# Patient Record
Sex: MALE | Race: WHITE | NOT HISPANIC OR LATINO | URBAN - METROPOLITAN AREA
[De-identification: names, ages, dates, MRNs, and addresses within clinical notes are randomized per-mention and may not be internally consistent; named-entity substitution may affect disease eponyms.]

---

## 2018-03-15 ENCOUNTER — EMERGENCY (EMERGENCY)
Facility: HOSPITAL | Age: 38
LOS: 1 days | Discharge: HOME | End: 2018-03-15
Attending: EMERGENCY MEDICINE | Admitting: EMERGENCY MEDICINE
Payer: MEDICAID

## 2018-03-15 VITALS
HEART RATE: 74 BPM | OXYGEN SATURATION: 100 % | SYSTOLIC BLOOD PRESSURE: 140 MMHG | TEMPERATURE: 98 F | DIASTOLIC BLOOD PRESSURE: 85 MMHG | RESPIRATION RATE: 18 BRPM | WEIGHT: 240.08 LBS | HEIGHT: 74 IN

## 2018-03-15 VITALS
HEART RATE: 65 BPM | SYSTOLIC BLOOD PRESSURE: 121 MMHG | RESPIRATION RATE: 18 BRPM | OXYGEN SATURATION: 100 % | DIASTOLIC BLOOD PRESSURE: 78 MMHG | TEMPERATURE: 98 F

## 2018-03-15 DIAGNOSIS — R69 ILLNESS, UNSPECIFIED: ICD-10-CM

## 2018-03-15 DIAGNOSIS — F19.980 OTHER PSYCHOACTIVE SUBSTANCE USE, UNSPECIFIED WITH PSYCHOACTIVE SUBSTANCE-INDUCED ANXIETY DISORDER: ICD-10-CM

## 2018-03-15 LAB
ALBUMIN SERPL ELPH-MCNC: 4.1 G/DL — SIGNIFICANT CHANGE UP (ref 3.3–5)
ALP SERPL-CCNC: 60 U/L — SIGNIFICANT CHANGE UP (ref 40–120)
ALT FLD-CCNC: 20 U/L — SIGNIFICANT CHANGE UP (ref 12–78)
ANION GAP SERPL CALC-SCNC: 6 MMOL/L — SIGNIFICANT CHANGE UP (ref 5–17)
APAP SERPL-MCNC: <2 UG/ML — LOW (ref 10–30)
APPEARANCE UR: CLEAR — SIGNIFICANT CHANGE UP
AST SERPL-CCNC: 18 U/L — SIGNIFICANT CHANGE UP (ref 15–37)
BASOPHILS # BLD AUTO: 0.1 K/UL — SIGNIFICANT CHANGE UP (ref 0–0.2)
BASOPHILS NFR BLD AUTO: 1.3 % — SIGNIFICANT CHANGE UP (ref 0–2)
BILIRUB SERPL-MCNC: 0.4 MG/DL — SIGNIFICANT CHANGE UP (ref 0.2–1.2)
BILIRUB UR-MCNC: NEGATIVE — SIGNIFICANT CHANGE UP
BUN SERPL-MCNC: 8 MG/DL — SIGNIFICANT CHANGE UP (ref 7–23)
CALCIUM SERPL-MCNC: 8.8 MG/DL — SIGNIFICANT CHANGE UP (ref 8.5–10.1)
CHLORIDE SERPL-SCNC: 106 MMOL/L — SIGNIFICANT CHANGE UP (ref 96–108)
CO2 SERPL-SCNC: 26 MMOL/L — SIGNIFICANT CHANGE UP (ref 22–31)
COLOR SPEC: SIGNIFICANT CHANGE UP
CREAT SERPL-MCNC: 1 MG/DL — SIGNIFICANT CHANGE UP (ref 0.5–1.3)
DIFF PNL FLD: NEGATIVE — SIGNIFICANT CHANGE UP
EOSINOPHIL # BLD AUTO: 0.2 K/UL — SIGNIFICANT CHANGE UP (ref 0–0.5)
EOSINOPHIL NFR BLD AUTO: 2.1 % — SIGNIFICANT CHANGE UP (ref 0–6)
GLUCOSE SERPL-MCNC: 85 MG/DL — SIGNIFICANT CHANGE UP (ref 70–99)
GLUCOSE UR QL: NEGATIVE — SIGNIFICANT CHANGE UP
HCT VFR BLD CALC: 40.8 % — SIGNIFICANT CHANGE UP (ref 39–50)
HGB BLD-MCNC: 13.9 G/DL — SIGNIFICANT CHANGE UP (ref 13–17)
KETONES UR-MCNC: NEGATIVE — SIGNIFICANT CHANGE UP
LEUKOCYTE ESTERASE UR-ACNC: NEGATIVE — SIGNIFICANT CHANGE UP
LIDOCAIN IGE QN: 127 U/L — SIGNIFICANT CHANGE UP (ref 73–393)
LYMPHOCYTES # BLD AUTO: 2.1 K/UL — SIGNIFICANT CHANGE UP (ref 1–3.3)
LYMPHOCYTES # BLD AUTO: 28.4 % — SIGNIFICANT CHANGE UP (ref 13–44)
MCHC RBC-ENTMCNC: 29.9 PG — SIGNIFICANT CHANGE UP (ref 27–34)
MCHC RBC-ENTMCNC: 34.1 GM/DL — SIGNIFICANT CHANGE UP (ref 32–36)
MCV RBC AUTO: 87.9 FL — SIGNIFICANT CHANGE UP (ref 80–100)
MONOCYTES # BLD AUTO: 0.7 K/UL — SIGNIFICANT CHANGE UP (ref 0–0.9)
MONOCYTES NFR BLD AUTO: 9.7 % — HIGH (ref 1–9)
NEUTROPHILS # BLD AUTO: 4.2 K/UL — SIGNIFICANT CHANGE UP (ref 1.8–7.4)
NEUTROPHILS NFR BLD AUTO: 58.4 % — SIGNIFICANT CHANGE UP (ref 43–77)
NITRITE UR-MCNC: NEGATIVE — SIGNIFICANT CHANGE UP
PCP SPEC-MCNC: SIGNIFICANT CHANGE UP
PH UR: 7 — SIGNIFICANT CHANGE UP (ref 5–8)
PLATELET # BLD AUTO: 305 K/UL — SIGNIFICANT CHANGE UP (ref 150–400)
POTASSIUM SERPL-MCNC: 4.2 MMOL/L — SIGNIFICANT CHANGE UP (ref 3.5–5.3)
POTASSIUM SERPL-SCNC: 4.2 MMOL/L — SIGNIFICANT CHANGE UP (ref 3.5–5.3)
PROT SERPL-MCNC: 8.2 G/DL — SIGNIFICANT CHANGE UP (ref 6–8.3)
PROT UR-MCNC: NEGATIVE — SIGNIFICANT CHANGE UP
RBC # BLD: 4.64 M/UL — SIGNIFICANT CHANGE UP (ref 4.2–5.8)
RBC # FLD: 11.8 % — SIGNIFICANT CHANGE UP (ref 10.3–14.5)
SALICYLATES SERPL-MCNC: 1.8 MG/DL — LOW (ref 2.8–20)
SODIUM SERPL-SCNC: 138 MMOL/L — SIGNIFICANT CHANGE UP (ref 135–145)
SP GR SPEC: 1 — LOW (ref 1.01–1.02)
UROBILINOGEN FLD QL: NEGATIVE — SIGNIFICANT CHANGE UP
WBC # BLD: 7.2 K/UL — SIGNIFICANT CHANGE UP (ref 3.8–10.5)
WBC # FLD AUTO: 7.2 K/UL — SIGNIFICANT CHANGE UP (ref 3.8–10.5)

## 2018-03-15 PROCEDURE — 80053 COMPREHEN METABOLIC PANEL: CPT

## 2018-03-15 PROCEDURE — 90792 PSYCH DIAG EVAL W/MED SRVCS: CPT | Mod: GT

## 2018-03-15 PROCEDURE — 81003 URINALYSIS AUTO W/O SCOPE: CPT

## 2018-03-15 PROCEDURE — 85027 COMPLETE CBC AUTOMATED: CPT

## 2018-03-15 PROCEDURE — 83690 ASSAY OF LIPASE: CPT

## 2018-03-15 PROCEDURE — 87086 URINE CULTURE/COLONY COUNT: CPT

## 2018-03-15 PROCEDURE — 99284 EMERGENCY DEPT VISIT MOD MDM: CPT

## 2018-03-15 PROCEDURE — 93005 ELECTROCARDIOGRAM TRACING: CPT

## 2018-03-15 PROCEDURE — 80307 DRUG TEST PRSMV CHEM ANLYZR: CPT

## 2018-03-15 RX ORDER — SODIUM CHLORIDE 9 MG/ML
3 INJECTION INTRAMUSCULAR; INTRAVENOUS; SUBCUTANEOUS ONCE
Qty: 0 | Refills: 0 | Status: COMPLETED | OUTPATIENT
Start: 2018-03-15 | End: 2018-03-15

## 2018-03-15 RX ORDER — CLONAZEPAM 1 MG
2 TABLET ORAL ONCE
Qty: 0 | Refills: 0 | Status: DISCONTINUED | OUTPATIENT
Start: 2018-03-15 | End: 2018-03-15

## 2018-03-15 RX ADMIN — Medication 2 MILLIGRAM(S): at 13:30

## 2018-03-15 RX ADMIN — SODIUM CHLORIDE 3 MILLILITER(S): 9 INJECTION INTRAMUSCULAR; INTRAVENOUS; SUBCUTANEOUS at 13:24

## 2018-03-15 NOTE — ED ADULT NURSE NOTE - OBJECTIVE STATEMENT
Pt. A&Ox4, brought in via EMS from therapist office for snorting crushed Wellbutrin. Pt. anxious but cooperative at bedside. Denies any pain. States he has been snorting it for the past few days and has been more anxious since. Denies any SI/HI or hallucinations. Smokes weed almost daily and cigarettes.  Labs drawn and sent as ordered. VSS in triage. MD at bedside for eval. Will continue to monitor.

## 2018-03-15 NOTE — ED PROVIDER NOTE - OBJECTIVE STATEMENT
Pt is a 38 yo male who presents to the ED with a cc of depression.  PMHx of anxiety, depression, HTN, substance abuse.  Pt follows with [facility].  He reports that for the last 2 days he has been crushing and snorting his Welbutrin.  When questioned Pt is a 36 yo male who presents to the ED with a cc of depression.  PMHx of anxiety, depression, HTN, substance abuse (heroin, THC, prescription medications).  Pt follows with [facility].  He reports that for the last 2 days he has been crushing and snorting his Wellbutrin.  When questioned about what made him do this pt reports that he had been feeling increasing anxiety and depression over the last several weeks.  He was concerned that his medication regime might not be working.  He spoke with a friend regarding this and was told that his friend crushed and snorted his Wellbutrin and he thought that this made it work better.  Pt reports that he was only attempting this to help himself and that he was not trying to harm himself.  Since beginning to snort the medication he reports worsening anxiety, states that he is not sleeping, and he just does not feel like himself (reports that he snorted the prescription several times yesterday but only once today and did not take more then his normal prescription).  Today while at his counseling session he reports that he felt like he was going to die so he spoke with his therapist and told them what he was doing.  EMS was called and pt was taken to the ED.  Denies SI/HI.  Pt has no previous history of suicide attempts but does have one prior inpatient admission to [facility] at the age of 24 for depression.  He follows regularly with a psychiatrist but states that he just does not think the medication is helping him anymore.  Pt reports that he wants to feel better.  Denies fever, chills, N/V, CP, SOB, abd pain, ext numbness or weakness.

## 2018-03-15 NOTE — ED BEHAVIORAL HEALTH ASSESSMENT NOTE - REFERRAL / APPOINTMENT DETAILS
Return to [facility]; provided referrals for substance abuse/psychiatric treatment

## 2018-03-15 NOTE — ED PROVIDER NOTE - CARE PLAN
Principal Discharge DX:	Depression  Assessment and plan of treatment:	Return to the ED for any new or worsening symptoms  Take your medication as prescribed  Follow up with your PMD in 2-3 days for a recheck   Advance activity as tolerated  Secondary Diagnosis:	Anxiety  Secondary Diagnosis:	Substance abuse

## 2018-03-15 NOTE — ED PROVIDER NOTE - PROGRESS NOTE DETAILS
Pt medically cleared.  Seen by telepsych and cleared for discharge home with outpatient follow up.  There is concern about the therapy and medication regime pt is under and it is recommended that he seek a different psychiatrist at this time.  Pt is in agreement.  Referrals provided.  All questions answered, father at bedside, copy of results provided.  Pt discharged home with outpatient follow up

## 2018-03-15 NOTE — ED ADULT NURSE REASSESSMENT NOTE - NS ED NURSE REASSESS COMMENT FT1
Pt. spoke with MD on telepysch with ED tech at bedside. ER MD waiting for call to discuss plan. Denies any pain or other discomforts at this time. VSS. Will continue to monitor.

## 2018-03-15 NOTE — ED ADULT TRIAGE NOTE - CHIEF COMPLAINT QUOTE
Patient was at [facility] and told therapists he crushed his wellbutrin and snorted it. Hx of substance abuse.

## 2018-03-15 NOTE — ED BEHAVIORAL HEALTH ASSESSMENT NOTE - CASE SUMMARY
see above; 36 y/o M with psychiatric history significant for both substance and psychiatric pathology, presenting s/p medication reaction due to misuse, now resolved. Stable for outpatient level of care. No SI/HI/I/P. Family corroborates. Safe for d/c.

## 2018-03-15 NOTE — ED BEHAVIORAL HEALTH ASSESSMENT NOTE - RISK ASSESSMENT
Protective factors: Responsibility to family and others, Identifies reasons for living, Future oriented, Supportive social network or family, Fear of death or dying due to pain/suffering, High spirituality, Engaged in work or school, Positive therapeutic relationships, Ability to cope with stress  Protective factors: Substance abuse/dependence, Access to means (pills, firearms, etc.)  Assessment: Low risk

## 2018-03-15 NOTE — ED BEHAVIORAL HEALTH ASSESSMENT NOTE - OTHER PAST PSYCHIATRIC HISTORY (INCLUDE DETAILS REGARDING ONSET, COURSE OF ILLNESS, INPATIENT/OUTPATIENT TREATMENT)
hospitalized at [facility] in 2005, currently in treatment at [facility], psychiatrist is Dr. Thomas    Multiple rehab and detox programs

## 2018-03-15 NOTE — ED PROVIDER NOTE - CONSTITUTIONAL, MLM
normal... Well appearing, well nourished, awake, alert, oriented to person, place, time/situation Well appearing, well nourished, awake, alert, oriented to person, place, time/situation anxious at bedside but cooperative

## 2018-03-15 NOTE — ED BEHAVIORAL HEALTH ASSESSMENT NOTE - DESCRIPTION
LEISA Lives in [facility] in [place] alone, paid for by father who is a retired  calm and cooperative in ED    ICU Vital Signs Last 24 Hrs  T(C): 36.9 (15 Mar 2018 16:26), Max: 36.9 (15 Mar 2018 16:26)  T(F): 98.4 (15 Mar 2018 16:26), Max: 98.4 (15 Mar 2018 16:26)  HR: 65 (15 Mar 2018 16:26) (65 - 74)  BP: 121/78 (15 Mar 2018 16:26) (121/78 - 140/85)  BP(mean): --  ABP: --  ABP(mean): --  RR: 18 (15 Mar 2018 16:26) (18 - 18)  SpO2: 100% (15 Mar 2018 16:26) (100% - 100%)

## 2018-03-15 NOTE — ED BEHAVIORAL HEALTH ASSESSMENT NOTE - SUICIDE PROTECTIVE FACTORS
Responsibility to family and others/High spirituality/Engaged in work or school/Positive therapeutic relationships/Ability to cope with stress/Identifies reasons for living/Future oriented/Fear of death or dying due to pain/suffering/Supportive social network or family

## 2018-03-15 NOTE — ED BEHAVIORAL HEALTH ASSESSMENT NOTE - SUMMARY
This 36 y/o M is presenting to the [facility] ED with anxiety in the context of having taken his wellbutrin in a non-prescribed manner (snorting). The patient denies suicidal ideation, homicidal ideation, and AVH, has numerous protective factors (future orientation, motivated for treatment, hopefulness, supportive family, etc), and is not seeking admission. He is not an imminent or substantial danger to himself or others and does not meet criteria for inpatient psychiatric hospitalization. He would be best served by seeking treatment for his substance use issues and, considering the questionable nature of his current pharmacologic regimen, consider alternative mental health treatment more in line with his needs. This 38 y/o M is presenting to the [facility] ED with anxiety in the context of having taken his wellbutrin in a non-prescribed manner (snorting), which resulted in significant anxiety. This has now resolved and patient is motivated to seek ongoing outpatient care with his current providers, although he is considering a new psychiatrist/dual dx treatment center. The patient denies suicidal ideation, homicidal ideation, and AVH, has numerous protective factors (future orientation, motivated for treatment, hopefulness, supportive family, etc), and is not seeking admission. He is not an imminent or substantial danger to himself or others and does not meet criteria for inpatient psychiatric hospitalization. He would be best served by seeking treatment for his substance use issues and, considering the questionable nature of his current pharmacologic regimen, consider alternative mental health treatment more in line with his needs.

## 2018-03-16 LAB
CULTURE RESULTS: NO GROWTH — SIGNIFICANT CHANGE UP
SPECIMEN SOURCE: SIGNIFICANT CHANGE UP

## 2018-05-25 NOTE — ED BEHAVIORAL HEALTH ASSESSMENT NOTE - JUDGMENT (REGARDING EVERYDAY EVENTS)
Shayy Medellin, RN, BSN      Fair

## 2021-01-12 NOTE — ED BEHAVIORAL HEALTH ASSESSMENT NOTE - HPI (INCLUDE ILLNESS QUALITY, SEVERITY, DURATION, TIMING, CONTEXT, MODIFYING FACTORS, ASSOCIATED SIGNS AND SYMPTOMS)
I have reviewed and discussed the case with the advanced practice clinician (Kaylee Hobbs PA-C).  I have spent face to face time with the patient and agree with the findings except where otherwise documented.     Brief summary:    55 year old female with PMH of obesity, COPD on 2.5 L O2 at home, CHF, HTN, depression, DMT2, and schizophrenia presents for a medical screening exam. Patient lives with her son who is currently a patient in the emergency department and anticipated to be hospitalized.  Patient is anxious that her son is not home with her, and therefore brought to emergency department for evaluation.    Patient feels significantly anxious and exhibits an anxious mood and affect on exam.    Patient denies any further acute physical symptoms.  Physical exam otherwise unremarkable.        MDM   Patient was seen by ED care manager who checked into patient's support at home.  Patient is followed by [facility] and is in contact with  almost daily.    Patient has an outpatient  through [facility].  A message was left with this individual, and plan is to arrange for home physical therapy.    Patient discharged home from ED.    Impression:  Acute anxiety  Medical screening exam      ED Course     ED Course as of Jan 11 2102   Mon Jan 11, 2021   2444 Dr. Prado saw patient, agrees patient does not need further emergent evaluation in ED. Ginger spoke extensively with patient who is agreeable to go home with continued check ins from  and her home health aids all already in place. Advised patient to return for any changes. Patient agrees to plan.    [OS]      ED Course User Index  [OS] SUDHEER Saldaña MD  01/11/21 5085     THIS REPRESENTS AN INCOMPLETE NOTE CURRENTLY BEING WRITTEN    Patient is a 37 year old single male; domiciled alone in an apartment in [facility]; noncaregiver; unemployed; PPHx of substance abuse (heroin, MJ, prescription pills, etc) no history of complicated withdrawal; hospitalized at least once before at [facility] at age 24; no known suicide attempts; no known history of violence or arrests; PMHx of HLD; presenting with anxiety; in the setting of having been snorting his Wellbutrin for the past few days.    Patient was interviewed alone via telehealth device, patient engaged well without any difficulties. He presented as calm, cooperative, and reliable. He states that he has a history of substance abuse, has been clean from heroin for ~12 years (on suboxone 4mg), and uses MJ frequently (although prior to most recent use had been clean for ~a month). He reports that he sees Dr. Thomas, is prescribed Klonopin and Xanax XR for anxiety, and Wellbutrin and effexor for depression and anxiety. He has felt the medications were not working, but before going back to Dr. Thomas, a "friend" he had made through AA/NA turned him onto the idea of crushing and snorting his Wellbutrin "to make it work better." He states he started doing this for a few days with both his own prescription and those obtained surreptitiously through his friend, however despite a brief immediate relief, he was left feeling dizzy and with increased anxiety after. He states he did this at his therapist's offive    The patient denies depression or other significant mood symtpoms.  Specifically, the patient denies manic sympotms, past and present.  The patient denies auditory or visual hallucinations, and no delusions could be elicited on direct questioning.  The patient denies suicidal idation, homicidal ideation, intent, or plan. THIS REPRESENTS AN INCOMPLETE NOTE CURRENTLY BEING WRITTEN    Patient is a 37 year old single male; domiciled alone in an apartment in [place]; noncaregiver; unemployed; PPHx of substance abuse (heroin, MJ, prescription pills, etc) no history of complicated withdrawal; hospitalized at least once before at [facility] at age 24; no known suicide attempts; no known history of violence or arrests; PMHx of HLD; presenting with anxiety; in the setting of having been snorting his Wellbutrin for the past few days.    Patient was interviewed alone via telehealth device, patient engaged well without any difficulties. He presented as calm, cooperative, and reliable. He states that he has a history of substance abuse, has been clean from heroin for ~12 years (on suboxone 4mg), and uses MJ frequently (although prior to most recent use had been clean for ~a month). He reports that he sees Dr. Thomas, is prescribed Klonopin and Xanax XR for anxiety, and Wellbutrin and effexor for depression and anxiety. He has felt the medications were not working, but before going back to Dr. Thomas, a "friend" he had made through AA/NA turned him onto the idea of crushing and snorting his Wellbutrin "to make it work better." He states he started doing this for a few days with both his own prescription and those obtained surreptitiously through his friend, however despite a brief immediate relief, he was left feeling dizzy and with increased anxiety after. He states he did this at his therapist's office today and it made him feel "like I was going to die" and so his father brought him to the hospital. Here, he denies suicidal ideation, denies homicidal ideation, denies AVH. He states he is not sure his current treatment is working for him, as in addition to prescribing him questionable regimens of medication, the provider also utilizes holistic medicine techniques to choose medication (bi-directional O-ring test) of which the patient is skeptical. Otherwise, the patient is future oriented towards getting better and obtaining employment and a romantic relationship, hopeful that he can get better and obtain financial independence, and is motivated to find alternative treatment. The patient denies depression or other significant mood symptoms.  Specifically, the patient denies manic symptoms in the absence of drug use, past and present.  The patient denies auditory or visual hallucinations, and no delusions could be elicited on direct questioning.  The patient denies suicidal idation, homicidal ideation, intent, or plan. He states his plan is to find further treatment with our referrals, and to go home, take his medications as prescribed, and continue to attend his NA/AA meetings.    Collateral obtained by MANNY Skaggs:   "Patient is a 37 year old single  male domiciled in a private residence in [place], patient lives alone has no dependents, patient is a non-caregiver, patient is unemployed, patient has no current legals, no history of violence or aggression , no access to weapons, patient has history of anxiety, patient has history of multiple rehabilitation and detox programs, patient has history of Opioid, Cannabis and Sedative use disorders, patient attends NA/AA and attends [facility] in [place], there is no family history of substance or mental health, patient has no history of trauma or medical issues.    ED course: calm and cooperative, given 2 mg of Klonopin, no restraints utilized    Collateral: Jalen Tompkins  father    Collateral reports that he dropped patient at outpatient treatment today at which time patient was at baseline. Collateral states that patient called him as he was being transported by EMS “for snorting Wellbutrin in the bathroom with a friend before group” Collateral denies concern for patient safety and reports that patient is satisfied with outpatient and private psychiatrist. Collateral will to take patient home upon discharge." Patient is a 37 year old single male; domiciled alone in an apartment in [facility]; noncaregiver; unemployed; PPHx of substance abuse (heroin, MJ, prescription pills, etc) no history of complicated withdrawal; hospitalized at least once before at [facility] at age 24; no known suicide attempts; no known history of violence or arrests; PMHx of HLD; presenting with anxiety; in the setting of having been snorting his Wellbutrin for the past few days.    Patient was interviewed alone via telehealth device, patient engaged well without any difficulties. He presented as calm, cooperative, and reliable. He states that he has a history of substance abuse, has been clean from heroin for ~12 years (on suboxone 4mg), and uses MJ frequently (although prior to most recent use had been clean for ~a month). He reports that he sees Dr. Thomas, is prescribed Klonopin and Xanax XR for anxiety, and Wellbutrin and effexor for depression and anxiety. He has felt the medications were not working, but before going back to Dr. Thomas, a "friend" he had made through AA/NA turned him onto the idea of crushing and snorting his Wellbutrin "to make it work better." He states he started doing this for a few days with both his own prescription and those obtained surreptitiously through his friend, however despite a brief immediate relief, he was left feeling dizzy and with increased anxiety after. He states he did this at his therapist's office today and it made him feel "like I was going to die" and so his father brought him to the hospital. Here, he denies suicidal ideation, denies homicidal ideation, denies AVH. He states he is not sure his current treatment is working for him, as in addition to prescribing him questionable regimens of medication, the provider also utilizes holistic medicine techniques to choose medication (bi-directional O-ring test) of which the patient is skeptical. Otherwise, the patient is future oriented towards getting better and obtaining employment and a romantic relationship, hopeful that he can get better and obtain financial independence, and is motivated to find alternative treatment. The patient denies depression or other significant mood symptoms.  Specifically, the patient denies manic symptoms in the absence of drug use, past and present.  The patient denies auditory or visual hallucinations, and no delusions could be elicited on direct questioning.  The patient denies suicidal idation, homicidal ideation, intent, or plan. He states his plan is to find further treatment with our referrals, and to go home, take his medications as prescribed, and continue to attend his NA/AA meetings.    Collateral obtained by Mary Long LM:   "Patient is a 37 year old single  male domiciled in a private residence in [facility], patient lives alone has no dependents, patient is a non-caregiver, patient is unemployed, patient has no current legals, no history of violence or aggression , no access to weapons, patient has history of anxiety, patient has history of multiple rehabilitation and detox programs, patient has history of Opioid, Cannabis and Sedative use disorders, patient attends NA/AA and attends [facility] in [place], there is no family history of substance or mental health, patient has no history of trauma or medical issues.    ED course: calm and cooperative, given 2 mg of Klonopin, no restraints utilized    Collateral: Jalen Tompkins  father    Collateral reports that he dropped patient at outpatient treatment today at which time patient was at baseline. Collateral states that patient called him as he was being transported by EMS “for snorting Wellbutrin in the bathroom with a friend before group” Collateral denies concern for patient safety and reports that patient is satisfied with outpatient and private psychiatrist. Collateral will to take patient home upon discharge."

## 2021-06-03 ENCOUNTER — EMERGENCY (EMERGENCY)
Facility: HOSPITAL | Age: 41
LOS: 1 days | Discharge: HOME | End: 2021-06-03
Admitting: EMERGENCY MEDICINE
Payer: MEDICAID

## 2021-06-03 VITALS
DIASTOLIC BLOOD PRESSURE: 76 MMHG | RESPIRATION RATE: 16 BRPM | SYSTOLIC BLOOD PRESSURE: 130 MMHG | TEMPERATURE: 98 F | HEIGHT: 74 IN | OXYGEN SATURATION: 100 % | HEART RATE: 96 BPM

## 2021-06-03 PROBLEM — F32.9 MAJOR DEPRESSIVE DISORDER, SINGLE EPISODE, UNSPECIFIED: Chronic | Status: ACTIVE | Noted: 2018-03-15

## 2021-06-03 PROBLEM — F41.9 ANXIETY DISORDER, UNSPECIFIED: Chronic | Status: ACTIVE | Noted: 2018-03-15

## 2021-06-03 PROBLEM — I10 ESSENTIAL (PRIMARY) HYPERTENSION: Chronic | Status: ACTIVE | Noted: 2018-03-15

## 2021-06-03 PROBLEM — F19.10 OTHER PSYCHOACTIVE SUBSTANCE ABUSE, UNCOMPLICATED: Chronic | Status: ACTIVE | Noted: 2018-03-15

## 2021-06-03 PROCEDURE — 99284 EMERGENCY DEPT VISIT MOD MDM: CPT | Mod: 25

## 2021-06-03 PROCEDURE — 93010 ELECTROCARDIOGRAM REPORT: CPT

## 2021-06-03 NOTE — ED PROVIDER NOTE - PATIENT PORTAL LINK FT
You can access the FollowMyHealth Patient Portal offered by [facility] by registering at the following website: [email or web address]. By joining [facility]’s FollowMyHealth portal, you will also be able to view your health information using other applications (apps) compatible with our system.

## 2021-06-03 NOTE — ED PROVIDER NOTE - CLINICAL SUMMARY MEDICAL DECISION MAKING FREE TEXT BOX
42 y/o M with PMHx of former heroin substance abuse and has not used for 10 year now on medication,  HLD presents to the ED after abnormal EKG yesterday at PMD office. EKG in ED normal sinus rhythm and will refer pt to outpatient cardiology. 40 y/o M with HLD presents to the ED after "abnormal EKG" yesterday at PMD office ("they said I might have a blood clot"). EKG in ED normal sinus rhythm. No CP or SOb at present.  No known PE risk factors.  Vitals normal.  well appearing and entirely asymptomatic.  Has access to f/u.  Declining CDU for stress (occasional non exertional CP),  and will f/u with his outpt docs as scheduled. Return precautions discussed.

## 2021-06-03 NOTE — ED PROVIDER NOTE - OBJECTIVE STATEMENT
40 y/o M with PMHx of former heroin substance abuse and has not used for 10 year now on medication,  HLD presents to the ED after being referred by the PMHx for abnormal EKG. Pt denies complaints, Went to PMD yesterday for routine blood work and come to the ED for an abdominal EKG. Pt does not have the EKG from yesterday. Lab done yesterday were CMP, HbA1C and Vit D which were all WNL reviewed by myself. Reporting intermittent chest pain lasting about 15 seconds are resolving spontaneously over the last 2 months. Pt referred to cardiology by PMD. Denies chest pain, SOB, nausea, vomiting, abdominal pain. 40 y/o M with PMHx of former heroin substance abuse and has not used for 10 year now on medication,  HLD presents to the ED after being referred by the PMHx for abnormal EKG. Pt denies complaints, Went to PMD yesterday for routine blood work and come to the ED for an abdominal EKG. Pt does not have the EKG from yesterday. Lab done yesterday were CMP, HbA1C and Vit D which were all WNL reviewed by myself. Reporting intermittent chest pain lasting about 15 seconds are resolving spontaneously over the last 2 months, happened on 2 separate occassions. Pt referred to cardiology by PMD. Denies chest pain, SOB, nausea, vomiting, abdominal pain. 42 y/o M with PMHx of former heroin substance abuse and has not used for 10 year now on MAT,  HLD presents to the ED after being referred by the pmd for abnormal EKG. Pt denies complaints, Went to PMD yesterday for routine blood work and told to come to the ED for an abdominal EKG. Pt does not have the EKG from yesterday. Lab done yesterday were CMP, HbA1C and Vit D which were all WNL reviewed by myself (JENA Leal). Reporting intermittent chest pain lasting about 15 seconds are resolving spontaneously over the last 2 months, happened on 2 separate occasions. Non exertional.  States he feels well during usual exertion. Pt referred to cardiology by PMD. Denies chest pain, SOB, nausea, vomiting, abdominal pain.  Is here with father. no travel, sick contacts.

## 2021-06-03 NOTE — ED ADULT TRIAGE NOTE - CHIEF COMPLAINT QUOTE
Pt states he has a routine medical workup yesterday and was told his EKG was abnormal and he should go to the ED. States he decided to come today. Denies chest pain, denies PMH.

## 2021-06-03 NOTE — ED PROVIDER NOTE - ATTENDING CONTRIBUTION TO CARE
ED Attending (Dr Guerrero): I have personally performed a face to face bedside history and physical examination of this patient.  I have discussed the case with the PA and  I have personally authored the following components: HPI, ROS, Physical Exam and MDM in addition to reviewing and revising the rest of the Provider Note. 42 y/o M with HLD presents to the ED after "abnormal EKG" yesterday at PMD office ("they said I might have a blood clot"). EKG in ED normal sinus rhythm. No CP or SOb at present.  No known PE risk factors.  Vitals normal.  well appearing and entirely asymptomatic.  Has access to f/u.  Declining CDU for stress (occasional non exertional CP),  and will f/u with his outpt docs as scheduled. Return precautions discussed.

## 2021-06-03 NOTE — ED PROVIDER NOTE - PROGRESS NOTE DETAILS
Patient offered and declines to stay in CDU for stress test, low suspicion for ACS, pt stable to be discharged from ED.

## 2021-06-03 NOTE — ED PROVIDER NOTE - NSFOLLOWUPINSTRUCTIONS_ED_ALL_ED_FT
Advance activity as tolerated.  Continue all previously prescribed medications as directed unless otherwise instructed.  Follow up with your primary care physician in 48-72 hours- bring copies of your results.  Return to the ER for worsening or persistent symptoms, and/or ANY NEW OR CONCERNING SYMPTOMS. If you have issues obtaining follow up, please call: [phone] to obtain a doctor or specialist who takes your insurance in your area.  You may call [phone] to make an appointment with the internal medicine clinic.

## 2021-06-03 NOTE — ED PROVIDER NOTE - NSFOLLOWUPCLINICS_GEN_ALL_ED_FT
[facility]  Cardiology  [address] [place] [id number]  Phone: [phone]  Fax:

## 2021-09-10 NOTE — ED BEHAVIORAL HEALTH ASSESSMENT NOTE - SAFETY PLAN DETAILS
New Patient  New Issue    Referring MD: COOKIE Berger, DO    Work Comp: No    Occupation: Assembly    Coumadin/Blood Thinners: Yes    Diabetic: No    Dominant Hand: Left    MRSA Infection: No   Return to ER with worsening safety concerns; take medication as prescribed

## 2023-02-27 NOTE — H&P ADULT - HISTORY OF PRESENT ILLNESS
This is 42 year old male with PMH prior substance use, hypertension, anxiety who presents with left frontal IPH ICH 2. On 1/23 morning, he told his family that he was not feeling well and took 2 tabs of Vyvanse (usually takes as needed). Shortly after, he was at a group therapy meeting over the   phone when he complained to his father he was having a severe headache. He then became more lethargic, and developed nausea and vomited. He was brought to ED by his father. On arrival, noted to be lethargic, vomiting, with garbled speech. 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 Post CT, more lethargic with dilated right pupil and bilateral reactive but sluggish pupils. He was given mannitol and intubated for airway protection and was brought to neuro ICU. 1/25 HCT with enlarging R lateral ventricle and slightly worsening MLS concerning for trapped ventricle. 1/27 acute drop in SpO2, lavaged and suctioned out thick clot. Placed back on ACVC and FiO2 weaned from 70% to 40% overnight. Episode of emesis overnight. 1/28 exam worse in AM with T:100.3, given tylenol with improvement in temperature but still little to no movement on the L side (previously would localize arm to suction and spontaneously bend L leg). DHC and EVD placement. fter the procedure patient opened eyes for the first time and regained LUE/LLE movement. As he became febrile again this was less prominent. Ceftriaxone was switched to Zosyn. 1/29 HCT mild increase extraaxial fluid collection. 1/30 HCT unchanged. MTL discontinued. Zosyn and vancomycin switched to cefepime. 1/31 HCT shows worsening edema +/- extraaxial fluid collection, elevated ICPs restarted mannitol. 2/1 Mannitol 25 q8h restarted and was able to reduce ICP down to the low teens. Plans for EVD revision made by Dr Cueva for 2/2. 2/2 OR cancelled. MTL weaned. 2/8 EVD raised to 10. 2/9 EVD clamped, multiple loose BMs. 2/10 EVD removed 2/11-2/12 NGT displaced, no signs of aspiration 2/13 PSV x8 hours. Trach 2/14 PEG, agitated and hypotensive requiring bolus overnight. 2/15 very agitated and was started on Precedex. Started on oxycodone. Had further drainage from his fluid collection. 2/15: MRI brain done overnight and shows no underlying tumor. Mild leptomeningeal enhancement in left parietal region. 2/17: underwent wound revision with NSGY and plastics. 2/18: Fentanyl weaned off. Started seroquel with sleep hygiene. Tolerated 8 hrs of PSV and became apneic. 2/19: Tolerated 5 hrs PSV. Became apneic after. Seroquel increased to 50 as did not sleep the night prior on 25. Midline placed. TLC removed. Keppra discontinued due to signs of agitation. 2/20: More restless during the day. Increased oxycodone standing for possible pain and fentanyl w/d. Improved symptoms after. Tolerated 2 hrs CPAP in the day and 5 hrs in the afternoon. 2/21: DSA negative for vascular malformation. Tolerated 3-4 hrs of trach collar. 2/23 [facility]   42 year old male with PMH prior substance use, hypertension, anxiety who presents  to [facility] on 1/23 with complaints of severe headache, n/v, and lethgarywith left frontal IPH ICH 2.  On arrival, noted to be lethargic, vomiting, with garbled speech. CTH done showed large left frontal and basal ganglia IPH with mass effect and 8mm midline shift. Post CT, more lethargic with dilated right pupil and bilateral reactive but sluggish pupils. He was given mannitol and intubated for airway protection and was brought to neuro ICU. 1/25 CTH with enlarging R lateral ventricle and slightly worsening MLS concerning for trapped ventricle. 1/27 acute drop in SpO2, lavaged and suctioned out thick clot. Placed back on ACVC and FiO2 weaned from 70% to 40% overnight. Episode of emesis overnight. 1/28 exam worse in AM with T:100.3, given Tylenol with improvement in temperature but still little to no movement on the L side (previously would localize arm to suction and spontaneously bend L leg). DHC and EVD placement. after the procedure patient opened eyes for the first time and regained LUE/LLE movement. As he became febrile again this was less prominent. Ceftriaxone was switched to Zosyn. 1/29 CTH with mild increase extraaxial fluid collection. 1/30 CTH unchanged. MTL discontinued. Zosyn and vancomycin switched to cefepime. 1/31 CTH shows worsening edema +/- extraaxial fluid collection, elevated ICPs restarted mannitol. 2/1 Mannitol 25 q8h restarted and was able to reduce ICP down to the low teens. Plans for EVD revision made by Dr Cueva for 2/2. 2/2 OR cancelled. MTL weaned. 2/8 EVD raised to 10. 2/9 EVD clamped, multiple loose BMs. 2/10 EVD removed 2/11-2/12 NGT displaced, no signs of aspiration 2/13 PSV x8 hours. Trach 2/14 PEG, agitated and hypotensive requiring bolus overnight. 2/15 very agitated and was started on Precedex. Started on oxycodone. Had further drainage from his fluid collection. 2/15: MRI brain done overnight and shows no underlying tumor. Mild leptomeningeal enhancement in left parietal region. 2/17: underwent wound revision with NSGY and plastics. 2/18: Fentanyl weaned off. Started seroquel with sleep hygiene. Tolerated 8 hrs of PSV and became apneic. 2/19: Tolerated 5 hrs PSV. Became apneic after. Seroquel increased to 50 as did not sleep the night prior on 25. Midline placed. TLC removed. Keppra discontinued due to signs of agitation. 2/20: More restless during the day. Increased oxycodone standing for possible pain and fentanyl w/d. Improved symptoms after. Tolerated 2 hrs CPAP in the day and 5 hrs in the afternoon. 2/21: DSA negative for vascular malformation. Tolerated 3-4 hrs of trach collar. 2/23 TC

## 2023-02-27 NOTE — H&P ADULT - ASSESSMENT
y __ with a history of *** who presented to *** on *** with complaint of *** and found to have ***. Hospital course complicated by ***.       Admitted to [facility] acute inpatient rehab on ___ for ADL, gait, and functional impairments.     #   - Comprehensive Multidisciplinary Rehab Program: 3 hours a day, 5 days a week with PT/OT/SLP     P&O as needed    #    #    #    # Mood/Cognition:    # Sleep:  - Melatonin qHS    # Pain Management:  - Tylenol PRN    # GI/Bowel:  - Senna QHS, Miralax daily PRN  - GI ppx:    # /Bladder:   - Bladder scan x1 on admission, SC PRN  - Encourage timed voids every 4 hours while awake       # Skin/Pressure Injury:  - Skin assessment on admission: ***  - Monitor Incisions: ***  - Turn every 2 hours while in bed    # Diet:   - Diet Consistency/Modifications: ***  - Aspiration Precautions  - SLP consult for swallow function evaluation and treatment    # DVT ppx:  - ***  - SCDs  - Last Doppler on ***    # Restrictions/Precautions:  - Weight bearing status: ****  - ROM restrictions: ***  - Precautions:    ---------------  Outpatient Follow-up:      --------------    Goals: Safe discharge to home  Estimated Length of Stay: 10-14 days  Rehab Potential: Good  Medical Prognosis: Good  Estimated Disposition: Home with Home Care  ---------------    PRESCREEN COMPARISON:  I have reviewed the prescreen information and I have found no relevant changes between the preadmission screening and my post admission evaluation.    RATIONALE FOR INPATIENT ADMISSION: Patient demonstrates the following:  [X] Medically appropriate for rehabilitation admission  [X] Has attainable rehab goals with an appropriate initial discharge plan  [X]Has rehabilitation potential (expected to make a significant improvement within a reasonable period of time)  [X] Requires close medical management by a rehab physician, rehab nursing care, Hospitalist and comprehensive interdisciplinary team (including PT, OT and/or SLP, Prosthetics and Orthotics)

## 2023-02-28 ENCOUNTER — INPATIENT (INPATIENT)
Facility: HOSPITAL | Age: 43
LOS: 36 days | Discharge: ANOTHER HOSPITAL | DRG: 949 | End: 2023-04-06
Attending: STUDENT IN AN ORGANIZED HEALTH CARE EDUCATION/TRAINING PROGRAM | Admitting: STUDENT IN AN ORGANIZED HEALTH CARE EDUCATION/TRAINING PROGRAM
Payer: MEDICAID

## 2023-02-28 VITALS
DIASTOLIC BLOOD PRESSURE: 78 MMHG | WEIGHT: 193.79 LBS | TEMPERATURE: 98 F | HEIGHT: 75 IN | HEART RATE: 76 BPM | OXYGEN SATURATION: 97 % | RESPIRATION RATE: 16 BRPM | SYSTOLIC BLOOD PRESSURE: 120 MMHG

## 2023-02-28 DIAGNOSIS — I61.8 OTHER NONTRAUMATIC INTRACEREBRAL HEMORRHAGE: ICD-10-CM

## 2023-02-28 LAB
GLUCOSE BLDC GLUCOMTR-MCNC: 110 MG/DL — HIGH (ref 70–99)
GLUCOSE BLDC GLUCOMTR-MCNC: 118 MG/DL — HIGH (ref 70–99)
SARS-COV-2 RNA SPEC QL NAA+PROBE: SIGNIFICANT CHANGE UP

## 2023-02-28 PROCEDURE — 99223 1ST HOSP IP/OBS HIGH 75: CPT

## 2023-02-28 RX ORDER — DEXTROSE 50 % IN WATER 50 %
25 SYRINGE (ML) INTRAVENOUS ONCE
Refills: 0 | Status: DISCONTINUED | OUTPATIENT
Start: 2023-02-28 | End: 2023-03-03

## 2023-02-28 RX ORDER — SENNA PLUS 8.6 MG/1
5 TABLET ORAL AT BEDTIME
Refills: 0 | Status: DISCONTINUED | OUTPATIENT
Start: 2023-02-28 | End: 2023-04-06

## 2023-02-28 RX ORDER — SODIUM CHLORIDE 9 MG/ML
1000 INJECTION, SOLUTION INTRAVENOUS
Refills: 0 | Status: DISCONTINUED | OUTPATIENT
Start: 2023-02-28 | End: 2023-03-03

## 2023-02-28 RX ORDER — PETROLATUM,WHITE
1 JELLY (GRAM) TOPICAL
Refills: 0 | Status: ACTIVE | OUTPATIENT
Start: 2023-02-28 | End: 2024-01-27

## 2023-02-28 RX ORDER — ACETAMINOPHEN 500 MG
650 TABLET ORAL EVERY 6 HOURS
Refills: 0 | Status: DISCONTINUED | OUTPATIENT
Start: 2023-02-28 | End: 2023-02-28

## 2023-02-28 RX ORDER — INSULIN HUMAN 100 [IU]/ML
7 INJECTION, SOLUTION SUBCUTANEOUS EVERY 6 HOURS
Refills: 0 | Status: DISCONTINUED | OUTPATIENT
Start: 2023-02-28 | End: 2023-02-28

## 2023-02-28 RX ORDER — PANTOPRAZOLE SODIUM 20 MG/1
40 TABLET, DELAYED RELEASE ORAL DAILY
Refills: 0 | Status: DISCONTINUED | OUTPATIENT
Start: 2023-03-01 | End: 2023-04-06

## 2023-02-28 RX ORDER — ACETAMINOPHEN 500 MG
650 TABLET ORAL EVERY 6 HOURS
Refills: 0 | Status: DISCONTINUED | OUTPATIENT
Start: 2023-02-28 | End: 2023-04-06

## 2023-02-28 RX ORDER — OXYCODONE HYDROCHLORIDE 5 MG/1
5 TABLET ORAL EVERY 6 HOURS
Refills: 0 | Status: DISCONTINUED | OUTPATIENT
Start: 2023-02-28 | End: 2023-03-02

## 2023-02-28 RX ORDER — INSULIN LISPRO 100/ML
VIAL (ML) SUBCUTANEOUS
Refills: 0 | Status: DISCONTINUED | OUTPATIENT
Start: 2023-02-28 | End: 2023-03-03

## 2023-02-28 RX ORDER — POLYETHYLENE GLYCOL 3350 17 G/17G
17 POWDER, FOR SOLUTION ORAL DAILY
Refills: 0 | Status: DISCONTINUED | OUTPATIENT
Start: 2023-02-28 | End: 2023-04-06

## 2023-02-28 RX ORDER — GLUCAGON INJECTION, SOLUTION 0.5 MG/.1ML
1 INJECTION, SOLUTION SUBCUTANEOUS ONCE
Refills: 0 | Status: DISCONTINUED | OUTPATIENT
Start: 2023-02-28 | End: 2023-03-03

## 2023-02-28 RX ORDER — HEPARIN SODIUM 5000 [USP'U]/ML
5000 INJECTION INTRAVENOUS; SUBCUTANEOUS EVERY 8 HOURS
Refills: 0 | Status: DISCONTINUED | OUTPATIENT
Start: 2023-02-28 | End: 2023-03-01

## 2023-02-28 RX ORDER — DEXTROSE 50 % IN WATER 50 %
15 SYRINGE (ML) INTRAVENOUS ONCE
Refills: 0 | Status: DISCONTINUED | OUTPATIENT
Start: 2023-02-28 | End: 2023-03-03

## 2023-02-28 RX ORDER — QUETIAPINE FUMARATE 200 MG/1
50 TABLET, FILM COATED ORAL AT BEDTIME
Refills: 0 | Status: DISCONTINUED | OUTPATIENT
Start: 2023-02-28 | End: 2023-03-01

## 2023-02-28 RX ORDER — INSULIN GLARGINE 100 [IU]/ML
7 INJECTION, SOLUTION SUBCUTANEOUS AT BEDTIME
Refills: 0 | Status: DISCONTINUED | OUTPATIENT
Start: 2023-02-28 | End: 2023-03-03

## 2023-02-28 RX ORDER — DEXTROSE 50 % IN WATER 50 %
12.5 SYRINGE (ML) INTRAVENOUS ONCE
Refills: 0 | Status: DISCONTINUED | OUTPATIENT
Start: 2023-02-28 | End: 2023-03-03

## 2023-02-28 RX ORDER — LANOLIN ALCOHOL/MO/W.PET/CERES
6 CREAM (GRAM) TOPICAL AT BEDTIME
Refills: 0 | Status: DISCONTINUED | OUTPATIENT
Start: 2023-02-28 | End: 2023-04-06

## 2023-02-28 RX ORDER — GABAPENTIN 400 MG/1
600 CAPSULE ORAL AT BEDTIME
Refills: 0 | Status: DISCONTINUED | OUTPATIENT
Start: 2023-02-28 | End: 2023-03-01

## 2023-02-28 RX ORDER — TAMSULOSIN HYDROCHLORIDE 0.4 MG/1
0.4 CAPSULE ORAL AT BEDTIME
Refills: 0 | Status: DISCONTINUED | OUTPATIENT
Start: 2023-02-28 | End: 2023-02-28

## 2023-02-28 RX ORDER — NYSTATIN CREAM 100000 [USP'U]/G
1 CREAM TOPICAL
Refills: 0 | Status: DISCONTINUED | OUTPATIENT
Start: 2023-02-28 | End: 2023-04-06

## 2023-02-28 RX ORDER — INSULIN LISPRO 100/ML
7 VIAL (ML) SUBCUTANEOUS
Refills: 0 | Status: DISCONTINUED | OUTPATIENT
Start: 2023-02-28 | End: 2023-03-02

## 2023-02-28 RX ORDER — DOXAZOSIN MESYLATE 4 MG
2 TABLET ORAL AT BEDTIME
Refills: 0 | Status: DISCONTINUED | OUTPATIENT
Start: 2023-02-28 | End: 2023-03-02

## 2023-02-28 RX ORDER — INSULIN LISPRO 100/ML
VIAL (ML) SUBCUTANEOUS EVERY 6 HOURS
Refills: 0 | Status: DISCONTINUED | OUTPATIENT
Start: 2023-02-28 | End: 2023-02-28

## 2023-02-28 RX ORDER — INSULIN LISPRO 100/ML
7 VIAL (ML) SUBCUTANEOUS EVERY 6 HOURS
Refills: 0 | Status: DISCONTINUED | OUTPATIENT
Start: 2023-02-28 | End: 2023-02-28

## 2023-02-28 RX ADMIN — Medication 2 MILLIGRAM(S): at 22:17

## 2023-02-28 RX ADMIN — OXYCODONE HYDROCHLORIDE 5 MILLIGRAM(S): 5 TABLET ORAL at 18:21

## 2023-02-28 RX ADMIN — OXYCODONE HYDROCHLORIDE 5 MILLIGRAM(S): 5 TABLET ORAL at 17:41

## 2023-02-28 RX ADMIN — HEPARIN SODIUM 5000 UNIT(S): 5000 INJECTION INTRAVENOUS; SUBCUTANEOUS at 22:12

## 2023-02-28 RX ADMIN — Medication 6 MILLIGRAM(S): at 22:14

## 2023-02-28 RX ADMIN — GABAPENTIN 600 MILLIGRAM(S): 400 CAPSULE ORAL at 22:13

## 2023-02-28 RX ADMIN — Medication 1 APPLICATION(S): at 17:41

## 2023-02-28 RX ADMIN — Medication 7 UNIT(S): at 17:41

## 2023-02-28 RX ADMIN — QUETIAPINE FUMARATE 50 MILLIGRAM(S): 200 TABLET, FILM COATED ORAL at 22:12

## 2023-02-28 RX ADMIN — SENNA PLUS 5 MILLILITER(S): 8.6 TABLET ORAL at 22:12

## 2023-02-28 RX ADMIN — INSULIN GLARGINE 7 UNIT(S): 100 INJECTION, SOLUTION SUBCUTANEOUS at 22:16

## 2023-02-28 NOTE — PATIENT PROFILE ADULT - FUNCTIONAL ASSESSMENT - DAILY ACTIVITY 1.
Writer met with patient in order to discuss progress towards goal upon discharge. Pt has made fair progress, as patient has identified Playing video games and deescalating as positive coping skills. Writer provided support and encouraged patient to continue utilizing coping skills post discharge. Patient was receptive.     On the unit, patient was visible, interacting with peers. Patient was observed to be initially anxious, however upon discharge patient denies any anxiety. Patient reports intent to follow up with treatment and spend time with wife upon discharge. Pt denies SI and is able to contract for safety. Patient and writer completed safety planning form.     Patient has attended approximately 90% of psych rehab groups during current hospitalization. Patient was verbally engaged and able to tolerate session structure. Patient completed safety planning form.
4 = No assist / stand by assistance

## 2023-02-28 NOTE — H&P ADULT - NS_MD_PANP_GEN_ALL_CORE
-at goal today  -currently on HCTZ 12.5 mg QD, verapamil 120 mg BID  -Did not tolerate ACE, Norvasc, Chlorothiadone in the past, per last PCP notes  -continue lifestyle modification with low sodium diet and exercise   -discussed hypertension disease course and importance of treating high blood pressure  -patient understood and advised of risk of untreated blood pressure.  ER precautions were given   for symptoms of hypertensive urgency and emergency.   Attending and PA/NP shared services statement (NON-critical care):

## 2023-02-28 NOTE — H&P ADULT - NSHPPHYSICALEXAM_GEN_ALL_CORE
Constitutional - NAD, Comfortable  HEENT - NCAT, EOMI, left hemicraniectomy with sutures  Neck - Supple, No limited ROM. + trach Shiley 8 on TC  Chest - good chest expansion, good respiratory effort, CTAB   Cardio - warm and well perfused, RRR, no murmur  Abdomen -  Soft, NTND, +PEG  Extremities - No peripheral edema, No calf tenderness   Neurologic Exam:                    Cognitive -             Orientation: Awake, Alert, unable to assess orientation due to aphasia and cognitive deficits            Attention:  unable to assess due to aphasia and cognitive deficits; but follows commands inconsistently            Memory: unable to assess orientation due to aphasia and cognitive deficits            Thought: unable to assess due to aphasia and cognitive deficits     Speech - non-Fluent, impaired comprehension, +aphasia      Cranial Nerves - Limited due to comprehension impairment and aphasia; No facial asymmetry, Tongue midline, EOMI, Pupils dilated but reactive     Motor -                      LEFT    UE - at least 3/5 and moving spontaneously; exam limited by command following;  WNL                    RIGHT UE - ShAB 0/5, EF 0/5, EE 0/5, WE 0/5,  0/5                    LEFT    LE -  at least 3/5 and moving spontaneously; exam limited by command following                    RIGHT LE - HE 1/5, Hip adductor 1-2/5, KE 0/5, DF 0/5, PF 0/5        Sensory - decrease sensation to LT RUE     Reflexes - DTR 2+/ 4 , neg Grider's b/l, neg Babinski's b/l     Coordination - FTN / HTS unable to test due to cognitive and hemiparesis      OculoVestibular -  No nystagmus  Psychiatric - Mood stable, Affect WNL  Skin on admission: IAD to bilateral groin and sacrum; RUQ incision with sutures with palpable skull flap; Left hemicraniectomy with sutures MILLER; psoriasis BL shin Constitutional - NAD, Comfortable  HEENT - EOMI, left hemicraniectomy with sutures--No erythema no drainage  Neck -  + trach Shiley 8 on TC  Chest -  CTAB   Cardio - warm and well perfused, RRR, no murmur  Abdomen -  Soft, NTND, +PEG, Bone flap marsupialized in epigastric region.  Sutures with no significant erythema and no drainage.  Extremities - No peripheral edema, No calf tenderness   Neurologic Exam:                    Cognitive -             Orientation: Awake, Alert, unable to assess orientation due to aphasia and cognitive deficits            Attention:  Impaired; makes eye contact but follows commands inconsistently            Memory: Impaired            Thought: Impaired, Impulsive     Speech - non-verbal -(trached), impaired comprehension, +aphasia      Cranial Nerves - Limited due to comprehension impairment and aphasia; No facial asymmetry, Tongue midline, EOMI, Pupils dilated but reactive, +dysphagia     Motor -                      LEFT    UE - at least 3/5 and moving spontaneously; exam limited by command following;  WNL                    RIGHT UE - ShAB 0/5, EF 0/5, EE 0/5, WE 0/5,  0/5                    LEFT    LE -  at least 3/5 and moving spontaneously; exam limited by command following                    RIGHT LE - HE 1/5, Hip adductor 1/5, KE 0/5, DF 0/5, PF 0/5        Sensory - decrease sensation to LT-- RUE     Reflexes - DTR 2+/ 4 , neg Grider's b/l, neg Babinski's b/l     Coordination - FTN / HTS impaired on right;       OculoVestibular -  No nystagmus  Psychiatric - Mood stable, Affect WNL  Skin on admission: IAD to bilateral groin and sacrum; RUQ incision with sutures with palpable skull flap; Left hemicraniectomy with sutures MILLER; psoriasis BL shin

## 2023-02-28 NOTE — H&P ADULT - NSHPLABSRESULTS_GEN_ALL_CORE
2/26/23  Chemistry:    Glucose: 141    Sodium: 137    Potassium: 4.1    Blood Urea Nitrogen : 9    Creatinine: 0.58     Hematology:    WBC: 8.4    HgB: 10.7    Hct: 32.9    Platelets: 225     Cultures:     01/23/2023 COVID-19 PCR:  Not detected     Radiology:     MRI brain: Hematoma centered in left basal ganglia with left to right midline shift of approximately 5 mm at the level of third ventricle.  No definite evidence of underlying tumor. However, the presence of subacute blood products limits evaluation for enhancing mass. Subarachnoid hemorrhage with associated leptomeningeal enhancement at left vertex. Status post left frontoparietal craniotomy with overlying fluid collection in the scalp. Sphenoid sinus disease; total opacification of left mastoid cavity.     CTH 02/15: Ongoing expected evolution of hemorrhage in left cerebral hemisphere, status post decompressive left hemicraniectomy. Slightly decreased rightward midline shift of about 7 mm.   Stable postoperative fluid collection over the left hemicraniectomy site. Stable ventricular size.     CTH: Previously visualized right frontal approach ventriculostomy catheter has now been removed. Ventricles are stable in size.  Stable left basal ganglia parenchymal hemorrhage and surrounding edema compared to 2/10/2023 CT. Stable rightward midline shift.     CTH  Slight decrease in amount of edema associated with left basal ganglia hematoma. Other findings, including left to right midline shift of approximately 6 mm at the level of third ventricle, are similar to prior study.    CTH: Left basal ganglia hemorrhage with mild decrease in mass effect and improved visualization of the third ventricle and left frontal horn. Persistent left to right midline shift of approximately 6 mm mildly decreased from prior study. Interval increase in fluid collection in scalp overlying the craniectomy.     TTE  --There is no left atrial dilatation (LA volume index 17 ml/m2).   --There is mild left ventricular hypertrophy.   --The left ventricle has normal end-diastolic diameter.   --LV global wall motion is hyperkinetic.   --LV ejection fraction is increased (75 % ).   --Indeterminate left ventricular diastolic function.   --Ascending aorta is normal in size; its diameter is 3.5 cm (1.4 cm/m2).   --There is no aortic stenosis. There is no aortic regurgitation.   --There is trace mitral regurgitation.   --There is too little tricuspid regurgitation to estimate PA systolic pressure.   --There is no pericardial effusion.

## 2023-02-28 NOTE — H&P ADULT - TIME BILLING
Subjective   Patient ID: Cory is a 77 year old male.    Chief Complaint   Patient presents with   • Ear Pain     left      Patient   Comes in with cerumen impaction   Left  ear        Past Medical History:   Diagnosis Date   • Cataract    • Essential (primary) hypertension    • GERD (gastroesophageal reflux disease)    • Glaucoma (increased eye pressure)        MEDICATIONS:  Current Outpatient Medications   Medication Sig   • albuterol (VENTOLIN HFA) 108 (90 Base) MCG/ACT inhaler Inhale 2 puffs into the lungs every 4 hours as needed for Shortness of Breath or Wheezing.   • ibuprofen (MOTRIN) 600 MG tablet Take 1 tablet by mouth at bedtime as needed for Pain.   • fluticasone (FLONASE) 50 MCG/ACT nasal spray USE 2 SPRAYS IN EACH NOSTRIL EVERY DAY   • Capsaicin 0.025 % Gel Apply to affected area TID as needed for pain   • amLODIPine (NORVASC) 5 MG tablet    • metoPROLOL succinate (TOPROL-XL) 25 MG 24 hr tablet    • simvastatin (ZOCOR) 20 MG tablet      No current facility-administered medications for this visit.        ALLERGIES:  ALLERGIES:  No Known Allergies    PAST SURGICAL HISTORY:  Past Surgical History:   Procedure Laterality Date   • Hernia repair Bilateral     inguinal       FAMILY HISTORY:  Family History   Problem Relation Age of Onset   • Osteoarthritis Mother    • Cancer Brother    • Diabetes Neg Hx        SOCIAL HISTORY:  Social History     Tobacco Use   • Smoking status: Never Smoker   • Smokeless tobacco: Never Used   Substance Use Topics   • Alcohol use: No     Frequency: Never   • Drug use: No         Patient's medications, allergies, past medical, surgical, social and family histories were reviewed and updated as appropriate.  Patient's medications, allergies, past medical, surgical, and social history  were reviewed and updated as appropriate.    Review of Systems   All other systems reviewed and are negative.      Objective   Physical Exam   Constitutional: He is oriented to person, place, and  time. He appears well-developed and well-nourished. No distress.   HENT:   Head: Normocephalic and atraumatic.   Right Ear: External ear normal.   Nose: Nose normal.   Mouth/Throat: Oropharynx is clear and moist.   Cerumen  Let  Ear  canal   Eyes: Pupils are equal, round, and reactive to light. Conjunctivae and EOM are normal.   Neck: Normal range of motion.   Cardiovascular: Normal rate and regular rhythm.   Murmur heard.  Pulmonary/Chest: Effort normal and breath sounds normal. No respiratory distress.   Abdominal: Soft. Bowel sounds are normal. There is no tenderness.   Musculoskeletal: Normal range of motion.   Neurological: He is alert and oriented to person, place, and time.   Skin: Skin is warm. No rash noted.   Psychiatric: He has a normal mood and affect.   Nursing note and vitals reviewed.    Visit Vitals  /73 (BP Location: [facility], Patient Position: Sitting, Cuff Size: Large Adult)   Pulse 50   Temp 97.6 °F (36.4 °C) (Oral)   Resp 16   Ht 5' 7\" (1.702 m)   Wt 93.3 kg (205 lb 11 oz)   SpO2 99%   BMI 32.22 kg/m²       Assessment   Problem List Items Addressed This Visit        Otologic    Impacted cerumen - Primary          This is a 77 year old year-old male who presents with cerumen impaction left  ear.    Instructions provided as documented in the AVS.    Thank you for visiting [facility].  To establish care with an [facility] Provider, please call [phone] ([phone]).   Review and preparation to see patient, examination and assessment of patient, obtaining history and subjective report from pt's mother as pt. confused,  Review of [place] records.  Discussion with family.

## 2023-02-28 NOTE — H&P ADULT - NSHPREVIEWOFSYSTEMS_GEN_ALL_CORE
REVIEW OF SYSTEMS  unable to fully obtain due neurological and cognitive deficits  +Trach, +PEG, +[place]

## 2023-02-28 NOTE — PATIENT PROFILE ADULT - FLU SEASON?
Yes... Bi-Rhombic Flap Text: The defect edges were debeveled with a #15 scalpel blade.  Given the location of the defect and the proximity to free margins a bi-rhombic flap was deemed most appropriate.  Using a sterile surgical marker, an appropriate rhombic flap was drawn incorporating the defect. The area thus outlined was incised deep to adipose tissue with a #15 scalpel blade.  The skin margins were undermined to an appropriate distance in all directions utilizing iris scissors.

## 2023-02-28 NOTE — H&P ADULT - HISTORY OF PRESENT ILLNESS
This is 42 year old male with PMH of prior substance use, HTN, anxiety who presented to [facility] on 1/23. On 1/23 morning, he told his family that he was not feeling well and took 2 tabs of Vyvanse (usually takes as needed). Shortly after, he was at a group therapy meeting over the phone when he complained to his father he was having a severe headache. He then became more lethargic, and developed nausea and vomited. He was brought to ED by his father. On arrival, noted to be lethargic, vomiting, with garbled speech. 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 Post CT, more lethargic with dilated right pupil and bilateral reactive but sluggish pupils. He was given mannitol and intubated for airway protection and was taken to neuro ICU.    On 1/25, HCT with enlarging R lateral ventricle and slightly worsening MLS concerning for trapped ventricle. 1/27 acute drop in SpO2 on 1/27, lavaged and suctioned out thick clot. Placed back on ACVC and FiO2 weaned from 70% to 40% overnight. Overnight, he had episode of emesis, fever of 100.3,he was  given tylenol with improvement in temperature but still little to no movement on the L side (previously would localize arm to suction and spontaneously bend L leg). DHC and EVD were placed.    After the procedure, patient opened eyes for the first time and regained LUE/LLE movement. As he became febrile again this was less prominent. Ceftriaxone was switched to Zosyn. 1/29 HCT mild increase extraaxial fluid collection. on 1/30, repeat  HCT unchanged, his MTL was discontinued. Zosyn and vancomycin switched to cefepime. On 1/31,  HCT shoeds worsening edema +/- extraaxial fluid collection, elevated ICPs, and mannitol was restarted.      MTL was weaned,  EVD adjusted and eventually removed on 2/10.  Hospital course further s/f agitation requiring precedex, hypotension requiring bolus, dysphagia s/p PEG and trach on 2/14, insomnia s/p seroquel, pain s/p oxycodone and fentanyl. He underwent wound revision with NSGY and plastics on 2/17. Keppra discontinued due to signs of agitation. Tolerated trach collar.     Patient was evaluated by PM&R and therapy for functional deficits, gait/ADL impairments and acute rehabilitation was recommended. Patient was medically optimized for discharge to [facility] IRU on 2/28/23.     This is 42 year old male with PMH of prior substance use, HTN, anxiety who presented to [facility] on 1/23. On 1/23 morning, he told his family that he was not feeling well and took 2 tabs of Vyvanse (usually takes as needed). Shortly after, he was at a group therapy meeting over the phone when he complained to his father he was having a severe headache. He then became more lethargic, and developed nausea and vomited. He was brought to ED by his father. On arrival, noted to be lethargic, vomiting, with garbled speech. 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 Post CT, more lethargic with dilated right pupil and bilateral reactive but sluggish pupils. He was given mannitol and intubated for airway protection and was taken to neuro ICU.    On 1/25, HCT with enlarging R lateral ventricle and slightly worsening MLS concerning for trapped ventricle. 1/27 acute drop in SpO2 on 1/27, lavaged and suctioned out thick clot. Placed back on ACVC and FiO2 weaned from 70% to 40% overnight. On 1/28/23, he had episode of emesis, & fever of 100.3. He was  given tylenol with improvement in temperature but still little to no movement on the L side (previously would localize arm to suction and spontaneously bend L leg). Pt. Had left decompressive hemicraniectomy and right EVD  placed.    After the procedure, patient opened eyes for the first time and regained LUE/LLE movement. As he became febrile again this was less prominent. Ceftriaxone was switched to Zosyn. 1/29 HCT mild increase extraaxial fluid collection. on 1/30, repeat  HCT unchanged, his MTL was discontinued. Zosyn and vancomycin switched to cefepime. On 1/31,  HCT shoeds worsening edema +/- extraaxial fluid collection, elevated ICPs, and mannitol was restarted.      MTL was weaned,  EVD adjusted and eventually removed on 2/10.  Hospital course further c/b agitation requiring precedex, hypotension requiring bolus, dysphagia s/p PEG 2/14  and trach on 2/13, insomnia s/p seroquel, pain s/p oxycodone and fentanyl. He underwent wound revision with NSGY and plastics on 2/17. Keppra discontinued due to signs of agitation. Tolerated trach collar.     Patient was evaluated by PM&R and therapy for functional deficits, gait/ADL impairments and acute rehabilitation was recommended. Patient was medically optimized for discharge to [facility] IRU on 2/28/23.

## 2023-02-28 NOTE — H&P ADULT - NS ATTEND AMEND GEN_ALL_CORE FT
Pt. seen with resident and fellow on admission.  Agree with documentation above as per resident and NP with amendments made as appropriate. Patient medically stable. Appropriate for acute rehabilitation.      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 He is  s/p Left decompressive hemicraniectomy and right EVD placement 1/28/23. Hospital course further s/f respiratory failure s/p intubation and extubation,  agitation requiring precedex, sepsis s/p ABX,  hypotension requiring bolus, dysphagia s/p PEG 2/14 and trach on 2/13, insomnia s/p seroquel, pain s/p oxycodone and fentanyl. Patient now with Left Hemiparesis, dysphagia s/p PEG, Aphasia, Cognitive deficits, gait Instability, ADL impairments and Functional impairments.    --Records from [facility] reviewed.      --Cont. current meds.   --check CT head, and LE doppler for baseline assessment.     Obtained history from pt's mother at bedside  Patient's neurological deficits, rehab plan of care, general prognosis estimated length of stay and discharge options discussed with patient's mother at bedside.  All questions answered

## 2023-02-28 NOTE — H&P ADULT - ASSESSMENT
ASSESSMENT/PLAN  This is 42 year old male with PMH of prior substance use, HTN, anxiety who presented to [facility] on 1/23. On 1/23 morning, he told his family that he was not feeling well. 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 He is  s/p decompressive hemicraniotomy and EVD placement. Hospital course further s/f respiratory failure s/p intubation and extubation,  agitation requiring precedex, sepsis s/p ABX,  hypotension requiring bolus, dysphagia s/p PEG and trach on 2/14, insomnia s/p seroquel, pain s/p oxycodone and fentanyl, left hemiplegia. Patient now with gait Instability, ADL impairments and Functional impairments.    #IPH  - Large left frontal and basal ganglia IPH with mass effect and 8mm midline shift  - s/p intubation for airway protection and extubation  - s/p Decompressive hemicraniectomy and EVD placement   -  underwent wound revision with NSGY and plastics on 2/17  - c/b agitation  - Start Comprehensive Rehab Program: PT/OT/ST, 3hours daily and 5 days weekly  - PT: Focused on improving strength, endurance, coordination, balance, functional mobility, and transfers  - OT: Focused on improving strength, fine motor skills, coordination, posture and ADLs.    - ST: to diagnose and treat deficits in swallowing, cognition and communication.     #Acute Respiratory Failure  - intubation and extubation  - Trach  - Nebs PRN    #GI PPC  - Nexium 40mg daily    #DM II  - ISS and FS  - Admelog and Lantus  - A1c ..... on     #Pain management  - Tylenol PRN, Oxycodone PRN, neurontin 600mg nightly    #DVT ppx  - SCD, TEDs    #Bowel Regimen  - Senna, miralax PRN    #Bladder management  - c/w flomax 0.4mg daily  - BS on admission, and q 8 hours (SC if > 400)  - Monitor UO    #FEN   - Diet: Tube/Parenteral. Diabetasource at 80 ML Per hour     #Skin:  - Skin on admission: ***  - Pressure injury/Skin: Turn Q2hrs while in bed, OOB to Chair, PT/OT     #Dysphagia    - s/p PEG  - SLP: evaluation and treatment    #Mood/Cognition:  - Neuropsychology consult PRN  - c/w seroquel nightly    #Sleep:   - Maintain quiet hours and low stim environment.  - Melatonin 10mg  PRN to maximize participation in therapy during the day.     #Precaution  - Fall, Aspiration, seizure    #GOC  CODE STATUS: FULL CODE     Outpatient Follow-up (Specialty/Name of physician):        MEDICAL PROGNOSIS: GOOD            REHAB POTENTIAL: GOOD             ESTIMATED DISPOSITION: HOME WITH HOME CARE            ELOS: 10-14 Days   EXPECTED THERAPY:     P.T. 1hr/day       O.T. 1hr/day      S.L.P. 1hr/day     P&O Unnecessary     EXP FREQUENCY: 5 days per 7 day period     PRESCREEN COMPARISON:   I have reviewed the prescreen information and I have found no relevant changes between the preadmission screening and my post admission evaluation     RATIONALE FOR INPATIENT ADMISSION - Patient demonstrates the following: (check all that apply)  [X] Medically appropriate for rehabilitation admission  [X] Has attainable rehab goals with an appropriate initial discharge plan  [X] Has rehabilitation potential (expected to make a significant improvement within a reasonable period of time)   [X] Requires close medical management by a rehab physician, rehab nursing care, Hospitalist and comprehensive interdisciplinary team (including PT, OT, & or SLP, Prosthetics and Orthotics)   ASSESSMENT/PLAN  This is 42 year old male with PMH of prior substance use, HTN, anxiety who presented to [facility] on 1/23. On 1/23 morning, he told his family that he was not feeling well. 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 He is  s/p decompressive hemicraniectomy and EVD placement. Hospital course further s/f respiratory failure s/p intubation and extubation,  agitation requiring precedex, sepsis s/p ABX,  hypotension requiring bolus, dysphagia s/p PEG and trach on 2/14, insomnia s/p seroquel, pain s/p oxycodone and fentanyl, left hemiplegia. Patient now with gait Instability, ADL impairments and Functional impairments.    #IPH  - Large left frontal and basal ganglia IPH with mass effect and 8mm midline shift  - s/p intubation for airway protection and extubation  - s/p Decompressive hemicraniectomy and EVD placement 1/28  -  underwent wound revision with NSGY and plastics on 2/17  - c/b agitation  - Start Comprehensive Rehab Program: PT/OT/ST, 3hours daily and 5 days weekly  - PT: Focused on improving strength, endurance, coordination, balance, functional mobility, and transfers  - OT: Focused on improving strength, fine motor skills, coordination, posture and ADLs.    - ST: to diagnose and treat deficits in swallowing, cognition and communication.  -FU baseline CTH     #Acute Respiratory Failure  - intubation and extubation  - s/p Trach 2/13  - Nebs PRN  -Start PMV trials in Speech only  -Pulm consulted    #GI PPC  - Nexium 40mg daily    #DM II  - ISS and FS  - Admelog and Lantus  - A1c 6.0     #Pain management  - Tylenol PRN, Oxycodone PRN, neurontin 600mg nightly    #DVT ppx  - SCD, TEDs  -c/w heparin SQC   -FU dopplers     #Bowel Regimen  - Senna, miralax PRN    #Bladder management  - c/w flomax 0.4mg daily  -+ mireles  - Monitor UO    #FEN   - Diet: transitioned to bolus TF     #Skin:  - Skin on admission: IAD to bilateral groin and sacrum; RUQ incision with sutures with palpable skull flap; Left hemicraniectomy with sutures MILLER; psoriasis BL shin  - Pressure injury/Skin: Turn Q2hrs while in bed, OOB to Chair, PT/OT     #Dysphagia    - s/p PEG 2/14  - SLP: evaluation and treatment    #Mood/Cognition:  - Neuropsychology consult PRN  - c/w seroquel nightly    #Sleep:   - Maintain quiet hours and low stim environment.  - Melatonin 6mg QHS to maximize participation in therapy during the day.     #Precaution  - Fall, Aspiration, seizure    #GOC  CODE STATUS: FULL CODE       MEDICAL PROGNOSIS: GOOD            REHAB POTENTIAL: GOOD             ESTIMATED DISPOSITION: HOME WITH HOME CARE            ELOS: 10-14 Days   EXPECTED THERAPY:     P.T. 1hr/day       O.T. 1hr/day      S.L.P. 1hr/day     P&O Unnecessary     EXP FREQUENCY: 5 days per 7 day period     PRESCREEN COMPARISON:   I have reviewed the prescreen information and I have found no relevant changes between the preadmission screening and my post admission evaluation     RATIONALE FOR INPATIENT ADMISSION - Patient demonstrates the following: (check all that apply)  [X] Medically appropriate for rehabilitation admission  [X] Has attainable rehab goals with an appropriate initial discharge plan  [X] Has rehabilitation potential (expected to make a significant improvement within a reasonable period of time)   [X] Requires close medical management by a rehab physician, rehab nursing care, Hospitalist and comprehensive interdisciplinary team (including PT, OT, & or SLP, Prosthetics and Orthotics)   ASSESSMENT/PLAN  This is 42 year old male with PMH of prior substance use, HTN, anxiety who presented to [facility] on 1/23 with  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 He is  s/p Left decompressive hemicraniectomy and right EVD placement 1/28/23. Hospital course further s/f respiratory failure s/p intubation and extubation,  agitation requiring precedex, sepsis s/p ABX,  hypotension requiring bolus, dysphagia s/p PEG 2/14 and trach on 2/13, insomnia s/p seroquel, pain s/p oxycodone and fentanyl. Patient now with Left Hemiparesis, dysphagia s/p PEG, Aphasia, Cognitive deficits, gait Instability, ADL impairments and Functional impairments.    #IPH  - Large left frontal and basal ganglia IPH with mass effect and 8mm midline shift  - s/p intubation for airway protection and extubation  - s/p Decompressive hemicraniectomy and EVD placement 1/28  -  underwent wound revision with NSGY and plastics on 2/17  - c/b agitation  - Start Comprehensive Rehab Program: PT/OT/ST, 3hours daily and 5 days weekly  - PT: Focused on improving strength, endurance, coordination, balance, functional mobility, and transfers  - OT: Focused on improving strength, fine motor skills, coordination, posture and ADLs.    - ST: to diagnose and treat deficits in swallowing, cognition and communication.  -FU baseline CTH     #Acute Respiratory Failure  - intubation and extubation  - s/p Trach 2/13  - Nebs PRN  -Start PMV trials in Speech only  -Pulm consulted    #GI PPC  - Nexium 40mg daily    #DM II  - ISS and FS  - Admelog and Lantus  - A1c 6.0     #Pain management  - Tylenol PRN, Oxycodone PRN, neurontin 600mg nightly    #DVT ppx  -c/w heparin SQC   -FU dopplers --Pt. high risk for DVT due to hemiparesis     #Bowel Regimen  - Senna, miralax PRN    #Bladder management  - c/w flomax 0.4mg daily  -+ mireles  - Monitor UO    #FEN   - Diet: transitioned to bolus TF     #Skin:  - Skin on admission: IAD to bilateral groin and sacrum; RUQ incision with sutures with palpable skull flap; Left hemicraniectomy with sutures MILLER; psoriasis BL shin  - Pressure injury/Skin: Turn Q2hrs while in bed, OOB to Chair, PT/OT     #Dysphagia    - s/p PEG 2/14  - SLP: evaluation and treatment    #Mood/Cognition:  - Neuropsychology consult PRN  - c/w seroquel nightly    #Sleep:   - Maintain quiet hours and low stim environment.      #Precaution  - Fall, Aspiration, seizure    #GOC  CODE STATUS: FULL CODE       MEDICAL PROGNOSIS: GOOD            REHAB POTENTIAL: GOOD             ESTIMATED DISPOSITION: HOME WITH HOME CARE            ELOS: 28 Days   EXPECTED THERAPY:     P.T. 1hr/day       O.T. 1hr/day      S.L.P. 1hr/day     P&O Unnecessary     EXP FREQUENCY: 5 days per 7 day period     PRESCREEN COMPARISON:   I have reviewed the prescreen information and I have found no relevant changes between the preadmission screening and my post admission evaluation     RATIONALE FOR INPATIENT ADMISSION - Patient demonstrates the following: (check all that apply)  [X] Medically appropriate for rehabilitation admission  [X] Has attainable rehab goals with an appropriate initial discharge plan  [X] Has rehabilitation potential (expected to make a significant improvement within a reasonable period of time)   [X] Requires close medical management by a rehab physician, rehab nursing care, Hospitalist and comprehensive interdisciplinary team (including PT, OT, & or SLP, Prosthetics and Orthotics)   ASSESSMENT/PLAN  This is 42 year old male with PMH of prior substance use, HTN, anxiety who presented to [facility] on 1/23 with  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 He is  s/p Left decompressive hemicraniectomy and right EVD placement 1/28/23. Hospital course further s/f respiratory failure s/p intubation and extubation,  agitation requiring precedex, sepsis s/p ABX,  hypotension requiring bolus, dysphagia s/p PEG 2/14 and trach on 2/13, insomnia s/p seroquel, pain s/p oxycodone and fentanyl. Patient now with Right Hemiparesis, dysphagia s/p PEG, Aphasia, Cognitive deficits, gait Instability, ADL impairments and Functional impairments.    #IPH  - Large left frontal and basal ganglia IPH with mass effect and 8mm midline shift  - s/p intubation for airway protection and extubation  - s/p Decompressive hemicraniectomy and EVD placement 1/28  -  underwent wound revision with NSGY and plastics on 2/17  - c/b agitation  - Start Comprehensive Rehab Program: PT/OT/ST, 3hours daily and 5 days weekly  - PT: Focused on improving strength, endurance, coordination, balance, functional mobility, and transfers  - OT: Focused on improving strength, fine motor skills, coordination, posture and ADLs.    - ST: to diagnose and treat deficits in swallowing, cognition and communication.  -FU baseline CTH     #Acute Respiratory Failure  - intubation and extubation  - s/p Trach 2/13  - Nebs PRN  -Start PMV trials in Speech only  -Pulm consulted    #GI PPC  - Nexium 40mg daily    #DM II  - ISS and FS  - Admelog and Lantus  - A1c 6.0     #Pain management  - Tylenol PRN, Oxycodone PRN, neurontin 600mg nightly    #DVT ppx  -c/w heparin SQC   -FU dopplers --Pt. high risk for DVT due to hemiparesis     #Bowel Regimen  - Senna, miralax PRN    #Bladder management  - c/w flomax 0.4mg daily  -+ mireles  - Monitor UO    #FEN   - Diet: transitioned to bolus TF     #Skin:  - Skin on admission: IAD to bilateral groin and sacrum; RUQ incision with sutures with palpable skull flap; Left hemicraniectomy with sutures MILLER; psoriasis BL shin  - Pressure injury/Skin: Turn Q2hrs while in bed, OOB to Chair, PT/OT     #Dysphagia    - s/p PEG 2/14  - SLP: evaluation and treatment    #Mood/Cognition:  - Neuropsychology consult PRN  - c/w seroquel nightly    #Sleep:   - Maintain quiet hours and low stim environment.      #Precaution  - Fall, Aspiration, seizure    #GOC  CODE STATUS: FULL CODE       MEDICAL PROGNOSIS: GOOD            REHAB POTENTIAL: GOOD             ESTIMATED DISPOSITION: HOME WITH HOME CARE            ELOS: 28 Days   EXPECTED THERAPY:     P.T. 1hr/day       O.T. 1hr/day      S.L.P. 1hr/day     P&O Unnecessary     EXP FREQUENCY: 5 days per 7 day period     PRESCREEN COMPARISON:   I have reviewed the prescreen information and I have found no relevant changes between the preadmission screening and my post admission evaluation     RATIONALE FOR INPATIENT ADMISSION - Patient demonstrates the following: (check all that apply)  [X] Medically appropriate for rehabilitation admission  [X] Has attainable rehab goals with an appropriate initial discharge plan  [X] Has rehabilitation potential (expected to make a significant improvement within a reasonable period of time)   [X] Requires close medical management by a rehab physician, rehab nursing care, Hospitalist and comprehensive interdisciplinary team (including PT, OT, & or SLP, Prosthetics and Orthotics)

## 2023-02-28 NOTE — PATIENT PROFILE ADULT - FALL HARM RISK - HARM RISK INTERVENTIONS
Assistance with ambulation/Assistance OOB with selected safe patient handling equipment/Communicate Risk of Fall with Harm to all staff/Discuss with provider need for PT consult/Monitor gait and stability/Reinforce activity limits and safety measures with patient and family/Tailored Fall Risk Interventions/Visual Cue: Yellow wristband and red socks/Bed in lowest position, wheels locked, appropriate side rails in place/Call bell, personal items and telephone in reach/Instruct patient to call for assistance before getting out of bed or chair/Non-slip footwear when patient is out of bed/[place] to call system/Physically safe environment - no spills, clutter or unnecessary equipment/Purposeful Proactive Rounding/Room/bathroom lighting operational, light cord in reach

## 2023-02-28 NOTE — H&P ADULT - NSHPSOCIALHISTORY_GEN_ALL_CORE
Smoking -  EtOH -   Drugs -     Marital status: single     Patient lives his parents on the main floor of a private house, no steps to enter  PTA: Independent in ADLs and ambulation     CURRENT FUNCTIONAL STATUS  Date: 2/26  Supine to Sit: maximal assistance x2 persons. Required mod x2 persons to maintain sitting balance   Sit to Stand: maximal assistance x2 persons. Pt with right knee buckling, required tibial block. Pt may benefit from use of EZ stand machine. Pt able to assist with transfer x4   Static Sitting: Requires maximum assistance to maintain static position   Dynamic Sitting: Unable to move from midline voluntarily, requires maximum assistance to right oneself   Static Standing: Requires maximum assistance to maintain static position   Grooming: minimal assistance to clean face with washcloth seated in bed using left upper extremity   Upper Body Dressing: moderate assistance to don/doff hospital gown supine in bed,   Scooting/bridging: maximal assistance   Rolling: maximal assistance   Supine to Sit: maximal assistance   Sit to Supine: maximal assistance   Sit to Side-lying: maximal assistance   Side-lying to Sit: maximal assistance   X 2 person assist, verbal and tactile cues for proper technique and to use bed rail to facilitate rolling to sides to come to sitting. Pt also required assistance to manage bilateral Lower extremity's towards edge of bed   Sit to Stand: moderate assistance x 2 with hand held assist   Stand to Sit: moderate assistance x 2 Smoking -1/2 ppd x 15 y  EtOH - ?  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     CURRENT FUNCTIONAL STATUS  Date: 2/26  Supine to Sit: maximal assistance x2 persons. Required mod x2 persons to maintain sitting balance   Sit to Stand: maximal assistance x2 persons. Pt with right knee buckling, required tibial block. Pt may benefit from use of EZ stand machine. Pt able to assist with transfer x4   Static Sitting: Requires maximum assistance to maintain static position   Dynamic Sitting: Unable to move from midline voluntarily, requires maximum assistance to right oneself   Static Standing: Requires maximum assistance to maintain static position   Grooming: minimal assistance to clean face with washcloth seated in bed using left upper extremity   Upper Body Dressing: moderate assistance to don/doff hospital gown supine in bed,   Scooting/bridging: maximal assistance   Rolling: maximal assistance   Supine to Sit: maximal assistance   Sit to Supine: maximal assistance   Sit to Side-lying: maximal assistance   Side-lying to Sit: maximal assistance   X 2 person assist, verbal and tactile cues for proper technique and to use bed rail to facilitate rolling to sides to come to sitting. Pt also required assistance to manage bilateral Lower extremity's towards edge of bed   Sit to Stand: moderate assistance x 2 with hand held assist   Stand to Sit: moderate assistance x 2

## 2023-03-01 LAB
A1C WITH ESTIMATED AVERAGE GLUCOSE RESULT: 6.3 % — HIGH (ref 4–5.6)
ALBUMIN SERPL ELPH-MCNC: 2.9 G/DL — LOW (ref 3.3–5)
ALP SERPL-CCNC: 120 U/L — SIGNIFICANT CHANGE UP (ref 40–120)
ALT FLD-CCNC: 43 U/L — SIGNIFICANT CHANGE UP (ref 10–45)
ANION GAP SERPL CALC-SCNC: 10 MMOL/L — SIGNIFICANT CHANGE UP (ref 5–17)
AST SERPL-CCNC: 15 U/L — SIGNIFICANT CHANGE UP (ref 10–40)
BASOPHILS # BLD AUTO: 0.02 K/UL — SIGNIFICANT CHANGE UP (ref 0–0.2)
BASOPHILS NFR BLD AUTO: 0.2 % — SIGNIFICANT CHANGE UP (ref 0–2)
BILIRUB SERPL-MCNC: 0.4 MG/DL — SIGNIFICANT CHANGE UP (ref 0.2–1.2)
BUN SERPL-MCNC: 12 MG/DL — SIGNIFICANT CHANGE UP (ref 7–23)
CALCIUM SERPL-MCNC: 8.9 MG/DL — SIGNIFICANT CHANGE UP (ref 8.4–10.5)
CHLORIDE SERPL-SCNC: 100 MMOL/L — SIGNIFICANT CHANGE UP (ref 96–108)
CO2 SERPL-SCNC: 25 MMOL/L — SIGNIFICANT CHANGE UP (ref 22–31)
CREAT SERPL-MCNC: 0.58 MG/DL — SIGNIFICANT CHANGE UP (ref 0.5–1.3)
EGFR: 125 ML/MIN/1.73M2 — SIGNIFICANT CHANGE UP
EOSINOPHIL # BLD AUTO: 0.08 K/UL — SIGNIFICANT CHANGE UP (ref 0–0.5)
EOSINOPHIL NFR BLD AUTO: 0.8 % — SIGNIFICANT CHANGE UP (ref 0–6)
ESTIMATED AVERAGE GLUCOSE: 134 MG/DL — HIGH (ref 68–114)
GLUCOSE BLDC GLUCOMTR-MCNC: 129 MG/DL — HIGH (ref 70–99)
GLUCOSE BLDC GLUCOMTR-MCNC: 144 MG/DL — HIGH (ref 70–99)
GLUCOSE BLDC GLUCOMTR-MCNC: 170 MG/DL — HIGH (ref 70–99)
GLUCOSE BLDC GLUCOMTR-MCNC: 81 MG/DL — SIGNIFICANT CHANGE UP (ref 70–99)
GLUCOSE SERPL-MCNC: 112 MG/DL — HIGH (ref 70–99)
HCT VFR BLD CALC: 39.2 % — SIGNIFICANT CHANGE UP (ref 39–50)
HGB BLD-MCNC: 12.7 G/DL — LOW (ref 13–17)
IMM GRANULOCYTES NFR BLD AUTO: 0.5 % — SIGNIFICANT CHANGE UP (ref 0–0.9)
LYMPHOCYTES # BLD AUTO: 2.08 K/UL — SIGNIFICANT CHANGE UP (ref 1–3.3)
LYMPHOCYTES # BLD AUTO: 21.8 % — SIGNIFICANT CHANGE UP (ref 13–44)
MCHC RBC-ENTMCNC: 29.8 PG — SIGNIFICANT CHANGE UP (ref 27–34)
MCHC RBC-ENTMCNC: 32.4 GM/DL — SIGNIFICANT CHANGE UP (ref 32–36)
MCV RBC AUTO: 92 FL — SIGNIFICANT CHANGE UP (ref 80–100)
MONOCYTES # BLD AUTO: 0.97 K/UL — HIGH (ref 0–0.9)
MONOCYTES NFR BLD AUTO: 10.1 % — SIGNIFICANT CHANGE UP (ref 2–14)
NEUTROPHILS # BLD AUTO: 6.36 K/UL — SIGNIFICANT CHANGE UP (ref 1.8–7.4)
NEUTROPHILS NFR BLD AUTO: 66.6 % — SIGNIFICANT CHANGE UP (ref 43–77)
NRBC # BLD: 0 /100 WBCS — SIGNIFICANT CHANGE UP (ref 0–0)
PLATELET # BLD AUTO: 254 K/UL — SIGNIFICANT CHANGE UP (ref 150–400)
POTASSIUM SERPL-MCNC: 4 MMOL/L — SIGNIFICANT CHANGE UP (ref 3.5–5.3)
POTASSIUM SERPL-SCNC: 4 MMOL/L — SIGNIFICANT CHANGE UP (ref 3.5–5.3)
PROT SERPL-MCNC: 7.4 G/DL — SIGNIFICANT CHANGE UP (ref 6–8.3)
RBC # BLD: 4.26 M/UL — SIGNIFICANT CHANGE UP (ref 4.2–5.8)
RBC # FLD: 14.8 % — HIGH (ref 10.3–14.5)
SODIUM SERPL-SCNC: 135 MMOL/L — SIGNIFICANT CHANGE UP (ref 135–145)
WBC # BLD: 9.56 K/UL — SIGNIFICANT CHANGE UP (ref 3.8–10.5)
WBC # FLD AUTO: 9.56 K/UL — SIGNIFICANT CHANGE UP (ref 3.8–10.5)

## 2023-03-01 PROCEDURE — 99232 SBSQ HOSP IP/OBS MODERATE 35: CPT

## 2023-03-01 PROCEDURE — 99255 IP/OBS CONSLTJ NEW/EST HI 80: CPT

## 2023-03-01 PROCEDURE — 93970 EXTREMITY STUDY: CPT | Mod: 26

## 2023-03-01 PROCEDURE — 70450 CT HEAD/BRAIN W/O DYE: CPT | Mod: 26

## 2023-03-01 RX ORDER — QUETIAPINE FUMARATE 200 MG/1
25 TABLET, FILM COATED ORAL AT BEDTIME
Refills: 0 | Status: DISCONTINUED | OUTPATIENT
Start: 2023-03-01 | End: 2023-03-08

## 2023-03-01 RX ORDER — GABAPENTIN 400 MG/1
600 CAPSULE ORAL AT BEDTIME
Refills: 0 | Status: DISCONTINUED | OUTPATIENT
Start: 2023-03-01 | End: 2023-03-10

## 2023-03-01 RX ORDER — HEPARIN SODIUM 5000 [USP'U]/ML
5000 INJECTION INTRAVENOUS; SUBCUTANEOUS EVERY 8 HOURS
Refills: 0 | Status: COMPLETED | OUTPATIENT
Start: 2023-03-01 | End: 2023-03-01

## 2023-03-01 RX ORDER — ENOXAPARIN SODIUM 100 MG/ML
90 INJECTION SUBCUTANEOUS
Refills: 0 | Status: DISCONTINUED | OUTPATIENT
Start: 2023-03-02 | End: 2023-04-03

## 2023-03-01 RX ADMIN — OXYCODONE HYDROCHLORIDE 5 MILLIGRAM(S): 5 TABLET ORAL at 08:57

## 2023-03-01 RX ADMIN — Medication 2: at 21:49

## 2023-03-01 RX ADMIN — Medication 6 MILLIGRAM(S): at 21:42

## 2023-03-01 RX ADMIN — Medication 7 UNIT(S): at 08:33

## 2023-03-01 RX ADMIN — Medication 1 APPLICATION(S): at 05:00

## 2023-03-01 RX ADMIN — Medication 2 MILLIGRAM(S): at 21:44

## 2023-03-01 RX ADMIN — HEPARIN SODIUM 5000 UNIT(S): 5000 INJECTION INTRAVENOUS; SUBCUTANEOUS at 21:44

## 2023-03-01 RX ADMIN — POLYETHYLENE GLYCOL 3350 17 GRAM(S): 17 POWDER, FOR SOLUTION ORAL at 12:55

## 2023-03-01 RX ADMIN — NYSTATIN CREAM 1 APPLICATION(S): 100000 CREAM TOPICAL at 05:01

## 2023-03-01 RX ADMIN — OXYCODONE HYDROCHLORIDE 5 MILLIGRAM(S): 5 TABLET ORAL at 09:57

## 2023-03-01 RX ADMIN — QUETIAPINE FUMARATE 25 MILLIGRAM(S): 200 TABLET, FILM COATED ORAL at 20:19

## 2023-03-01 RX ADMIN — HEPARIN SODIUM 5000 UNIT(S): 5000 INJECTION INTRAVENOUS; SUBCUTANEOUS at 05:01

## 2023-03-01 RX ADMIN — Medication 7 UNIT(S): at 12:56

## 2023-03-01 RX ADMIN — Medication 1 APPLICATION(S): at 18:03

## 2023-03-01 RX ADMIN — HEPARIN SODIUM 5000 UNIT(S): 5000 INJECTION INTRAVENOUS; SUBCUTANEOUS at 15:43

## 2023-03-01 RX ADMIN — GABAPENTIN 600 MILLIGRAM(S): 400 CAPSULE ORAL at 21:42

## 2023-03-01 RX ADMIN — NYSTATIN CREAM 1 APPLICATION(S): 100000 CREAM TOPICAL at 21:43

## 2023-03-01 RX ADMIN — PANTOPRAZOLE SODIUM 40 MILLIGRAM(S): 20 TABLET, DELAYED RELEASE ORAL at 13:51

## 2023-03-01 RX ADMIN — INSULIN GLARGINE 7 UNIT(S): 100 INJECTION, SOLUTION SUBCUTANEOUS at 21:42

## 2023-03-01 NOTE — PROGRESS NOTE ADULT - SUBJECTIVE AND OBJECTIVE BOX
HPI:  This is 42 year old male with PMH of prior substance use, HTN, anxiety who presented to [facility] on 1/23. On 1/23 morning, he told his family that he was not feeling well and took 2 tabs of Vyvanse (usually takes as needed). Shortly after, he was at a group therapy meeting over the phone when he complained to his father he was having a severe headache. He then became more lethargic, and developed nausea and vomited. He was brought to ED by his father. On arrival, noted to be lethargic, vomiting, with garbled speech. 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 Post CT, more lethargic with dilated right pupil and bilateral reactive but sluggish pupils. He was given mannitol and intubated for airway protection and was taken to neuro ICU.    On 1/25, HCT with enlarging R lateral ventricle and slightly worsening MLS concerning for trapped ventricle. 1/27 acute drop in SpO2 on 1/27, lavaged and suctioned out thick clot. Placed back on ACVC and FiO2 weaned from 70% to 40% overnight. On 1/28/23, he had episode of emesis, & fever of 100.3. He was  given tylenol with improvement in temperature but still little to no movement on the L side (previously would localize arm to suction and spontaneously bend L leg). Pt. Had left decompressive hemicraniectomy and right EVD  placed.    After the procedure, patient opened eyes for the first time and regained LUE/LLE movement. As he became febrile again this was less prominent. Ceftriaxone was switched to Zosyn. 1/29 HCT mild increase extraaxial fluid collection. on 1/30, repeat  HCT unchanged, his MTL was discontinued. Zosyn and vancomycin switched to cefepime. On 1/31,  HCT shoeds worsening edema +/- extraaxial fluid collection, elevated ICPs, and mannitol was restarted.      MTL was weaned,  EVD adjusted and eventually removed on 2/10.  Hospital course further c/b agitation requiring precedex, hypotension requiring bolus, dysphagia s/p PEG 2/14  and trach on 2/13, insomnia s/p seroquel, pain s/p oxycodone and fentanyl. He underwent wound revision with NSGY and plastics on 2/17. Keppra discontinued due to signs of agitation. Tolerated trach collar.     Patient was evaluated by PM&R and therapy for functional deficits, gait/ADL impairments and acute rehabilitation was recommended. Patient was medically optimized for discharge to [facility] IRU on 2/28/23.      SUBJECTIVE/OBJECTIVE: Patient seen and examined. No acute overnight events. Reported to slept well by nursing. Patient is aphasic, inconsistent command following, automatic movements.    REVIEW OF SYSTEMS  Cognitive deficits-- aphasic  +Trach, +PEG, +Ovalles     VITALS  42y  Vital Signs Last 24 Hrs  T(C): 36.6 (01 Mar 2023 06:39), Max: 37.1 (28 Feb 2023 19:43)  T(F): 97.8 (01 Mar 2023 06:39), Max: 98.7 (28 Feb 2023 19:43)  HR: 76 (01 Mar 2023 06:39) (76 - 86)  BP: 118/76 (01 Mar 2023 06:39) (112/70 - 120/78)  BP(mean): --  RR: 16 (01 Mar 2023 06:39) (16 - 16)  SpO2: 98% (01 Mar 2023 06:39) (97% - 98%)    Parameters below as of 01 Mar 2023 06:39  Patient On (Oxygen Delivery Method): tracheostomy collar    O2 Concentration (%): 28  Daily Height in cm: 190.5 (28 Feb 2023 14:34)    Daily       PHYSICAL EXAM:   Constitutional - NAD, Comfortable  HEENT - EOMI, left hemicraniectomy with sutures--No erythema no drainage  Neck -  + trach Shiley 8 on TC  Chest -  CTAB   Cardio - warm and well perfused, RRR, no murmur  Abdomen -  Soft, NTND, +PEG, Bone flap marsupialized in epigastric region.  Sutures with no significant erythema and no drainage.  Extremities - No peripheral edema, No calf tenderness   Neurologic Exam:                    Cognitive -             Orientation: Awake, Alert, unable to assess orientation due to aphasia and cognitive deficits            Attention:  Impaired; makes eye contact but follows commands inconsistently            Memory: Impaired            Thought: Impaired, Impulsive     Speech - non-verbal -(trached), impaired comprehension, +aphasia      Cranial Nerves - Limited due to comprehension impairment and aphasia; No facial asymmetry, Tongue midline, EOMI, Pupils dilated but reactive, +dysphagia     Motor -                      LEFT    UE - at least 3/5 and moving spontaneously; exam limited by command following;  WNL                    RIGHT UE - ShAB 0/5, EF 0/5, EE 0/5, WE 0/5,  0/5-- MAS 1 finger flexors                     LEFT    LE -  at least 3/5 and moving spontaneously; exam limited by command following                    RIGHT LE - HE 1/5, Hip adductor 1/5, KE 0/5, DF 0/5, PF 0/5        Sensory - decrease sensation to LT-- RUE     Reflexes - DTR 2+/ 4 , neg Grider's b/l, neg Babinski's b/l     Coordination - FTN / HTS impaired on right;       OculoVestibular -  No nystagmus  Psychiatric - Mood stable, Affect WNL  Skin on admission: IAD to bilateral groin and sacrum; RUQ incision with sutures with palpable skull flap; Left hemicraniectomy with sutures MILLER; psoriasis BL shin    RECENT LABS:                        12.7   9.56  )-----------( 254      ( 01 Mar 2023 06:38 )             39.2     03-01    135  |  100  |  12  ----------------------------<  112<H>  4.0   |  25  |  0.58    Ca    8.9      01 Mar 2023 06:38    TPro  7.4  /  Alb  2.9<L>  /  TBili  0.4  /  DBili  x   /  AST  15  /  ALT  43  /  AlkPhos  120  03-01    LIVER FUNCTIONS - ( 01 Mar 2023 06:38 )  Alb: 2.9 g/dL / Pro: 7.4 g/dL / ALK PHOS: 120 U/L / ALT: 43 U/L / AST: 15 U/L / GGT: x                   CAPILLARY BLOOD GLUCOSE      POCT Blood Glucose.: 144 mg/dL (01 Mar 2023 08:30)  POCT Blood Glucose.: 110 mg/dL (28 Feb 2023 22:15)  POCT Blood Glucose.: 118 mg/dL (28 Feb 2023 17:34)        MEDICATIONS:  MEDICATIONS  (STANDING):  AQUAPHOR (petrolatum Ointment) 1 Application(s) Topical two times a day  dextrose 5%. 1000 milliLiter(s) (100 mL/Hr) IV Continuous <Continuous>  dextrose 5%. 1000 milliLiter(s) (50 mL/Hr) IV Continuous <Continuous>  dextrose 50% Injectable 25 Gram(s) IV Push once  dextrose 50% Injectable 12.5 Gram(s) IV Push once  dextrose 50% Injectable 25 Gram(s) IV Push once  doxazosin 2 milliGRAM(s) Oral at bedtime  gabapentin Solution 600 milliGRAM(s) Oral at bedtime  glucagon  Injectable 1 milliGRAM(s) IntraMuscular once  heparin   Injectable 5000 Unit(s) SubCutaneous every 8 hours  insulin glargine Injectable (LANTUS) 7 Unit(s) SubCutaneous at bedtime  insulin lispro (ADMELOG) corrective regimen sliding scale   SubCutaneous four times a day before meals  insulin lispro Injectable (ADMELOG) 7 Unit(s) SubCutaneous four times a day before meals  melatonin 6 milliGRAM(s) Oral at bedtime  nystatin Powder 1 Application(s) Topical two times a day  pantoprazole   Suspension 40 milliGRAM(s) Oral daily  polyethylene glycol 3350 17 Gram(s) Oral daily  QUEtiapine 50 milliGRAM(s) Oral at bedtime  senna Syrup 5 milliLiter(s) Oral at bedtime    MEDICATIONS  (PRN):  acetaminophen    Suspension .. 650 milliGRAM(s) Oral every 6 hours PRN Temp greater or equal to 38C (100.4F), Mild Pain (1 - 3)  dextrose Oral Gel 15 Gram(s) Oral once PRN Blood Glucose LESS THAN 70 milliGRAM(s)/deciliter  oxyCODONE    IR 5 milliGRAM(s) Oral every 6 hours PRN Severe Pain (7 - 10)     HPI:  This is 42 year old male with PMH of prior substance use, HTN, anxiety who presented to [facility] on 1/23. On 1/23 morning, he told his family that he was not feeling well and took 2 tabs of Vyvanse (usually takes as needed). Shortly after, he was at a group therapy meeting over the phone when he complained to his father he was having a severe headache. He then became more lethargic, and developed nausea and vomited. He was brought to ED by his father. On arrival, noted to be lethargic, vomiting, with garbled speech. 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 Post CT, more lethargic with dilated right pupil and bilateral reactive but sluggish pupils. He was given mannitol and intubated for airway protection and was taken to neuro ICU.    On 1/25, HCT with enlarging R lateral ventricle and slightly worsening MLS concerning for trapped ventricle. 1/27 acute drop in SpO2 on 1/27, lavaged and suctioned out thick clot. Placed back on ACVC and FiO2 weaned from 70% to 40% overnight. On 1/28/23, he had episode of emesis, & fever of 100.3. He was  given tylenol with improvement in temperature but still little to no movement on the L side (previously would localize arm to suction and spontaneously bend L leg). Pt. Had left decompressive hemicraniectomy and right EVD  placed.    After the procedure, patient opened eyes for the first time and regained LUE/LLE movement. As he became febrile again this was less prominent. Ceftriaxone was switched to Zosyn. 1/29 HCT mild increase extraaxial fluid collection. on 1/30, repeat  HCT unchanged, his MTL was discontinued. Zosyn and vancomycin switched to cefepime. On 1/31,  HCT shoeds worsening edema +/- extraaxial fluid collection, elevated ICPs, and mannitol was restarted.      MTL was weaned,  EVD adjusted and eventually removed on 2/10.  Hospital course further c/b agitation requiring precedex, hypotension requiring bolus, dysphagia s/p PEG 2/14  and trach on 2/13, insomnia s/p seroquel, pain s/p oxycodone and fentanyl. He underwent wound revision with NSGY and plastics on 2/17. Keppra discontinued due to signs of agitation. Tolerated trach collar.     Patient was evaluated by PM&R and therapy for functional deficits, gait/ADL impairments and acute rehabilitation was recommended. Patient was medically optimized for discharge to [facility] IRU on 2/28/23.      SUBJECTIVE/OBJECTIVE: Patient seen and examined. No acute overnight events. Reported to slept well by nursing. Patient is aphasic, inconsistent command following, automatic movements.    REVIEW OF SYSTEMS  Cognitive deficits-- aphasic  +Trach, +PEG, +Ovalles     VITALS  42y  Vital Signs Last 24 Hrs  T(C): 36.6 (01 Mar 2023 06:39), Max: 37.1 (28 Feb 2023 19:43)  T(F): 97.8 (01 Mar 2023 06:39), Max: 98.7 (28 Feb 2023 19:43)  HR: 76 (01 Mar 2023 06:39) (76 - 86)  BP: 118/76 (01 Mar 2023 06:39) (112/70 - 120/78)  RR: 16 (01 Mar 2023 06:39) (16 - 16)  SpO2: 98% (01 Mar 2023 06:39) (97% - 98%)    Parameters below as of 01 Mar 2023 06:39  Patient On (Oxygen Delivery Method): tracheostomy collar    O2 Concentration (%): 28  Daily Height in cm: 190.5 (28 Feb 2023 14:34)    Daily     PHYSICAL EXAM:   Constitutional - NAD, Comfortable  HEENT - EOMI, left hemicraniectomy with sutures--No erythema no drainage  Neck -  + trach Shiley 8 on TC  Chest -  CTAB   Cardio - warm and well perfused, RRR, no murmur  Abdomen -  Soft, NTND, +PEG, Bone flap marsupialized in epigastric region.  Sutures with no significant erythema and no drainage.  Extremities - No peripheral edema, No calf tenderness   Neurologic Exam:                    Cognitive -             Orientation: Awake, Alert, unable to assess orientation due to aphasia and cognitive deficits            Attention:  Impaired; makes eye contact but follows commands inconsistently            Memory: Impaired            Thought: Impaired, Impulsive     Speech - non-verbal -(trached), impaired comprehension, +aphasia      Cranial Nerves - Limited due to comprehension impairment and aphasia; No facial asymmetry, Tongue midline, EOMI, Pupils dilated but reactive, +dysphagia     Motor -                      LEFT    UE - at least 3/5 and moving spontaneously; exam limited by command following;  WNL                    RIGHT UE - ShAB 0/5, EF 0/5, EE 0/5, WE 0/5,  0/5-- MAS 1 finger flexors                     LEFT    LE -  at least 3/5 and moving spontaneously; exam limited by command following                    RIGHT LE - HE 1/5, Hip adductor 1/5, KE 0/5, DF 0/5, PF 0/5        Sensory - decrease sensation to LT-- RUE     Reflexes - DTR 2+/ 4 , neg Grider's b/l, neg Babinski's b/l     Coordination - FTN / HTS impaired on right;       OculoVestibular -  No nystagmus  Psychiatric - Mood stable, Affect WNL  Skin on admission: IAD to bilateral groin and sacrum; RUQ incision with sutures with palpable skull flap; Left hemicraniectomy with sutures MILLER; psoriasis BL shin    RECENT LABS:                        12.7   9.56  )-----------( 254      ( 01 Mar 2023 06:38 )             39.2     03-01    135  |  100  |  12  ----------------------------<  112<H>  4.0   |  25  |  0.58    Ca    8.9      01 Mar 2023 06:38    TPro  7.4  /  Alb  2.9<L>  /  TBili  0.4  /  DBili  x   /  AST  15  /  ALT  43  /  AlkPhos  120  03-01    LIVER FUNCTIONS - ( 01 Mar 2023 06:38 )  Alb: 2.9 g/dL / Pro: 7.4 g/dL / ALK PHOS: 120 U/L / ALT: 43 U/L / AST: 15 U/L / GGT: x             CAPILLARY BLOOD GLUCOSE      POCT Blood Glucose.: 144 mg/dL (01 Mar 2023 08:30)  POCT Blood Glucose.: 110 mg/dL (28 Feb 2023 22:15)  POCT Blood Glucose.: 118 mg/dL (28 Feb 2023 17:34)      MEDICATIONS:  MEDICATIONS  (STANDING):  AQUAPHOR (petrolatum Ointment) 1 Application(s) Topical two times a day  dextrose 5%. 1000 milliLiter(s) (100 mL/Hr) IV Continuous <Continuous>  dextrose 5%. 1000 milliLiter(s) (50 mL/Hr) IV Continuous <Continuous>  dextrose 50% Injectable 25 Gram(s) IV Push once  dextrose 50% Injectable 12.5 Gram(s) IV Push once  dextrose 50% Injectable 25 Gram(s) IV Push once  doxazosin 2 milliGRAM(s) Oral at bedtime  gabapentin Solution 600 milliGRAM(s) Oral at bedtime  glucagon  Injectable 1 milliGRAM(s) IntraMuscular once  heparin   Injectable 5000 Unit(s) SubCutaneous every 8 hours  insulin glargine Injectable (LANTUS) 7 Unit(s) SubCutaneous at bedtime  insulin lispro (ADMELOG) corrective regimen sliding scale   SubCutaneous four times a day before meals  insulin lispro Injectable (ADMELOG) 7 Unit(s) SubCutaneous four times a day before meals  melatonin 6 milliGRAM(s) Oral at bedtime  nystatin Powder 1 Application(s) Topical two times a day  pantoprazole   Suspension 40 milliGRAM(s) Oral daily  polyethylene glycol 3350 17 Gram(s) Oral daily  QUEtiapine 50 milliGRAM(s) Oral at bedtime  senna Syrup 5 milliLiter(s) Oral at bedtime    MEDICATIONS  (PRN):  acetaminophen    Suspension .. 650 milliGRAM(s) Oral every 6 hours PRN Temp greater or equal to 38C (100.4F), Mild Pain (1 - 3)  dextrose Oral Gel 15 Gram(s) Oral once PRN Blood Glucose LESS THAN 70 milliGRAM(s)/deciliter  oxyCODONE    IR 5 milliGRAM(s) Oral every 6 hours PRN Severe Pain (7 - 10)     HPI:  This is 42 year old male with PMH of prior substance use, HTN, anxiety who presented to [facility] on 1/23. On 1/23 morning, he told his family that he was not feeling well and took 2 tabs of Vyvanse (usually takes as needed). Shortly after, he was at a group therapy meeting over the phone when he complained to his father he was having a severe headache. He then became more lethargic, and developed nausea and vomited. He was brought to ED by his father. On arrival, noted to be lethargic, vomiting, with garbled speech. 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 Post CT, more lethargic with dilated right pupil and bilateral reactive but sluggish pupils. He was given mannitol and intubated for airway protection and was taken to neuro ICU.    On 1/25, HCT with enlarging R lateral ventricle and slightly worsening MLS concerning for trapped ventricle. 1/27 acute drop in SpO2 on 1/27, lavaged and suctioned out thick clot. Placed back on ACVC and FiO2 weaned from 70% to 40% overnight. On 1/28/23, he had episode of emesis, & fever of 100.3. He was  given tylenol with improvement in temperature but still little to no movement on the L side (previously would localize arm to suction and spontaneously bend L leg). Pt. Had left decompressive hemicraniectomy and right EVD  placed.    After the procedure, patient opened eyes for the first time and regained LUE/LLE movement. As he became febrile again this was less prominent. Ceftriaxone was switched to Zosyn. 1/29 HCT mild increase extraaxial fluid collection. on 1/30, repeat  HCT unchanged, his MTL was discontinued. Zosyn and vancomycin switched to cefepime. On 1/31,  HCT shoeds worsening edema +/- extraaxial fluid collection, elevated ICPs, and mannitol was restarted.      MTL was weaned,  EVD adjusted and eventually removed on 2/10.  Hospital course further c/b agitation requiring precedex, hypotension requiring bolus, dysphagia s/p PEG 2/14  and trach on 2/13, insomnia s/p seroquel, pain s/p oxycodone and fentanyl. He underwent wound revision with NSGY and plastics on 2/17. Keppra discontinued due to signs of agitation. Tolerated trach collar.     Patient was evaluated by PM&R and therapy for functional deficits, gait/ADL impairments and acute rehabilitation was recommended. Patient was medically optimized for discharge to [facility] IRU on 2/28/23.      SUBJECTIVE/OBJECTIVE: Patient seen and examined. No acute overnight events. Reported to have slept well by nursing. Patient is aphasic, inconsistent command following, automatic movements.  Pt. calm this AM.      REVIEW OF SYSTEMS  Cognitive deficits-- aphasic  +Trach, +PEG, +Ovalles     VITALS  42y  Vital Signs Last 24 Hrs  T(C): 36.6 (01 Mar 2023 06:39), Max: 37.1 (28 Feb 2023 19:43)  T(F): 97.8 (01 Mar 2023 06:39), Max: 98.7 (28 Feb 2023 19:43)  HR: 76 (01 Mar 2023 06:39) (76 - 86)  BP: 118/76 (01 Mar 2023 06:39) (112/70 - 120/78)  RR: 16 (01 Mar 2023 06:39) (16 - 16)  SpO2: 98% (01 Mar 2023 06:39) (97% - 98%)    Parameters below as of 01 Mar 2023 06:39  Patient On (Oxygen Delivery Method): tracheostomy collar    O2 Concentration (%): 28  Daily Height in cm: 190.5 (28 Feb 2023 14:34)    Daily     PHYSICAL EXAM:   Constitutional - NAD, Comfortable  HEENT - EOMI, left hemicraniectomy with sutures--No erythema no drainage  Neck -  + trach Shiley 8 on TC  Chest -  CTAB   Cardio - warm and well perfused, RRR, no murmur  Abdomen -  Soft, NTND, +PEG, Bone flap marsupialized in epigastric region.  Sutures with no significant erythema and no drainage.  Extremities - No peripheral edema, No calf tenderness   Neurologic Exam:                    Cognitive -             Orientation: Awake, Alert, unable to assess orientation due to aphasia and cognitive deficits            Attention:  Impaired; makes eye contact but follows commands inconsistently            Memory: Impaired            Thought: Impaired, Impulsive     Speech - non-verbal -(trached), impaired comprehension, +aphasia      Cranial Nerves - Limited due to comprehension impairment and aphasia; No facial asymmetry, Tongue midline, EOMI, Pupils dilated but reactive, +dysphagia     Motor -                      LEFT    UE - at least 3/5 and moving spontaneously; exam limited by command following;  WNL                    RIGHT UE - ShAB 0/5, EF 0/5, EE 0/5, WE 0/5,  0/5-- MAS 1 finger flexors                     LEFT    LE -  at least 3/5 and moving spontaneously; exam limited by command following                    RIGHT LE - HE 1/5, Hip adductor 1/5, KE 0/5, DF 0/5, PF 0/5        Sensory - decrease sensation to LT-- RUE     Reflexes - DTR 2+/ 4 , neg Grider's b/l, neg Babinski's b/l     Coordination - FTN / HTS impaired on right;       OculoVestibular -  No nystagmus  Psychiatric - Mood stable, Affect WNL  Skin on admission: IAD to bilateral groin and sacrum; RUQ incision with sutures with palpable skull flap; Left hemicraniectomy with sutures MILLER; psoriasis BL shin    RECENT LABS:                        12.7   9.56  )-----------( 254      ( 01 Mar 2023 06:38 )             39.2     03-01    135  |  100  |  12  ----------------------------<  112<H>  4.0   |  25  |  0.58    Ca    8.9      01 Mar 2023 06:38    TPro  7.4  /  Alb  2.9<L>  /  TBili  0.4  /  DBili  x   /  AST  15  /  ALT  43  /  AlkPhos  120  03-01    LIVER FUNCTIONS - ( 01 Mar 2023 06:38 )  Alb: 2.9 g/dL / Pro: 7.4 g/dL / ALK PHOS: 120 U/L / ALT: 43 U/L / AST: 15 U/L / GGT: x             CAPILLARY BLOOD GLUCOSE      POCT Blood Glucose.: 144 mg/dL (01 Mar 2023 08:30)  POCT Blood Glucose.: 110 mg/dL (28 Feb 2023 22:15)  POCT Blood Glucose.: 118 mg/dL (28 Feb 2023 17:34)    RADIOLOGY:  EXAM:  CT BRAIN   ORDERED BY: RHONDA TOPETE     PROCEDURE DATE:  03/01/2023          INTERPRETATION:  Clinical indication: Follow-up hemorrhage    Multiple axial sections were performed from base of vertex without contrast enhancement. Coronal and sagittal reconstructions were performed    No prior studies without comparison.    High right frontal malik hole is seen.    Postoperative changes compatible with a left temporal frontal parietalcraniectomy is again seen. Mild parenchyma extending through the craniectomy defect is again seen. There is abnormal high attenuation seen involving the left basal ganglia/corona radiata region. This finding is likely compatible with evolving areas acute parenchymal hemorrhage. Surrounding edema is seen. Mass effect on the left lateral ventricle is seen. Left-to-right shift (3.1 mm) is seen.    Evaluation of the osseous structures with the appropriate window aside from postop changes appear normal.    Complete opacification of the right sphenoid sinus is seen consisting of mucosal thickening.    Both mastoid and middle ear regions appear clear.    IMPRESSION: Postop changes described above    Evolving area parenchymal hemorrhage as described above.    MEDICATIONS:  MEDICATIONS  (STANDING):  AQUAPHOR (petrolatum Ointment) 1 Application(s) Topical two times a day  dextrose 5%. 1000 milliLiter(s) (100 mL/Hr) IV Continuous <Continuous>  dextrose 5%. 1000 milliLiter(s) (50 mL/Hr) IV Continuous <Continuous>  dextrose 50% Injectable 25 Gram(s) IV Push once  dextrose 50% Injectable 12.5 Gram(s) IV Push once  dextrose 50% Injectable 25 Gram(s) IV Push once  doxazosin 2 milliGRAM(s) Oral at bedtime  gabapentin Solution 600 milliGRAM(s) Oral at bedtime  glucagon  Injectable 1 milliGRAM(s) IntraMuscular once  heparin   Injectable 5000 Unit(s) SubCutaneous every 8 hours  insulin glargine Injectable (LANTUS) 7 Unit(s) SubCutaneous at bedtime  insulin lispro (ADMELOG) corrective regimen sliding scale   SubCutaneous four times a day before meals  insulin lispro Injectable (ADMELOG) 7 Unit(s) SubCutaneous four times a day before meals  melatonin 6 milliGRAM(s) Oral at bedtime  nystatin Powder 1 Application(s) Topical two times a day  pantoprazole   Suspension 40 milliGRAM(s) Oral daily  polyethylene glycol 3350 17 Gram(s) Oral daily  QUEtiapine 50 milliGRAM(s) Oral at bedtime  senna Syrup 5 milliLiter(s) Oral at bedtime    MEDICATIONS  (PRN):  acetaminophen    Suspension .. 650 milliGRAM(s) Oral every 6 hours PRN Temp greater or equal to 38C (100.4F), Mild Pain (1 - 3)  dextrose Oral Gel 15 Gram(s) Oral once PRN Blood Glucose LESS THAN 70 milliGRAM(s)/deciliter  oxyCODONE    IR 5 milliGRAM(s) Oral every 6 hours PRN Severe Pain (7 - 10)

## 2023-03-01 NOTE — PROGRESS NOTE ADULT - ATTENDING COMMENTS
Pt. seen with fellow.  Agree with documentation above as per fellow with amendments made as appropriate. Patient medically stable. Making progress towards rehab goals.     left ICH  Sleep/Restlessness--Pt. slept during the night,  No agitation   --Taper Seroquel to 25mg --will help cognition and aphasia  --cont. melatonin  --monitor    CT head Reviewed-- area of resolving ICH on left    LE dopplers-- reviewed with radiology--Patient with DVT in right common femoral, peroneal and gastrocnemius veins.  Also DVT and left peroneal and soleal veins.  Will contact neurosurgery for at [facility] for clearance for therapeutic Lovenox for treatment.  Vascular surgery consult.

## 2023-03-01 NOTE — DIETITIAN INITIAL EVALUATION ADULT - ADD RECOMMEND
1) Recommend increased tube feed to 1200 mL/d (300mL x4 x/d) will provide 2400 kcal, 100 grams of protein advance diet PRN.   2) Maintain aspiration precautions (elevated HOB>30-45 degrees during feeds and for at least 30 minutes before/after feeds), hold feeds for s/s TF intolerance (n/v/abdominal distention)  3) Monitor GI tolerance, skin integrity, labs, weight, and bowel movement regularity.   4) Follow SLP recommendations  5) Provide ongoing diet education as needed  6) RD remains available upon request and will follow-up per protocol

## 2023-03-01 NOTE — CONSULT NOTE ADULT - ASSESSMENT
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 He is  s/p Left decompressive hemicraniectomy and right EVD placement 1/28/23. Hospital course further s/f respiratory failure s/p intubation and extubation,  agitation requiring precedex, sepsis s/p ABX,  hypotension requiring bolus, dysphagia s/p PEG 2/14 and trach on 2/13, insomnia s/p seroquel, pain s/p oxycodone and fentanyl. Patient now with Left Hemiparesis, dysphagia s/p PEG, Aphasia, Cognitive deficits, gait Instability, ADL impairments and Functional impairments.    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 with mass effect and 8mm midline shift  -s/p intubation for airway protection and extubation  -s/p Decompressive hemicraniectomy and EVD placement 1/28  -Underwent wound revision with NSGY and plastics on 2/17  -Start comprehensive rehab program - PT/OT/SLP per rehab team  -Pain management, bowel regimen per rehab     #Acute Respiratory Failure  -s/p intubation/extubation, Trach 2/13  -Pulm consulted    #T2DM, HbA1c 6.0  -ISS and FS  -Admelog and Lantus    #Dysphagia s/p PEG 2/14  -Diet/Nutrition consult. Continue TF    #GERD  -PPI    #DVT ppx - HSQ This is 41 y/o M with PMH of prior substance use, HTN, anxiety who presented to 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 He is  s/p Left decompressive hemicraniectomy and right EVD placement 1/28/23. Hospital course further s/f respiratory failure s/p intubation and extubation,  agitation requiring precedex, sepsis s/p ABX,  hypotension requiring bolus, dysphagia s/p PEG 2/14 and trach on 2/13, insomnia s/p seroquel, pain s/p oxycodone and fentanyl. Patient now with Left Hemiparesis, dysphagia s/p PEG, Aphasia, Cognitive deficits, gait Instability, ADL impairments and Functional impairments.    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 with mass effect and 8mm midline shift  -s/p intubation for airway protection and extubation  -s/p Decompressive hemicraniectomy and EVD placement 1/28  -Underwent wound revision with NSGY and plastics on 2/17  -Start comprehensive rehab program - PT/OT/SLP per rehab team  -Pain management, bowel regimen per rehab     #Acute Respiratory Failure  -s/p intubation/extubation, Trach 2/13  -Pulm consulted    #T2DM, HbA1c 6.0  -ISS and FS  -Lantus 7un qhs, Admelog 7un 4x/day    #Dysphagia s/p PEG 2/14  -Diet/Nutrition consult. Continue TF    #GERD  -PPI    #DVT ppx - HSQ

## 2023-03-01 NOTE — DIETITIAN NUTRITION RISK NOTIFICATION - ADDITIONAL COMMENTS/DIETITIAN RECOMMENDATIONS
Recommend increase bolus tube feed via PEG to 1200 mL/d (300mL x4 x/d) will provide 2400 kcal, 100 grams of protein

## 2023-03-01 NOTE — DIETITIAN INITIAL EVALUATION ADULT - OTHER INFO
Reason for Admission: left IPH/ICH    History of Present Illness:   This is 42 year old male with PMH of prior substance use, HTN, anxiety who presented to [facility] on 1/23. On 1/23 morning, he told his family that he was not feeling well and took 2 tabs of Vyvanse (usually takes as needed). Shortly after, he was at a group therapy meeting over the phone when he complained to his father he was having a severe headache. He then became more lethargic, and developed nausea and vomited. He was brought to ED by his father. On arrival, noted to be lethargic, vomiting, with garbled speech. 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 Post CT, more lethargic with dilated right pupil and bilateral reactive but sluggish pupils. He was given mannitol and intubated for airway protection and was taken to neuro ICU.    On 1/25, HCT with enlarging R lateral ventricle and slightly worsening MLS concerning for trapped ventricle. 1/27 acute drop in SpO2 on 1/27, lavaged and suctioned out thick clot. Placed back on ACVC and FiO2 weaned from 70% to 40% overnight. On 1/28/23, he had episode of emesis, & fever of 100.3. He was  given tylenol with improvement in temperature but still little to no movement on the L side (previously would localize arm to suction and spontaneously bend L leg). Pt. Had left decompressive hemicraniectomy and right EVD  placed.    After the procedure, patient opened eyes for the first time and regained LUE/LLE movement. As he became febrile again this was less prominent. Ceftriaxone was switched to Zosyn. 1/29 HCT mild increase extraaxial fluid collection. on 1/30, repeat  HCT unchanged, his MTL was discontinued. Zosyn and vancomycin switched to cefepime. On 1/31,  HCT shoeds worsening edema +/- extraaxial fluid collection, elevated ICPs, and mannitol was restarted.      MTL was weaned,  EVD adjusted and eventually removed on 2/10.  Hospital course further c/b agitation requiring precedex, hypotension requiring bolus, dysphagia s/p PEG 2/14  and trach on 2/13, insomnia s/p seroquel, pain s/p oxycodone and fentanyl. He underwent wound revision with NSGY and plastics on 2/17. Keppra discontinued due to signs of agitation. Tolerated trach collar.     Patient was evaluated by PM&R and therapy for functional deficits, gait/ADL impairments and acute rehabilitation was recommended. Patient was medically optimized for discharge to [facility] IRU on 2/28/23.    At this time patient tolerating current tube feeding regimen TwoCal HN 960ml (240mL x 4/d) providing 1920kcal, 80 grams of protein. PEG placed on 2/14 per EMR. NKFA nor food intolerances reported. Denies N/V/C/D, unable to obtain last BM. W/ observed subcutaneous fat loss and depleted muscle mass. Noted Patient w/ A1C 6.3 (3/1)POCT 110-129 mg/dL in last 24 hours, continue to monitor.

## 2023-03-01 NOTE — DIETITIAN NUTRITION RISK NOTIFICATION - TREATMENT: THE FOLLOWING DIET HAS BEEN RECOMMENDED
Diet, NPO with Tube Feed:   Tube Feeding Modality: Gastrostomy  TwoCal HN  Total Volume for 24 Hours (mL): 3840  Bolus  Total Volume of Bolus (mL):  960  Total # of Feeds: 4  Tube Feed Frequency: Every 6 hours   Tube Feed Start Time: 20:00  Bolus Feed Rate (mL per Hour): 240   Bolus Feed Duration (in Hours): 0.5  Bolus Feed Instructions:  240cc Bolus QID at 8am, 12pm, 4pm, and 8pm  Free Water Flush  Bolus  Free Water Flush Instructions:  250cc water bolus QID at least 1 hour apart from tube feeds (02-28-23 @ 16:15) [Active]

## 2023-03-01 NOTE — CONSULT NOTE ADULT - SUBJECTIVE AND OBJECTIVE BOX
Patient is a 42y old  Male who presents with a chief complaint of left IPH/ICH (28 Feb 2023 13:10)    HPI:  This is 43 y/o M with PMH of prior substance use, HTN, anxiety who presented to [facility] on 1/23. On 1/23 morning, he told his family that he was not feeling well and took 2 tabs of Vyvanse (usually takes prn). Shortly after, he was at a group therapy meeting over the phone when he complained to his father he was having a severe headache. He then became more lethargic, and developed nausea and vomited. He was brought to ED by his father. On arrival, noted to be lethargic, vomiting, with garbled speech. CTH showed large left frontal and basal ganglia IPH with mass effect and 8mm midline shift. Post CT, more lethargic with dilated right pupil and bilateral reactive but sluggish pupils. He was given mannitol and intubated for airway protection and was taken to neuro ICU.    On 1/25, CTH with enlarging R lateral ventricle and slightly worsening MLS concerning for trapped ventricle. 1/27 acute drop in SpO2 on 1/27, lavaged and suctioned out thick clot. Placed back on ACVC and FiO2 weaned from 70% to 40% overnight. On 1/28/23, he had episode of emesis, & fever of 100.3F. He was given tylenol with improvement in temperature but still little to no movement on the L side (previously would localize arm to suction and spontaneously bend L leg). Pt. Had left decompressive hemicraniectomy and right EVD placed.    After the procedure, patient opened eyes for the first time and regained LUE/LLE movement. As he became febrile again this was less prominent. Ceftriaxone was switched to Zosyn. 1/29 HCT mild increase extraaxial fluid collection. On 1/30, repeat  CTH unchanged, his MTL was discontinued. Zosyn and vancomycin switched to cefepime. On 1/31, CTH showed worsening edema +/- extraaxial fluid collection, elevated ICPs, and mannitol was restarted.      MTL was weaned,  EVD adjusted and eventually removed on 2/10.  Hospital course further c/b agitation requiring precedex, hypotension requiring bolus, dysphagia s/p PEG 2/14  and trach on 2/13, insomnia s/p seroquel, pain s/p oxycodone and fentanyl. He underwent wound revision with NSGY and plastics on 2/17. Keppra discontinued due to signs of agitation. Tolerated trach collar.     Patient was evaluated by PM&R and therapy for functional deficits, gait/ADL impairments and acute rehabilitation was recommended. Patient was medically optimized for discharge to [facility] IRU on 2/28/23.     (28 Feb 2023 13:10)    PAST MEDICAL & SURGICAL HISTORY:  Substance abuse      Hypertension      Depression      Anxiety      No significant past surgical history        SOCIAL HISTORY:  FAMILY HISTORY:    ALLERGIES:  No Known Allergies    MEDICATIONS  (STANDING):  AQUAPHOR (petrolatum Ointment) 1 Application(s) Topical two times a day  dextrose 5%. 1000 milliLiter(s) (100 mL/Hr) IV Continuous <Continuous>  dextrose 5%. 1000 milliLiter(s) (50 mL/Hr) IV Continuous <Continuous>  dextrose 50% Injectable 25 Gram(s) IV Push once  dextrose 50% Injectable 12.5 Gram(s) IV Push once  dextrose 50% Injectable 25 Gram(s) IV Push once  doxazosin 2 milliGRAM(s) Oral at bedtime  gabapentin Solution 600 milliGRAM(s) Oral at bedtime  glucagon  Injectable 1 milliGRAM(s) IntraMuscular once  heparin   Injectable 5000 Unit(s) SubCutaneous every 8 hours  insulin glargine Injectable (LANTUS) 7 Unit(s) SubCutaneous at bedtime  insulin lispro (ADMELOG) corrective regimen sliding scale   SubCutaneous four times a day before meals  insulin lispro Injectable (ADMELOG) 7 Unit(s) SubCutaneous four times a day before meals  melatonin 6 milliGRAM(s) Oral at bedtime  nystatin Powder 1 Application(s) Topical two times a day  pantoprazole   Suspension 40 milliGRAM(s) Oral daily  polyethylene glycol 3350 17 Gram(s) Oral daily  QUEtiapine 50 milliGRAM(s) Oral at bedtime  senna Syrup 5 milliLiter(s) Oral at bedtime    MEDICATIONS  (PRN):  acetaminophen    Suspension .. 650 milliGRAM(s) Oral every 6 hours PRN Temp greater or equal to 38C (100.4F), Mild Pain (1 - 3)  dextrose Oral Gel 15 Gram(s) Oral once PRN Blood Glucose LESS THAN 70 milliGRAM(s)/deciliter  oxyCODONE    IR 5 milliGRAM(s) Oral every 6 hours PRN Severe Pain (7 - 10)    Review of Systems: Refer to HPI for pertinent positives and negatives. All other ROS reviewed and negative except as otherwise stated above.    Vital Signs Last 24 Hrs  T(F): 97.8 (01 Mar 2023 06:39), Max: 98.7 (28 Feb 2023 19:43)  HR: 76 (01 Mar 2023 06:39) (76 - 86)  BP: 118/76 (01 Mar 2023 06:39) (112/70 - 120/78)  RR: 16 (01 Mar 2023 06:39) (16 - 16)  SpO2: 98% (01 Mar 2023 06:39) (97% - 98%)  I&O's Summary    28 Feb 2023 07:01  -  01 Mar 2023 07:00  --------------------------------------------------------  IN: 490 mL / OUT: 0 mL / NET: 490 mL      PHYSICAL EXAM:  GENERAL: NAD, well-groomed, well-developed  HEAD:  Atraumatic, Normocephalic  EYES: EOMI, PERRL, conjunctiva and sclera clear  ENMT: Moist mucous membranes, Good dentition  NECK: Supple, No JVD  CHEST/LUNG: Clear to auscultation bilaterally, non-labored breathing, good air entry  HEART: RRR; S1/S2, No murmur  ABDOMEN: Soft, Nontender, Nondistended; Bowel sounds present  VASCULAR: Normal pulses, Normal capillary refill  EXTREMITIES: No cyanosis, No edema  LYMPH: No lymphadenopathy noted  SKIN: Warm, Intact  PSYCH: Normal mood and affect  NERVOUS SYSTEM:  A/O x3, Good concentration; CN 2-12 intact, No focal deficits, moves all extremities    LABS:                        12.7   9.56  )-----------( 254      ( 01 Mar 2023 06:38 )             39.2     03-01    135  |  100  |  12  ----------------------------<  112  4.0   |  25  |  0.58    Ca    8.9      01 Mar 2023 06:38    TPro  7.4  /  Alb  2.9  /  TBili  0.4  /  DBili  x   /  AST  15  /  ALT  43  /  AlkPhos  120  03-01                          POCT Blood Glucose.: 110 mg/dL (28 Feb 2023 22:15)  POCT Blood Glucose.: 118 mg/dL (28 Feb 2023 17:34)          COVID-19 PCR: NotDetec (02-28-23 @ 16:00)    RADIOLOGY & ADDITIONAL TESTS:    Care Discussed with Consultants/Other Providers: Patient is a 42y old  Male who presents with a chief complaint of left IPH/ICH (28 Feb 2023 13:10)    HPI:  This is 41 y/o M with PMH of prior substance use, HTN, anxiety who presented to [facility] on 1/23. On 1/23 morning, he told his family that he was not feeling well and took 2 tabs of Vyvanse (usually takes prn). Shortly after, he was at a group therapy meeting over the phone when he complained to his father he was having a severe headache. He then became more lethargic, and developed nausea and vomited. He was brought to ED by his father. On arrival, noted to be lethargic, vomiting, with garbled speech. CTH showed large left frontal and basal ganglia IPH with mass effect and 8mm midline shift. Post CT, more lethargic with dilated right pupil and bilateral reactive but sluggish pupils. He was given mannitol and intubated for airway protection and was taken to neuro ICU.    On 1/25, CTH with enlarging R lateral ventricle and slightly worsening MLS concerning for trapped ventricle. 1/27 acute drop in SpO2 on 1/27, lavaged and suctioned out thick clot. Placed back on ACVC and FiO2 weaned from 70% to 40% overnight. On 1/28/23, he had episode of emesis, & fever of 100.3F. He was given tylenol with improvement in temperature but still little to no movement on the L side (previously would localize arm to suction and spontaneously bend L leg). Pt. Had left decompressive hemicraniectomy and right EVD placed.    After the procedure, patient opened eyes for the first time and regained LUE/LLE movement. As he became febrile again this was less prominent. Ceftriaxone was switched to Zosyn. 1/29 HCT mild increase extraaxial fluid collection. On 1/30, repeat  CTH unchanged, his MTL was discontinued. Zosyn and vancomycin switched to cefepime. On 1/31, CTH showed worsening edema +/- extraaxial fluid collection, elevated ICPs, and mannitol was restarted.      MTL was weaned,  EVD adjusted and eventually removed on 2/10.  Hospital course further c/b agitation requiring precedex, hypotension requiring bolus, dysphagia s/p PEG 2/14  and trach on 2/13, insomnia s/p seroquel, pain s/p oxycodone and fentanyl. He underwent wound revision with NSGY and plastics on 2/17. Keppra discontinued due to signs of agitation. Tolerated trach collar.     Patient was evaluated by PM&R and therapy for functional deficits, gait/ADL impairments and acute rehabilitation was recommended. Patient was medically optimized for discharge to [facility] IRU on 2/28/23.  (28 Feb 2023 13:10)    Patient seen and examined at bedside, stable, NAD.     PAST MEDICAL & SURGICAL HISTORY:  Substance abuse  Hypertension  Depression  Anxiety  No significant past surgical history    SOCIAL HISTORY: tobacco 1/2 ppd x 15 years, h/o heroin use, completed detox, suboxone taper. PTA independent in ADLs, IADLs, and ambulation    FAMILY HISTORY: Denies pertinent family hx in first degree relatives    ALLERGIES:  No Known Allergies    MEDICATIONS  (STANDING):  AQUAPHOR (petrolatum Ointment) 1 Application(s) Topical two times a day  dextrose 5%. 1000 milliLiter(s) (100 mL/Hr) IV Continuous <Continuous>  dextrose 5%. 1000 milliLiter(s) (50 mL/Hr) IV Continuous <Continuous>  dextrose 50% Injectable 25 Gram(s) IV Push once  dextrose 50% Injectable 12.5 Gram(s) IV Push once  dextrose 50% Injectable 25 Gram(s) IV Push once  doxazosin 2 milliGRAM(s) Oral at bedtime  gabapentin Solution 600 milliGRAM(s) Oral at bedtime  glucagon  Injectable 1 milliGRAM(s) IntraMuscular once  heparin   Injectable 5000 Unit(s) SubCutaneous every 8 hours  insulin glargine Injectable (LANTUS) 7 Unit(s) SubCutaneous at bedtime  insulin lispro (ADMELOG) corrective regimen sliding scale   SubCutaneous four times a day before meals  insulin lispro Injectable (ADMELOG) 7 Unit(s) SubCutaneous four times a day before meals  melatonin 6 milliGRAM(s) Oral at bedtime  nystatin Powder 1 Application(s) Topical two times a day  pantoprazole   Suspension 40 milliGRAM(s) Oral daily  polyethylene glycol 3350 17 Gram(s) Oral daily  QUEtiapine 50 milliGRAM(s) Oral at bedtime  senna Syrup 5 milliLiter(s) Oral at bedtime    MEDICATIONS  (PRN):  acetaminophen    Suspension .. 650 milliGRAM(s) Oral every 6 hours PRN Temp greater or equal to 38C (100.4F), Mild Pain (1 - 3)  dextrose Oral Gel 15 Gram(s) Oral once PRN Blood Glucose LESS THAN 70 milliGRAM(s)/deciliter  oxyCODONE    IR 5 milliGRAM(s) Oral every 6 hours PRN Severe Pain (7 - 10)    Review of Systems: Refer to HPI for pertinent positives and negatives. All other ROS reviewed and negative except as otherwise stated above.    Vital Signs Last 24 Hrs  T(F): 97.8 (01 Mar 2023 06:39), Max: 98.7 (28 Feb 2023 19:43)  HR: 76 (01 Mar 2023 06:39) (76 - 86)  BP: 118/76 (01 Mar 2023 06:39) (112/70 - 120/78)  RR: 16 (01 Mar 2023 06:39) (16 - 16)  SpO2: 98% (01 Mar 2023 06:39) (97% - 98%)  I&O's Summary    28 Feb 2023 07:01  -  01 Mar 2023 07:00  --------------------------------------------------------  IN: 490 mL / OUT: 0 mL / NET: 490 mL      PHYSICAL EXAM:  GENERAL: NAD, well-groomed, well-developed  HEAD:  Atraumatic, Normocephalic  EYES: EOMI, PERRL, conjunctiva and sclera clear  ENMT: Moist mucous membranes, Good dentition  NECK: +trach  CHEST/LUNG: Clear to auscultation bilaterally, non-labored breathing, good air entry  HEART: RRR; S1/S2  ABDOMEN: Soft, Nontender, Nondistended; Bowel sounds present, +PEG  VASCULAR: Normal pulses, Normal capillary refill  EXTREMITIES: No cyanosis, No edema  LYMPH: No lymphadenopathy noted  SKIN: Warm, Intact  PSYCH: Normal mood and affect  NERVOUS SYSTEM:  A/O x3, Good concentration; No focal deficits, moves all extremities  : [place]    LABS:                        12.7   9.56  )-----------( 254      ( 01 Mar 2023 06:38 )             39.2     03-01    135  |  100  |  12  ----------------------------<  112  4.0   |  25  |  0.58    Ca    8.9      01 Mar 2023 06:38    TPro  7.4  /  Alb  2.9  /  TBili  0.4  /  DBili  x   /  AST  15  /  ALT  43  /  AlkPhos  120  03-01      POCT Blood Glucose.: 110 mg/dL (28 Feb 2023 22:15)  POCT Blood Glucose.: 118 mg/dL (28 Feb 2023 17:34)    COVID-19 PCR: NotDetec (02-28-23 @ 16:00)    RADIOLOGY & ADDITIONAL TESTS: reviewed    Labs, Radiology, Cardiology, and Other Results: 2/26/23  Chemistry:    Glucose: 141    Sodium: 137    Potassium: 4.1    Blood Urea Nitrogen : 9    Creatinine: 0.58     Hematology:    WBC: 8.4    HgB: 10.7    Hct: 32.9    Platelets: 225     Cultures:     01/23/2023 COVID-19 PCR:  Not detected     Radiology:     MRI brain: Hematoma centered in left basal ganglia with left to right midline shift of approximately 5 mm at the level of third ventricle.  No definite evidence of underlying tumor. However, the presence of subacute blood products limits evaluation for enhancing mass. Subarachnoid hemorrhage with associated leptomeningeal enhancement at left vertex. Status post left frontoparietal craniotomy with overlying fluid collection in the scalp. Sphenoid sinus disease; total opacification of left mastoid cavity.     CTH 02/15: Ongoing expected evolution of hemorrhage in left cerebral hemisphere, status post decompressive left hemicraniectomy. Slightly decreased rightward midline shift of about 7 mm.   Stable postoperative fluid collection over the left hemicraniectomy site. Stable ventricular size.     CTH: Previously visualized right frontal approach ventriculostomy catheter has now been removed. Ventricles are stable in size.  Stable left basal ganglia parenchymal hemorrhage and surrounding edema compared to 2/10/2023 CT. Stable rightward midline shift.     CTH  Slight decrease in amount of edema associated with left basal ganglia hematoma. Other findings, including left to right midline shift of approximately 6 mm at the level of third ventricle, are similar to prior study.    CTH: Left basal ganglia hemorrhage with mild decrease in mass effect and improved visualization of the third ventricle and left frontal horn. Persistent left to right midline shift of approximately 6 mm mildly decreased from prior study. Interval increase in fluid collection in scalp overlying the craniectomy.     TTE  --There is no left atrial dilatation (LA volume index 17 ml/m2).   --There is mild left ventricular hypertrophy.   --The left ventricle has normal end-diastolic diameter.   --LV global wall motion is hyperkinetic.   --LV ejection fraction is increased (75 % ).   --Indeterminate left ventricular diastolic function.   --Ascending aorta is normal in size; its diameter is 3.5 cm (1.4 cm/m2).   --There is no aortic stenosis. There is no aortic regurgitation.   --There is trace mitral regurgitation.   --There is too little tricuspid regurgitation to estimate PA systolic pressure.   --There is no pericardial effusion.      Care Discussed with Consultants/Other Providers: yes, rehab Patient is a 42y old  Male who presents with a chief complaint of left IPH/ICH (28 Feb 2023 13:10)    HPI:  This is 43 y/o M with PMH of prior substance use, HTN, anxiety who presented to [facility] on 1/23. On 1/23 morning, he told his family that he was not feeling well and took 2 tabs of Vyvanse (usually takes prn). Shortly after, he was at a group therapy meeting over the phone when he complained to his father he was having a severe headache. He then became more lethargic, and developed nausea and vomited. He was brought to ED by his father. On arrival, noted to be lethargic, vomiting, with garbled speech. CTH showed large left frontal and basal ganglia IPH with mass effect and 8mm midline shift. Post CT, more lethargic with dilated right pupil and bilateral reactive but sluggish pupils. He was given mannitol and intubated for airway protection and was taken to neuro ICU.    On 1/25, CTH with enlarging R lateral ventricle and slightly worsening MLS concerning for trapped ventricle. 1/27 acute drop in SpO2 on 1/27, lavaged and suctioned out thick clot. Placed back on ACVC and FiO2 weaned from 70% to 40% overnight. On 1/28/23, he had episode of emesis, & fever of 100.3F. He was given tylenol with improvement in temperature but still little to no movement on the L side (previously would localize arm to suction and spontaneously bend L leg). Pt. Had left decompressive hemicraniectomy and right EVD placed.    After the procedure, patient opened eyes for the first time and regained LUE/LLE movement. As he became febrile again this was less prominent. Ceftriaxone was switched to Zosyn. 1/29 HCT mild increase extraaxial fluid collection. On 1/30, repeat  CTH unchanged, his MTL was discontinued. Zosyn and vancomycin switched to cefepime. On 1/31, CTH showed worsening edema +/- extraaxial fluid collection, elevated ICPs, and mannitol was restarted.      MTL was weaned,  EVD adjusted and eventually removed on 2/10.  Hospital course further c/b agitation requiring precedex, hypotension requiring bolus, dysphagia s/p PEG 2/14  and trach on 2/13, insomnia s/p seroquel, pain s/p oxycodone and fentanyl. He underwent wound revision with NSGY and plastics on 2/17. Keppra discontinued due to signs of agitation. Tolerated trach collar.     Patient was evaluated by PM&R and therapy for functional deficits, gait/ADL impairments and acute rehabilitation was recommended. Patient was medically optimized for discharge to [facility] IRU on 2/28/23.  (28 Feb 2023 13:10)    Patient seen and examined at bedside, stable, NAD. +malaise, RUE pain    PAST MEDICAL & SURGICAL HISTORY:  Substance abuse  Hypertension  Depression  Anxiety  No significant past surgical history    SOCIAL HISTORY: tobacco 1/2 ppd x 15 years, h/o heroin use, completed detox, suboxone taper. PTA independent in ADLs, IADLs, and ambulation    FAMILY HISTORY: Denies pertinent family hx in first degree relatives    ALLERGIES:  No Known Allergies    MEDICATIONS  (STANDING):  AQUAPHOR (petrolatum Ointment) 1 Application(s) Topical two times a day  dextrose 5%. 1000 milliLiter(s) (100 mL/Hr) IV Continuous <Continuous>  dextrose 5%. 1000 milliLiter(s) (50 mL/Hr) IV Continuous <Continuous>  dextrose 50% Injectable 25 Gram(s) IV Push once  dextrose 50% Injectable 12.5 Gram(s) IV Push once  dextrose 50% Injectable 25 Gram(s) IV Push once  doxazosin 2 milliGRAM(s) Oral at bedtime  gabapentin Solution 600 milliGRAM(s) Oral at bedtime  glucagon  Injectable 1 milliGRAM(s) IntraMuscular once  heparin   Injectable 5000 Unit(s) SubCutaneous every 8 hours  insulin glargine Injectable (LANTUS) 7 Unit(s) SubCutaneous at bedtime  insulin lispro (ADMELOG) corrective regimen sliding scale   SubCutaneous four times a day before meals  insulin lispro Injectable (ADMELOG) 7 Unit(s) SubCutaneous four times a day before meals  melatonin 6 milliGRAM(s) Oral at bedtime  nystatin Powder 1 Application(s) Topical two times a day  pantoprazole   Suspension 40 milliGRAM(s) Oral daily  polyethylene glycol 3350 17 Gram(s) Oral daily  QUEtiapine 50 milliGRAM(s) Oral at bedtime  senna Syrup 5 milliLiter(s) Oral at bedtime    MEDICATIONS  (PRN):  acetaminophen    Suspension .. 650 milliGRAM(s) Oral every 6 hours PRN Temp greater or equal to 38C (100.4F), Mild Pain (1 - 3)  dextrose Oral Gel 15 Gram(s) Oral once PRN Blood Glucose LESS THAN 70 milliGRAM(s)/deciliter  oxyCODONE    IR 5 milliGRAM(s) Oral every 6 hours PRN Severe Pain (7 - 10)    Review of Systems: Refer to HPI for pertinent positives and negatives. All other ROS reviewed and negative except as otherwise stated above.    Vital Signs Last 24 Hrs  T(F): 97.8 (01 Mar 2023 06:39), Max: 98.7 (28 Feb 2023 19:43)  HR: 76 (01 Mar 2023 06:39) (76 - 86)  BP: 118/76 (01 Mar 2023 06:39) (112/70 - 120/78)  RR: 16 (01 Mar 2023 06:39) (16 - 16)  SpO2: 98% (01 Mar 2023 06:39) (97% - 98%)  I&O's Summary    28 Feb 2023 07:01  -  01 Mar 2023 07:00  --------------------------------------------------------  IN: 490 mL / OUT: 0 mL / NET: 490 mL      PHYSICAL EXAM:  GENERAL: NAD  HEAD: +left hemicraniectomy healing well  EYES: EOMI, PERRL, conjunctiva and sclera clear  ENMT: Moist mucous membranes, Good dentition  NECK: +trach  CHEST/LUNG: Clear to auscultation bilaterally, non-labored breathing, good air entry  HEART: RRR; S1/S2  ABDOMEN: Soft, Nontender, Nondistended; Bowel sounds present, +PEG  VASCULAR: Normal pulses, Normal capillary refill  EXTREMITIES: No cyanosis, No edema, +RUE contracture,  +RUE, RLE strength 0/5, LUE, LLE strength 2-3/5  SKIN: Warm, dry, +left hemicraniectomy sutures intact, healing well. +chronic dry plaques anterior tibia b/l  PSYCH: Normal mood and affect  NERVOUS SYSTEM: answers simple yes/no questions, fair concentration; No focal deficits, moves all extremities  : [place]    LABS:                        12.7   9.56  )-----------( 254      ( 01 Mar 2023 06:38 )             39.2     03-01    135  |  100  |  12  ----------------------------<  112  4.0   |  25  |  0.58    Ca    8.9      01 Mar 2023 06:38    TPro  7.4  /  Alb  2.9  /  TBili  0.4  /  DBili  x   /  AST  15  /  ALT  43  /  AlkPhos  120  03-01      POCT Blood Glucose.: 110 mg/dL (28 Feb 2023 22:15)  POCT Blood Glucose.: 118 mg/dL (28 Feb 2023 17:34)    COVID-19 PCR: NotDetec (02-28-23 @ 16:00)    RADIOLOGY & ADDITIONAL TESTS: reviewed    Labs, Radiology, Cardiology, and Other Results: 2/26/23  Chemistry:    Glucose: 141    Sodium: 137    Potassium: 4.1    Blood Urea Nitrogen : 9    Creatinine: 0.58     Hematology:    WBC: 8.4    HgB: 10.7    Hct: 32.9    Platelets: 225     Cultures:     01/23/2023 COVID-19 PCR:  Not detected     Radiology:     MRI brain: Hematoma centered in left basal ganglia with left to right midline shift of approximately 5 mm at the level of third ventricle.  No definite evidence of underlying tumor. However, the presence of subacute blood products limits evaluation for enhancing mass. Subarachnoid hemorrhage with associated leptomeningeal enhancement at left vertex. Status post left frontoparietal craniotomy with overlying fluid collection in the scalp. Sphenoid sinus disease; total opacification of left mastoid cavity.     CTH 02/15: Ongoing expected evolution of hemorrhage in left cerebral hemisphere, status post decompressive left hemicraniectomy. Slightly decreased rightward midline shift of about 7 mm.   Stable postoperative fluid collection over the left hemicraniectomy site. Stable ventricular size.     CTH: Previously visualized right frontal approach ventriculostomy catheter has now been removed. Ventricles are stable in size.  Stable left basal ganglia parenchymal hemorrhage and surrounding edema compared to 2/10/2023 CT. Stable rightward midline shift.     CTH  Slight decrease in amount of edema associated with left basal ganglia hematoma. Other findings, including left to right midline shift of approximately 6 mm at the level of third ventricle, are similar to prior study.    CTH: Left basal ganglia hemorrhage with mild decrease in mass effect and improved visualization of the third ventricle and left frontal horn. Persistent left to right midline shift of approximately 6 mm mildly decreased from prior study. Interval increase in fluid collection in scalp overlying the craniectomy.     TTE  --There is no left atrial dilatation (LA volume index 17 ml/m2).   --There is mild left ventricular hypertrophy.   --The left ventricle has normal end-diastolic diameter.   --LV global wall motion is hyperkinetic.   --LV ejection fraction is increased (75 % ).   --Indeterminate left ventricular diastolic function.   --Ascending aorta is normal in size; its diameter is 3.5 cm (1.4 cm/m2).   --There is no aortic stenosis. There is no aortic regurgitation.   --There is trace mitral regurgitation.   --There is too little tricuspid regurgitation to estimate PA systolic pressure.   --There is no pericardial effusion.      Care Discussed with Consultants/Other Providers: yes, rehab

## 2023-03-01 NOTE — DIETITIAN INITIAL EVALUATION ADULT - PERTINENT LABORATORY DATA
03-01    135  |  100  |  12  ----------------------------<  112<H>  4.0   |  25  |  0.58    Ca    8.9      01 Mar 2023 06:38    TPro  7.4  /  Alb  2.9<L>  /  TBili  0.4  /  DBili  x   /  AST  15  /  ALT  43  /  AlkPhos  120  03-01  POCT Blood Glucose.: 129 mg/dL (03-01-23 @ 12:52)  A1C with Estimated Average Glucose Result: 6.3 % (03-01-23 @ 06:38)

## 2023-03-01 NOTE — PROGRESS NOTE ADULT - ASSESSMENT
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 He is  s/p Left decompressive hemicraniectomy and right EVD placement 1/28/23. Hospital course further s/f respiratory failure s/p intubation and extubation,  agitation requiring precedex, sepsis s/p ABX,  hypotension requiring bolus, dysphagia s/p PEG 2/14 and trach on 2/13, insomnia s/p seroquel, pain s/p oxycodone and fentanyl. Patient now with Left Hemiparesis, dysphagia s/p PEG, Aphasia, Cognitive deficits, gait Instability, ADL impairments and Functional impairments.    #IPH  - Large left frontal and basal ganglia IPH with mass effect and 8mm midline shift  - s/p intubation for airway protection and extubation  - s/p Decompressive hemicraniectomy and EVD placement 1/28  -  underwent wound revision with NSGY and plastics on 2/17  - c/b agitation  - Start Comprehensive Rehab Program: PT/OT/ST, 3hours daily and 5 days weekly  - PT: Focused on improving strength, endurance, coordination, balance, functional mobility, and transfers  - OT: Focused on improving strength, fine motor skills, coordination, posture and ADLs.    - ST: to diagnose and treat deficits in swallowing, cognition and communication.  - Helmet when OOB  - Baseline CT head 3/1- High right frontal malik hole is seen. Postoperative changes compatible with a left temporal frontal parietal craniectomy is again seen. Mild parenchyma extending through the craniectomy defect is again seen. There is abnormal high attenuation seen involving the left basal ganglia/corona radiata region. This finding is likely compatible with evolving areas acute parenchymal hemorrhage. Surrounding edema is seen. Mass effect on the left lateral ventricle is seen. Left-to-right shift (3.1 mm) is seen. Evaluation of the osseous structures with the appropriate window aside from postop changes appear normal. Complete opacification of the right sphenoid sinus is seen consisting of mucosal thickening. Both mastoid and middle ear regions appear clear. IMPRESSION: Postop changes described above. Evolving area parenchymal hemorrhage as described above.    #Acute Respiratory Failure  - Intubation and extubation  - s/p Trach 2/13  - Nebs PRN  -Start PMV trials in Speech only  -Pulm consulted, pending recs    #GI PPX  - Nexium 40mg daily    #DM II  - ISS and FS  - Admelog and Lantus  - A1c 6.0     #Pain management  - Tylenol PRN, Oxycodone PRN, Neurontin 600mg nightly    #DVT ppx  -c/w heparin SQC   -FU dopplers --Pt. high risk for DVT due to hemiparesis     #Bowel Regimen  - Senna, miralax PRN    #Bladder management  - c/w flomax 0.4mg daily  -+ mireles  - Monitor UO    #FEN   - Diet: transitioned to bolus TF     #Skin:  - Skin on admission: IAD to bilateral groin and sacrum; RUQ incision with sutures with palpable skull flap; Left hemicraniectomy with sutures MILLER; psoriasis BL shin  - Pressure injury/Skin: Turn Q2hrs while in bed, OOB to Chair, PT/OT     #Dysphagia    - s/p PEG 2/14  - SLP: evaluation and treatment    #Mood/Cognition:  - Neuropsychology consult PRN  - c/w seroquel nightly    #Sleep:   - Maintain quiet hours and low stim environment.    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 He is  s/p Left decompressive hemicraniectomy and right EVD placement 1/28/23. Hospital course further s/f respiratory failure s/p intubation and extubation,  agitation requiring precedex, sepsis s/p ABX,  hypotension requiring bolus, dysphagia s/p PEG 2/14 and trach on 2/13, insomnia s/p seroquel, pain s/p oxycodone and fentanyl. Patient now with Left Hemiparesis, dysphagia s/p PEG, Aphasia, Cognitive deficits, gait Instability, ADL impairments and Functional impairments.    #IPH  - Large left frontal and basal ganglia IPH with mass effect and 8mm midline shift  - s/p intubation for airway protection and extubation  - s/p Decompressive hemicraniectomy and EVD placement 1/28  -  underwent wound revision with NSGY and plastics on 2/17  - c/b agitation  - Start Comprehensive Rehab Program: PT/OT/ST, 3hours daily and 5 days weekly  - PT: Focused on improving strength, endurance, coordination, balance, functional mobility, and transfers  - OT: Focused on improving strength, fine motor skills, coordination, posture and ADLs.    - ST: to diagnose and treat deficits in swallowing, cognition and communication.  - Helmet when OOB  - Baseline CT head 3/1- High right frontal malik hole is seen. Postoperative changes compatible with a left temporal frontal parietal craniectomy is again seen. Mild parenchyma extending through the craniectomy defect is again seen. There is abnormal high attenuation seen involving the left basal ganglia/corona radiata region. This finding is likely compatible with evolving areas acute parenchymal hemorrhage. Surrounding edema is seen. Mass effect on the left lateral ventricle is seen. Left-to-right shift (3.1 mm) is seen. Evaluation of the osseous structures with the appropriate window aside from postop changes appear normal. Complete opacification of the right sphenoid sinus is seen consisting of mucosal thickening. Both mastoid and middle ear regions appear clear. IMPRESSION: Postop changes described above. Evolving area parenchymal hemorrhage as described above.    Sleep/Restlessness  --Taper Seroquel to 25mg --will help cognition and aphasia  --cont. melatonin  --monitor    #Acute Respiratory Failure  - Intubation and extubation  - s/p Trach 2/13  - Nebs PRN  -Start PMV trials in Speech only  -Pulm consulted, pending recs    #GI PPX  - Nexium 40mg daily    #DM II  - ISS and FS  - Admelog and Lantus  - A1c 6.0     #Pain management  - Tylenol PRN, Oxycodone PRN, Neurontin 600mg nightly    #DVT ppx  -c/w heparin SQC   -FU dopplers --Pt. high risk for DVT due to hemiparesis     #Bowel Regimen  - Senna, miralax PRN    #Bladder management  - c/w flomax 0.4mg daily  -+ mireles  - Monitor UO    #FEN   - Diet: transitioned to bolus TF     #Skin:  - Skin on admission: IAD to bilateral groin and sacrum; RUQ incision with sutures with palpable skull flap; Left hemicraniectomy with sutures MILLER; psoriasis BL shin  - Pressure injury/Skin: Turn Q2hrs while in bed, OOB to Chair, PT/OT     #Dysphagia    - s/p PEG 2/14  - SLP: evaluation and treatment        #Precaution  - Fall, Aspiration, seizure

## 2023-03-01 NOTE — DIETITIAN INITIAL EVALUATION ADULT - ORAL INTAKE PTA/DIET HISTORY
Unable to obtain diet Hx from patient, no family at bedside. Will obtain subjective wt/diet history from pt/family PRN. Per EMR PEG placed 2/14

## 2023-03-01 NOTE — DIETITIAN INITIAL EVALUATION ADULT - NS FNS DIET ORDER
Diet, NPO with Tube Feed:   Tube Feeding Modality: Gastrostomy  TwoCal HN  Total Volume for 24 Hours (mL): 3840  Bolus  Total Volume of Bolus (mL):  960  Total # of Feeds: 4  Tube Feed Frequency: Every 6 hours   Tube Feed Start Time: 20:00  Bolus Feed Rate (mL per Hour): 240   Bolus Feed Duration (in Hours): 0.5  Bolus Feed Instructions:  240cc Bolus QID at 8am, 12pm, 4pm, and 8pm  Free Water Flush  Bolus  Free Water Flush Instructions:  250cc water bolus QID at least 1 hour apart from tube feeds (02-28-23 @ 16:15)

## 2023-03-01 NOTE — DIETITIAN INITIAL EVALUATION ADULT - NSFNSGIIOFT_GEN_A_CORE
02-28-23 @ 07:01 - 03-01-23 @ 07:00  --------------------------------------------------------  OUT:  Total OUT: 0 mL    Total NET: 240 mL      03-01-23 @ 07:01 - 03-01-23 @ 13:19  --------------------------------------------------------  OUT:  Total OUT: 0 mL    Total NET: 240 mL

## 2023-03-02 LAB
ALBUMIN SERPL ELPH-MCNC: 3 G/DL — LOW (ref 3.3–5)
ALP SERPL-CCNC: 122 U/L — HIGH (ref 40–120)
ALT FLD-CCNC: 35 U/L — SIGNIFICANT CHANGE UP (ref 10–45)
ANION GAP SERPL CALC-SCNC: 11 MMOL/L — SIGNIFICANT CHANGE UP (ref 5–17)
AST SERPL-CCNC: 14 U/L — SIGNIFICANT CHANGE UP (ref 10–40)
BILIRUB SERPL-MCNC: 0.4 MG/DL — SIGNIFICANT CHANGE UP (ref 0.2–1.2)
BUN SERPL-MCNC: 9 MG/DL — SIGNIFICANT CHANGE UP (ref 7–23)
CALCIUM SERPL-MCNC: 9.1 MG/DL — SIGNIFICANT CHANGE UP (ref 8.4–10.5)
CHLORIDE SERPL-SCNC: 100 MMOL/L — SIGNIFICANT CHANGE UP (ref 96–108)
CO2 SERPL-SCNC: 24 MMOL/L — SIGNIFICANT CHANGE UP (ref 22–31)
CORTIS AM PEAK SERPL-MCNC: 29.4 UG/DL — HIGH (ref 6–18.4)
CREAT SERPL-MCNC: 0.63 MG/DL — SIGNIFICANT CHANGE UP (ref 0.5–1.3)
EGFR: 122 ML/MIN/1.73M2 — SIGNIFICANT CHANGE UP
FSH SERPL-MCNC: 2 IU/L — SIGNIFICANT CHANGE UP (ref 1.5–12.4)
GLUCOSE BLDC GLUCOMTR-MCNC: 104 MG/DL — HIGH (ref 70–99)
GLUCOSE BLDC GLUCOMTR-MCNC: 110 MG/DL — HIGH (ref 70–99)
GLUCOSE BLDC GLUCOMTR-MCNC: 128 MG/DL — HIGH (ref 70–99)
GLUCOSE BLDC GLUCOMTR-MCNC: 181 MG/DL — HIGH (ref 70–99)
GLUCOSE SERPL-MCNC: 146 MG/DL — HIGH (ref 70–99)
HCT VFR BLD CALC: 39.8 % — SIGNIFICANT CHANGE UP (ref 39–50)
HGB BLD-MCNC: 12.9 G/DL — LOW (ref 13–17)
INSULIN-LIKE GROWTH FACTOR 1 INTERPRETATION: SIGNIFICANT CHANGE UP
INSULIN-LIKE GROWTH FACTOR 1: 175 NG/ML — SIGNIFICANT CHANGE UP (ref 80–235)
LH SERPL-ACNC: 2.5 IU/L — SIGNIFICANT CHANGE UP
MCHC RBC-ENTMCNC: 29.5 PG — SIGNIFICANT CHANGE UP (ref 27–34)
MCHC RBC-ENTMCNC: 32.4 GM/DL — SIGNIFICANT CHANGE UP (ref 32–36)
MCV RBC AUTO: 90.9 FL — SIGNIFICANT CHANGE UP (ref 80–100)
NRBC # BLD: 0 /100 WBCS — SIGNIFICANT CHANGE UP (ref 0–0)
PLATELET # BLD AUTO: 248 K/UL — SIGNIFICANT CHANGE UP (ref 150–400)
POTASSIUM SERPL-MCNC: 4.1 MMOL/L — SIGNIFICANT CHANGE UP (ref 3.5–5.3)
POTASSIUM SERPL-SCNC: 4.1 MMOL/L — SIGNIFICANT CHANGE UP (ref 3.5–5.3)
PROT SERPL-MCNC: 7.4 G/DL — SIGNIFICANT CHANGE UP (ref 6–8.3)
RBC # BLD: 4.38 M/UL — SIGNIFICANT CHANGE UP (ref 4.2–5.8)
RBC # FLD: 14.6 % — HIGH (ref 10.3–14.5)
SODIUM SERPL-SCNC: 135 MMOL/L — SIGNIFICANT CHANGE UP (ref 135–145)
T4 FREE SERPL-MCNC: 1.4 NG/DL — SIGNIFICANT CHANGE UP (ref 0.9–1.8)
TESTOST FREE+TOTAL PANEL SERPL-MCNC: 252 NG/DL — LOW (ref 300–836)
TSH SERPL-MCNC: 2.6 UIU/ML — SIGNIFICANT CHANGE UP (ref 0.36–3.74)
WBC # BLD: 9.13 K/UL — SIGNIFICANT CHANGE UP (ref 3.8–10.5)
WBC # FLD AUTO: 9.13 K/UL — SIGNIFICANT CHANGE UP (ref 3.8–10.5)

## 2023-03-02 PROCEDURE — 99232 SBSQ HOSP IP/OBS MODERATE 35: CPT

## 2023-03-02 PROCEDURE — 99233 SBSQ HOSP IP/OBS HIGH 50: CPT

## 2023-03-02 RX ORDER — QUETIAPINE FUMARATE 200 MG/1
12.5 TABLET, FILM COATED ORAL ONCE
Refills: 0 | Status: COMPLETED | OUTPATIENT
Start: 2023-03-02 | End: 2023-03-02

## 2023-03-02 RX ORDER — PETROLATUM,WHITE
1 JELLY (GRAM) TOPICAL
Refills: 0 | Status: DISCONTINUED | OUTPATIENT
Start: 2023-03-02 | End: 2023-04-06

## 2023-03-02 RX ORDER — TRAZODONE HCL 50 MG
25 TABLET ORAL AT BEDTIME
Refills: 0 | Status: DISCONTINUED | OUTPATIENT
Start: 2023-03-02 | End: 2023-03-03

## 2023-03-02 RX ORDER — BACITRACIN ZINC 500 UNIT/G
1 OINTMENT IN PACKET (EA) TOPICAL DAILY
Refills: 0 | Status: DISCONTINUED | OUTPATIENT
Start: 2023-03-02 | End: 2023-03-02

## 2023-03-02 RX ORDER — BACITRACIN ZINC 500 UNIT/G
1 OINTMENT IN PACKET (EA) TOPICAL DAILY
Refills: 0 | Status: DISCONTINUED | OUTPATIENT
Start: 2023-03-02 | End: 2023-04-06

## 2023-03-02 RX ADMIN — INSULIN GLARGINE 7 UNIT(S): 100 INJECTION, SOLUTION SUBCUTANEOUS at 22:32

## 2023-03-02 RX ADMIN — NYSTATIN CREAM 1 APPLICATION(S): 100000 CREAM TOPICAL at 19:16

## 2023-03-02 RX ADMIN — ENOXAPARIN SODIUM 90 MILLIGRAM(S): 100 INJECTION SUBCUTANEOUS at 21:29

## 2023-03-02 RX ADMIN — QUETIAPINE FUMARATE 12.5 MILLIGRAM(S): 200 TABLET, FILM COATED ORAL at 13:20

## 2023-03-02 RX ADMIN — Medication 1 APPLICATION(S): at 23:05

## 2023-03-02 RX ADMIN — SENNA PLUS 5 MILLILITER(S): 8.6 TABLET ORAL at 22:32

## 2023-03-02 RX ADMIN — GABAPENTIN 600 MILLIGRAM(S): 400 CAPSULE ORAL at 22:33

## 2023-03-02 RX ADMIN — QUETIAPINE FUMARATE 25 MILLIGRAM(S): 200 TABLET, FILM COATED ORAL at 22:33

## 2023-03-02 RX ADMIN — PANTOPRAZOLE SODIUM 40 MILLIGRAM(S): 20 TABLET, DELAYED RELEASE ORAL at 13:20

## 2023-03-02 RX ADMIN — Medication 1 APPLICATION(S): at 05:42

## 2023-03-02 RX ADMIN — Medication 2: at 22:29

## 2023-03-02 RX ADMIN — ENOXAPARIN SODIUM 90 MILLIGRAM(S): 100 INJECTION SUBCUTANEOUS at 08:33

## 2023-03-02 RX ADMIN — NYSTATIN CREAM 1 APPLICATION(S): 100000 CREAM TOPICAL at 05:42

## 2023-03-02 RX ADMIN — Medication 25 MILLIGRAM(S): at 22:32

## 2023-03-02 RX ADMIN — Medication 6 MILLIGRAM(S): at 22:33

## 2023-03-02 RX ADMIN — POLYETHYLENE GLYCOL 3350 17 GRAM(S): 17 POWDER, FOR SOLUTION ORAL at 13:21

## 2023-03-02 NOTE — PROGRESS NOTE ADULT - ASSESSMENT
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 He is  s/p Left decompressive hemicraniectomy and right EVD placement 1/28/23. Hospital course further s/f respiratory failure s/p intubation and extubation,  agitation requiring precedex, sepsis s/p ABX,  hypotension requiring bolus, dysphagia s/p PEG 2/14 and trach on 2/13, insomnia s/p seroquel, pain s/p oxycodone and fentanyl. Patient now with Left Hemiparesis, dysphagia s/p PEG, Aphasia, Cognitive deficits, gait Instability, ADL impairments and Functional impairments.    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 with mass effect and 8mm midline shift  -s/p intubation for airway protection and extubation  -s/p Decompressive hemicraniectomy and EVD placement 1/28  -Underwent wound revision with NSGY and plastics on 2/17  -Comprehensive rehab program - PT/OT/SLP per rehab team  -Pain management, bowel regimen per rehab     #Acute Respiratory Failure  -s/p intubation/extubation, Trach 2/13  -Pulm consulted    #T2DM, HbA1c 6.0  -ISS and FS  -Lantus 7un qhs, Admelog 7un 4x/day    #Dysphagia s/p PEG 2/14  -Diet/Nutrition consult. Continue TF    #GERD  -PPI    #DVT ppx - HSQ

## 2023-03-02 NOTE — PROGRESS NOTE ADULT - SUBJECTIVE AND OBJECTIVE BOX
Patient is a 42y old  Male who presents with a chief complaint of left IPH/ICH (02 Mar 2023 12:31)      Patient seen and examined at bedside.     ALLERGIES:  No Known Allergies    MEDICATIONS  (STANDING):  AQUAPHOR (petrolatum Ointment) 1 Application(s) Topical two times a day  dextrose 5%. 1000 milliLiter(s) (100 mL/Hr) IV Continuous <Continuous>  dextrose 5%. 1000 milliLiter(s) (50 mL/Hr) IV Continuous <Continuous>  dextrose 50% Injectable 25 Gram(s) IV Push once  dextrose 50% Injectable 12.5 Gram(s) IV Push once  dextrose 50% Injectable 25 Gram(s) IV Push once  doxazosin 2 milliGRAM(s) Oral at bedtime  enoxaparin Injectable 90 milliGRAM(s) SubCutaneous <User Schedule>  gabapentin 600 milliGRAM(s) Oral at bedtime  glucagon  Injectable 1 milliGRAM(s) IntraMuscular once  insulin glargine Injectable (LANTUS) 7 Unit(s) SubCutaneous at bedtime  insulin lispro (ADMELOG) corrective regimen sliding scale   SubCutaneous four times a day before meals  melatonin 6 milliGRAM(s) Oral at bedtime  nystatin Powder 1 Application(s) Topical two times a day  pantoprazole   Suspension 40 milliGRAM(s) Oral daily  polyethylene glycol 3350 17 Gram(s) Oral daily  QUEtiapine 25 milliGRAM(s) Oral at bedtime  senna Syrup 5 milliLiter(s) Oral at bedtime  traZODone 25 milliGRAM(s) Oral at bedtime    MEDICATIONS  (PRN):  acetaminophen    Suspension .. 650 milliGRAM(s) Oral every 6 hours PRN Temp greater or equal to 38C (100.4F), Mild Pain (1 - 3)  dextrose Oral Gel 15 Gram(s) Oral once PRN Blood Glucose LESS THAN 70 milliGRAM(s)/deciliter    Vital Signs Last 24 Hrs  T(F): 97.5 (02 Mar 2023 09:27), Max: 98.4 (01 Mar 2023 19:55)  HR: 86 (02 Mar 2023 09:27) (86 - 98)  BP: 99/66 (02 Mar 2023 09:27) (99/66 - 112/78)  RR: 16 (02 Mar 2023 09:27) (16 - 16)  SpO2: 96% (01 Mar 2023 19:55) (96% - 96%)  I&O's Summary    01 Mar 2023 07:01  -  02 Mar 2023 07:00  --------------------------------------------------------  IN: 1090 mL / OUT: 0 mL / NET: 1090 mL    02 Mar 2023 07:01  -  02 Mar 2023 13:26  --------------------------------------------------------  IN: 600 mL / OUT: 0 mL / NET: 600 mL      BMI (kg/m2): 24.2 (02-28-23 @ 14:34)    PHYSICAL EXAM:  General: NAD, +cranial wound healing well  ENT: MMM, no scleral icterus  Neck: Supple, No JVD. +trach  Lungs: Clear to auscultation bilaterally, no wheezes, rales, rhonchi  Cardio: RRR, S1/S2  Abdomen: Soft, Nontender, Nondistended; Bowel sounds present. +PEG  Extremities: No calf tenderness, No pitting edema    LABS:                        12.9   9.13  )-----------( 248      ( 02 Mar 2023 05:47 )             39.8       03-02    135  |  100  |  9   ----------------------------<  146  4.1   |  24  |  0.63    Ca    9.1      02 Mar 2023 05:47    TPro  7.4  /  Alb  3.0  /  TBili  0.4  /  DBili  x   /  AST  14  /  ALT  35  /  AlkPhos  122  03-02                  TSH 2.605   TSH with FT4 reflex --  Total T3 --              POCT Blood Glucose.: 104 mg/dL (02 Mar 2023 13:09)  POCT Blood Glucose.: 128 mg/dL (02 Mar 2023 08:30)  POCT Blood Glucose.: 170 mg/dL (01 Mar 2023 21:36)  POCT Blood Glucose.: 81 mg/dL (01 Mar 2023 17:21)          COVID-19 PCR: NotDetec (02-28-23 @ 16:00)      RADIOLOGY & ADDITIONAL TESTS: reviewed    Care Discussed with Consultants/Other Providers: yes, rehab

## 2023-03-02 NOTE — PROGRESS NOTE ADULT - SUBJECTIVE AND OBJECTIVE BOX
HPI:  This is 42 year old male with PMH of prior substance use, HTN, anxiety who presented to [facility] on 1/23. On 1/23 morning, he told his family that he was not feeling well and took 2 tabs of Vyvanse (usually takes as needed). Shortly after, he was at a group therapy meeting over the phone when he complained to his father he was having a severe headache. He then became more lethargic, and developed nausea and vomited. He was brought to ED by his father. On arrival, noted to be lethargic, vomiting, with garbled speech. 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 Post CT, more lethargic with dilated right pupil and bilateral reactive but sluggish pupils. He was given mannitol and intubated for airway protection and was taken to neuro ICU.    On 1/25, HCT with enlarging R lateral ventricle and slightly worsening MLS concerning for trapped ventricle. 1/27 acute drop in SpO2 on 1/27, lavaged and suctioned out thick clot. Placed back on ACVC and FiO2 weaned from 70% to 40% overnight. On 1/28/23, he had episode of emesis, & fever of 100.3. He was  given tylenol with improvement in temperature but still little to no movement on the L side (previously would localize arm to suction and spontaneously bend L leg). Pt. Had left decompressive hemicraniectomy and right EVD  placed.    After the procedure, patient opened eyes for the first time and regained LUE/LLE movement. As he became febrile again this was less prominent. Ceftriaxone was switched to Zosyn. 1/29 HCT mild increase extraaxial fluid collection. on 1/30, repeat  HCT unchanged, his MTL was discontinued. Zosyn and vancomycin switched to cefepime. On 1/31,  HCT shoeds worsening edema +/- extraaxial fluid collection, elevated ICPs, and mannitol was restarted.      MTL was weaned,  EVD adjusted and eventually removed on 2/10.  Hospital course further c/b agitation requiring precedex, hypotension requiring bolus, dysphagia s/p PEG 2/14  and trach on 2/13, insomnia s/p seroquel, pain s/p oxycodone and fentanyl. He underwent wound revision with NSGY and plastics on 2/17. Keppra discontinued due to signs of agitation. Tolerated trach collar.     Patient was evaluated by PM&R and therapy for functional deficits, gait/ADL impairments and acute rehabilitation was recommended. Patient was medically optimized for discharge to [facility] IRU on 2/28/23.        Subjective:  Pt. slept Overnight as per nursing.  Nursing reports patient was more restless during morning care today.  Patient was calmer during PT seen during PT session.  Patient had orthostatic BP while sitting at the edge of the bed.  BP was not unable to be measured when patient was sitting due to symptoms.  Patient was placed supine and blood pressure reading was 115/72.    Patient limited by aphasia.  He appeared calm, did not seem to be uncomfortable or restless at the time when I saw him.      VITALS  Vital Signs Last 24 Hrs  T(C): 36.4 (02 Mar 2023 09:27), Max: 36.9 (01 Mar 2023 19:55)  T(F): 97.5 (02 Mar 2023 09:27), Max: 98.4 (01 Mar 2023 19:55)  HR: 86 (02 Mar 2023 09:27) (86 - 98)  BP: 99/66 (02 Mar 2023 09:27) (99/66 - 112/78)  BP(mean): --  RR: 16 (02 Mar 2023 09:27) (16 - 16)  SpO2: 96% (01 Mar 2023 19:55) (96% - 96%)    Parameters below as of 02 Mar 2023 09:27  Patient On (Oxygen Delivery Method): tracheostomy collar    O2 Concentration (%): 28    REVIEW OF SYMPTOMS  Neurological deficits      PHYSICAL EXAM  Constitutional - NAD, Comfortable  HEENT - EOMI, left hemicraniectomy with sutures--No erythema no drainage  Neck -  + trach Shiley 8 on TC  Chest -  CTAB   Cardio - warm and well perfused, RRR, no murmur  Abdomen -  Soft, NTND, +PEG, Bone flap marsupialized in epigastric region.  Sutures with no significant erythema and no drainage.  Extremities - No peripheral edema, No calf tenderness   Neurologic Exam:                    Cognitive -             Orientation: Awake, Alert, unable to assess orientation due to aphasia and cognitive deficits            Attention:  Impaired; makes eye contact but follows commands inconsistently            Memory: Impaired            Thought: Impaired, Impulsive     Speech - non-verbal -(trached), impaired comprehension, +aphasia      Cranial Nerves - Limited due to comprehension impairment and aphasia; No facial asymmetry, Tongue midline, EOMI, Pupils dilated but reactive, +dysphagia     Motor -                      LEFT    UE - at least 3/5 and moving spontaneously; exam limited by command following;  WNL                    RIGHT UE - ShAB 0/5, EF 0/5, EE 0/5, WE 0/5,  0/5-- MAS 1 finger flexors                     LEFT    LE -  at least 3/5 and moving spontaneously; exam limited by command following                    RIGHT LE - HE 1/5, Hip adductor 1/5, KE 0/5, DF 0/5, PF 0/5        Sensory - decrease sensation to LT-- RUE     Reflexes - DTR 2+/ 4 , neg Grider's b/l, neg Babinski's b/l     Coordination - FTN / HTS impaired on right;    Psychiatric - Mood stable, Affect WNL  Skin on admission: IAD to bilateral groin and sacrum; RUQ incision with sutures with palpable skull flap; Left hemicraniectomy with sutures MILLER; psoriasis BL shin      RECENT LABS                        12.9   9.13  )-----------( 248      ( 02 Mar 2023 05:47 )             39.8     03-02    135  |  100  |  9   ----------------------------<  146<H>  4.1   |  24  |  0.63    Ca    9.1      02 Mar 2023 05:47    TPro  7.4  /  Alb  3.0<L>  /  TBili  0.4  /  DBili  x   /  AST  14  /  ALT  35  /  AlkPhos  122<H>  03-02            RADIOLOGY/OTHER RESULTS   EXAM:  CT BRAIN   ORDERED BY: RHONDA TOPETE     PROCEDURE DATE:  03/01/2023          INTERPRETATION:  Clinical indication: Follow-up hemorrhage    Multiple axial sections were performed from base of vertex without contrast enhancement. Coronal and sagittal reconstructions were performed    No prior studies without comparison.    High right frontal malik hole is seen.    Postoperative changes compatible with a left temporal frontal parietal craniectomy is again seen. Mild parenchyma extending through the craniectomy defect is again seen. There is abnormal high attenuation seen involving the left basal ganglia/corona radiata region. This finding is likely compatible with evolving areas acute parenchymal hemorrhage. Surrounding edema is seen. Mass effect on the left lateral ventricle is seen. Left-to-right shift (3.1 mm) is seen.    Evaluation of the osseous structures with the appropriate window aside from postop changes appear normal.    Complete opacification of the right sphenoid sinus is seen consisting of mucosal thickening.    Both mastoid and middle ear regions appear clear.    IMPRESSION: Postop changes described above    Evolving area parenchymal hemorrhage as described above.    EXAM:  US DPLX LWR EXT VEINS COMPL BI   ORDERED BY:   RHONDA TOPETE     PROCEDURE DATE:  03/01/2023          INTERPRETATION:  CLINICAL INFORMATION: Leg swelling    COMPARISON: None available.    TECHNIQUE: Duplex sonography of the BILATERAL LOWER extremity veins with color and spectral Doppler, with and without compression. Portable technique is utilized.    FINDINGS:    RIGHT:  Normal compressibility of the RIGHT femoral and popliteal veins. History tibial vein is patent. Doppler examination shows normal spontaneous and phasic flow.Partially occlusive DVT is seen in the common femoral vein and at its junction with the greater saphenous vein. Gastrocnemius and peroneal veins demonstrate occlusive thrombus.    LEFT:  Normal compressibility of the LEFT common femoral, femoral and popliteal veins.  Doppler examination shows normal spontaneous and phasic flow. The   posterior tibial vein is patent.  Thrombus is seen in the peroneal and soleal veins.    IMPRESSION:  Partially occlusive thrombus in the right common femoral vein.  Bilateral calf vein thrombosis as described above.  Results were discussed with Dr. Eulalia cevallos at 5:10 PM on 3/1/2023.      MEDICATIONS  (STANDING):  AQUAPHOR (petrolatum Ointment) 1 Application(s) Topical two times a day  dextrose 5%. 1000 milliLiter(s) (100 mL/Hr) IV Continuous <Continuous>  dextrose 5%. 1000 milliLiter(s) (50 mL/Hr) IV Continuous <Continuous>  dextrose 50% Injectable 25 Gram(s) IV Push once  dextrose 50% Injectable 12.5 Gram(s) IV Push once  dextrose 50% Injectable 25 Gram(s) IV Push once  doxazosin 2 milliGRAM(s) Oral at bedtime  enoxaparin Injectable 90 milliGRAM(s) SubCutaneous <User Schedule>  gabapentin 600 milliGRAM(s) Oral at bedtime  glucagon  Injectable 1 milliGRAM(s) IntraMuscular once  insulin glargine Injectable (LANTUS) 7 Unit(s) SubCutaneous at bedtime  insulin lispro (ADMELOG) corrective regimen sliding scale   SubCutaneous four times a day before meals  melatonin 6 milliGRAM(s) Oral at bedtime  nystatin Powder 1 Application(s) Topical two times a day  pantoprazole   Suspension 40 milliGRAM(s) Oral daily  polyethylene glycol 3350 17 Gram(s) Oral daily  QUEtiapine 25 milliGRAM(s) Oral at bedtime  senna Syrup 5 milliLiter(s) Oral at bedtime    MEDICATIONS  (PRN):  acetaminophen    Suspension .. 650 milliGRAM(s) Oral every 6 hours PRN Temp greater or equal to 38C (100.4F), Mild Pain (1 - 3)  dextrose Oral Gel 15 Gram(s) Oral once PRN Blood Glucose LESS THAN 70 milliGRAM(s)/deciliter

## 2023-03-02 NOTE — PROGRESS NOTE ADULT - ASSESSMENT
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 He is  s/p Left decompressive hemicraniectomy and right EVD placement 1/28/23. Hospital course further c/b respiratory failure s/p intubation and extubation,  agitation requiring precedex, sepsis s/p ABX,  hypotension requiring bolus, dysphagia s/p PEG 2/14 and trach on 2/13, insomnia s/p seroquel, pain s/p oxycodone and fentanyl. Patient now with Left Hemiparesis, dysphagia s/p PEG, Aphasia, Cognitive deficits, gait Instability, ADL impairments and Functional impairments.    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    - ST: to diagnose and treat deficits in swallowing, cognition and communication.  - Helmet when OOB  - Baseline CT head 3/1- High right frontal malik hole is seen. Postoperative changes compatible with a left temporal frontal parietal craniectomy is again seen. Mild parenchyma extending through the craniectomy defect is again seen. There is abnormal high attenuation seen involving the left basal ganglia/corona radiata region. This finding is likely compatible with evolving areas acute parenchymal hemorrhage. Surrounding edema is seen. Mass effect on the left lateral ventricle is seen. Left-to-right shift (3.1 mm) is seen. Evaluation of the osseous structures with the appropriate window aside from postop changes appear normal. Complete opacification of the right sphenoid sinus is seen consisting of mucosal thickening. Both mastoid and middle ear regions appear clear. IMPRESSION: Postop changes described above. Evolving area parenchymal hemorrhage as described above.  --Spoke with OT–will benefit from right resting hand splint    Sleep/Restlessness  --Tapered Seroquel to 25mg 3/2--goal to eventually transition off-- will help cognition and aphasia  --Add trial of Trazodone 25mg qhs as will help with daytime restlessness.    --cont. melatonin  --monitor  --Discussed with nursing that patient needs supervision for safety monitoring as pt.  is impulsive, restless and high fall risk as well as is reaching for trach  --Cont. left UE mitten    Bilateral lower extremity DVTs–  – Lower extremity Doppler from 3/1–right femoral vein DVT and bilateral lower extremity calf DVTs  – [facility]--Dr. Graham, spoke with patient's neurosurgery PA–who was in contact with Dr. Cueva patient's neurosurgeon–gave clearance for patient to start therapeutic Lovenox.  – Vascular consult pending,–discussed with Dr. Nolan recommends therapeutic Lovenox for 3 months.    Orthostatic hypotension–  – Discussed with hospitalist–patient on doxazosin 2 mg  Check postvoid residual–if normal will stop doxazosin and monitor.      #Acute Respiratory Failure  - Intubation and extubation  - s/p Trach 2/13  - Nebs PRN  -Starting PMV trials in Speech only  -Pulm consulted, pending recs    #GI PPX  - Nexium 40mg daily    #DM II  - ISS and FS  - Admelog and Lantus  - A1c 6.0     #Pain management  - Tylenol PRN, d/c Oxycodone PRN,   --cont. Neurontin 600mg nightly      #Bowel Regimen  - Senna, miralax PRN    #Bladder management  -on doxazosin 2mg   -check PVRs-- SC PRN if > 400cc  -- d/c doxazosin if PVR normal    #Dysphagia    - s/p PEG 2/14  - SLP: evaluation and treatment  - Diet: tolerating bolus TF     #Skin:  - Skin on admission: IAD to bilateral groin and sacrum; RUQ incision with sutures with palpable skull flap; Left hemicraniectomy with sutures MILLER; psoriasis BL shin  - Pressure injury/Skin: Turn Q2hrs while in bed, OOB to Chair, PT/OT       #Precaution  - Fall, Aspiration, seizure    – IDT 3/2:  Social work:  patient on discharge can live with his mother who has a first-floor set up.  Nursing–incontinent of bowel and bladder, total assist with toileting  Speech: N.p.o./PEG, severe language deficits–poor command following, poor orientation, decreased initiation, delayed responses.  Tolerating finger occlusion of trach–starting PMV trials  Aphasia, severe cognitive deficits  OT's: Total assist with ADLs and transfers, restless  PT: Sitting–max assist,–goal min assist with bed mobility,  mod assist with transfers, ambulation 10 feet max assist with hemiwalker, wheelchair mobility 50 feet min assist.  ELOS: 3/28 ZOHAIB  Goals:  1.  Improved secretion management and tolerate PMV  2.  transfers with 1 person assist  3.  Communicate basic wants and needs with yes no answers    **Called pt's mother, [place], to provide update.  No answer-- left Voicemail.

## 2023-03-03 LAB
GLUCOSE BLDC GLUCOMTR-MCNC: 119 MG/DL — HIGH (ref 70–99)
GLUCOSE BLDC GLUCOMTR-MCNC: 128 MG/DL — HIGH (ref 70–99)

## 2023-03-03 PROCEDURE — 93010 ELECTROCARDIOGRAM REPORT: CPT

## 2023-03-03 PROCEDURE — 71045 X-RAY EXAM CHEST 1 VIEW: CPT | Mod: 26

## 2023-03-03 PROCEDURE — 99233 SBSQ HOSP IP/OBS HIGH 50: CPT

## 2023-03-03 RX ORDER — TRAZODONE HCL 50 MG
50 TABLET ORAL AT BEDTIME
Refills: 0 | Status: DISCONTINUED | OUTPATIENT
Start: 2023-03-03 | End: 2023-03-06

## 2023-03-03 RX ORDER — OLANZAPINE 15 MG/1
2.5 TABLET, FILM COATED ORAL ONCE
Refills: 0 | Status: COMPLETED | OUTPATIENT
Start: 2023-03-03 | End: 2023-03-03

## 2023-03-03 RX ADMIN — Medication 1 APPLICATION(S): at 12:41

## 2023-03-03 RX ADMIN — OLANZAPINE 2.5 MILLIGRAM(S): 15 TABLET, FILM COATED ORAL at 11:19

## 2023-03-03 RX ADMIN — SENNA PLUS 5 MILLILITER(S): 8.6 TABLET ORAL at 21:46

## 2023-03-03 RX ADMIN — Medication 1 APPLICATION(S): at 05:08

## 2023-03-03 RX ADMIN — ENOXAPARIN SODIUM 90 MILLIGRAM(S): 100 INJECTION SUBCUTANEOUS at 21:47

## 2023-03-03 RX ADMIN — Medication 1 APPLICATION(S): at 18:12

## 2023-03-03 RX ADMIN — POLYETHYLENE GLYCOL 3350 17 GRAM(S): 17 POWDER, FOR SOLUTION ORAL at 12:58

## 2023-03-03 RX ADMIN — NYSTATIN CREAM 1 APPLICATION(S): 100000 CREAM TOPICAL at 05:08

## 2023-03-03 RX ADMIN — GABAPENTIN 600 MILLIGRAM(S): 400 CAPSULE ORAL at 21:47

## 2023-03-03 RX ADMIN — ENOXAPARIN SODIUM 90 MILLIGRAM(S): 100 INJECTION SUBCUTANEOUS at 08:39

## 2023-03-03 RX ADMIN — PANTOPRAZOLE SODIUM 40 MILLIGRAM(S): 20 TABLET, DELAYED RELEASE ORAL at 12:58

## 2023-03-03 RX ADMIN — NYSTATIN CREAM 1 APPLICATION(S): 100000 CREAM TOPICAL at 18:11

## 2023-03-03 RX ADMIN — Medication 6 MILLIGRAM(S): at 21:48

## 2023-03-03 RX ADMIN — Medication 50 MILLIGRAM(S): at 21:48

## 2023-03-03 RX ADMIN — OLANZAPINE 2.5 MILLIGRAM(S): 15 TABLET, FILM COATED ORAL at 18:12

## 2023-03-03 RX ADMIN — QUETIAPINE FUMARATE 25 MILLIGRAM(S): 200 TABLET, FILM COATED ORAL at 21:48

## 2023-03-03 NOTE — CONSULT NOTE ADULT - ASSESSMENT
Assessment  Chronic Respiratory failure s/p trach  S/P ICH  h/o polysubstance abuse  underlying HTN, Depression    Plan  Continue PMV traials, will consider downsizing trach if noted air trapping  trach care  Speech f/u  PT as tolerated  TC o2 to maintain sat taper as tolerated  d/w Speech

## 2023-03-03 NOTE — PROGRESS NOTE ADULT - SUBJECTIVE AND OBJECTIVE BOX
Patient is a 42y old  Male who presents with a chief complaint of left IPH/ICH (03 Mar 2023 10:17)      HPI:  This is 42 year old male with PMH of prior substance use, HTN, anxiety who presented to [facility] on 1/23. On 1/23 morning, he told his family that he was not feeling well and took 2 tabs of Vyvanse (usually takes as needed). Shortly after, he was at a group therapy meeting over the phone when he complained to his father he was having a severe headache. He then became more lethargic, and developed nausea and vomited. He was brought to ED by his father. On arrival, noted to be lethargic, vomiting, with garbled speech. 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 Post CT, more lethargic with dilated right pupil and bilateral reactive but sluggish pupils. He was given mannitol and intubated for airway protection and was taken to neuro ICU.    On 1/25, HCT with enlarging R lateral ventricle and slightly worsening MLS concerning for trapped ventricle. 1/27 acute drop in SpO2 on 1/27, lavaged and suctioned out thick clot. Placed back on ACVC and FiO2 weaned from 70% to 40% overnight. On 1/28/23, he had episode of emesis, & fever of 100.3. He was  given tylenol with improvement in temperature but still little to no movement on the L side (previously would localize arm to suction and spontaneously bend L leg). Pt. Had left decompressive hemicraniectomy and right EVD  placed.    After the procedure, patient opened eyes for the first time and regained LUE/LLE movement. As he became febrile again this was less prominent. Ceftriaxone was switched to Zosyn. 1/29 HCT mild increase extraaxial fluid collection. on 1/30, repeat  HCT unchanged, his MTL was discontinued. Zosyn and vancomycin switched to cefepime. On 1/31,  HCT shoeds worsening edema +/- extraaxial fluid collection, elevated ICPs, and mannitol was restarted.      MTL was weaned,  EVD adjusted and eventually removed on 2/10.  Hospital course further c/b agitation requiring precedex, hypotension requiring bolus, dysphagia s/p PEG 2/14  and trach on 2/13, insomnia s/p seroquel, pain s/p oxycodone and fentanyl. He underwent wound revision with NSGY and plastics on 2/17. Keppra discontinued due to signs of agitation. Tolerated trach collar.     Patient was evaluated by PM&R and therapy for functional deficits, gait/ADL impairments and acute rehabilitation was recommended. Patient was medically optimized for discharge to [facility] IRU on 2/28/23.     (28 Feb 2023 13:10)      SUBJECTIVE HISTORY:  Patient seen during therapy.  Minimally following commands.  Per nursing slept well.  Around 10:30 am patient became agitated and combative and IM 2.5mg Zyprexa was given.  Family at bedside provided update on progress and medications.    Review Of Systems:  Aphasia  right hemiparesis   Neurological deficits      PHYSICAL EXAM  Constitutional - NAD, Comfortable  HEENT - EOMI, left hemicraniectomy with sutures--No erythema no drainage  Neck -  + trach Shiley 8 on TC  Chest -  CTAB   Cardio - warm and well perfused, RRR, no murmur  Abdomen -  Soft, NTND, +PEG, Bone flap marsupialized in epigastric region.  Sutures with no significant erythema and no drainage.  Extremities - No peripheral edema, No calf tenderness   Neurologic Exam:                    Cognitive -             Orientation: Awake, Alert, unable to assess orientation due to aphasia and cognitive deficits            Attention:  Impaired; makes eye contact but follows commands inconsistently            Memory: Impaired            Thought: Impaired, Impulsive     Speech - non-verbal -(trached), impaired comprehension, +aphasia      Cranial Nerves - Limited due to comprehension impairment and aphasia; No facial asymmetry, Tongue midline, EOMI, Pupils dilated but reactive, +dysphagia     Motor -                      LEFT    UE - at least 3/5 and moving spontaneously; exam limited by command following;  WNL                    RIGHT UE - ShAB 0/5, EF 0/5, EE 0/5, WE 0/5,  0/5-- MAS 1 finger flexors                     LEFT    LE -  at least 3/5 and moving spontaneously; exam limited by command following                    RIGHT LE - HE 1/5, Hip adductor 1/5, KE 0/5, DF 0/5, PF 0/5        Sensory - decrease sensation to LT-- RUE     Reflexes - DTR 2+/ 4 , neg Grider's b/l, neg Babinski's b/l     Coordination - FTN / HTS impaired on right;    Psychiatric - Mood stable, Affect WNL  Skin on admission: IAD to bilateral groin and sacrum; RUQ incision with sutures with palpable skull flap; Left hemicraniectomy with sutures MILLER; psoriasis BL shin      VITALS  42y  Vital Signs Last 24 Hrs  T(C): 36.7 (03 Mar 2023 08:53), Max: 36.9 (02 Mar 2023 20:54)  T(F): 98 (03 Mar 2023 08:53), Max: 98.4 (02 Mar 2023 20:54)  HR: 78 (03 Mar 2023 08:53) (78 - 91)  BP: 108/72 (03 Mar 2023 08:53) (108/72 - 111/64)  BP(mean): --  RR: 16 (03 Mar 2023 08:53) (15 - 16)  SpO2: 98% (03 Mar 2023 08:53) (94% - 98%)    Parameters below as of 03 Mar 2023 08:53  Patient On (Oxygen Delivery Method): tracheostomy collar    O2 Concentration (%): 28  Daily     Daily         RECENT LABS:                          12.9   9.13  )-----------( 248      ( 02 Mar 2023 05:47 )             39.8     03-02    135  |  100  |  9   ----------------------------<  146<H>  4.1   |  24  |  0.63    Ca    9.1      02 Mar 2023 05:47    TPro  7.4  /  Alb  3.0<L>  /  TBili  0.4  /  DBili  x   /  AST  14  /  ALT  35  /  AlkPhos  122<H>  03-02    LIVER FUNCTIONS - ( 02 Mar 2023 05:47 )  Alb: 3.0 g/dL / Pro: 7.4 g/dL / ALK PHOS: 122 U/L / ALT: 35 U/L / AST: 14 U/L / GGT: x                   CAPILLARY BLOOD GLUCOSE      POCT Blood Glucose.: 128 mg/dL (03 Mar 2023 12:40)  POCT Blood Glucose.: 119 mg/dL (03 Mar 2023 08:37)  POCT Blood Glucose.: 181 mg/dL (02 Mar 2023 22:25)  POCT Blood Glucose.: 110 mg/dL (02 Mar 2023 16:53)      MEDICATIONS  (STANDING):  AQUAPHOR (petrolatum Ointment) 1 Application(s) Topical two times a day  bacitracin   Ointment 1 Application(s) Topical daily  enoxaparin Injectable 90 milliGRAM(s) SubCutaneous <User Schedule>  gabapentin 600 milliGRAM(s) Oral at bedtime  melatonin 6 milliGRAM(s) Oral at bedtime  nystatin Powder 1 Application(s) Topical two times a day  pantoprazole   Suspension 40 milliGRAM(s) Oral daily  polyethylene glycol 3350 17 Gram(s) Oral daily  QUEtiapine 25 milliGRAM(s) Oral at bedtime  senna Syrup 5 milliLiter(s) Oral at bedtime  traZODone 50 milliGRAM(s) Oral at bedtime    MEDICATIONS  (PRN):  acetaminophen    Suspension .. 650 milliGRAM(s) Oral every 6 hours PRN Temp greater or equal to 38C (100.4F), Mild Pain (1 - 3)           Patient is a 42y old  Male who presents with a chief complaint of left IPH/ICH (03 Mar 2023 10:17)      HPI:  This is 42 year old male with PMH of prior substance use, HTN, anxiety who presented to [facility] on 1/23. On 1/23 morning, he told his family that he was not feeling well and took 2 tabs of Vyvanse (usually takes as needed). Shortly after, he was at a group therapy meeting over the phone when he complained to his father he was having a severe headache. He then became more lethargic, and developed nausea and vomited. He was brought to ED by his father. On arrival, noted to be lethargic, vomiting, with garbled speech. 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 Post CT, more lethargic with dilated right pupil and bilateral reactive but sluggish pupils. He was given mannitol and intubated for airway protection and was taken to neuro ICU.    On 1/25, HCT with enlarging R lateral ventricle and slightly worsening MLS concerning for trapped ventricle. 1/27 acute drop in SpO2 on 1/27, lavaged and suctioned out thick clot. Placed back on ACVC and FiO2 weaned from 70% to 40% overnight. On 1/28/23, he had episode of emesis, & fever of 100.3. He was  given tylenol with improvement in temperature but still little to no movement on the L side (previously would localize arm to suction and spontaneously bend L leg). Pt. Had left decompressive hemicraniectomy and right EVD  placed.    After the procedure, patient opened eyes for the first time and regained LUE/LLE movement. As he became febrile again this was less prominent. Ceftriaxone was switched to Zosyn. 1/29 HCT mild increase extraaxial fluid collection. on 1/30, repeat  HCT unchanged, his MTL was discontinued. Zosyn and vancomycin switched to cefepime. On 1/31,  HCT shoeds worsening edema +/- extraaxial fluid collection, elevated ICPs, and mannitol was restarted.      MTL was weaned,  EVD adjusted and eventually removed on 2/10.  Hospital course further c/b agitation requiring precedex, hypotension requiring bolus, dysphagia s/p PEG 2/14  and trach on 2/13, insomnia s/p seroquel, pain s/p oxycodone and fentanyl. He underwent wound revision with NSGY and plastics on 2/17. Keppra discontinued due to signs of agitation. Tolerated trach collar.     Patient was evaluated by PM&R and therapy for functional deficits, gait/ADL impairments and acute rehabilitation was recommended. Patient was medically optimized for discharge to [facility] IRU on 2/28/23.        SUBJECTIVE HISTORY:  Patient seen during therapy.  Minimally following commands.  Per nursing slept well.  Around 10:30 am patient became agitated and combative and IM 2.5mg Zyprexa was given.  Family at bedside provided update on progress and medications.    Review Of Systems:  Aphasia  right hemiparesis   Neurological deficits      PHYSICAL EXAM  Constitutional - NAD, Comfortable  HEENT - EOMI, left hemicraniectomy with sutures--No erythema no drainage  Neck -  + trach Shiley 8 on TC  Chest -  CTAB   Cardio - warm and well perfused, RRR, no murmur  Abdomen -  Soft, NTND, +PEG, Bone flap marsupialized in epigastric region.  Sutures with no significant erythema and no drainage.  Extremities - No peripheral edema, No calf tenderness   Neurologic Exam:                    Cognitive -             Orientation: Awake, Alert, unable to assess orientation due to aphasia and cognitive deficits            Attention:  Impaired; makes eye contact but follows commands inconsistently            Memory: Impaired            Thought: Impaired, Impulsive     Speech - non-verbal -(trached), impaired comprehension, +aphasia      Cranial Nerves - Limited due to comprehension impairment and aphasia; No facial asymmetry, Tongue midline, EOMI, Pupils dilated but reactive, +dysphagia     Motor -                      LEFT    UE - at least 3/5 and moving spontaneously; exam limited by command following;  WNL                    RIGHT UE - ShAB 0/5, EF 0/5, EE 0/5, WE 0/5,  0/5-- MAS 1 finger flexors                     LEFT    LE -  at least 3/5 and moving spontaneously; exam limited by command following                    RIGHT LE - HE 1/5, Hip adductor 1/5, KE 0/5, DF 0/5, PF 0/5        Sensory - decrease sensation to LT-- RUE     Reflexes - DTR 2+/ 4 , neg Grider's b/l, neg Babinski's b/l     Coordination - FTN / HTS impaired on right;    Psychiatric - Mood stable, Affect WNL  Skin on admission: IAD to bilateral groin and sacrum; RUQ incision with sutures with palpable skull flap; Left hemicraniectomy with sutures MILLER; psoriasis BL shin      VITALS  42y  Vital Signs Last 24 Hrs  T(C): 36.7 (03 Mar 2023 08:53), Max: 36.9 (02 Mar 2023 20:54)  T(F): 98 (03 Mar 2023 08:53), Max: 98.4 (02 Mar 2023 20:54)  HR: 78 (03 Mar 2023 08:53) (78 - 91)  BP: 108/72 (03 Mar 2023 08:53) (108/72 - 111/64)  BP(mean): --  RR: 16 (03 Mar 2023 08:53) (15 - 16)  SpO2: 98% (03 Mar 2023 08:53) (94% - 98%)    Parameters below as of 03 Mar 2023 08:53  Patient On (Oxygen Delivery Method): tracheostomy collar    O2 Concentration (%): 28  Daily     Daily         RECENT LABS:                          12.9   9.13  )-----------( 248      ( 02 Mar 2023 05:47 )             39.8     03-02    135  |  100  |  9   ----------------------------<  146<H>  4.1   |  24  |  0.63    Ca    9.1      02 Mar 2023 05:47    TPro  7.4  /  Alb  3.0<L>  /  TBili  0.4  /  DBili  x   /  AST  14  /  ALT  35  /  AlkPhos  122<H>  03-02    LIVER FUNCTIONS - ( 02 Mar 2023 05:47 )  Alb: 3.0 g/dL / Pro: 7.4 g/dL / ALK PHOS: 122 U/L / ALT: 35 U/L / AST: 14 U/L / GGT: x                   CAPILLARY BLOOD GLUCOSE      POCT Blood Glucose.: 128 mg/dL (03 Mar 2023 12:40)  POCT Blood Glucose.: 119 mg/dL (03 Mar 2023 08:37)  POCT Blood Glucose.: 181 mg/dL (02 Mar 2023 22:25)  POCT Blood Glucose.: 110 mg/dL (02 Mar 2023 16:53)      MEDICATIONS  (STANDING):  AQUAPHOR (petrolatum Ointment) 1 Application(s) Topical two times a day  bacitracin   Ointment 1 Application(s) Topical daily  enoxaparin Injectable 90 milliGRAM(s) SubCutaneous <User Schedule>  gabapentin 600 milliGRAM(s) Oral at bedtime  melatonin 6 milliGRAM(s) Oral at bedtime  nystatin Powder 1 Application(s) Topical two times a day  pantoprazole   Suspension 40 milliGRAM(s) Oral daily  polyethylene glycol 3350 17 Gram(s) Oral daily  QUEtiapine 25 milliGRAM(s) Oral at bedtime  senna Syrup 5 milliLiter(s) Oral at bedtime  traZODone 50 milliGRAM(s) Oral at bedtime    MEDICATIONS  (PRN):  acetaminophen    Suspension .. 650 milliGRAM(s) Oral every 6 hours PRN Temp greater or equal to 38C (100.4F), Mild Pain (1 - 3)

## 2023-03-03 NOTE — PROGRESS NOTE ADULT - ATTENDING COMMENTS
Pt. seen with resident.  Agree with documentation above as per resident with amendments made as appropriate. Patient medically stable. Making progress towards rehab goals.       right IPH--  -increased Trazodone to 50mg qhs as will help with daytime restlessness--May increase further on weekend if needed.    Cont. 2:1 for safety  --If PVR is normal can use Zyprexa 2.5mg IM PRN for agitation.  Or can use 12.5mg seroquel PRN    --Pt. with long standing psych history-- Spoke with pt's parents to bring in list of recent meds to help with behavior management.      -- Stopped doxazosin yesterday for orthostatic BP-- no orthostatic BP today-- monitor PVRs.     Cont. therapeutic lovenox for DVTs-- Vascular consult pending-- d/w Parents.     ** Spoke with pt's  Parents__, to provide update on pt's status,  medical update, medications, progress in therapy, rehab plan of care, ELOS and discharge plan for ZOHAIB.  Also discussed pt's psych history and management.  All questions answered.

## 2023-03-03 NOTE — PROGRESS NOTE ADULT - ASSESSMENT
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 He is  s/p Left decompressive hemicraniectomy and right EVD placement 1/28/23. Hospital course further c/b respiratory failure s/p intubation and extubation,  agitation requiring precedex, sepsis s/p ABX,  hypotension requiring bolus, dysphagia s/p PEG 2/14 and trach on 2/13, insomnia s/p seroquel, pain s/p oxycodone and fentanyl. Patient now with Right Hemiparesis, dysphagia s/p PEG, Aphasia, Cognitive deficits, gait Instability, ADL impairments and Functional impairments.    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    - ST: to diagnose and treat deficits in swallowing, cognition and communication.  - Helmet when OOB  - Baseline CT head 3/1- High right frontal malik hole is seen. Postoperative changes compatible with a left temporal frontal parietal craniectomy is again seen. Mild parenchyma extending through the craniectomy defect is again seen. There is abnormal high attenuation seen involving the left basal ganglia/corona radiata region. This finding is likely compatible with evolving areas acute parenchymal hemorrhage. Surrounding edema is seen. Mass effect on the left lateral ventricle is seen. Left-to-right shift (3.1 mm) is seen. Evaluation of the osseous structures with the appropriate window aside from postop changes appear normal. Complete opacification of the right sphenoid sinus is seen consisting of mucosal thickening. Both mastoid and middle ear regions appear clear. IMPRESSION: Postop changes described above. Evolving area parenchymal hemorrhage as described above.  --Spoke with OT–will benefit from right resting hand splint    Sleep/Restlessness  --Tapered Seroquel to 25mg 3/2--goal to eventually transition off-- will help cognition and aphasia  --increased Trazodone to 50mg qhs as will help with daytime restlessness.    --cont. melatonin  --monitor  --Discussed with nursing that patient needs supervision for safety monitoring as pt.  is impulsive, restless and high fall risk as well as is reaching for trach  --Cont. left UE mitten  -3/3 required Zyprexa 2.5mg IM x1    Bilateral lower extremity DVTs–  – Lower extremity Doppler from 3/1–right femoral vein DVT and bilateral lower extremity calf DVTs  – [facility]--Dr. Graham, spoke with patient's neurosurgery PA–who was in contact with Dr. Cueva patient's neurosurgeon–gave clearance for patient to start therapeutic Lovenox.  – Vascular consult pending,–discussed with Dr. Nolan recommends therapeutic Lovenox for 3 months.    Orthostatic hypotension–  – Discussed with hospitalist–patient on doxazosin 2 mg  -stopped doxazosin-- monitor PVR    #Acute Respiratory Failure  - Intubation and extubation  - s/p Trach 2/13  - Nebs PRN  -Starting PMV trials in Speech only  -Pulm recs appreciatred,continue PMV trials, will consider downsizing trach if noted air trapping    #GI PPX  - Nexium 40mg daily    #DM II  - A1c 6.0  -dw hospitalist and dc'd lantus and lispro     #Pain management  - Tylenol PRN, d/c Oxycodone PRN,   --cont. Neurontin 600mg nightly      #Bowel Regimen  - Senna, miralax PRN    #Bladder management  -check PVRs-- SC PRN if > 400cc  -- d/c doxazosin    #Dysphagia    - s/p PEG 2/14  - SLP: evaluation and treatment  - Diet: tolerating bolus TF     #Skin:  - Skin on admission: IAD to bilateral groin and sacrum; RUQ incision with sutures with palpable skull flap; Left hemicraniectomy with sutures MILLER; psoriasis BL shin  - Pressure injury/Skin: Turn Q2hrs while in bed, OOB to Chair, PT/OT       #Precaution  - Fall, Aspiration, seizure    – IDT 3/2:  Social work:  patient on discharge can live with his mother who has a first-floor set up.  Nursing–incontinent of bowel and bladder, total assist with toileting  Speech: N.p.o./PEG, severe language deficits–poor command following, poor orientation, decreased initiation, delayed responses.  Tolerating finger occlusion of trach–starting PMV trials  Aphasia, severe cognitive deficits  OT's: Total assist with ADLs and transfers, restless  PT: Sitting–max assist,–goal min assist with bed mobility,  mod assist with transfers, ambulation 10 feet max assist with hemiwalker, wheelchair mobility 50 feet min assist.  ELOS: 3/28 ZOHAIB  Goals:  1.  Improved secretion management and tolerate PMV  2.  transfers with 1 person assist  3.  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 He is  s/p Left decompressive hemicraniectomy and right EVD placement 1/28/23. Hospital course further c/b respiratory failure s/p intubation and extubation,  agitation requiring precedex, sepsis s/p ABX,  hypotension requiring bolus, dysphagia s/p PEG 2/14 and trach on 2/13, insomnia s/p seroquel, pain s/p oxycodone and fentanyl. Patient now with Right Hemiparesis, dysphagia s/p PEG, Aphasia, Cognitive deficits, gait Instability, ADL impairments and Functional impairments.    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    - ST: to diagnose and treat deficits in swallowing, cognition and communication.  - Helmet when OOB  - Baseline CT head 3/1- High right frontal malik hole is seen. Postoperative changes compatible with a left temporal frontal parietal craniectomy is again seen. Mild parenchyma extending through the craniectomy defect is again seen. There is abnormal high attenuation seen involving the left basal ganglia/corona radiata region. This finding is likely compatible with evolving areas acute parenchymal hemorrhage. Surrounding edema is seen. Mass effect on the left lateral ventricle is seen. Left-to-right shift (3.1 mm) is seen. Evaluation of the osseous structures with the appropriate window aside from postop changes appear normal. Complete opacification of the right sphenoid sinus is seen consisting of mucosal thickening. Both mastoid and middle ear regions appear clear. IMPRESSION: Postop changes described above. Evolving area parenchymal hemorrhage as described above.  --Spoke with OT–will benefit from right resting hand splint    Sleep/Restlessness  --Tapered Seroquel to 25mg 3/2--goal to eventually transition off-- will help cognition and aphasia  --increased Trazodone to 50mg qhs as will help with daytime restlessness--May increase further on weekend if needed.    --cont. melatonin  --monitor  --Discussed with nursing that patient needs supervision for safety monitoring as pt.  is impulsive, restless and high fall risk as well as is reaching for trach  --Cont. left UE mitten  -3/3 required Zyprexa 2.5mg IM x1    Bilateral lower extremity DVTs–  – Lower extremity Doppler from 3/1–right femoral vein DVT and bilateral lower extremity calf DVTs  –  spoke with patient's neurosurgery PA–who was in contact with Dr. Cueva patient's neurosurgeon–gave clearance for patient to start therapeutic Lovenox.  – Vascular consult pending,–discussed with Dr. Nolan recommends therapeutic Lovenox for 3 months.    Orthostatic hypotension–  – Discussed with hospitalist–patient on doxazosin 2 mg  -stopped doxazosin-- monitor PVR    #Acute Respiratory Failure  - Intubation and extubation  - s/p Trach 2/13  - Nebs PRN  -Starting PMV trials in Speech only  -Pulm recs appreciatred,continue PMV trials, will consider downsizing trach if noted air trapping    #GI PPX  - Nexium 40mg daily    #DM II  - A1c 6.0  -dw hospitalist and dc'd lantus and lispro     #Pain management  - Tylenol PRN,   --cont. Neurontin 600mg nightly      #Bowel Regimen  - Senna, miralax PRN    #Bladder management  -check PVRs-- SC PRN if > 400cc  -- off doxazosin 3/2    #Dysphagia    - s/p PEG 2/14  - SLP: evaluation and treatment  - Diet: tolerating bolus TF     #Skin:  - Skin on admission: IAD to bilateral groin and sacrum; RUQ incision with sutures with palpable skull flap; Left hemicraniectomy with sutures MILLER; psoriasis BL shin  - Pressure injury/Skin: Turn Q2hrs while in bed, OOB to Chair, PT/OT       #Precaution  - Fall, Aspiration, seizure    – IDT 3/2:  Social work:  patient on discharge can live with his mother who has a first-floor set up.  Nursing–incontinent of bowel and bladder, total assist with toileting  Speech: N.p.o./PEG, severe language deficits–poor command following, poor orientation, decreased initiation, delayed responses.  Tolerating finger occlusion of trach–starting PMV trials  Aphasia, severe cognitive deficits  OT's: Total assist with ADLs and transfers, restless  PT: Sitting–max assist,–goal min assist with bed mobility,  mod assist with transfers, ambulation 10 feet max assist with hemiwalker, wheelchair mobility 50 feet min assist.  ELOS: 3/28 ZOHAIB  Goals:  1.  Improved secretion management and tolerate PMV  2.  transfers with 1 person assist  3.  Communicate basic wants and needs with yes no answers    **Patient's parents updated at bedside

## 2023-03-03 NOTE — CONSULT NOTE ADULT - SUBJECTIVE AND OBJECTIVE BOX
PULMONARY CONSULT  Location of Patient :  [facility] ( [facility])  Attending requesting Consult:Eulalia Cevallos  Chief Complaint :     Reason For consult :      Initial HPI on admission:  HPI:  This is 42 year old male with PMH of prior substance use, HTN, anxiety who presented to [facility] on 1/23. On 1/23 morning, he told his family that he was not feeling well and took 2 tabs of Vyvanse (usually takes as needed). Shortly after, he was at a group therapy meeting over the phone when he complained to his father he was having a severe headache. He then became more lethargic, and developed nausea and vomited. He was brought to ED by his father. On arrival, noted to be lethargic, vomiting, with garbled speech. 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 Post CT, more lethargic with dilated right pupil and bilateral reactive but sluggish pupils. He was given mannitol and intubated for airway protection and was taken to neuro ICU.    On 1/25, HCT with enlarging R lateral ventricle and slightly worsening MLS concerning for trapped ventricle. 1/27 acute drop in SpO2 on 1/27, lavaged and suctioned out thick clot. Placed back on ACVC and FiO2 weaned from 70% to 40% overnight. On 1/28/23, he had episode of emesis, & fever of 100.3. He was  given tylenol with improvement in temperature but still little to no movement on the L side (previously would localize arm to suction and spontaneously bend L leg). Pt. Had left decompressive hemicraniectomy and right EVD  placed.    After the procedure, patient opened eyes for the first time and regained LUE/LLE movement. As he became febrile again this was less prominent. Ceftriaxone was switched to Zosyn. 1/29 HCT mild increase extraaxial fluid collection. on 1/30, repeat  HCT unchanged, his MTL was discontinued. Zosyn and vancomycin switched to cefepime. On 1/31,  HCT shoeds worsening edema +/- extraaxial fluid collection, elevated ICPs, and mannitol was restarted.      MTL was weaned,  EVD adjusted and eventually removed on 2/10.  Hospital course further c/b agitation requiring precedex, hypotension requiring bolus, dysphagia s/p PEG 2/14  and trach on 2/13, insomnia s/p seroquel, pain s/p oxycodone and fentanyl. He underwent wound revision with NSGY and plastics on 2/17. Keppra discontinued due to signs of agitation. Tolerated trach collar.     Patient was evaluated by PM&R and therapy for functional deficits, gait/ADL impairments and acute rehabilitation was recommended. Patient was medically optimized for discharge to [facility] IRU on 2/28/23.     (28 Feb 2023 13:10)      BRIEF HOSPITAL COURSE: ***    PAST MEDICAL & SURGICAL HISTORY:  Substance abuse      Hypertension      Depression      Anxiety      No significant past surgical history        Allergies    No Known Allergies    Intolerances      FAMILY HISTORY:  No pertinent family history in first degree relatives      Social history: Social History:  Smoking -1/2 ppd x 15 y  EtOH - ?  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     CURRENT FUNCTIONAL STATUS  Date: 2/26  Supine to Sit: maximal assistance x2 persons. Required mod x2 persons to maintain sitting balance   Sit to Stand: maximal assistance x2 persons. Pt with right knee buckling, required tibial block. Pt may benefit from use of EZ stand machine. Pt able to assist with transfer x4   Static Sitting: Requires maximum assistance to maintain static position   Dynamic Sitting: Unable to move from midline voluntarily, requires maximum assistance to right oneself   Static Standing: Requires maximum assistance to maintain static position   Grooming: minimal assistance to clean face with washcloth seated in bed using left upper extremity   Upper Body Dressing: moderate assistance to don/doff hospital gown supine in bed,   Scooting/bridging: maximal assistance   Rolling: maximal assistance   Supine to Sit: maximal assistance   Sit to Supine: maximal assistance   Sit to Side-lying: maximal assistance   Side-lying to Sit: maximal assistance   X 2 person assist, verbal and tactile cues for proper technique and to use bed rail to facilitate rolling to sides to come to sitting. Pt also required assistance to manage bilateral Lower extremity's towards edge of bed   Sit to Stand: moderate assistance x 2 with hand held assist   Stand to Sit: moderate assistance x 2 (28 Feb 2023 13:10)       Smoking:     Drinking:     Drug use:    Review of Systems: as stated above    CONSTITUTIONAL: No fever, No chills, No fatigue  EYES: No eye pain, No visual disturbances, No discharge  ENMT:  No difficulty hearing, No tinnitus, No vertigo; No sinus or throat pain  NECK: No pain, No stiffness  RESPIRATORY: No Cough, No SOB, No Secretions  CARDIOVASCULAR: No chest pain, No palpitations, No dizziness, or No leg swelling  GASTROINTESTINAL: No abdominal or epigastric pain. No nausea, No vomiting, No hematemesis; No diarrhea, No constipation. No melena, No hematochezia.  GENITOURINARY: No dysuria, No frequency, No hematuria, No incontinence  NEUROLOGICAL: No headaches, No memory loss, No loss of strength, No numbness, No tremors  SKIN: No itching, No burning, No rashes, No lesions   MUSCULOSKELETAL: No joint pain or swelling; No muscle, back, No extremity pain  PSYCHIATRIC: No depression, No anxiety, No mood swings, No difficulty sleeping      Medications:  MEDICATIONS  (STANDING):  AQUAPHOR (petrolatum Ointment) 1 Application(s) Topical two times a day  bacitracin   Ointment 1 Application(s) Topical daily  dextrose 5%. 1000 milliLiter(s) (100 mL/Hr) IV Continuous <Continuous>  dextrose 5%. 1000 milliLiter(s) (50 mL/Hr) IV Continuous <Continuous>  dextrose 50% Injectable 25 Gram(s) IV Push once  dextrose 50% Injectable 12.5 Gram(s) IV Push once  dextrose 50% Injectable 25 Gram(s) IV Push once  enoxaparin Injectable 90 milliGRAM(s) SubCutaneous <User Schedule>  gabapentin 600 milliGRAM(s) Oral at bedtime  glucagon  Injectable 1 milliGRAM(s) IntraMuscular once  insulin glargine Injectable (LANTUS) 7 Unit(s) SubCutaneous at bedtime  insulin lispro (ADMELOG) corrective regimen sliding scale   SubCutaneous four times a day before meals  melatonin 6 milliGRAM(s) Oral at bedtime  nystatin Powder 1 Application(s) Topical two times a day  pantoprazole   Suspension 40 milliGRAM(s) Oral daily  polyethylene glycol 3350 17 Gram(s) Oral daily  QUEtiapine 25 milliGRAM(s) Oral at bedtime  senna Syrup 5 milliLiter(s) Oral at bedtime  traZODone 25 milliGRAM(s) Oral at bedtime    MEDICATIONS  (PRN):  acetaminophen    Suspension .. 650 milliGRAM(s) Oral every 6 hours PRN Temp greater or equal to 38C (100.4F), Mild Pain (1 - 3)  dextrose Oral Gel 15 Gram(s) Oral once PRN Blood Glucose LESS THAN 70 milliGRAM(s)/deciliter      Antibiotics History      Heme Medications   enoxaparin Injectable 90 milliGRAM(s) SubCutaneous <User Schedule>, 03-02-23 @ 00:00      GI Medications  pantoprazole   Suspension 40 milliGRAM(s) Oral daily, 03-01-23 @ 00:00, Routine  polyethylene glycol 3350 17 Gram(s) Oral daily, 02-28-23 @ 14:19, Routine  senna Syrup 5 milliLiter(s) Oral at bedtime, 02-28-23 @ 14:19, Routine        Home Medications:  Last Order Reconciliation Date: 02-28-23 @ 13:11 (Admission Reconciliation)  atenolol:  (03-16-18 @ 10:30)  Effexor:  (03-16-18 @ 10:30)  KlonoPIN 2 mg oral tablet: 1 tab(s) orally 3 times a day (03-16-18 @ 10:30)  simvastatin:  (03-16-18 @ 10:30)  Suboxone 2 mg-0.5 mg sublingual tablet: 2 tab(s) sublingual once a day (03-16-18 @ 10:30)  Wellbutrin: 150 milligram(s) orally once a day (03-16-18 @ 10:30)  Xanax 2 mg oral tablet: 1 tab(s) orally 3 times a day (03-16-18 @ 10:30)      LABS:                        12.9   9.13  )-----------( 248      ( 02 Mar 2023 05:47 )             39.8     03-02    135  |  100  |  9   ----------------------------<  146<H>  4.1   |  24  |  0.63    Ca    9.1      02 Mar 2023 05:47    TPro  7.4  /  Alb  3.0<L>  /  TBili  0.4  /  DBili  x   /  AST  14  /  ALT  35  /  AlkPhos  122<H>  03-02       RADIOLOGY  CXR:      CT:  < from: CT Head No Cont (03.01.23 @ 09:43) >  IMPRESSION: Postop changes described above    Evolving area parenchymal hemorrhage as described above.    < end of copied text >    ECHO:    US  < from: US Duplex Venous Lower Ext Complete, Bilateral (03.01.23 @ 16:55) >  IMPRESSION:  Partially occlusive thrombus in the right common femoral vein.  Bilateral calf vein thrombosis as described above.  Results were discussed with Dr. Eulalia cevallos at 5:10 PM on 3/1/2023.      < end of copied text >      VITALS:  T(C): 36.7 (03-03-23 @ 08:53), Max: 36.9 (03-02-23 @ 20:54)  T(F): 98 (03-03-23 @ 08:53), Max: 98.4 (03-02-23 @ 20:54)  HR: 78 (03-03-23 @ 08:53) (78 - 91)  BP: 108/72 (03-03-23 @ 08:53) (108/72 - 111/64)  BP(mean): --  ABP: --  ABP(mean): --  RR: 16 (03-03-23 @ 08:53) (15 - 16)  SpO2: 98% (03-03-23 @ 08:53) (94% - 98%)  CVP(mm Hg): --  CVP(cm H2O): --    Ins and Outs     03-02-23 @ 07:01  -  03-03-23 @ 07:00  --------------------------------------------------------  IN: 2100 mL / OUT: 0 mL / NET: 2100 mL        Height (cm): 190.5 (02-28-23 @ 14:34)  Weight (kg): 87.9 (02-28-23 @ 14:34)  BMI (kg/m2): 24.2 (02-28-23 @ 14:34)        I&O's Detail    02 Mar 2023 07:01  -  03 Mar 2023 07:00  --------------------------------------------------------  IN:    Free Water: 900 mL    TwoCal HN: 1200 mL  Total IN: 2100 mL    OUT:  Total OUT: 0 mL    Total NET: 2100 mL          Physical Examination:  GENERAL:               Alert, Oriented, No acute distress.    HEENT:                    Pupils equal, reactive to light.  Symmetric. No JVD, Moist MM  PULM:                     Bilateral air entry, Clear to auscultation bilaterally, no significant sputum production, No Rales, No Rhonchi, No Wheezing  CVS:                         S1, S2,  No Murmur  ABD:                        Soft, nondistended, nontender, normoactive bowel sounds,   EXT:                         No edema, nontender, No Cyanosis or Clubbing   Vascular:                Warm Extremities, Normal Capillary refill, Normal Distal Pulses  SKIN:                       Warm and well perfused, no rashes noted.   NEURO:                  Alert, oriented, interactive, nonfocal, follows commands  PSYC:                      Calm, + Insight.     PULMONARY CONSULT  Location of Patient :  [facility] [address] ( [facility])  Attending requesting Consult:Eulalia Cevallos  Chief Complaint :     Reason For consult :      Initial HPI on admission:  HPI:  This is 42 year old male with PMH of prior substance use, HTN, anxiety who presented to [facility] on 1/23. On 1/23 morning, he told his family that he was not feeling well and took 2 tabs of Vyvanse (usually takes as needed). Shortly after, he was at a group therapy meeting over the phone when he complained to his father he was having a severe headache. He then became more lethargic, and developed nausea and vomited. He was brought to ED by his father. On arrival, noted to be lethargic, vomiting, with garbled speech. 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 Post CT, more lethargic with dilated right pupil and bilateral reactive but sluggish pupils. He was given mannitol and intubated for airway protection and was taken to neuro ICU.    On 1/25, HCT with enlarging R lateral ventricle and slightly worsening MLS concerning for trapped ventricle. 1/27 acute drop in SpO2 on 1/27, lavaged and suctioned out thick clot. Placed back on ACVC and FiO2 weaned from 70% to 40% overnight. On 1/28/23, he had episode of emesis, & fever of 100.3. He was  given tylenol with improvement in temperature but still little to no movement on the L side (previously would localize arm to suction and spontaneously bend L leg). Pt. Had left decompressive hemicraniectomy and right EVD  placed.    After the procedure, patient opened eyes for the first time and regained LUE/LLE movement. As he became febrile again this was less prominent. Ceftriaxone was switched to Zosyn. 1/29 HCT mild increase extraaxial fluid collection. on 1/30, repeat  HCT unchanged, his MTL was discontinued. Zosyn and vancomycin switched to cefepime. On 1/31,  HCT shoeds worsening edema +/- extraaxial fluid collection, elevated ICPs, and mannitol was restarted.      MTL was weaned,  EVD adjusted and eventually removed on 2/10.  Hospital course further c/b agitation requiring precedex, hypotension requiring bolus, dysphagia s/p PEG 2/14  and trach on 2/13, insomnia s/p seroquel, pain s/p oxycodone and fentanyl. He underwent wound revision with NSGY and plastics on 2/17. Keppra discontinued due to signs of agitation. Tolerated trach collar.     Patient was evaluated by PM&R and therapy for functional deficits, gait/ADL impairments and acute rehabilitation was recommended. Patient was medically optimized for discharge to [facility] IRU on 2/28/23.     (28 Feb 2023 13:10)      BRIEF HOSPITAL COURSE:   Patient seen and examined   unable to provide history or ROS  seen with leta tolerated short period of time with PMV valve      PAST MEDICAL & SURGICAL HISTORY:  Substance abuse  Hypertension  Depression  Anxiety  No significant past surgical history        Allergies  No Known Allergies    Intolerances      FAMILY HISTORY:  No pertinent family history in first degree relatives      Social history: Social History:  Smoking -1/2 ppd x 15 y  EtOH - ?  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     CURRENT FUNCTIONAL STATUS  Date: 2/26  Supine to Sit: maximal assistance x2 persons. Required mod x2 persons to maintain sitting balance   Sit to Stand: maximal assistance x2 persons. Pt with right knee buckling, required tibial block. Pt may benefit from use of EZ stand machine. Pt able to assist with transfer x4   Static Sitting: Requires maximum assistance to maintain static position   Dynamic Sitting: Unable to move from midline voluntarily, requires maximum assistance to right oneself   Static Standing: Requires maximum assistance to maintain static position   Grooming: minimal assistance to clean face with washcloth seated in bed using left upper extremity   Upper Body Dressing: moderate assistance to don/doff hospital gown supine in bed,   Scooting/bridging: maximal assistance   Rolling: maximal assistance   Supine to Sit: maximal assistance   Sit to Supine: maximal assistance   Sit to Side-lying: maximal assistance   Side-lying to Sit: maximal assistance   X 2 person assist, verbal and tactile cues for proper technique and to use bed rail to facilitate rolling to sides to come to sitting. Pt also required assistance to manage bilateral Lower extremity's towards edge of bed   Sit to Stand: moderate assistance x 2 with hand held assist   Stand to Sit: moderate assistance x 2 (28 Feb 2023 13:10)    Due to current medical status patient unable to provide medical, social, family history.  History obtained from available medical record.       Medications:  MEDICATIONS  (STANDING):  AQUAPHOR (petrolatum Ointment) 1 Application(s) Topical two times a day  bacitracin   Ointment 1 Application(s) Topical daily  dextrose 5%. 1000 milliLiter(s) (100 mL/Hr) IV Continuous <Continuous>  dextrose 5%. 1000 milliLiter(s) (50 mL/Hr) IV Continuous <Continuous>  dextrose 50% Injectable 25 Gram(s) IV Push once  dextrose 50% Injectable 12.5 Gram(s) IV Push once  dextrose 50% Injectable 25 Gram(s) IV Push once  enoxaparin Injectable 90 milliGRAM(s) SubCutaneous <User Schedule>  gabapentin 600 milliGRAM(s) Oral at bedtime  glucagon  Injectable 1 milliGRAM(s) IntraMuscular once  insulin glargine Injectable (LANTUS) 7 Unit(s) SubCutaneous at bedtime  insulin lispro (ADMELOG) corrective regimen sliding scale   SubCutaneous four times a day before meals  melatonin 6 milliGRAM(s) Oral at bedtime  nystatin Powder 1 Application(s) Topical two times a day  pantoprazole   Suspension 40 milliGRAM(s) Oral daily  polyethylene glycol 3350 17 Gram(s) Oral daily  QUEtiapine 25 milliGRAM(s) Oral at bedtime  senna Syrup 5 milliLiter(s) Oral at bedtime  traZODone 25 milliGRAM(s) Oral at bedtime    MEDICATIONS  (PRN):  acetaminophen    Suspension .. 650 milliGRAM(s) Oral every 6 hours PRN Temp greater or equal to 38C (100.4F), Mild Pain (1 - 3)  dextrose Oral Gel 15 Gram(s) Oral once PRN Blood Glucose LESS THAN 70 milliGRAM(s)/deciliter      Antibiotics History      Heme Medications   enoxaparin Injectable 90 milliGRAM(s) SubCutaneous <User Schedule>, 03-02-23 @ 00:00      GI Medications  pantoprazole   Suspension 40 milliGRAM(s) Oral daily, 03-01-23 @ 00:00, Routine  polyethylene glycol 3350 17 Gram(s) Oral daily, 02-28-23 @ 14:19, Routine  senna Syrup 5 milliLiter(s) Oral at bedtime, 02-28-23 @ 14:19, Routine        Home Medications:  Last Order Reconciliation Date: 02-28-23 @ 13:11 (Admission Reconciliation)  atenolol:  (03-16-18 @ 10:30)  Effexor:  (03-16-18 @ 10:30)  KlonoPIN 2 mg oral tablet: 1 tab(s) orally 3 times a day (03-16-18 @ 10:30)  simvastatin:  (03-16-18 @ 10:30)  Suboxone 2 mg-0.5 mg sublingual tablet: 2 tab(s) sublingual once a day (03-16-18 @ 10:30)  Wellbutrin: 150 milligram(s) orally once a day (03-16-18 @ 10:30)  Xanax 2 mg oral tablet: 1 tab(s) orally 3 times a day (03-16-18 @ 10:30)      LABS:                        12.9   9.13  )-----------( 248      ( 02 Mar 2023 05:47 )             39.8     03-02    135  |  100  |  9   ----------------------------<  146<H>  4.1   |  24  |  0.63    Ca    9.1      02 Mar 2023 05:47    TPro  7.4  /  Alb  3.0<L>  /  TBili  0.4  /  DBili  x   /  AST  14  /  ALT  35  /  AlkPhos  122<H>  03-02       RADIOLOGY  CXR:      CT:  < from: CT Head No Cont (03.01.23 @ 09:43) >  IMPRESSION: Postop changes described above    Evolving area parenchymal hemorrhage as described above.    < end of copied text >    ECHO:    US  < from: US Duplex Venous Lower Ext Complete, Bilateral (03.01.23 @ 16:55) >  IMPRESSION:  Partially occlusive thrombus in the right common femoral vein.  Bilateral calf vein thrombosis as described above.  Results were discussed with Dr. Eulalia cevallos at 5:10 PM on 3/1/2023.      < end of copied text >      VITALS:  T(C): 36.7 (03-03-23 @ 08:53), Max: 36.9 (03-02-23 @ 20:54)  T(F): 98 (03-03-23 @ 08:53), Max: 98.4 (03-02-23 @ 20:54)  HR: 78 (03-03-23 @ 08:53) (78 - 91)  BP: 108/72 (03-03-23 @ 08:53) (108/72 - 111/64)  BP(mean): --  ABP: --  ABP(mean): --  RR: 16 (03-03-23 @ 08:53) (15 - 16)  SpO2: 98% (03-03-23 @ 08:53) (94% - 98%)  CVP(mm Hg): --  CVP(cm H2O): --    Ins and Outs     03-02-23 @ 07:01  -  03-03-23 @ 07:00  --------------------------------------------------------  IN: 2100 mL / OUT: 0 mL / NET: 2100 mL        Height (cm): 190.5 (02-28-23 @ 14:34)  Weight (kg): 87.9 (02-28-23 @ 14:34)  BMI (kg/m2): 24.2 (02-28-23 @ 14:34)        I&O's Detail    02 Mar 2023 07:01  -  03 Mar 2023 07:00  --------------------------------------------------------  IN:    Free Water: 900 mL    TwoCal HN: 1200 mL  Total IN: 2100 mL    OUT:  Total OUT: 0 mL    Total NET: 2100 mL          Physical Examination:  GENERAL:               Alert,  No acute distress.    HEENT:                      No JVD, Moist MM, s/p craniotomy, Trach in place with no secretions   PULM:                     Bilateral air entry, course to auscultation bilaterally, no significant sputum production, No Rales, No Rhonchi, No Wheezing  CVS:                         S1, S2,  No Murmur  ABD:                        Soft, nondistended, nontender, normoactive bowel sounds,   EXT:                         No edema, nontender, No Cyanosis or Clubbing   NEURO:                  Alert,  Not interactive, does not follow commands  PSYC:                      Calm, not Insight.

## 2023-03-04 PROCEDURE — 99232 SBSQ HOSP IP/OBS MODERATE 35: CPT

## 2023-03-04 RX ADMIN — SENNA PLUS 5 MILLILITER(S): 8.6 TABLET ORAL at 21:42

## 2023-03-04 RX ADMIN — QUETIAPINE FUMARATE 25 MILLIGRAM(S): 200 TABLET, FILM COATED ORAL at 21:42

## 2023-03-04 RX ADMIN — ENOXAPARIN SODIUM 90 MILLIGRAM(S): 100 INJECTION SUBCUTANEOUS at 21:43

## 2023-03-04 RX ADMIN — Medication 1 APPLICATION(S): at 12:25

## 2023-03-04 RX ADMIN — Medication 1 APPLICATION(S): at 05:23

## 2023-03-04 RX ADMIN — Medication 6 MILLIGRAM(S): at 21:43

## 2023-03-04 RX ADMIN — NYSTATIN CREAM 1 APPLICATION(S): 100000 CREAM TOPICAL at 05:22

## 2023-03-04 RX ADMIN — NYSTATIN CREAM 1 APPLICATION(S): 100000 CREAM TOPICAL at 17:17

## 2023-03-04 RX ADMIN — Medication 50 MILLIGRAM(S): at 21:42

## 2023-03-04 RX ADMIN — ENOXAPARIN SODIUM 90 MILLIGRAM(S): 100 INJECTION SUBCUTANEOUS at 09:23

## 2023-03-04 RX ADMIN — Medication 1 APPLICATION(S): at 17:24

## 2023-03-04 RX ADMIN — GABAPENTIN 600 MILLIGRAM(S): 400 CAPSULE ORAL at 21:43

## 2023-03-04 RX ADMIN — POLYETHYLENE GLYCOL 3350 17 GRAM(S): 17 POWDER, FOR SOLUTION ORAL at 12:24

## 2023-03-04 RX ADMIN — PANTOPRAZOLE SODIUM 40 MILLIGRAM(S): 20 TABLET, DELAYED RELEASE ORAL at 12:25

## 2023-03-04 NOTE — PROGRESS NOTE ADULT - SUBJECTIVE AND OBJECTIVE BOX
Follow-up Pulmonary Progress Note  Chief Complaint : Other nontraumatic intracerebral hemorrhage        patient seen and examined  d/w Speech noted air trapping today   Downsized to size 6 shiley uncuffed, tolerated procedure   mild secretions  noted airtrapping post procedure with manual occlusion of trach  patient otherwise comfortable and cant provide history or ROS      Allergies :No Known Allergies      PAST MEDICAL & SURGICAL HISTORY:  Substance abuse    Hypertension    Depression    Anxiety    No significant past surgical history        Medications:  MEDICATIONS  (STANDING):  AQUAPHOR (petrolatum Ointment) 1 Application(s) Topical two times a day  bacitracin   Ointment 1 Application(s) Topical daily  enoxaparin Injectable 90 milliGRAM(s) SubCutaneous <User Schedule>  gabapentin 600 milliGRAM(s) Oral at bedtime  melatonin 6 milliGRAM(s) Oral at bedtime  nystatin Powder 1 Application(s) Topical two times a day  pantoprazole   Suspension 40 milliGRAM(s) Oral daily  polyethylene glycol 3350 17 Gram(s) Oral daily  QUEtiapine 25 milliGRAM(s) Oral at bedtime  senna Syrup 5 milliLiter(s) Oral at bedtime  traZODone 50 milliGRAM(s) Oral at bedtime    MEDICATIONS  (PRN):  acetaminophen    Suspension .. 650 milliGRAM(s) Oral every 6 hours PRN Temp greater or equal to 38C (100.4F), Mild Pain (1 - 3)      Antibiotics History      Heme Medications   enoxaparin Injectable 90 milliGRAM(s) SubCutaneous <User Schedule>, 03-02-23 @ 00:00      GI Medications  pantoprazole   Suspension 40 milliGRAM(s) Oral daily, 03-01-23 @ 00:00, Routine  polyethylene glycol 3350 17 Gram(s) Oral daily, 02-28-23 @ 14:19, Routine  senna Syrup 5 milliLiter(s) Oral at bedtime, 02-28-23 @ 14:19, Routine             VITALS:  T(C): 36.7 (03-04-23 @ 08:58), Max: 36.9 (03-03-23 @ 20:19)  T(F): 98 (03-04-23 @ 08:58), Max: 98.5 (03-03-23 @ 20:19)  HR: 78 (03-04-23 @ 10:02) (78 - 80)  BP: 110/69 (03-04-23 @ 08:58) (110/69 - 114/76)  BP(mean): --  ABP: --  ABP(mean): --  RR: 15 (03-04-23 @ 08:58) (15 - 16)  SpO2: 98% (03-04-23 @ 10:02) (98% - 99%)  CVP(mm Hg): --  CVP(cm H2O): --    Ins and Outs     03-03-23 @ 07:01  -  03-04-23 @ 07:00  --------------------------------------------------------  IN: 600 mL / OUT: 0 mL / NET: 600 mL    03-04-23 @ 07:01  -  03-04-23 @ 13:50  --------------------------------------------------------  IN: 300 mL / OUT: 0 mL / NET: 300 mL                I&O's Detail    03 Mar 2023 07:01  -  04 Mar 2023 07:00  --------------------------------------------------------  IN:    Free Water: 300 mL    TwoCal HN: 300 mL  Total IN: 600 mL    OUT:  Total OUT: 0 mL    Total NET: 600 mL      04 Mar 2023 07:01  -  04 Mar 2023 13:50  --------------------------------------------------------  IN:    TwoCal HN: 300 mL  Total IN: 300 mL    OUT:  Total OUT: 0 mL    Total NET: 300 mL

## 2023-03-04 NOTE — PROGRESS NOTE ADULT - SUBJECTIVE AND OBJECTIVE BOX
Patient is a 42y old  Male who presents with a chief complaint of left IPH/ICH (03 Mar 2023 16:21)      HPI:  This is 42 year old male with PMH of prior substance use, HTN, anxiety who presented to [facility] on 1/23. On 1/23 morning, he told his family that he was not feeling well and took 2 tabs of Vyvanse (usually takes as needed). Shortly after, he was at a group therapy meeting over the phone when he complained to his father he was having a severe headache. He then became more lethargic, and developed nausea and vomited. He was brought to ED by his father. On arrival, noted to be lethargic, vomiting, with garbled speech. 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 Post CT, more lethargic with dilated right pupil and bilateral reactive but sluggish pupils. He was given mannitol and intubated for airway protection and was taken to neuro ICU.    On 1/25, HCT with enlarging R lateral ventricle and slightly worsening MLS concerning for trapped ventricle. 1/27 acute drop in SpO2 on 1/27, lavaged and suctioned out thick clot. Placed back on ACVC and FiO2 weaned from 70% to 40% overnight. On 1/28/23, he had episode of emesis, & fever of 100.3. He was  given tylenol with improvement in temperature but still little to no movement on the L side (previously would localize arm to suction and spontaneously bend L leg). Pt. Had left decompressive hemicraniectomy and right EVD  placed.    After the procedure, patient opened eyes for the first time and regained LUE/LLE movement. As he became febrile again this was less prominent. Ceftriaxone was switched to Zosyn. 1/29 HCT mild increase extraaxial fluid collection. on 1/30, repeat  HCT unchanged, his MTL was discontinued. Zosyn and vancomycin switched to cefepime. On 1/31,  HCT shoeds worsening edema +/- extraaxial fluid collection, elevated ICPs, and mannitol was restarted.      MTL was weaned,  EVD adjusted and eventually removed on 2/10.  Hospital course further c/b agitation requiring precedex, hypotension requiring bolus, dysphagia s/p PEG 2/14  and trach on 2/13, insomnia s/p seroquel, pain s/p oxycodone and fentanyl. He underwent wound revision with NSGY and plastics on 2/17. Keppra discontinued due to signs of agitation. Tolerated trach collar.     Patient was evaluated by PM&R and therapy for functional deficits, gait/ADL impairments and acute rehabilitation was recommended. Patient was medically optimized for discharge to [facility] IRU on 2/28/23.     (28 Feb 2023 13:10)      PAST MEDICAL & SURGICAL HISTORY:  Substance abuse      Hypertension      Depression      Anxiety      No significant past surgical history          MEDICATIONS  (STANDING):  AQUAPHOR (petrolatum Ointment) 1 Application(s) Topical two times a day  bacitracin   Ointment 1 Application(s) Topical daily  enoxaparin Injectable 90 milliGRAM(s) SubCutaneous <User Schedule>  gabapentin 600 milliGRAM(s) Oral at bedtime  melatonin 6 milliGRAM(s) Oral at bedtime  nystatin Powder 1 Application(s) Topical two times a day  pantoprazole   Suspension 40 milliGRAM(s) Oral daily  polyethylene glycol 3350 17 Gram(s) Oral daily  QUEtiapine 25 milliGRAM(s) Oral at bedtime  senna Syrup 5 milliLiter(s) Oral at bedtime  traZODone 50 milliGRAM(s) Oral at bedtime    MEDICATIONS  (PRN):  acetaminophen    Suspension .. 650 milliGRAM(s) Oral every 6 hours PRN Temp greater or equal to 38C (100.4F), Mild Pain (1 - 3)      Allergies    No Known Allergies    Intolerances          VITALS  42y  Vital Signs Last 24 Hrs  T(C): 36.7 (04 Mar 2023 08:58), Max: 36.9 (03 Mar 2023 20:19)  T(F): 98 (04 Mar 2023 08:58), Max: 98.5 (03 Mar 2023 20:19)  HR: 78 (04 Mar 2023 10:02) (78 - 80)  BP: 110/69 (04 Mar 2023 08:58) (110/69 - 114/76)  BP(mean): --  RR: 15 (04 Mar 2023 08:58) (15 - 16)  SpO2: 98% (04 Mar 2023 10:02) (98% - 99%)    Parameters below as of 04 Mar 2023 10:02  Patient On (Oxygen Delivery Method): tracheostomy collar      Daily     Daily         RECENT LABS:                      CAPILLARY BLOOD GLUCOSE      POCT Blood Glucose.: 128 mg/dL (03 Mar 2023 12:40)

## 2023-03-04 NOTE — PROGRESS NOTE ADULT - ASSESSMENT
Physical Examination:  GENERAL:               Alert,  No acute distress.    HEENT:                      No JVD, Moist MM, s/p craniotomy, Trach in place with no secretions   PULM:                     Bilateral air entry, course to auscultation bilaterally, no significant sputum production, No Rales, No Rhonchi, No Wheezing  CVS:                         S1, S2,  No Murmur  ABD:                        Soft, nondistended, nontender, normoactive bowel sounds,   EXT:                         No edema, nontender, No Cyanosis or Clubbing   NEURO:                  Alert,  Not interactive, does not follow commands  PSYC:                      Calm, not Insight.      Assessment  Chronic Respiratory failure s/p trach  S/P ICH  h/o polysubstance abuse  underlying HTN, Depression    Plan  Trach downsized  if air trapping continues consider ENT eval  PMV trials with speech as tolerated    trach care  d/w Speech   PT as tolerated  TC o2 to maintain sat taper as tolerated

## 2023-03-04 NOTE — PROGRESS NOTE ADULT - ASSESSMENT
42 year old male with PMH of prior substance use, HTN, anxiety who presented to [facility] on 1/23/23 with  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s/p Left decompressive hemicraniectomy and right EVD placement 1/28/23. Hospital course further c/b respiratory failure, sepsis s/p ABX,  hypotension. s/p PEG 2/14 and trach on 2/13,    #  left frontal and basal ganglia IPH with mass effect and 8mm midline shift  - s/p Decompressive hemicraniectomy and EVD placement 1/28  - underwent wound revision with NSGY and plastics on 2/17  - CT head 3/1- High right frontal malik hole is seen. Postoperative changes compatible with a left temporal frontal parietal craniectomy is again seen. Mild parenchyma extending through the craniectomy defect is again seen. There is abnormal high attenuation seen involving the left basal ganglia/corona radiata region. This finding is likely compatible with evolving areas acute parenchymal hemorrhage. Surrounding edema is seen. Mass effect on the left lateral ventricle is seen. Left-to-right shift (3.1 mm)  - Cont Comprehensive Rehab Program: PT/OT/ST, 3hours daily and 5 days weekly  - Helmet when OOB   - right resting hand splint    # Sleep/Restlessness  --Tapered Seroquel to 25mg 3/2  -  Trazodone to 50mg qhs    - melatonin  - Cont. left UE mitten for line pullint 3/4  - maintain on 3:1 supervision    # Orthostatic hypotension  –  doxazosin dc  - 110/69 - 114/76) 3/4    # Acute Respiratory Failure  - Intubation and extubation  - s/p Trach 2/13  - Nebs PRN  - Starting PMV trials in Speech only  - will consider downsizing trach if noted air trapping    # DM II  - A1c 6.0  -dc'd lantus and lispro     #GI PPX  - Nexium 40mg daily    # Pain management  - Tylenol PRN,   - Neurontin 600mg nightly    # Bowel Regimen  - Senna, miralax PRN    # Bladder management  - check PVRs-- SC PRN if > 400cc  -- off doxazosin 3/2    #Dysphagia    - s/p PEG 2/14  - SLP: evaluation and treatment  - Diet: tolerating bolus TF     #Skin:  - Skin on admission: IAD to bilateral groin and sacrum; RUQ incision with sutures with palpable skull flap; Left hemicraniectomy with sutures MILLER; psoriasis BL shin  - Pressure injury/Skin: Turn Q2hrs while in bed, OOB to Chair, PT/OT     # Bilateral lower extremity DVTs–  – Lower extremity Doppler from 3/1–right femoral vein DVT and bilateral lower extremity calf DVTs  –  spoke with patient's neurosurgery PA–who was in contact with Dr. Cueva patient's neurosurgeon–gave clearance for patient to start therapeutic Lovenox.  – Vascular consult" Dr. Nolan recommends therapeutic Lovenox for 3 months.     42 year old male with PMH of prior substance use, HTN, anxiety who presented to [facility] on 1/23/23 with  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s/p Left decompressive hemicraniectomy and right EVD placement 1/28/23. Hospital course further c/b respiratory failure, sepsis s/p ABX,  hypotension. s/p PEG 2/14 and trach on 2/13,    #  left frontal and basal ganglia IPH with mass effect and 8mm midline shift  - s/p Decompressive hemicraniectomy and EVD placement 1/28  - underwent wound revision with NSGY and plastics on 2/17  - CT head 3/1- High right frontal malik hole is seen. Postoperative changes compatible with a left temporal frontal parietal craniectomy is again seen. Mild parenchyma extending through the craniectomy defect is again seen. There is abnormal high attenuation seen involving the left basal ganglia/corona radiata region. This finding is likely compatible with evolving areas acute parenchymal hemorrhage. Surrounding edema is seen. Mass effect on the left lateral ventricle is seen. Left-to-right shift (3.1 mm)  - Cont Comprehensive Rehab Program: PT/OT/ST, 3hours daily and 5 days weekly  - Helmet when OOB   - right resting hand splint    # Sleep/Restlessness  --Tapered Seroquel to 25mg 3/2  -  Trazodone to 50mg qhs    - melatonin  - Cont. left UE mitten for line pullint 3/4  - maintain on 3:1 supervision    # Orthostatic hypotension  –  doxazosin dc  - 110/69 - 114/76) 3/4    # Acute Respiratory Failure  - Intubation and extubation  - s/p Trach 2/13  - Nebs PRN  - Pulmonary appreciated. Plan to downsize #6 TRACH today due to air trapping with PMV trials 3/4    # DM II  - A1c 6.0  -dc'd lantus and lispro     #GI PPX  - Nexium 40mg daily    # Pain management  - Tylenol PRN,   - Neurontin 600mg nightly    # Bowel Regimen  - Senna, miralax PRN    # Bladder management  - check PVRs-- SC PRN if > 400cc  -- off doxazosin 3/2    #Dysphagia    - s/p PEG 2/14  - SLP: evaluation and treatment  - Diet: tolerating bolus TF     #Skin:  - Skin on admission: IAD to bilateral groin and sacrum; RUQ incision with sutures with palpable skull flap; Left hemicraniectomy with sutures MILLER; psoriasis BL shin  - Pressure injury/Skin: Turn Q2hrs while in bed, OOB to Chair, PT/OT     # Bilateral lower extremity DVTs–  – Lower extremity Doppler from 3/1–right femoral vein DVT and bilateral lower extremity calf DVTs  –  spoke with patient's neurosurgery PA–who was in contact with Dr. Cueva patient's neurosurgeon–gave clearance for patient to start therapeutic Lovenox.  – Vascular consult" Dr. Nolan recommends therapeutic Lovenox for 3 months.

## 2023-03-05 PROCEDURE — 99233 SBSQ HOSP IP/OBS HIGH 50: CPT

## 2023-03-05 PROCEDURE — 70450 CT HEAD/BRAIN W/O DYE: CPT | Mod: 26

## 2023-03-05 PROCEDURE — 99232 SBSQ HOSP IP/OBS MODERATE 35: CPT

## 2023-03-05 RX ADMIN — NYSTATIN CREAM 1 APPLICATION(S): 100000 CREAM TOPICAL at 17:52

## 2023-03-05 RX ADMIN — Medication 1 APPLICATION(S): at 17:52

## 2023-03-05 RX ADMIN — Medication 1 APPLICATION(S): at 05:38

## 2023-03-05 RX ADMIN — QUETIAPINE FUMARATE 25 MILLIGRAM(S): 200 TABLET, FILM COATED ORAL at 21:48

## 2023-03-05 RX ADMIN — SENNA PLUS 5 MILLILITER(S): 8.6 TABLET ORAL at 21:47

## 2023-03-05 RX ADMIN — NYSTATIN CREAM 1 APPLICATION(S): 100000 CREAM TOPICAL at 05:37

## 2023-03-05 RX ADMIN — POLYETHYLENE GLYCOL 3350 17 GRAM(S): 17 POWDER, FOR SOLUTION ORAL at 11:44

## 2023-03-05 RX ADMIN — Medication 1 APPLICATION(S): at 11:45

## 2023-03-05 RX ADMIN — ENOXAPARIN SODIUM 90 MILLIGRAM(S): 100 INJECTION SUBCUTANEOUS at 12:59

## 2023-03-05 RX ADMIN — GABAPENTIN 600 MILLIGRAM(S): 400 CAPSULE ORAL at 21:48

## 2023-03-05 RX ADMIN — Medication 6 MILLIGRAM(S): at 21:48

## 2023-03-05 RX ADMIN — ENOXAPARIN SODIUM 90 MILLIGRAM(S): 100 INJECTION SUBCUTANEOUS at 21:47

## 2023-03-05 RX ADMIN — Medication 50 MILLIGRAM(S): at 21:48

## 2023-03-05 RX ADMIN — PANTOPRAZOLE SODIUM 40 MILLIGRAM(S): 20 TABLET, DELAYED RELEASE ORAL at 11:44

## 2023-03-05 NOTE — PROGRESS NOTE ADULT - SUBJECTIVE AND OBJECTIVE BOX
Follow-up Pulmonary Progress Note  Chief Complaint : Other nontraumatic intracerebral hemorrhage    Was asked to evaluate patient as he self decannulated on my way to hospital   when came to evaluate patient ~ 8 am . noted stoma closed unable to place any trach  he is breathing comfortably  no gross air trapping noted  sat 100% on 2 L n/c  no stridor  does not follow commands or attempts made to phonate.          Allergies :No Known Allergies      PAST MEDICAL & SURGICAL HISTORY:  Substance abuse    Hypertension    Depression    Anxiety    No significant past surgical history        Medications:  MEDICATIONS  (STANDING):  AQUAPHOR (petrolatum Ointment) 1 Application(s) Topical two times a day  bacitracin   Ointment 1 Application(s) Topical daily  enoxaparin Injectable 90 milliGRAM(s) SubCutaneous <User Schedule>  gabapentin 600 milliGRAM(s) Oral at bedtime  melatonin 6 milliGRAM(s) Oral at bedtime  nystatin Powder 1 Application(s) Topical two times a day  pantoprazole   Suspension 40 milliGRAM(s) Oral daily  polyethylene glycol 3350 17 Gram(s) Oral daily  QUEtiapine 25 milliGRAM(s) Oral at bedtime  senna Syrup 5 milliLiter(s) Oral at bedtime  traZODone 50 milliGRAM(s) Oral at bedtime    MEDICATIONS  (PRN):  acetaminophen    Suspension .. 650 milliGRAM(s) Oral every 6 hours PRN Temp greater or equal to 38C (100.4F), Mild Pain (1 - 3)      Antibiotics History      Heme Medications   enoxaparin Injectable 90 milliGRAM(s) SubCutaneous <User Schedule>, 03-02-23 @ 00:00      GI Medications  pantoprazole   Suspension 40 milliGRAM(s) Oral daily, 03-01-23 @ 00:00, Routine  polyethylene glycol 3350 17 Gram(s) Oral daily, 02-28-23 @ 14:19, Routine  senna Syrup 5 milliLiter(s) Oral at bedtime, 02-28-23 @ 14:19, Routine       VITALS:  T(C): 36.7 (03-05-23 @ 10:37), Max: 36.7 (03-04-23 @ 19:54)  T(F): 98 (03-05-23 @ 10:37), Max: 98.1 (03-04-23 @ 19:54)  HR: 85 (03-05-23 @ 10:37) (76 - 85)  BP: 113/72 (03-05-23 @ 10:37) (112/71 - 127/75)  BP(mean): --  ABP: --  ABP(mean): --  RR: 15 (03-05-23 @ 10:37) (15 - 16)  SpO2: 98% (03-05-23 @ 10:37) (96% - 99%)  CVP(mm Hg): --  CVP(cm H2O): --    Ins and Outs     03-04-23 @ 07:01  -  03-05-23 @ 07:00  --------------------------------------------------------  IN: 900 mL / OUT: 0 mL / NET: 900 mL                I&O's Detail    04 Mar 2023 07:01  -  05 Mar 2023 07:00  --------------------------------------------------------  IN:    Free Water: 300 mL    TwoCal HN: 600 mL  Total IN: 900 mL    OUT:  Total OUT: 0 mL    Total NET: 900 mL

## 2023-03-05 NOTE — PROGRESS NOTE ADULT - SUBJECTIVE AND OBJECTIVE BOX
Patient is a 42y old  Male who presents with a chief complaint of left IPH/ICH (05 Mar 2023 11:33)      HPI:  This is 42 year old male with PMH of prior substance use, HTN, anxiety who presented to [facility] on 1/23. On 1/23 morning, he told his family that he was not feeling well and took 2 tabs of Vyvanse (usually takes as needed). Shortly after, he was at a group therapy meeting over the phone when he complained to his father he was having a severe headache. He then became more lethargic, and developed nausea and vomited. He was brought to ED by his father. On arrival, noted to be lethargic, vomiting, with garbled speech. 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 Post CT, more lethargic with dilated right pupil and bilateral reactive but sluggish pupils. He was given mannitol and intubated for airway protection and was taken to neuro ICU.    On 1/25, HCT with enlarging R lateral ventricle and slightly worsening MLS concerning for trapped ventricle. 1/27 acute drop in SpO2 on 1/27, lavaged and suctioned out thick clot. Placed back on ACVC and FiO2 weaned from 70% to 40% overnight. On 1/28/23, he had episode of emesis, & fever of 100.3. He was  given tylenol with improvement in temperature but still little to no movement on the L side (previously would localize arm to suction and spontaneously bend L leg). Pt. Had left decompressive hemicraniectomy and right EVD  placed.    After the procedure, patient opened eyes for the first time and regained LUE/LLE movement. As he became febrile again this was less prominent. Ceftriaxone was switched to Zosyn. 1/29 HCT mild increase extraaxial fluid collection. on 1/30, repeat  HCT unchanged, his MTL was discontinued. Zosyn and vancomycin switched to cefepime. On 1/31,  HCT shoeds worsening edema +/- extraaxial fluid collection, elevated ICPs, and mannitol was restarted.      MTL was weaned,  EVD adjusted and eventually removed on 2/10.  Hospital course further c/b agitation requiring precedex, hypotension requiring bolus, dysphagia s/p PEG 2/14  and trach on 2/13, insomnia s/p seroquel, pain s/p oxycodone and fentanyl. He underwent wound revision with NSGY and plastics on 2/17. Keppra discontinued due to signs of agitation. Tolerated trach collar.     Patient was evaluated by PM&R and therapy for functional deficits, gait/ADL impairments and acute rehabilitation was recommended. Patient was medically optimized for discharge to [facility] IRU on 2/28/23.     (28 Feb 2023 13:10)      PAST MEDICAL & SURGICAL HISTORY:  Substance abuse      Hypertension      Depression      Anxiety      No significant past surgical history          MEDICATIONS  (STANDING):  AQUAPHOR (petrolatum Ointment) 1 Application(s) Topical two times a day  bacitracin   Ointment 1 Application(s) Topical daily  enoxaparin Injectable 90 milliGRAM(s) SubCutaneous <User Schedule>  gabapentin 600 milliGRAM(s) Oral at bedtime  melatonin 6 milliGRAM(s) Oral at bedtime  nystatin Powder 1 Application(s) Topical two times a day  pantoprazole   Suspension 40 milliGRAM(s) Oral daily  polyethylene glycol 3350 17 Gram(s) Oral daily  QUEtiapine 25 milliGRAM(s) Oral at bedtime  senna Syrup 5 milliLiter(s) Oral at bedtime  traZODone 50 milliGRAM(s) Oral at bedtime    MEDICATIONS  (PRN):  acetaminophen    Suspension .. 650 milliGRAM(s) Oral every 6 hours PRN Temp greater or equal to 38C (100.4F), Mild Pain (1 - 3)      Allergies    No Known Allergies    Intolerances          VITALS  42y  Vital Signs Last 24 Hrs  T(C): 36.7 (05 Mar 2023 10:37), Max: 36.7 (04 Mar 2023 19:54)  T(F): 98 (05 Mar 2023 10:37), Max: 98.1 (04 Mar 2023 19:54)  HR: 85 (05 Mar 2023 10:37) (76 - 85)  BP: 113/72 (05 Mar 2023 10:37) (112/71 - 127/75)  BP(mean): --  RR: 15 (05 Mar 2023 10:37) (15 - 16)  SpO2: 98% (05 Mar 2023 10:37) (96% - 99%)    Parameters below as of 05 Mar 2023 10:37  Patient On (Oxygen Delivery Method): nasal cannula      Daily     Daily         RECENT LABS:            Review of Systems:   · Additional ROS	Patient eyes open, seen with RN and respiratory thearpy this morning at 7 AM. Patient was found with TRACH tube displaced. no repiratory distress, denied SOB. Eyes sustained opening, nodding. good color. Vitals stable, including O2 sat 99% RA, HR and BP ctonrolled        Physical Exam:   · Constitutional Comments	eyes open spontaneously. stoma already closed, No active bleeding noted, but gauze from nurse shows pinking sanguinous drainage. no significant secretions . Puplmonary called, see below A/P    Later seen with ICU team as well  	  · ENMT Comments	+stoma, very small opening, not large enough to cause nont raumatic reinsertion TRACH  · Respiratory Comments	fair effort no wheezing or rhonchi  · Cardiovascular	regular rate and rhythm; S1 S2 present; no gallops; no rub; no murmur  · Gastrointestinal	soft; nontender; nondistended; normal active bowel sounds  · Motor	bilateral calves soft no TTP  no erythema or warmth  left UE and LE at least 3-4/5  no AROM RUE or RLE

## 2023-03-05 NOTE — PROGRESS NOTE ADULT - ASSESSMENT
Physical Examination:  GENERAL:               Alert,  No acute distress.    HEENT:                      No JVD, Moist MM, s/p craniotomy, Trach in place with no secretions   PULM:                     Bilateral air entry, course to auscultation bilaterally, no significant sputum production, No Rales, No Rhonchi, No Wheezing  CVS:                         S1, S2,  No Murmur  ABD:                        Soft, nondistended, nontender, normoactive bowel sounds,   EXT:                         No edema, nontender, No Cyanosis or Clubbing   NEURO:                  Alert,  Not interactive, does not follow commands  PSYC:                      Calm, not Insight.      Assessment  Chronic Respiratory failure s/p trach s/p self decannulation 3/5/23  S/P ICH  h/o polysubstance abuse  underlying HTN, Depression    Plan  monitor with out trach  monito ro2 today  No gross air trapping with out trach today  PMV trials with speech as tolerated    trach care  d/w Speech   PT as tolerated  TC o2 to maintain sat taper as tolerated  d/w floor team.  Physical Examination:  GENERAL:               Alert,  No acute distress.    HEENT:                      No JVD, Moist MM, s/p craniotomy, stoma pin sized hole  PULM:                     Bilateral air entry, course to auscultation bilaterally, no significant sputum production, No Rales, No Rhonchi, No Wheezing  CVS:                         S1, S2,  No Murmur  ABD:                        Soft, nondistended, nontender, normoactive bowel sounds,   EXT:                         No edema, nontender, No Cyanosis or Clubbing   NEURO:                  Alert,  Not interactive, does not follow commands  PSYC:                      Calm, not Insight.      Assessment  Chronic Respiratory failure s/p trach s/p self decannulation 3/5/23  S/P ICH  h/o polysubstance abuse  underlying HTN, Depression    Plan  monitor with out trach  monitor o2  sat today  No gross air trapping with out trach today    d/w Speech   PT as tolerated  d/w floor team.

## 2023-03-05 NOTE — PROVIDER CONTACT NOTE (OTHER) - BACKGROUND
Patient  with dx of large left frontal and basal ganglia IPH, s/p Left decompressive hemicraniectomy and right EVD . patient was downsized on 3/4

## 2023-03-05 NOTE — PROGRESS NOTE ADULT - SUBJECTIVE AND OBJECTIVE BOX
Hospitalist: Kaylee Healy DO    CHIEF COMPLAINT: Patient is a 42y old  male who presents with a chief complaint of left IPH/ICH (05 Mar 2023 12:21)      SUBJECTIVE / OVERNIGHT EVENTS: Patient seen and examined. No acute events overnight. Pain well controlled and patient without any complaints.    MEDICATIONS  (STANDING):  AQUAPHOR (petrolatum Ointment) 1 Application(s) Topical two times a day  bacitracin   Ointment 1 Application(s) Topical daily  enoxaparin Injectable 90 milliGRAM(s) SubCutaneous <User Schedule>  gabapentin 600 milliGRAM(s) Oral at bedtime  melatonin 6 milliGRAM(s) Oral at bedtime  nystatin Powder 1 Application(s) Topical two times a day  pantoprazole   Suspension 40 milliGRAM(s) Oral daily  polyethylene glycol 3350 17 Gram(s) Oral daily  QUEtiapine 25 milliGRAM(s) Oral at bedtime  senna Syrup 5 milliLiter(s) Oral at bedtime  traZODone 50 milliGRAM(s) Oral at bedtime    MEDICATIONS  (PRN):  acetaminophen    Suspension .. 650 milliGRAM(s) Oral every 6 hours PRN Temp greater or equal to 38C (100.4F), Mild Pain (1 - 3)      VITALS:  T(F): 98 (03-05-23 @ 10:37), Max: 98.1 (03-04-23 @ 19:54)  HR: 85 (03-05-23 @ 10:37) (76 - 85)  BP: 113/72 (03-05-23 @ 10:37) (112/71 - 127/75)  RR: 15 (03-05-23 @ 10:37) (15 - 16)  SpO2: 98% (03-05-23 @ 10:37)      REVIEW OF SYSTEMS:  For ROV please refer back to H&P     PHYSICAL EXAM:  General: NAD, +cranial wound healing well  ENT: MMM, no scleral icterus  Neck: Supple, No JVD. +trach  Lungs: Clear to auscultation bilaterally, no wheezes, rales, rhonchi  Cardio: RRR, S1/S2  Abdomen: Soft, Nontender, Nondistended; Bowel sounds present. +PEG  Extremities: No calf tenderness, No pitting edema      RADIOLOGY & ADDITIONAL TESTS:    Imaging Personally Reviewed:    [X] Consultant(s) Notes Reviewed:  [X] Care Discussed with Consultants/Other Providers:

## 2023-03-05 NOTE — PROGRESS NOTE ADULT - ASSESSMENT
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 Hospital course further c/b respiratory failure, sepsis s/p ABX,  hypotension. s/p PEG 2/14 and trach on 2/13,    #  left frontal and basal ganglia IPH with mass effect and 8mm midline shift  - s/p Decompressive hemicraniectomy and EVD placement 1/28  - underwent wound revision with NSGY and plastics on 2/17  - CT head 3/1- High right frontal malik hole is seen. Postoperative changes compatible with a left temporal frontal parietal craniectomy is again seen. Mild parenchyma extending through the craniectomy defect is again seen. There is abnormal high attenuation seen involving the left basal ganglia/corona radiata region. This finding is likely compatible with evolving areas acute parenchymal hemorrhage. Surrounding edema is seen. Mass effect on the left lateral ventricle is seen. Left-to-right shift (3.1 mm)  - Cont Comprehensive Rehab Program: PT/OT/ST, 3hours daily and 5 days weekly  - Helmet when OOB   - right resting hand splint    # dislodged TRACH 3/5  - evaluated with respiratory therapists and ICU team, RN this morning  - spoke with pulmonary. Patient was recently downsized to #6 due to concern of air trapping, and would prefer to inset #4 Shiley if possible. However, stoma had closed that re-insertion was difficult; had some sanguinous oozing stoma, but stopped  - lovenox in AM HELD to make sure no rebleed. If stable, will resume, discussed in huddle  - Patient was alert, comfortable. Able to vocalize somewhat with finger occlusion over stoma, and no respiratory distress. O2 sat % on RA,   - O2 via NC also ordered in case of fatigue. no significant air trapping noted this morning  - placed on continous pulse ox, with pulmonary to follow  - xeroform and tegaderm dressing applied  - discussed with team, in huddle and BRYNN    # Sleep/Restlessness  --Tapered Seroquel to 25mg 3/2  -  Trazodone to 50mg qhs    - melatonin  - maintain on 2:1 supervision with continuous O2 monitoring  3/5    # Orthostatic hypotension  –  doxazosin dc  -  (112/71 - 127/75) 3/5    # DM II  - A1c 6.0  - dc'd lantus and lispro     #GI PPX  - Nexium 40mg daily    # Pain management  - Tylenol PRN,   - Neurontin 600mg nightly    # Bowel Regimen  - Senna, miralax PRN    #Dysphagia    - s/p PEG 2/14  - SLP: evaluation and treatment  - Diet: tolerating bolus TF     #Skin:  - Skin on admission: IAD to bilateral groin and sacrum; RUQ incision with sutures with palpable skull flap; Left hemicraniectomy with sutures MILLER; psoriasis BL shin  - Pressure injury/Skin: Turn Q2hrs while in bed, OOB to Chair, PT/OT     # Bilateral lower extremity DVTs–  – Lower extremity Doppler from 3/1–right femoral vein DVT and bilateral lower extremity calf DVTs  –  spoke with patient's neurosurgery PA–who was in contact with Dr. Cueva patient's neurosurgeon–gave clearance for patient to start therapeutic Lovenox.  – Vascular consult" Dr. Nolan recommends therapeutic Lovenox for 3 months.    time spent: 60 min 42 year old male with PMH of prior substance use, HTN, anxiety who presented to [facility] on [facility]/23/23 with  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s/p Left decompressive hemicraniectomy and right EVD placement 1/28/23. Hospital course further c/b respiratory failure, sepsis s/p ABX,  hypotension. s/p PEG 2/14 and trach on 2/13,    #  left frontal and basal ganglia IPH with mass effect and 8mm midline shift  - s/p Decompressive hemicraniectomy and EVD placement 1/28  - underwent wound revision with NSGY and plastics on 2/17  - CT head 3/1- High right frontal malik hole is seen. Postoperative changes compatible with a left temporal frontal parietal craniectomy is again seen. Mild parenchyma extending through the craniectomy defect is again seen. There is abnormal high attenuation seen involving the left basal ganglia/corona radiata region. This finding is likely compatible with evolving areas acute parenchymal hemorrhage. Surrounding edema is seen. Mass effect on the left lateral ventricle is seen. Left-to-right shift (3.1 mm)  - Cont Comprehensive Rehab Program: PT/OT/ST, 3hours daily and 5 days weekly  - Helmet when OOB   - right resting hand splint    # dislodged TRACH 3/5  - evaluated with respiratory therapists and ICU team, RN this morning  - spoke with pulmonary. Patient was recently downsized to #6 due to concern of air trapping, and would prefer to inset #4 Shiley if possible. However, stoma had closed that re-insertion was difficult; had some sanguinous oozing stoma, but stopped  - lovenox in AM HELD to make sure no rebleed. If stable, will resume, discussed in huddle  - Patient was alert, comfortable. Able to vocalize somewhat with finger occlusion over stoma, and no respiratory distress. O2 sat % on RA,   - O2 via NC also ordered in case of fatigue. no significant air trapping noted this morning  - placed on continous pulse ox, with pulmonary to follow  - xeroform and tegaderm dressing applied  - discussed with team, in huddle and BRYNN    # Sleep/Restlessness  --Tapered Seroquel to 25mg 3/2  -  Trazodone to 50mg qhs    - melatonin  - maintain on 2:1 supervision with continuous O2 monitoring  3/5    # Orthostatic hypotension  –  doxazosin dc  -  (112/71 - 127/75) 3/5    # DM II  - A1c 6.0  - dc'd lantus and lispro     #GI PPX  - Nexium 40mg daily    # Pain management  - Tylenol PRN,   - Neurontin 600mg nightly    # Bowel Regimen  - Senna, miralax PRN    #Dysphagia    - s/p PEG 2/14  - SLP: evaluation and treatment  - Diet: tolerating bolus TF     #Skin:  - Skin on admission: IAD to bilateral groin and sacrum; RUQ incision with sutures with palpable skull flap; Left hemicraniectomy with sutures MILLER; psoriasis BL shin  - Pressure injury/Skin: Turn Q2hrs while in bed, OOB to Chair, PT/OT     # Bilateral lower extremity DVTs–  – Lower extremity Doppler from 3/1–right femoral vein DVT and bilateral lower extremity calf DVTs  –  spoke with patient's neurosurgery PA–who was in contact with Dr. Cueva patient's neurosurgeon–gave clearance for patient to start therapeutic Lovenox.  – Vascular consult" Dr. Nolan recommends therapeutic Lovenox for 3 months.  - lovenox HELD this AM post decannulation and oozing. Will restart later today if stable    time spent: 60 min

## 2023-03-05 NOTE — PROVIDER CONTACT NOTE (OTHER) - ASSESSMENT
Patient assessed. O2 2L placed, O2 sat approx at 98%-99%. Patient appeared in no distress. Resp called and MD was at bedside at present time. Patient assessed. O2 2L placed, O2 sat approx at 98%-99%. Patient appeared in no distress. Resp called and MD was at bedside at present time. B/P 127/75, 97.9, 15.

## 2023-03-05 NOTE — PROGRESS NOTE ADULT - ASSESSMENT
41 y/o M with PMH of prior substance use, HTN, anxiety who presented to [facility] on 1/23 with  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 He is  s/p Left decompressive hemicraniectomy and right EVD placement 1/28/23. Hospital course further s/f respiratory failure s/p intubation and extubation,  agitation requiring precedex, sepsis s/p ABX,  hypotension requiring bolus, dysphagia s/p PEG 2/14 and trach on 2/13, insomnia s/p seroquel, pain s/p oxycodone and fentanyl. Patient now with Left Hemiparesis, dysphagia s/p PEG, Aphasia, Cognitive deficits, gait Instability, ADL impairments and Functional impairments.    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 with mass effect and 8mm midline shift  -s/p intubation for airway protection and extubation  -s/p Decompressive hemicraniectomy and EVD placement 1/28  -Underwent wound revision with NSGY and plastics on 2/17  -Comprehensive rehab program - PT/OT/SLP per rehab team  -Pain management, bowel regimen per rehab     #Acute Respiratory Failure  -s/p intubation/extubation, Trach 2/13  -Pulm consulted    #T2DM, HbA1c 6.0  -ISS and FS  -Lantus 7un qhs, Admelog 7un 4x/day    #Dysphagia s/p PEG 2/14  -Diet/Nutrition consult. Continue TF    #GERD  -PPI    #DVT ppx - HSQ

## 2023-03-06 LAB
ALBUMIN SERPL ELPH-MCNC: 3 G/DL — LOW (ref 3.3–5)
ALP SERPL-CCNC: 109 U/L — SIGNIFICANT CHANGE UP (ref 40–120)
ALT FLD-CCNC: 17 U/L — SIGNIFICANT CHANGE UP (ref 10–45)
ANION GAP SERPL CALC-SCNC: 11 MMOL/L — SIGNIFICANT CHANGE UP (ref 5–17)
AST SERPL-CCNC: 14 U/L — SIGNIFICANT CHANGE UP (ref 10–40)
BILIRUB SERPL-MCNC: 0.3 MG/DL — SIGNIFICANT CHANGE UP (ref 0.2–1.2)
BUN SERPL-MCNC: 10 MG/DL — SIGNIFICANT CHANGE UP (ref 7–23)
CALCIUM SERPL-MCNC: 9.5 MG/DL — SIGNIFICANT CHANGE UP (ref 8.4–10.5)
CHLORIDE SERPL-SCNC: 102 MMOL/L — SIGNIFICANT CHANGE UP (ref 96–108)
CO2 SERPL-SCNC: 25 MMOL/L — SIGNIFICANT CHANGE UP (ref 22–31)
CREAT SERPL-MCNC: 0.59 MG/DL — SIGNIFICANT CHANGE UP (ref 0.5–1.3)
EGFR: 124 ML/MIN/1.73M2 — SIGNIFICANT CHANGE UP
GLUCOSE SERPL-MCNC: 143 MG/DL — HIGH (ref 70–99)
HCT VFR BLD CALC: 41.2 % — SIGNIFICANT CHANGE UP (ref 39–50)
HGB BLD-MCNC: 13.4 G/DL — SIGNIFICANT CHANGE UP (ref 13–17)
MCHC RBC-ENTMCNC: 29.9 PG — SIGNIFICANT CHANGE UP (ref 27–34)
MCHC RBC-ENTMCNC: 32.5 GM/DL — SIGNIFICANT CHANGE UP (ref 32–36)
MCV RBC AUTO: 92 FL — SIGNIFICANT CHANGE UP (ref 80–100)
NRBC # BLD: 0 /100 WBCS — SIGNIFICANT CHANGE UP (ref 0–0)
PLATELET # BLD AUTO: 240 K/UL — SIGNIFICANT CHANGE UP (ref 150–400)
POTASSIUM SERPL-MCNC: 4.5 MMOL/L — SIGNIFICANT CHANGE UP (ref 3.5–5.3)
POTASSIUM SERPL-SCNC: 4.5 MMOL/L — SIGNIFICANT CHANGE UP (ref 3.5–5.3)
PROT SERPL-MCNC: 7.3 G/DL — SIGNIFICANT CHANGE UP (ref 6–8.3)
RBC # BLD: 4.48 M/UL — SIGNIFICANT CHANGE UP (ref 4.2–5.8)
RBC # FLD: 14.5 % — SIGNIFICANT CHANGE UP (ref 10.3–14.5)
SODIUM SERPL-SCNC: 138 MMOL/L — SIGNIFICANT CHANGE UP (ref 135–145)
WBC # BLD: 7.92 K/UL — SIGNIFICANT CHANGE UP (ref 3.8–10.5)
WBC # FLD AUTO: 7.92 K/UL — SIGNIFICANT CHANGE UP (ref 3.8–10.5)

## 2023-03-06 PROCEDURE — 90832 PSYTX W PT 30 MINUTES: CPT

## 2023-03-06 PROCEDURE — 99232 SBSQ HOSP IP/OBS MODERATE 35: CPT

## 2023-03-06 RX ORDER — TRAZODONE HCL 50 MG
75 TABLET ORAL AT BEDTIME
Refills: 0 | Status: DISCONTINUED | OUTPATIENT
Start: 2023-03-06 | End: 2023-03-08

## 2023-03-06 RX ADMIN — PANTOPRAZOLE SODIUM 40 MILLIGRAM(S): 20 TABLET, DELAYED RELEASE ORAL at 13:04

## 2023-03-06 RX ADMIN — GABAPENTIN 600 MILLIGRAM(S): 400 CAPSULE ORAL at 21:41

## 2023-03-06 RX ADMIN — Medication 1 APPLICATION(S): at 05:33

## 2023-03-06 RX ADMIN — Medication 1 APPLICATION(S): at 13:04

## 2023-03-06 RX ADMIN — NYSTATIN CREAM 1 APPLICATION(S): 100000 CREAM TOPICAL at 17:50

## 2023-03-06 RX ADMIN — Medication 6 MILLIGRAM(S): at 21:41

## 2023-03-06 RX ADMIN — Medication 75 MILLIGRAM(S): at 21:41

## 2023-03-06 RX ADMIN — POLYETHYLENE GLYCOL 3350 17 GRAM(S): 17 POWDER, FOR SOLUTION ORAL at 13:04

## 2023-03-06 RX ADMIN — ENOXAPARIN SODIUM 90 MILLIGRAM(S): 100 INJECTION SUBCUTANEOUS at 21:40

## 2023-03-06 RX ADMIN — NYSTATIN CREAM 1 APPLICATION(S): 100000 CREAM TOPICAL at 05:33

## 2023-03-06 RX ADMIN — Medication 1 APPLICATION(S): at 17:51

## 2023-03-06 RX ADMIN — QUETIAPINE FUMARATE 25 MILLIGRAM(S): 200 TABLET, FILM COATED ORAL at 21:41

## 2023-03-06 RX ADMIN — ENOXAPARIN SODIUM 90 MILLIGRAM(S): 100 INJECTION SUBCUTANEOUS at 11:00

## 2023-03-06 NOTE — PROGRESS NOTE ADULT - SUBJECTIVE AND OBJECTIVE BOX
Follow-up Pulmonary Progress Note  Chief Complaint : Other nontraumatic intracerebral hemorrhage    patient seen and examined  confused  stoma still open with wheezing of air  has pressure dressing there  patient able to phonate but unable to provide history or ROS        Allergies :No Known Allergies      PAST MEDICAL & SURGICAL HISTORY:  Substance abuse    Hypertension    Depression    Anxiety    No significant past surgical history        Medications:  MEDICATIONS  (STANDING):  AQUAPHOR (petrolatum Ointment) 1 Application(s) Topical two times a day  bacitracin   Ointment 1 Application(s) Topical daily  enoxaparin Injectable 90 milliGRAM(s) SubCutaneous <User Schedule>  gabapentin 600 milliGRAM(s) Oral at bedtime  melatonin 6 milliGRAM(s) Oral at bedtime  nystatin Powder 1 Application(s) Topical two times a day  pantoprazole   Suspension 40 milliGRAM(s) Oral daily  polyethylene glycol 3350 17 Gram(s) Oral daily  QUEtiapine 25 milliGRAM(s) Oral at bedtime  senna Syrup 5 milliLiter(s) Oral at bedtime  traZODone 50 milliGRAM(s) Oral at bedtime    MEDICATIONS  (PRN):  acetaminophen    Suspension .. 650 milliGRAM(s) Oral every 6 hours PRN Temp greater or equal to 38C (100.4F), Mild Pain (1 - 3)      Antibiotics History      Heme Medications   enoxaparin Injectable 90 milliGRAM(s) SubCutaneous <User Schedule>, 03-02-23 @ 00:00      GI Medications  pantoprazole   Suspension 40 milliGRAM(s) Oral daily, 03-01-23 @ 00:00, Routine  polyethylene glycol 3350 17 Gram(s) Oral daily, 02-28-23 @ 14:19, Routine  senna Syrup 5 milliLiter(s) Oral at bedtime, 02-28-23 @ 14:19, Routine        LABS:                        13.4   7.92  )-----------( 240      ( 06 Mar 2023 06:21 )             41.2     03-06    138  |  102  |  10  ----------------------------<  143<H>  4.5   |  25  |  0.59    Ca    9.5      06 Mar 2023 06:21    TPro  7.3  /  Alb  3.0<L>  /  TBili  0.3  /  DBili  x   /  AST  14  /  ALT  17  /  AlkPhos  109  03-06           VITALS:  T(C): 36.7 (03-06-23 @ 07:57), Max: 36.7 (03-06-23 @ 07:57)  T(F): 98.1 (03-06-23 @ 07:57), Max: 98.1 (03-06-23 @ 07:57)  HR: 86 (03-06-23 @ 07:57) (86 - 93)  BP: 115/80 (03-06-23 @ 07:57) (115/80 - 120/75)  BP(mean): --  ABP: --  ABP(mean): --  RR: 16 (03-06-23 @ 07:57) (16 - 16)  SpO2: 97% (03-06-23 @ 07:57) (97% - 97%)  CVP(mm Hg): --  CVP(cm H2O): --    Ins and Outs     03-05-23 @ 07:01  -  03-06-23 @ 07:00  --------------------------------------------------------  IN: 0 mL / OUT: 900 mL / NET: -900 mL                I&O's Detail    05 Mar 2023 07:01  -  06 Mar 2023 07:00  --------------------------------------------------------  IN:  Total IN: 0 mL    OUT:    Voided (mL): 900 mL  Total OUT: 900 mL    Total NET: -900 mL

## 2023-03-06 NOTE — CHART NOTE - NSCHARTNOTEFT_GEN_A_CORE
Nutrition Follow Up Note  Hospital Course   (Per Electronic Medical Record)    Source:  Patient [X]  Nursing Staff [X]   Medical Record [X]      Diet: Diet, NPO with Tube Feed:   Tube Feeding Modality: Gastrostomy  TwoCal HN  Total Volume for 24 Hours (mL): 1200  Bolus  Total Volume of Bolus (mL):  300  Total # of Feeds: 4  Tube Feed Frequency: Every 6 hours   Tube Feed Start Time: 20:00  Bolus Feed Rate (mL per Hour): 300   Bolus Feed Duration (in Hours): 1  Bolus Feed Instructions:  Please provide tube feed at 8am, 12pm, 4pm, and 8pm  Free Water Flush  Free Water Flush Instructions:  Please provide 300ml water at least 1 hour apart from feeds (03-01-23 @ 14:53) [Active]    At this time patient tolerating current tube feeding regimen, 1200 mL/d (300mL x4 x/d) will providing 2400 kcal, 100 grams of protein. Denies hunger, N/V/C/D. Last BM 3/2 Per nursing flowsheets. Noted DM II A1c 6.0 - dc'd lantus and lispro.      Current Weight: 193.7lb on 2/28    Pertinent Medications: MEDICATIONS  (STANDING):  AQUAPHOR (petrolatum Ointment) 1 Application(s) Topical two times a day  bacitracin   Ointment 1 Application(s) Topical daily  enoxaparin Injectable 90 milliGRAM(s) SubCutaneous <User Schedule>  gabapentin 600 milliGRAM(s) Oral at bedtime  melatonin 6 milliGRAM(s) Oral at bedtime  nystatin Powder 1 Application(s) Topical two times a day  pantoprazole   Suspension 40 milliGRAM(s) Oral daily  polyethylene glycol 3350 17 Gram(s) Oral daily  QUEtiapine 25 milliGRAM(s) Oral at bedtime  senna Syrup 5 milliLiter(s) Oral at bedtime  traZODone 50 milliGRAM(s) Oral at bedtime    MEDICATIONS  (PRN):  acetaminophen    Suspension .. 650 milliGRAM(s) Oral every 6 hours PRN Temp greater or equal to 38C (100.4F), Mild Pain (1 - 3)    Pertinent Labs:  03-06 Na138 mmol/L Glu 143 mg/dL<H> K+ 4.5 mmol/L Cr  0.59 mg/dL BUN 10 mg/dL 03-06 Alb 3.0 g/dL<L>    Skin: Surgical Incision, Site left head, Right upper abdomen    Edema: No edema noted per nursing flowsheet    Last Bowel Movement: on 2/    Estimated Needs:   [X] No Change Since Previous Assessment    Previous Nutrition Diagnosis:   Moderate, Acute  related to decreased ability to meet estimated energy requirements  as evidenced by subcutaneous fat loss and signs of muscle depletion    Nutrition Diagnosis is [X] Ongoing - addressed w/ tube feed at goal rate    New Nutrition Diagnosis: [X] Not Applicable    Interventions:   1) Recommend continue tube feed to 1200 mL/d (300mL x4 x/d) provides 2400 kcal, 100 grams of protein advance diet PRN.   2) Maintain aspiration precautions (elevated HOB>30-45 degrees during feeds and for at least 30 minutes before/after feeds), hold feeds for s/s TF intolerance (n/v/abdominal distention)  3) Monitor GI tolerance, skin integrity, labs, weight, and bowel movement regularity.   4) Follow SLP recommendations  5) Provide ongoing diet education as needed  6) RD remains available upon request and will follow-up per protocol    Monitoring & Evaluation:   [X] Weights   [X] PO Intake   [X] Skin Integrity   [X] Follow Up (Per Protocol)  [X] Tolerance to Diet Prescription   [X] Other: Labs & POCT    Registered Dietitian/Nutritionist Remains Available.  Zahra Rain RD, MS, CDN    Phone# [phone]

## 2023-03-06 NOTE — PROGRESS NOTE ADULT - ASSESSMENT
Physical Examination:  GENERAL:               Alert,  No acute distress.    HEENT:                      No JVD, Moist MM, s/p craniotomy , stoma with pinsized hole   PULM:                     Bilateral air entry, course to auscultation bilaterally, no significant sputum production, No Rales, No Rhonchi, No Wheezing  CVS:                         S1, S2,  No Murmur  ABD:                        Soft, nondistended, nontender, normoactive bowel sounds,   EXT:                         No edema, nontender, No Cyanosis or Clubbing   NEURO:                  Alert,  Not interactive, does not follow commands  PSYC:                      Calm, not Insight.      Assessment  Chronic Respiratory failure s/p trach s/p self decannulation 3/5/23  S/P ICH  h/o polysubstance abuse  underlying HTN, Depression    Plan  monitor with out trach  pressure dressing on stoma  not hypoxic on RA  No gross air trapping with out trach today  PT as tolerated    Rest of care as per primary team  D/w Dr. Talavera

## 2023-03-06 NOTE — PROGRESS NOTE ADULT - SUBJECTIVE AND OBJECTIVE BOX
Patient is a 42y old  Male who presents with a chief complaint of left IPH/ICH (03 Mar 2023 10:17)    HPI:  This is 42 year old male with PMH of prior substance use, HTN, anxiety who presented to [facility] on 1/23. On 1/23 morning, he told his family that he was not feeling well and took 2 tabs of Vyvanse (usually takes as needed). Shortly after, he was at a group therapy meeting over the phone when he complained to his father he was having a severe headache. He then became more lethargic, and developed nausea and vomited. He was brought to ED by his father. On arrival, noted to be lethargic, vomiting, with garbled speech. 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 Post CT, more lethargic with dilated right pupil and bilateral reactive but sluggish pupils. He was given mannitol and intubated for airway protection and was taken to neuro ICU.    On 1/25, HCT with enlarging R lateral ventricle and slightly worsening MLS concerning for trapped ventricle. 1/27 acute drop in SpO2 on 1/27, lavaged and suctioned out thick clot. Placed back on ACVC and FiO2 weaned from 70% to 40% overnight. On 1/28/23, he had episode of emesis, & fever of 100.3. He was  given tylenol with improvement in temperature but still little to no movement on the L side (previously would localize arm to suction and spontaneously bend L leg). Pt. Had left decompressive hemicraniectomy and right EVD  placed.    After the procedure, patient opened eyes for the first time and regained LUE/LLE movement. As he became febrile again this was less prominent. Ceftriaxone was switched to Zosyn. 1/29 HCT mild increase extraaxial fluid collection. on 1/30, repeat  HCT unchanged, his MTL was discontinued. Zosyn and vancomycin switched to cefepime. On 1/31,  HCT shoeds worsening edema +/- extraaxial fluid collection, elevated ICPs, and mannitol was restarted.      MTL was weaned,  EVD adjusted and eventually removed on 2/10.  Hospital course further c/b agitation requiring precedex, hypotension requiring bolus, dysphagia s/p PEG 2/14  and trach on 2/13, insomnia s/p seroquel, pain s/p oxycodone and fentanyl. He underwent wound revision with NSGY and plastics on 2/17. Keppra discontinued due to signs of agitation. Tolerated trach collar.     Patient was evaluated by PM&R and therapy for functional deficits, gait/ADL impairments and acute rehabilitation was recommended. Patient was medically optimized for discharge to [facility] IRU on 2/28/23.      SUBJECTIVE HISTORY:  Patient seen and examined in bed during AM rounds. L mitten in place. NAD. Attempts speech with prompting.     Review Of Systems:  Aphasia  right hemiparesis   Neurological deficits      PHYSICAL EXAM  Constitutional - NAD, Comfortable  HEENT - EOMI, left hemicraniectomy with sutures--No erythema no drainage  Neck -  +trach Shiley 8 on TC  Chest -  CTAB   Cardio - warm and well perfused, RRR, no murmur  Abdomen -  Soft, NTND, +PEG, Bone flap marsupialized in epigastric region.  Sutures removed, healing well, with no significant erythema and no drainage.  Extremities - No peripheral edema, No calf tenderness   Neurologic Exam:                    Cognitive -             Orientation: Awake, Alert, unable to assess orientation due to aphasia and cognitive deficits            Attention:  Impaired; makes eye contact but follows commands inconsistently            Memory: Impaired            Thought: Impaired, Impulsive     Speech - non-verbal -s/p trach, impaired comprehension, +aphasia, poor parroting      Cranial Nerves - Limited due to comprehension impairment and aphasia; No facial asymmetry, Tongue midline, EOMI, Pupils dilated but reactive, +dysphagia     Motor -                      LEFT    UE - at least 3/5 and moving spontaneously; exam limited by command following;  WNL                    RIGHT UE - ShAB 0/5, EF 0/5, EE 0/5, WE 0/5,  0/5-- MAS 1 finger flexors                     LEFT    LE -  at least 3/5 and moving spontaneously; exam limited by command following                    RIGHT LE - HE 1/5, Hip adductor 1/5, KE 0/5, DF 0/5, PF 0/5        Sensory - decrease sensation to LT-- RUE     Reflexes - DTR 2+/ 4 , neg Grider's b/l, neg Babinski's b/l     Coordination - FTN / HTS impaired on right;    Psychiatric - Mood stable, Affect WNL  Skin on admission: IAD to bilateral groin and sacrum; RUQ incision with sutures with palpable skull flap; Left hemicraniectomy with sutures MILLER; psoriasis BL shin      RECENT LABS    Vital Signs Last 24 Hrs  T(C): 36.7 (06 Mar 2023 07:57), Max: 36.7 (06 Mar 2023 07:57)  T(F): 98.1 (06 Mar 2023 07:57), Max: 98.1 (06 Mar 2023 07:57)  HR: 86 (06 Mar 2023 07:57) (86 - 93)  BP: 115/80 (06 Mar 2023 07:57) (115/80 - 120/75)  RR: 16 (06 Mar 2023 07:57) (16 - 16)  SpO2: 97% (06 Mar 2023 07:57) (97% - 97%)    Parameters below as of 06 Mar 2023 07:57  Patient On (Oxygen Delivery Method): room air                            13.4   7.92  )-----------( 240      ( 06 Mar 2023 06:21 )             41.2     03-06    138  |  102  |  10  ----------------------------<  143<H>  4.5   |  25  |  0.59    Ca    9.5      06 Mar 2023 06:21    TPro  7.3  /  Alb  3.0<L>  /  TBili  0.3  /  DBili  x   /  AST  14  /  ALT  17  /  AlkPhos  109  03-06      CAPILLARY BLOOD GLUCOSE      MEDICATIONS  (STANDING):  AQUAPHOR (petrolatum Ointment) 1 Application(s) Topical two times a day  bacitracin   Ointment 1 Application(s) Topical daily  enoxaparin Injectable 90 milliGRAM(s) SubCutaneous <User Schedule>  gabapentin 600 milliGRAM(s) Oral at bedtime  melatonin 6 milliGRAM(s) Oral at bedtime  nystatin Powder 1 Application(s) Topical two times a day  pantoprazole   Suspension 40 milliGRAM(s) Oral daily  polyethylene glycol 3350 17 Gram(s) Oral daily  QUEtiapine 25 milliGRAM(s) Oral at bedtime  senna Syrup 5 milliLiter(s) Oral at bedtime  traZODone 50 milliGRAM(s) Oral at bedtime    MEDICATIONS  (PRN):  acetaminophen    Suspension .. 650 milliGRAM(s) Oral every 6 hours PRN Temp greater or equal to 38C (100.4F), Mild Pain (1 - 3)         Patient is a 42y old  Male who presents with a chief complaint of left IPH/ICH (03 Mar 2023 10:17)    HPI:  This is 42 year old male with PMH of prior substance use, HTN, anxiety who presented to [facility] on 1/23. On 1/23 morning, he told his family that he was not feeling well and took 2 tabs of Vyvanse (usually takes as needed). Shortly after, he was at a group therapy meeting over the phone when he complained to his father he was having a severe headache. He then became more lethargic, and developed nausea and vomited. He was brought to ED by his father. On arrival, noted to be lethargic, vomiting, with garbled speech. 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 Post CT, more lethargic with dilated right pupil and bilateral reactive but sluggish pupils. He was given mannitol and intubated for airway protection and was taken to neuro ICU.    On 1/25, HCT with enlarging R lateral ventricle and slightly worsening MLS concerning for trapped ventricle. 1/27 acute drop in SpO2 on 1/27, lavaged and suctioned out thick clot. Placed back on ACVC and FiO2 weaned from 70% to 40% overnight. On 1/28/23, he had episode of emesis, & fever of 100.3. He was  given tylenol with improvement in temperature but still little to no movement on the L side (previously would localize arm to suction and spontaneously bend L leg). Pt. Had left decompressive hemicraniectomy and right EVD  placed.    After the procedure, patient opened eyes for the first time and regained LUE/LLE movement. As he became febrile again this was less prominent. Ceftriaxone was switched to Zosyn. 1/29 HCT mild increase extraaxial fluid collection. on 1/30, repeat  HCT unchanged, his MTL was discontinued. Zosyn and vancomycin switched to cefepime. On 1/31,  HCT shoeds worsening edema +/- extraaxial fluid collection, elevated ICPs, and mannitol was restarted.      MTL was weaned,  EVD adjusted and eventually removed on 2/10.  Hospital course further c/b agitation requiring precedex, hypotension requiring bolus, dysphagia s/p PEG 2/14  and trach on 2/13, insomnia s/p seroquel, pain s/p oxycodone and fentanyl. He underwent wound revision with NSGY and plastics on 2/17. Keppra discontinued due to signs of agitation. Tolerated trach collar.     Patient was evaluated by PM&R and therapy for functional deficits, gait/ADL impairments and acute rehabilitation was recommended. Patient was medically optimized for discharge to [facility] IRU on 2/28/23.      SUBJECTIVE HISTORY:  Patient seen and examined in bed during AM rounds. L mitten in place. NAD. Attempts speech with prompting.     Review Of Systems:  Aphasia  right hemiparesis   Neurological deficits      PHYSICAL EXAM  Constitutional - NAD, Comfortable  HEENT - EOMI, left hemicraniectomy with sutures--No erythema no drainage  Neck -  +trach Shiley 8 on TC  Chest -  CTAB   Cardio - warm and well perfused, RRR, no murmur  Abdomen -  Soft, NTND, +PEG, Bone flap marsupialized in epigastric region.  Sutures removed, healing well, with no significant erythema and no drainage.  Extremities - No peripheral edema, No calf tenderness   Neurologic Exam:                    Cognitive -             Orientation: Awake, Alert, unable to assess orientation due to aphasia and cognitive deficits            Attention:  Impaired; makes eye contact but follows commands inconsistently            Memory: Impaired            Thought: Impaired, Impulsive     Speech - non-verbal -s/p trach, impaired comprehension, +aphasia, poor mimicking     Cranial Nerves - Limited due to comprehension impairment and aphasia; No facial asymmetry, Tongue midline, EOMI, Pupils dilated but reactive, +dysphagia     Motor -                      LEFT    UE - at least 3/5 and moving spontaneously; exam limited by command following;  WNL                    RIGHT UE - ShAB 0/5, EF 0/5, EE 0/5, WE 0/5,  0/5-- MAS 1 finger flexors                     LEFT    LE -  at least 3/5 and moving spontaneously; exam limited by command following                    RIGHT LE - HE 1/5, Hip adductor 1/5, KE 0/5, DF 0/5, PF 0/5        Sensory - decrease sensation to LT-- RUE     Reflexes - DTR 2+/ 4 , neg Grider's b/l, neg Babinski's b/l     Coordination - FTN / HTS impaired on right;    Psychiatric - Mood stable, Affect WNL  Skin on admission: IAD to bilateral groin and sacrum; RUQ incision with sutures with palpable skull flap; Left hemicraniectomy with sutures MILLER; psoriasis BL shin      RECENT LABS    Vital Signs Last 24 Hrs  T(C): 36.7 (06 Mar 2023 07:57), Max: 36.7 (06 Mar 2023 07:57)  T(F): 98.1 (06 Mar 2023 07:57), Max: 98.1 (06 Mar 2023 07:57)  HR: 86 (06 Mar 2023 07:57) (86 - 93)  BP: 115/80 (06 Mar 2023 07:57) (115/80 - 120/75)  RR: 16 (06 Mar 2023 07:57) (16 - 16)  SpO2: 97% (06 Mar 2023 07:57) (97% - 97%)    Parameters below as of 06 Mar 2023 07:57  Patient On (Oxygen Delivery Method): room air                            13.4   7.92  )-----------( 240      ( 06 Mar 2023 06:21 )             41.2     03-06    138  |  102  |  10  ----------------------------<  143<H>  4.5   |  25  |  0.59    Ca    9.5      06 Mar 2023 06:21    TPro  7.3  /  Alb  3.0<L>  /  TBili  0.3  /  DBili  x   /  AST  14  /  ALT  17  /  AlkPhos  109  03-06      CAPILLARY BLOOD GLUCOSE      MEDICATIONS  (STANDING):  AQUAPHOR (petrolatum Ointment) 1 Application(s) Topical two times a day  bacitracin   Ointment 1 Application(s) Topical daily  enoxaparin Injectable 90 milliGRAM(s) SubCutaneous <User Schedule>  gabapentin 600 milliGRAM(s) Oral at bedtime  melatonin 6 milliGRAM(s) Oral at bedtime  nystatin Powder 1 Application(s) Topical two times a day  pantoprazole   Suspension 40 milliGRAM(s) Oral daily  polyethylene glycol 3350 17 Gram(s) Oral daily  QUEtiapine 25 milliGRAM(s) Oral at bedtime  senna Syrup 5 milliLiter(s) Oral at bedtime  traZODone 50 milliGRAM(s) Oral at bedtime    MEDICATIONS  (PRN):  acetaminophen    Suspension .. 650 milliGRAM(s) Oral every 6 hours PRN Temp greater or equal to 38C (100.4F), Mild Pain (1 - 3)

## 2023-03-06 NOTE — PROGRESS NOTE ADULT - SUBJECTIVE AND OBJECTIVE BOX
Pt was seen for 25 min for supportive tx. Pt c/o poor sleep, fatigue. Reviewed chart, approached Pt an initial consult, introduced the role of neuropsychology in the tx team and explained the nature/purpose of the consult. Pt agreed to participate. Pt is a 41 y/o male brought to ED by his father, on arrival, noted to be lethargic, vomiting, with garbled speech, and HCT done showed large left frontal and basal ganglia IPH with mass effect and 8mm midline shift. Pt has dysarthric speech, with unintelligible mumbling. Session focused on establishing rapport, attempting to gather some biographical information, and assessing Pt's current emotional functioning. Pt had significant difficulty with initiation and fluency, but tended to respond questions in a yes/no format, but with unclear accuracy. He reported a few depressive symptoms, including helplessness/hopelessness, poor sleep, low energy and fatigue, but was unable to verbalize their triggers. Pt also provided very basic personal information, but requires much prompting and questions stated in a way that he was able to answer them. Also dedicated a portion of the session to educate Pt about acute rehab and to instill hope and positive outcome expectations. Support and encouragement were provided.

## 2023-03-06 NOTE — PROGRESS NOTE ADULT - ATTENDING COMMENTS
Pt. seen with resident.  Agree with documentation above as per resident with amendments made as appropriate. Patient medically stable. Making progress towards rehab goals.     Left IPH  --increase Trazodone to 75mg qhs as will help with sleep & daytime restlessness--Pt. reported Sleep issues as per Neuropsychology note     --MBS 3/8--d/w speech therapy    d---decannulated on weekend-- d/w pulmonology-- O2 sat is good.      --will f/u with parents regarding home psych meds.

## 2023-03-06 NOTE — PROGRESS NOTE ADULT - ASSESSMENT
Pt alert, fair attention, impaired expressive language, hypophonic/dysarthric speech, thought processes apparently goal-directed, no abnormal thought contents, depressed affect, euthymic mood, denied AH/VH, denied SI/HI/I/P, calm behavior. Plan: Continue the assessment process.

## 2023-03-06 NOTE — PROGRESS NOTE ADULT - ASSESSMENT
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 He is  s/p Left decompressive hemicraniectomy and right EVD placement 1/28/23. Hospital course further c/b respiratory failure s/p intubation and extubation,  agitation requiring precedex, sepsis s/p ABX,  hypotension requiring bolus, dysphagia s/p PEG 2/14 and trach on 2/13, insomnia s/p seroquel, pain s/p oxycodone and fentanyl. Patient now with Right Hemiparesis, dysphagia s/p PEG, Aphasia, Cognitive deficits, gait Instability, ADL impairments and Functional impairments.    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    - ST: to diagnose and treat deficits in swallowing, cognition and communication.  - Helmet when OOB  - Baseline CT head 3/1- High right frontal malik hole is seen. Postoperative changes compatible with a left temporal frontal parietal craniectomy is again seen. Mild parenchyma extending through the craniectomy defect is again seen. There is abnormal high attenuation seen involving the left basal ganglia/corona radiata region. This finding is likely compatible with evolving areas acute parenchymal hemorrhage. Surrounding edema is seen. Mass effect on the left lateral ventricle is seen. Left-to-right shift (3.1 mm) is seen. Evaluation of the osseous structures with the appropriate window aside from postop changes appear normal. Complete opacification of the right sphenoid sinus is seen consisting of mucosal thickening. Both mastoid and middle ear regions appear clear. IMPRESSION: Postop changes described above. Evolving area parenchymal hemorrhage as described above.  --Spoke with OT–will benefit from right resting hand splint    Sleep/Restlessness  --Tapered Seroquel to 25mg 3/2--goal to eventually transition off-- will help cognition and aphasia  --increased Trazodone to 50mg qhs as will help with daytime restlessness--May increase further on weekend if needed.    --cont. melatonin  --monitor  --Discussed with nursing that patient needs supervision for safety monitoring as pt.  is impulsive, restless and high fall risk as well as is reaching for trach  --Cont. left UE mitten  -3/3 required Zyprexa 2.5mg IM x1    Bilateral lower extremity DVTs–  – Lower extremity Doppler from 3/1–right femoral vein DVT and bilateral lower extremity calf DVTs  –  spoke with patient's neurosurgery PA–who was in contact with Dr. Cueva patient's neurosurgeon–gave clearance for patient to start therapeutic Lovenox.  – Vascular consult pending,–discussed with Dr. Nolan recommends therapeutic Lovenox for 3 months.    Orthostatic hypotension–  – Discussed with hospitalist–patient on doxazosin 2 mg  -  stopped doxazosin-- monitor PVR    #Acute Respiratory Failure  - Intubation and extubation  - s/p Trach 2/13  - Nebs PRN  -Starting PMV trials in Speech only  -Pulm recs appreciatred,continue PMV trials, will consider downsizing trach if noted air trapping    #GI PPX  - Nexium 40mg daily    #DM II  - A1c 6.0  -dw hospitalist and dc'd lantus and lispro     #Pain management  - Tylenol PRN,   --cont. Neurontin 600mg nightly      #Bowel Regimen  - Senna, miralax PRN    #Bladder management  -check PVRs-- SC PRN if > 400cc  -- off doxazosin 3/2    #Dysphagia    - s/p PEG 2/14  - SLP: evaluation and treatment  - Diet: tolerating bolus TF     #Skin:  - Skin on admission: IAD to bilateral groin and sacrum; RUQ incision with sutures with palpable skull flap; Left hemicraniectomy with sutures MILLER; psoriasis BL shin  - Pressure injury/Skin: Turn Q2hrs while in bed, OOB to Chair, PT/OT       #Precaution  - Fall, Aspiration, seizure    – IDT 3/2:  Social work:  patient on discharge can live with his mother who has a first-floor set up.  Nursing–incontinent of bowel and bladder, total assist with toileting  Speech: N.p.o./PEG, severe language deficits–poor command following, poor orientation, decreased initiation, delayed responses.  Tolerating finger occlusion of trach–starting PMV trials  Aphasia, severe cognitive deficits  OT's: Total assist with ADLs and transfers, restless  PT: Sitting–max assist,–goal min assist with bed mobility,  mod assist with transfers, ambulation 10 feet max assist with hemiwalker, wheelchair mobility 50 feet min assist.  ELOS: 3/28 ZOHAIB  Goals:  1.  Improved secretion management and tolerate PMV  2.  transfers with 1 person assist  3.  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 He is  s/p Left decompressive hemicraniectomy and right EVD placement 1/28/23. Hospital course further c/b respiratory failure s/p intubation and extubation,  agitation requiring precedex, sepsis s/p ABX,  hypotension requiring bolus, dysphagia s/p PEG 2/14 and trach on 2/13, insomnia s/p seroquel, pain s/p oxycodone and fentanyl. Patient now with Right Hemiparesis, dysphagia s/p PEG, Aphasia, Cognitive deficits, gait Instability, ADL impairments and Functional impairments.    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    - ST: to diagnose and treat deficits in swallowing, cognition and communication.  - Helmet when OOB  - Baseline CT head 3/1- High right frontal malik hole is seen. Postoperative changes compatible with a left temporal frontal parietal craniectomy is again seen. Mild parenchyma extending through the craniectomy defect is again seen. There is abnormal high attenuation seen involving the left basal ganglia/corona radiata region. This finding is likely compatible with evolving areas acute parenchymal hemorrhage. Surrounding edema is seen. Mass effect on the left lateral ventricle is seen. Left-to-right shift (3.1 mm) is seen. Evaluation of the osseous structures with the appropriate window aside from postop changes appear normal. Complete opacification of the right sphenoid sinus is seen consisting of mucosal thickening. Both mastoid and middle ear regions appear clear. IMPRESSION: Postop changes described above. Evolving area parenchymal hemorrhage as described above.  --Spoke with OT–will benefit from right resting hand splint    Sleep/Restlessness  --Tapered Seroquel to 25mg 3/2--goal to eventually transition off-- will help cognition and aphasia  --increased Trazodone to 50mg qhs as will help with daytime restlessness--May increase further on weekend if needed.    --cont. melatonin  --monitor  --Discussed with nursing that patient needs supervision for safety monitoring as pt.  is impulsive, restless and high fall risk as well as is reaching for trach  --Cont. left UE mitten  -3/3 required Zyprexa 2.5mg IM x1    Bilateral lower extremity DVTs–  – Lower extremity Doppler from 3/1–right femoral vein DVT and bilateral lower extremity calf DVTs  – spoke with patient's neurosurgery PA–who was in contact with Dr. Cueva patient's neurosurgeon–gave clearance for patient to start therapeutic Lovenox.  – Vascular consult pending?   – discussed with Dr. Nolan recommends therapeutic Lovenox for 3 months.  -- c/w therapeutic lovenox     Orthostatic hypotension–  – Discussed with hospitalist–patient on doxazosin 2 mg  -  stopped doxazosin-- monitor PVR    #Acute Respiratory Failure  - Intubation and extubation  - s/p Trach 2/13  - Nebs PRN  - patient decannulated over weekend, Pulm monitoring, tolerating well    #GI PPX  - Nexium 40mg daily    #DM II  - A1c 6.0  -dw hospitalist and dc'd lantus and lispro     #Pain management  - Tylenol PRN,   --cont. Neurontin 600mg nightly      #Bowel Regimen  - Senna, miralax PRN    #Bladder management  -check PVRs-- SC PRN if > 400cc  -- off doxazosin 3/2    #Dysphagia    - s/p PEG 2/14  - SLP: evaluation and treatment  - Diet: tolerating bolus TF     #Skin:  - Skin on admission: IAD to bilateral groin and sacrum; RUQ incision with sutures with palpable skull flap; Left hemicraniectomy with sutures MILLER; psoriasis BL shin  - Pressure injury/Skin: Turn Q2hrs while in bed, OOB to Chair, PT/OT       #Precaution  - Fall, Aspiration, seizure    – IDT 3/2:  Social work:  patient on discharge can live with his mother who has a first-floor set up.  Nursing–incontinent of bowel and bladder, total assist with toileting  Speech: N.p.o./PEG, severe language deficits–poor command following, poor orientation, decreased initiation, delayed responses.  Tolerating finger occlusion of trach–starting PMV trials  Aphasia, severe cognitive deficits  OT's: Total assist with ADLs and transfers, restless  PT: Sitting–max assist,–goal min assist with bed mobility,  mod assist with transfers, ambulation 10 feet max assist with hemiwalker, wheelchair mobility 50 feet min assist.  ELOS: 3/28 ZOHAIB  Goals:  1.  Improved secretion management and tolerate PMV  2.  transfers with 1 person assist  3.  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 He is  s/p Left decompressive hemicraniectomy and right EVD placement 1/28/23. Hospital course further c/b respiratory failure s/p intubation and extubation,  agitation requiring precedex, sepsis s/p ABX,  hypotension requiring bolus, dysphagia s/p PEG 2/14 and trach on 2/13, insomnia s/p seroquel, pain s/p oxycodone and fentanyl. Patient now with Right Hemiparesis, dysphagia s/p PEG, Aphasia, Cognitive deficits, gait Instability, ADL impairments and Functional impairments.    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    - ST: to diagnose and treat deficits in swallowing, cognition and communication.  - Helmet when OOB  - Baseline CT head 3/1- High right frontal malik hole is seen. Postoperative changes compatible with a left temporal frontal parietal craniectomy is again seen. Mild parenchyma extending through the craniectomy defect is again seen. There is abnormal high attenuation seen involving the left basal ganglia/corona radiata region. This finding is likely compatible with evolving areas acute parenchymal hemorrhage. Surrounding edema is seen. Mass effect on the left lateral ventricle is seen. Left-to-right shift (3.1 mm) is seen. Evaluation of the osseous structures with the appropriate window aside from postop changes appear normal. Complete opacification of the right sphenoid sinus is seen consisting of mucosal thickening. Both mastoid and middle ear regions appear clear. IMPRESSION: Postop changes described above. Evolving area parenchymal hemorrhage as described above.  - OT–right resting hand splint    Sleep/Restlessness  --Tapered Seroquel to 25mg 3/2--goal to eventually transition off-- will help cognition and aphasia  --increase Trazodone to 75mg qhs as will help with sleep & daytime restlessness--Pt. reported Sleep issues as per Neuropsychology note  --cont. melatonin  --monitor  --Discussed with nursing that patient needs supervision for safety monitoring as pt.  is impulsive, restless and high fall risk as well as is reaching for trach  --Cont. left UE mitten  -3/3 required Zyprexa 2.5mg IM x1    Bilateral lower extremity DVTs–  – Lower extremity Doppler from 3/1–right femoral vein DVT and bilateral lower extremity calf DVTs  – spoke with patient's neurosurgery PA–who was in contact with Dr. Cueva patient's neurosurgeon–gave clearance for patient to start therapeutic Lovenox.  --Vascular Surgery consult--  – discussed with Dr. Nolan recommends therapeutic Lovenox for 3 months.  -- c/w therapeutic lovenox     Orthostatic hypotension–  – Discussed with hospitalist–patient on doxazosin 2 mg  -  stopped doxazosin-- monitor PVR    #Acute Respiratory Failure  - Intubation and extubation  - s/p Trach 2/13  - Nebs PRN  - patient decannulated over weekend, Pulm monitoring, tolerating well    #GI PPX  - Nexium 40mg daily    #DM II  - A1c 6.0  -dw hospitalist and dc'd lantus and lispro     #Pain management  - Tylenol PRN,   --cont. Neurontin 600mg nightly      #Bowel Regimen  - Senna, miralax PRN    #Bladder management  -check PVRs-- SC PRN if > 400cc  -- off doxazosin 3/2    #Dysphagia    - s/p PEG 2/14  --MBS ordered--d/w Speech therapy  - SLP: evaluation and treatment  - Diet: tolerating bolus TF     #Skin:  - Skin on admission: IAD to bilateral groin and sacrum; RUQ incision with sutures with palpable skull flap; Left hemicraniectomy with sutures MILLER; psoriasis BL shin  - Pressure injury/Skin: Turn Q2hrs while in bed, OOB to Chair, PT/OT       #Precaution  - Fall, Aspiration, seizure    – IDT 3/2:  Social work:  patient on discharge can live with his mother who has a first-floor set up.  Nursing–incontinent of bowel and bladder, total assist with toileting  Speech: N.p.o./PEG, severe language deficits–poor command following, poor orientation, decreased initiation, delayed responses.  Tolerating finger occlusion of trach–starting PMV trials  Aphasia, severe cognitive deficits  OT's: Total assist with ADLs and transfers, restless  PT: Sitting–max assist,–goal min assist with bed mobility,  mod assist with transfers, ambulation 10 feet max assist with hemiwalker, wheelchair mobility 50 feet min assist.  ELOS: 3/28 ZOHAIB  Goals:  1.  Improved secretion management and tolerate PMV  2.  transfers with 1 person assist  3.  Communicate basic wants and needs with yes no answers

## 2023-03-07 LAB — TESTOST FREE SERPL-MCNC: 10.2 PG/ML — SIGNIFICANT CHANGE UP (ref 5.7–30.7)

## 2023-03-07 PROCEDURE — 99232 SBSQ HOSP IP/OBS MODERATE 35: CPT

## 2023-03-07 RX ADMIN — Medication 6 MILLIGRAM(S): at 21:30

## 2023-03-07 RX ADMIN — POLYETHYLENE GLYCOL 3350 17 GRAM(S): 17 POWDER, FOR SOLUTION ORAL at 12:01

## 2023-03-07 RX ADMIN — GABAPENTIN 600 MILLIGRAM(S): 400 CAPSULE ORAL at 21:30

## 2023-03-07 RX ADMIN — NYSTATIN CREAM 1 APPLICATION(S): 100000 CREAM TOPICAL at 05:07

## 2023-03-07 RX ADMIN — Medication 1 APPLICATION(S): at 17:52

## 2023-03-07 RX ADMIN — ENOXAPARIN SODIUM 90 MILLIGRAM(S): 100 INJECTION SUBCUTANEOUS at 21:30

## 2023-03-07 RX ADMIN — SENNA PLUS 5 MILLILITER(S): 8.6 TABLET ORAL at 21:30

## 2023-03-07 RX ADMIN — ENOXAPARIN SODIUM 90 MILLIGRAM(S): 100 INJECTION SUBCUTANEOUS at 08:37

## 2023-03-07 RX ADMIN — Medication 1 APPLICATION(S): at 05:08

## 2023-03-07 RX ADMIN — NYSTATIN CREAM 1 APPLICATION(S): 100000 CREAM TOPICAL at 17:51

## 2023-03-07 RX ADMIN — Medication 75 MILLIGRAM(S): at 21:30

## 2023-03-07 RX ADMIN — QUETIAPINE FUMARATE 25 MILLIGRAM(S): 200 TABLET, FILM COATED ORAL at 21:30

## 2023-03-07 RX ADMIN — Medication 1 APPLICATION(S): at 12:01

## 2023-03-07 RX ADMIN — PANTOPRAZOLE SODIUM 40 MILLIGRAM(S): 20 TABLET, DELAYED RELEASE ORAL at 12:00

## 2023-03-07 NOTE — PROGRESS NOTE ADULT - ASSESSMENT
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 He is  s/p Left decompressive hemicraniectomy and right EVD placement 1/28/23. Hospital course further c/b respiratory failure s/p intubation and extubation,  agitation requiring precedex, sepsis s/p ABX,  hypotension requiring bolus, dysphagia s/p PEG 2/14 and trach on 2/13, insomnia s/p seroquel, pain s/p oxycodone and fentanyl. Patient now with Right Hemiparesis, dysphagia s/p PEG, Aphasia, Cognitive deficits, gait Instability, ADL impairments and Functional impairments.    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    - ST: to diagnose and treat deficits in swallowing, cognition and communication.  - Helmet when OOB  - Baseline CT head 3/1- High right frontal malik hole is seen. Postoperative changes compatible with a left temporal frontal parietal craniectomy is again seen. Mild parenchyma extending through the craniectomy defect is again seen. There is abnormal high attenuation seen involving the left basal ganglia/corona radiata region. This finding is likely compatible with evolving areas acute parenchymal hemorrhage. Surrounding edema is seen. Mass effect on the left lateral ventricle is seen. Left-to-right shift (3.1 mm) is seen. Evaluation of the osseous structures with the appropriate window aside from postop changes appear normal. Complete opacification of the right sphenoid sinus is seen consisting of mucosal thickening. Both mastoid and middle ear regions appear clear. IMPRESSION: Postop changes described above. Evolving area parenchymal hemorrhage as described above.  - OT–right resting hand splint    Sleep/Restlessness  --Tapered Seroquel to 25mg 3/2--goal to eventually transition off-- will help cognition and aphasia  --C/w Trazodone to 75mg qhs (increased on 3/6)- helping with sleep and restlessness   --cont. melatonin  --monitor  --Attending discussed with nursing that patient needs supervision for safety monitoring as pt.  is impulsive, restless and high fall risk as well as is reaching for trach  --Cont. left UE mitten  -3/3 required Zyprexa 2.5mg IM x1    Bilateral lower extremity DVTs–  – Lower extremity Doppler from 3/1–right femoral vein DVT and bilateral lower extremity calf DVTs  – spoke with patient's neurosurgery PA–who was in contact with Dr. Cueva patient's neurosurgeon–gave clearance for patient to start therapeutic Lovenox.  --Vascular Surgery consult  – discussed with Dr. Nolan recommends therapeutic Lovenox for 3 months.  -- c/w therapeutic lovenox     Orthostatic hypotension–  -Stopped doxazosin-- monitor PVR-- reported by nursing to be 241, unclear if true post void-- nursing will attempt repeat today    #Acute Respiratory Failure  - Intubation and extubation  - s/p Trach 2/13  - Nebs PRN  - Decannulated over weekend, Pulm monitoring, tolerating well    #GI PPX  - Nexium 40mg daily    #DM II  - A1c 6.0  -dw hospitalist and dc'd lantus and lispro     #Pain management  - Tylenol PRN,   --cont. Neurontin 600mg nightly    #Bowel Regimen  - Senna, miralax PRN    #Bladder management  -check PVRs-- SC PRN if > 400cc  -- off doxazosin 3/2    #Dysphagia    - s/p PEG 2/14  --MBS ordered--d/w Speech therapy  - SLP: evaluation and treatment  - Diet: tolerating bolus TF     #Skin:  - Skin on admission: IAD to bilateral groin and sacrum; RUQ incision with sutures with palpable skull flap; Left hemicraniectomy with sutures MILLER; psoriasis BL shin  - Pressure injury/Skin: Turn Q2hrs while in bed, OOB to Chair, PT/OT     #Precaution  - Fall, Aspiration, seizure    – IDT 3/2:  Social work:  patient on discharge can live with his mother who has a first-floor set up.  Nursing–incontinent of bowel and bladder, total assist with toileting  Speech: N.p.o./PEG, severe language deficits–poor command following, poor orientation, decreased initiation, delayed responses.  Tolerating finger occlusion of trach–starting PMV trials  Aphasia, severe cognitive deficits  OT's: Total assist with ADLs and transfers, restless  PT: Sitting–max assist,–goal min assist with bed mobility,  mod assist with transfers, ambulation 10 feet max assist with hemiwalker, wheelchair mobility 50 feet min assist.  ELOS: 3/28 ZOHAIB  Goals:  1.  Improved secretion management and tolerate PMV  2.  transfers with 1 person assist  3.  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 He is  s/p Left decompressive hemicraniectomy and right EVD placement 1/28/23. Hospital course further c/b respiratory failure s/p intubation and extubation,  agitation requiring precedex, sepsis s/p ABX,  hypotension requiring bolus, dysphagia s/p PEG 2/14 and trach on 2/13, insomnia s/p seroquel, pain s/p oxycodone and fentanyl. Patient now with Right Hemiparesis, dysphagia s/p PEG, Aphasia, Cognitive deficits, gait Instability, ADL impairments and Functional impairments.    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    - ST: to diagnose and treat deficits in swallowing, cognition and communication.  - Helmet when OOB  - Baseline CT head 3/1- High right frontal malik hole is seen. Postoperative changes compatible with a left temporal frontal parietal craniectomy is again seen. Mild parenchyma extending through the craniectomy defect is again seen. There is abnormal high attenuation seen involving the left basal ganglia/corona radiata region. This finding is likely compatible with evolving areas acute parenchymal hemorrhage. Surrounding edema is seen. Mass effect on the left lateral ventricle is seen. Left-to-right shift (3.1 mm) is seen. Evaluation of the osseous structures with the appropriate window aside from postop changes appear normal. Complete opacification of the right sphenoid sinus is seen consisting of mucosal thickening. Both mastoid and middle ear regions appear clear. IMPRESSION: Postop changes described above. Evolving area parenchymal hemorrhage as described above.  - OT–right resting hand splint    Sleep/Restlessness  --Tapered Seroquel to 25mg 3/2--goal to eventually transition off-- will help cognition and aphasia  --C/w Trazodone to 75mg qhs (increased on 3/6)- helping with sleep and restlessness   --cont. melatonin  --monitor  --Attending discussed with nursing that patient needs supervision for safety monitoring as pt.  is impulsive, restless and high fall risk as well as is reaching for trach  --Cont. left UE mitten  -3/3 required Zyprexa 2.5mg IM x1    Bilateral lower extremity DVTs–  – Lower extremity Doppler from 3/1–right femoral vein DVT and bilateral lower extremity calf DVTs  – spoke with patient's neurosurgery PA–who was in contact with Dr. Cueva patient's neurosurgeon–gave clearance for patient to start therapeutic Lovenox.  --Vascular Surgery consult  – discussed with Dr. Nolan recommends therapeutic Lovenox for 3 months.  -- c/w therapeutic lovenox     Orthostatic hypotension–  -Stopped doxazosin-- monitor PVR-- reported by nursing to be 241, unclear if true post void-- nursing will attempt repeat today    #Acute Respiratory Failure  - Intubation and extubation  - s/p Trach 2/13  - Nebs PRN  - Decannulated over weekend, Pulm monitoring, tolerating well    #GI PPX  - Nexium 40mg daily    #DM II  - A1c 6.0  -dw hospitalist and dc'd lantus and lispro     #Pain management  - Tylenol PRN,   --cont. Neurontin 600mg nightly    #Bowel Regimen  - Senna, miralax PRN    #Bladder management  -check PVRs-- SC PRN if > 400cc  -- off doxazosin 3/2    #Dysphagia    - s/p PEG 2/14  --MBS ordered- to be done tomorrow AM  - SLP: evaluation and treatment  - Diet: tolerating bolus TF     #Skin:  - Skin on admission: IAD to bilateral groin and sacrum; RUQ incision with sutures with palpable skull flap; Left hemicraniectomy with sutures MILLER; psoriasis BL shin  - Pressure injury/Skin: Turn Q2hrs while in bed, OOB to Chair, PT/OT     #Precaution  - Fall, Aspiration, seizure    – IDT 3/2:  Social work:  patient on discharge can live with his mother who has a first-floor set up.  Nursing–incontinent of bowel and bladder, total assist with toileting  Speech: N.p.o./PEG, severe language deficits–poor command following, poor orientation, decreased initiation, delayed responses.  Tolerating finger occlusion of trach–starting PMV trials  Aphasia, severe cognitive deficits  OT's: Total assist with ADLs and transfers, restless  PT: Sitting–max assist,–goal min assist with bed mobility,  mod assist with transfers, ambulation 10 feet max assist with hemiwalker, wheelchair mobility 50 feet min assist.  ELOS: 3/28 ZOHAIB  Goals:  1.  Improved secretion management and tolerate PMV  2.  transfers with 1 person assist  3.  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 He is  s/p Left decompressive hemicraniectomy and right EVD placement 1/28/23. Hospital course further c/b respiratory failure s/p intubation and extubation,  agitation requiring precedex, sepsis s/p ABX,  hypotension requiring bolus, dysphagia s/p PEG 2/14 and trach on 2/13, insomnia s/p seroquel, pain s/p oxycodone and fentanyl. Patient now with Right Hemiparesis, dysphagia s/p PEG, Aphasia, Cognitive deficits, gait Instability, ADL impairments and Functional impairments.    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    - ST: to diagnose and treat deficits in swallowing, cognition and communication.  - Helmet when OOB  - Baseline CT head 3/1- High right frontal malik hole is seen. Postoperative changes compatible with a left temporal frontal parietal craniectomy is again seen. Mild parenchyma extending through the craniectomy defect is again seen. There is abnormal high attenuation seen involving the left basal ganglia/corona radiata region. This finding is likely compatible with evolving areas acute parenchymal hemorrhage. Surrounding edema is seen. Mass effect on the left lateral ventricle is seen. Left-to-right shift (3.1 mm) is seen. Evaluation of the osseous structures with the appropriate window aside from postop changes appear normal. Complete opacification of the right sphenoid sinus is seen consisting of mucosal thickening. Both mastoid and middle ear regions appear clear. IMPRESSION: Postop changes described above. Evolving area parenchymal hemorrhage as described above.  - OT–right resting hand splint    Sleep/Restlessness  --Tapered Seroquel to 25mg 3/2--goal to eventually transition off-- will help cognition and aphasia  --C/w Trazodone to 75mg qhs (increased on 3/6)- helping with sleep and restlessness   --cont. melatonin  --monitor  --Attending discussed with nursing that patient needs supervision for safety monitoring as pt.  is impulsive, restless and high fall risk as well as is reaching for trach  --Cont. left UE mitten  -3/3 required Zyprexa 2.5mg IM x1    Bilateral lower extremity DVTs–  – Lower extremity Doppler from 3/1–right femoral vein DVT and bilateral lower extremity calf DVTs  – spoke with patient's neurosurgery PA–who was in contact with Dr. Cueva patient's neurosurgeon–gave clearance for patient to start therapeutic Lovenox.  --Vascular Surgery consult -- reaached out to Dr. Hodges 3/7 - nothing further to do if patient is on therapeutic lovenox.   – discussed with Dr. Nolan recommends therapeutic Lovenox for 3 months.  -- c/w therapeutic lovenox     Orthostatic hypotension–  -Stopped doxazosin-- monitor PVR-- reported by nursing to be 241, unclear if true post void-- nursing will attempt repeat today    #Acute Respiratory Failure  - Intubation and extubation  - s/p Trach 2/13  - Nebs PRN  - Decannulated over weekend, Pulm monitoring, tolerating well    #GI PPX  - Nexium 40mg daily    #DM II  - A1c 6.0  -dw hospitalist and dc'd lantus and lispro     #Pain management  - Tylenol PRN,   --cont. Neurontin 600mg nightly    #Bowel Regimen  - Senna, miralax PRN    #Bladder management  -check PVRs-- SC PRN if > 400cc  -- off doxazosin 3/2    #Dysphagia    - s/p PEG 2/14  --MBS ordered- to be done tomorrow AM  - SLP: evaluation and treatment  - Diet: tolerating bolus TF     #Skin:  - Skin on admission: IAD to bilateral groin and sacrum; RUQ incision with sutures with palpable skull flap; Left hemicraniectomy with sutures MILLER; psoriasis BL shin  - Pressure injury/Skin: Turn Q2hrs while in bed, OOB to Chair, PT/OT     #Precaution  - Fall, Aspiration, seizure    – IDT 3/2:  Social work:  patient on discharge can live with his mother who has a first-floor set up.  Nursing–incontinent of bowel and bladder, total assist with toileting  Speech: N.p.o./PEG, severe language deficits–poor command following, poor orientation, decreased initiation, delayed responses.  Tolerating finger occlusion of trach–starting PMV trials  Aphasia, severe cognitive deficits  OT's: Total assist with ADLs and transfers, restless  PT: Sitting–max assist,–goal min assist with bed mobility,  mod assist with transfers, ambulation 10 feet max assist with hemiwalker, wheelchair mobility 50 feet min assist.  ELOS: 3/28 ZOHAIB  Goals:  1.  Improved secretion management and tolerate PMV  2.  transfers with 1 person assist  3.  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 He is  s/p Left decompressive hemicraniectomy and right EVD placement 1/28/23. Hospital course further c/b respiratory failure s/p intubation and extubation,  agitation requiring precedex, sepsis s/p ABX,  hypotension requiring bolus, dysphagia s/p PEG 2/14 and trach on 2/13, insomnia s/p seroquel, pain s/p oxycodone and fentanyl. Patient now with Right Hemiparesis, dysphagia s/p PEG, Aphasia, Cognitive deficits, gait Instability, ADL impairments and Functional impairments.    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    - ST: to diagnose and treat deficits in swallowing, cognition and communication.  - Helmet when OOB  - Baseline CT head 3/1- High right frontal malik hole is seen. Postoperative changes compatible with a left temporal frontal parietal craniectomy is again seen. Mild parenchyma extending through the craniectomy defect is again seen. There is abnormal high attenuation seen involving the left basal ganglia/corona radiata region. This finding is likely compatible with evolving areas acute parenchymal hemorrhage. Surrounding edema is seen. Mass effect on the left lateral ventricle is seen. Left-to-right shift (3.1 mm) is seen. Evaluation of the osseous structures with the appropriate window aside from postop changes appear normal. Complete opacification of the right sphenoid sinus is seen consisting of mucosal thickening. Both mastoid and middle ear regions appear clear. IMPRESSION: Postop changes described above. Evolving area parenchymal hemorrhage as described above.  - OT–right resting hand splint    Sleep/Restlessness  --Tapered Seroquel to 25mg 3/2--goal to eventually transition off-- will help cognition and aphasia  --C/w Trazodone to 75mg qhs (increased on 3/6)- helping with sleep and restlessness   --cont. melatonin  --monitor  --Attending discussed with nursing that patient needs supervision for safety monitoring as pt.  is impulsive, restless and high fall risk as well as is reaching for trach  --Cont. left UE mitten  -3/3 required Zyprexa 2.5mg IM x1    Bilateral lower extremity DVTs–  – Lower extremity Doppler from 3/1–right femoral vein DVT and bilateral lower extremity calf DVTs  – spoke with patient's neurosurgery PA–who was in contact with Dr. Cueva patient's neurosurgeon–gave clearance for patient to start therapeutic Lovenox.  --Vascular Surgery consult -- reaached out to Dr. Hodges 3/7 - nothing further to do if patient is on therapeutic lovenox.   -- c/w therapeutic lovenox for 3 months.    Orthostatic hypotension–  -Stopped doxazosin-- monitor PVR-- reported by nursing to be 241, unclear if true post void--   -- PVR at 10am was 0  --BP improved    #Acute Respiratory Failure  - Intubation and extubation  - s/p Trach 2/13  - Nebs PRN  - Decannulated over weekend, Pulm monitoring, tolerating well    #GI PPX  - Nexium 40mg daily    #DM II  - A1c 6.0  -dw hospitalist and dc'd lantus and lispro     #Pain management  - Tylenol PRN,   --cont. Neurontin 600mg nightly    #Bowel Regimen  - Senna, miralax PRN    #Bladder management  -check PVRs-- SC PRN if > 400cc  -- off doxazosin 3/2    #Dysphagia    - s/p PEG 2/14  --MBS ordered- to be done tomorrow AM  - SLP: evaluation and treatment  - Diet: tolerating bolus TF     #Skin:  - Skin on admission: IAD to bilateral groin and sacrum; RUQ incision with sutures with palpable skull flap; Left hemicraniectomy with sutures MILLER; psoriasis BL shin  - Pressure injury/Skin: Turn Q2hrs while in bed, OOB to Chair, PT/OT     #Precaution  - Fall, Aspiration, seizure    – IDT 3/2:  Social work:  patient on discharge can live with his mother who has a first-floor set up.  Nursing–incontinent of bowel and bladder, total assist with toileting  Speech: N.p.o./PEG, severe language deficits–poor command following, poor orientation, decreased initiation, delayed responses.  Tolerating finger occlusion of trach–starting PMV trials  Aphasia, severe cognitive deficits  OT's: Total assist with ADLs and transfers, restless  PT: Sitting–max assist,–goal min assist with bed mobility,  mod assist with transfers, ambulation 10 feet max assist with hemiwalker, wheelchair mobility 50 feet min assist.  ELOS: 3/28 ZOHAIB  Goals:  1.  Improved secretion management and tolerate PMV  2.  transfers with 1 person assist  3.  Communicate basic wants and needs with yes no answers

## 2023-03-07 NOTE — PROGRESS NOTE ADULT - ATTENDING COMMENTS
Pt. seen with resident & fellow.  Agree with documentation above as per fellow with amendments made as appropriate. Patient medically stable. Making progress towards rehab goals.     Left IPH  -C/w Trazodone to 75mg qhs (increased on 3/6)- helping with sleep and restlessness     Voiding–true PVR at 10 AM = 0, patient voiding well off of doxazosin.  No orthostatic hypotension issues currently

## 2023-03-07 NOTE — PROGRESS NOTE ADULT - SUBJECTIVE AND OBJECTIVE BOX
SUBJECTIVE/OBJECTIVE: Patient seen and examined while sitting in the wheelchair No acute overnight events, sleeping well per nursing. Continues to be aphasic with automated responses- waving.    REVIEW OF SYSTEMS  Aphasia  Right hemiparesis   Cognitive deficits     VITALS  42y  Vital Signs Last 24 Hrs  T(C): 36.6 (07 Mar 2023 08:07), Max: 36.7 (06 Mar 2023 20:31)  T(F): 97.8 (07 Mar 2023 08:07), Max: 98 (06 Mar 2023 20:31)  HR: 95 (07 Mar 2023 08:53) (92 - 98)  BP: 104/66 (07 Mar 2023 08:07) (104/66 - 110/68)  BP(mean): --  RR: 18 (07 Mar 2023 08:53) (15 - 18)  SpO2: 98% (07 Mar 2023 08:53) (96% - 98%)    Parameters below as of 07 Mar 2023 08:53  Patient On (Oxygen Delivery Method): room air    PHYSICAL EXAM:   Constitutional - NAD, Comfortable  HEENT - EOMI, left hemicraniectomy, sutures removed 3/7,  +Helmet in place   Neck - trach removal, stoma covered   Chest -  CTAB   Cardio - warm and well perfused, RRR, no murmur  Abdomen -  Soft, NTND, +PEG, Bone flap marsupialized in epigastric region.  Sutures removed, healing well, with no significant erythema and no drainage.  Extremities - No peripheral edema, No calf tenderness   Neurologic Exam:                    Cognitive -             Orientation: Awake, Alert, unable to assess orientation due to aphasia and cognitive deficits            Attention:  Impaired; makes eye contact but follows commands inconsistently            Memory: Impaired            Thought: Impaired, Impulsive     Speech - non-verbal -s/p trach, impaired comprehension, +aphasia, poor mimicking     Cranial Nerves - Limited due to comprehension impairment and aphasia; No facial asymmetry, Tongue midline, EOMI, Pupils dilated but reactive, +dysphagia     Motor -                      LEFT    UE - at least 3/5 and moving spontaneously; exam limited by command following;  WNL                    RIGHT UE - ShAB 0/5, EF 0/5, EE 0/5, WE 0/5,  0/5-- MAS 1 finger flexors                     LEFT    LE -  at least 3/5 and moving spontaneously; exam limited by command following                    RIGHT LE - HE 1/5, Hip adductor 1/5, KE 0/5, DF 0/5, PF 0/5        Sensory - decrease sensation to LT-- RUE     Reflexes - DTR 2+/ 4 , neg Grider's b/l, neg Babinski's b/l     Coordination - FTN / HTS impaired on right  Psychiatric - Mood stable, Affect WNL  Skin on admission: IAD to bilateral groin and sacrum; RUQ incision with sutures with palpable skull flap; Left hemicraniectomy with sutures MILLER; psoriasis BL shin    RECENT LABS:                        13.4   7.92  )-----------( 240      ( 06 Mar 2023 06:21 )             41.2     03-06    138  |  102  |  10  ----------------------------<  143<H>  4.5   |  25  |  0.59    Ca    9.5      06 Mar 2023 06:21    TPro  7.3  /  Alb  3.0<L>  /  TBili  0.3  /  DBili  x   /  AST  14  /  ALT  17  /  AlkPhos  109  03-06    LIVER FUNCTIONS - ( 06 Mar 2023 06:21 )  Alb: 3.0 g/dL / Pro: 7.3 g/dL / ALK PHOS: 109 U/L / ALT: 17 U/L / AST: 14 U/L / GGT: x               MEDICATIONS:  MEDICATIONS  (STANDING):  AQUAPHOR (petrolatum Ointment) 1 Application(s) Topical two times a day  bacitracin   Ointment 1 Application(s) Topical daily  enoxaparin Injectable 90 milliGRAM(s) SubCutaneous <User Schedule>  gabapentin 600 milliGRAM(s) Oral at bedtime  melatonin 6 milliGRAM(s) Oral at bedtime  nystatin Powder 1 Application(s) Topical two times a day  pantoprazole   Suspension 40 milliGRAM(s) Oral daily  polyethylene glycol 3350 17 Gram(s) Oral daily  QUEtiapine 25 milliGRAM(s) Oral at bedtime  senna Syrup 5 milliLiter(s) Oral at bedtime  traZODone 75 milliGRAM(s) Oral at bedtime    MEDICATIONS  (PRN):  acetaminophen    Suspension .. 650 milliGRAM(s) Oral every 6 hours PRN Temp greater or equal to 38C (100.4F), Mild Pain (1 - 3)     SUBJECTIVE/OBJECTIVE: Patient seen and examined while sitting in the wheelchair. No acute overnight events, sleeping well per nursing. Continues to be aphasic with automated responses- waving.    REVIEW OF SYSTEMS  Aphasia  Right hemiparesis   Cognitive deficits     VITALS  42y  Vital Signs Last 24 Hrs  T(C): 36.6 (07 Mar 2023 08:07), Max: 36.7 (06 Mar 2023 20:31)  T(F): 97.8 (07 Mar 2023 08:07), Max: 98 (06 Mar 2023 20:31)  HR: 95 (07 Mar 2023 08:53) (92 - 98)  BP: 104/66 (07 Mar 2023 08:07) (104/66 - 110/68)  BP(mean): --  RR: 18 (07 Mar 2023 08:53) (15 - 18)  SpO2: 98% (07 Mar 2023 08:53) (96% - 98%)    Parameters below as of 07 Mar 2023 08:53  Patient On (Oxygen Delivery Method): room air    PHYSICAL EXAM:   Constitutional - NAD, Comfortable  HEENT - EOMI, left hemicraniectomy, sutures removed 3/7,  +Helmet in place   Neck - trach removal, stoma covered   Chest -  CTAB   Cardio - warm and well perfused, RRR, no murmur  Abdomen -  Soft, NTND, +PEG, Bone flap marsupialized in epigastric region.  Sutures removed, healing well, with no significant erythema and no drainage.  Extremities - No peripheral edema, No calf tenderness   Neurologic Exam:                    Cognitive -             Orientation: Awake, Alert, unable to assess orientation due to aphasia and cognitive deficits            Communication- able to sing "hello"            Attention:  Impaired; makes eye contact but follows commands inconsistently            Memory: Impaired            Thought: Impaired, Impulsive     Speech - non-verbal -s/p trach, impaired comprehension, +aphasia, poor mimicking     Cranial Nerves - Limited due to comprehension impairment and aphasia; No facial asymmetry, Tongue midline, EOMI, Pupils dilated but reactive, +dysphagia     Motor -                      LEFT    UE - at least 3/5 and moving spontaneously; exam limited by command following;  WNL                    RIGHT UE - ShAB 0/5, EF 0/5, EE 0/5, WE 0/5,  0/5-- MAS 1 finger flexors                     LEFT    LE -  at least 3/5 and moving spontaneously; exam limited by command following                    RIGHT LE - HE 1/5, Hip adductor 1/5, KE 0/5, DF 0/5, PF 0/5        Sensory - decrease sensation to LT-- RUE     Reflexes - DTR 2+/ 4 , neg Grider's b/l, neg Babinski's b/l     Coordination - FTN / HTS impaired on right  Psychiatric - Mood stable, Affect WNL  Skin on admission: IAD to bilateral groin and sacrum; RUQ incision with sutures with palpable skull flap; Left hemicraniectomy with sutures MILLER; psoriasis BL shin    RECENT LABS:                        13.4   7.92  )-----------( 240      ( 06 Mar 2023 06:21 )             41.2     03-06    138  |  102  |  10  ----------------------------<  143<H>  4.5   |  25  |  0.59    Ca    9.5      06 Mar 2023 06:21    TPro  7.3  /  Alb  3.0<L>  /  TBili  0.3  /  DBili  x   /  AST  14  /  ALT  17  /  AlkPhos  109  03-06    LIVER FUNCTIONS - ( 06 Mar 2023 06:21 )  Alb: 3.0 g/dL / Pro: 7.3 g/dL / ALK PHOS: 109 U/L / ALT: 17 U/L / AST: 14 U/L / GGT: x               MEDICATIONS:  MEDICATIONS  (STANDING):  AQUAPHOR (petrolatum Ointment) 1 Application(s) Topical two times a day  bacitracin   Ointment 1 Application(s) Topical daily  enoxaparin Injectable 90 milliGRAM(s) SubCutaneous <User Schedule>  gabapentin 600 milliGRAM(s) Oral at bedtime  melatonin 6 milliGRAM(s) Oral at bedtime  nystatin Powder 1 Application(s) Topical two times a day  pantoprazole   Suspension 40 milliGRAM(s) Oral daily  polyethylene glycol 3350 17 Gram(s) Oral daily  QUEtiapine 25 milliGRAM(s) Oral at bedtime  senna Syrup 5 milliLiter(s) Oral at bedtime  traZODone 75 milliGRAM(s) Oral at bedtime    MEDICATIONS  (PRN):  acetaminophen    Suspension .. 650 milliGRAM(s) Oral every 6 hours PRN Temp greater or equal to 38C (100.4F), Mild Pain (1 - 3)     SUBJECTIVE/OBJECTIVE: Patient seen and examined while sitting in the wheelchair. No acute overnight events, sleeping well per nursing. Continues to be aphasic with automated responses- waving. Seen in speech therapy this morning.  Patient less restless today.  Patient reports yes when asked if slept.     REVIEW OF SYSTEMS  Aphasia  Right hemiparesis   Cognitive deficits     VITALS  42y  Vital Signs Last 24 Hrs  T(C): 36.6 (07 Mar 2023 08:07), Max: 36.7 (06 Mar 2023 20:31)  T(F): 97.8 (07 Mar 2023 08:07), Max: 98 (06 Mar 2023 20:31)  HR: 95 (07 Mar 2023 08:53) (92 - 98)  BP: 104/66 (07 Mar 2023 08:07) (104/66 - 110/68)  BP(mean): --  RR: 18 (07 Mar 2023 08:53) (15 - 18)  SpO2: 98% (07 Mar 2023 08:53) (96% - 98%)    Parameters below as of 07 Mar 2023 08:53  Patient On (Oxygen Delivery Method): room air    PHYSICAL EXAM:   Constitutional - NAD, Comfortable  HEENT - EOMI, left hemicraniectomy, sutures removed 3/7,  +Helmet in place   Neck - trach removal, stoma covered   Chest -  CTAB   Cardio - warm and well perfused, RRR, no murmur  Abdomen -  Soft, NTND, +PEG, Bone flap marsupialized in epigastric region.  Sutures removed, healing well, with no significant erythema and no drainage.  Extremities - No peripheral edema, No calf tenderness   Neurologic Exam:                    Cognitive -             Orientation: Awake, Alert, unable to assess orientation due to aphasia and cognitive deficits            Communication- able to sing "hello" and hum a tune.             Attention:  Impaired; makes eye contact but follows commands inconsistently            Memory: Impaired            Thought: Impaired, Impulsive     Speech - non-verbal -s/p trach, impaired comprehension, +aphasia, poor mimicking     Cranial Nerves - Limited due to comprehension impairment and aphasia; No facial asymmetry, Tongue midline, EOMI, Pupils dilated but reactive, +dysphagia     Motor -                      LEFT    UE - at least 3/5 and moving spontaneously; exam limited by command following;  WNL                    RIGHT UE - ShAB 0/5, EF 0/5, EE 0/5, WE 0/5,  0/5-- MAS 1 finger flexors                     LEFT    LE -  at least 3/5 and moving spontaneously; exam limited by command following                    RIGHT LE - HE 1/5, Hip adductor 1/5, KE 0/5, DF 0/5, PF 0/5        Sensory - decrease sensation to LT-- RUE     Reflexes - DTR 2+/ 4 , neg Grider's b/l, neg Babinski's b/l     Coordination - FTN / HTS impaired on right  Psychiatric - Mood stable, Affect WNL  Skin on admission: IAD to bilateral groin and sacrum; RUQ incision with sutures with palpable skull flap; Left hemicraniectomy with sutures MILLER; psoriasis BL shin    RECENT LABS:                        13.4   7.92  )-----------( 240      ( 06 Mar 2023 06:21 )             41.2     03-06    138  |  102  |  10  ----------------------------<  143<H>  4.5   |  25  |  0.59    Ca    9.5      06 Mar 2023 06:21    TPro  7.3  /  Alb  3.0<L>  /  TBili  0.3  /  DBili  x   /  AST  14  /  ALT  17  /  AlkPhos  109  03-06    LIVER FUNCTIONS - ( 06 Mar 2023 06:21 )  Alb: 3.0 g/dL / Pro: 7.3 g/dL / ALK PHOS: 109 U/L / ALT: 17 U/L / AST: 14 U/L / GGT: x               MEDICATIONS:  MEDICATIONS  (STANDING):  AQUAPHOR (petrolatum Ointment) 1 Application(s) Topical two times a day  bacitracin   Ointment 1 Application(s) Topical daily  enoxaparin Injectable 90 milliGRAM(s) SubCutaneous <User Schedule>  gabapentin 600 milliGRAM(s) Oral at bedtime  melatonin 6 milliGRAM(s) Oral at bedtime  nystatin Powder 1 Application(s) Topical two times a day  pantoprazole   Suspension 40 milliGRAM(s) Oral daily  polyethylene glycol 3350 17 Gram(s) Oral daily  QUEtiapine 25 milliGRAM(s) Oral at bedtime  senna Syrup 5 milliLiter(s) Oral at bedtime  traZODone 75 milliGRAM(s) Oral at bedtime    MEDICATIONS  (PRN):  acetaminophen    Suspension .. 650 milliGRAM(s) Oral every 6 hours PRN Temp greater or equal to 38C (100.4F), Mild Pain (1 - 3)

## 2023-03-08 PROCEDURE — 99232 SBSQ HOSP IP/OBS MODERATE 35: CPT

## 2023-03-08 PROCEDURE — 74230 X-RAY XM SWLNG FUNCJ C+: CPT | Mod: 26

## 2023-03-08 PROCEDURE — 99233 SBSQ HOSP IP/OBS HIGH 50: CPT

## 2023-03-08 RX ORDER — MIDODRINE HYDROCHLORIDE 2.5 MG/1
5 TABLET ORAL
Refills: 0 | Status: DISCONTINUED | OUTPATIENT
Start: 2023-03-08 | End: 2023-03-09

## 2023-03-08 RX ORDER — TRAZODONE HCL 50 MG
100 TABLET ORAL AT BEDTIME
Refills: 0 | Status: DISCONTINUED | OUTPATIENT
Start: 2023-03-08 | End: 2023-03-08

## 2023-03-08 RX ORDER — TRAZODONE HCL 50 MG
100 TABLET ORAL AT BEDTIME
Refills: 0 | Status: DISCONTINUED | OUTPATIENT
Start: 2023-03-08 | End: 2023-04-06

## 2023-03-08 RX ADMIN — ENOXAPARIN SODIUM 90 MILLIGRAM(S): 100 INJECTION SUBCUTANEOUS at 21:49

## 2023-03-08 RX ADMIN — Medication 1 APPLICATION(S): at 17:35

## 2023-03-08 RX ADMIN — Medication 1 APPLICATION(S): at 11:41

## 2023-03-08 RX ADMIN — POLYETHYLENE GLYCOL 3350 17 GRAM(S): 17 POWDER, FOR SOLUTION ORAL at 11:41

## 2023-03-08 RX ADMIN — ENOXAPARIN SODIUM 90 MILLIGRAM(S): 100 INJECTION SUBCUTANEOUS at 08:04

## 2023-03-08 RX ADMIN — Medication 6 MILLIGRAM(S): at 21:14

## 2023-03-08 RX ADMIN — SENNA PLUS 5 MILLILITER(S): 8.6 TABLET ORAL at 21:14

## 2023-03-08 RX ADMIN — GABAPENTIN 600 MILLIGRAM(S): 400 CAPSULE ORAL at 21:14

## 2023-03-08 RX ADMIN — Medication 100 MILLIGRAM(S): at 21:14

## 2023-03-08 RX ADMIN — PANTOPRAZOLE SODIUM 40 MILLIGRAM(S): 20 TABLET, DELAYED RELEASE ORAL at 11:41

## 2023-03-08 RX ADMIN — NYSTATIN CREAM 1 APPLICATION(S): 100000 CREAM TOPICAL at 17:40

## 2023-03-08 RX ADMIN — Medication 1 APPLICATION(S): at 05:27

## 2023-03-08 RX ADMIN — NYSTATIN CREAM 1 APPLICATION(S): 100000 CREAM TOPICAL at 05:24

## 2023-03-08 NOTE — PROGRESS NOTE ADULT - ASSESSMENT
Physical Examination:  GENERAL:               Alert,  No acute distress.    HEENT:                      No JVD, Moist MM, s/p craniotomy , stoma with pinsized hole   PULM:                     Bilateral air entry, course to auscultation bilaterally, no significant sputum production, No Rales, No Rhonchi, No Wheezing  CVS:                         S1, S2,  No Murmur  ABD:                        Soft, nondistended, nontender, normoactive bowel sounds,   EXT:                         No edema, nontender, No Cyanosis or Clubbing   NEURO:                  Alert,  Not interactive, does not follow commands  PSYC:                      Calm, not Insight.      Assessment  Chronic Respiratory failure s/p trach s/p self decannulation 3/5/23  S/P ICH  h/o polysubstance abuse  underlying HTN, Depression    Plan  Continue pressure dressing on stoma  monitor on RA    No gross air trapping with out trach today  PT as tolerated    Rest of care as per primary team

## 2023-03-08 NOTE — PROGRESS NOTE ADULT - ASSESSMENT
ASSESSMENT/PLAN  This is 42 year old male with PMH of prior substance use, HTN, anxiety who presented to [facility] on 1/23 with  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 He is  s/p Left decompressive hemicraniectomy and right EVD placement 1/28/23. Hospital course further c/b respiratory failure s/p intubation and extubation,  agitation requiring precedex, sepsis s/p ABX,  hypotension requiring bolus, dysphagia s/p PEG 2/14 and trach on 2/13, insomnia s/p seroquel, pain s/p oxycodone and fentanyl. Patient now with Right Hemiparesis, dysphagia s/p PEG, Aphasia, Cognitive deficits, gait Instability, ADL impairments and Functional impairments.    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    - ST: to diagnose and treat deficits in swallowing, cognition and communication.  - Helmet when OOB  - Baseline CT head 3/1- High right frontal malik hole is seen. Postoperative changes compatible with a left temporal frontal parietal craniectomy is again seen. Mild parenchyma extending through the craniectomy defect is again seen. There is abnormal high attenuation seen involving the left basal ganglia/corona radiata region. This finding is likely compatible with evolving areas acute parenchymal hemorrhage. Surrounding edema is seen. Mass effect on the left lateral ventricle is seen. Left-to-right shift (3.1 mm) is seen. Evaluation of the osseous structures with the appropriate window aside from postop changes appear normal. Complete opacification of the right sphenoid sinus is seen consisting of mucosal thickening. Both mastoid and middle ear regions appear clear. IMPRESSION: Postop changes described above. Evolving area parenchymal hemorrhage as described above.  - OT–right resting hand splint  ****-Mother to bring in medication list later this afternoon    Sleep/Restlessness- not sleeping at night   --Discontinued Seroquel mood stable, not agitated   --Increased Trazodone to 100mg qhs-- helping with sleep and restlessness   --cont. melatonin  --monitor  --Attending discussed with nursing that patient needs supervision for safety monitoring as pt.  is impulsive, restless and high fall risk as well as is reaching for trach  --Cont. left UE mitten  -3/3 required Zyprexa 2.5mg IM x1    Bilateral lower extremity DVTs–  – Lower extremity Doppler from 3/1–right femoral vein DVT and bilateral lower extremity calf DVTs  – spoke with patient's neurosurgery PA–who was in contact with Dr. Cueva patient's neurosurgeon–gave clearance for patient to start therapeutic Lovenox.  --Vascular Surgery consult -- reaached out to Dr. Hodges 3/7 - nothing further to do if patient is on therapeutic lovenox.   -- c/w therapeutic lovenox for 3 months.    Orthostatic hypotension–  -Stopped doxazosin-- monitor PVRs, low PVR's per nursing  -Episode of orthostasis in therapy-- started midodrine 5 mg @8am     #Acute Respiratory Failure  - Intubation and extubation  - s/p Trach 2/13  - Nebs PRN  - Decannulated over weekend, Pulm monitoring, tolerating well    #GI PPX  - Nexium 40mg daily    #DM II  - A1c 6.0  -dw hospitalist and dc'd lantus and lispro     #Pain management  - Tylenol PRN,   --cont. Neurontin 600mg nightly    #Bowel Regimen  - Senna, miralax PRN    #Bladder management  -check PVRs-- SC PRN if > 400cc  -- off doxazosin 3/2    #Dysphagia    - s/p PEG 2/14  - Passed MBS this morning-- diet upgraded  - SLP: evaluation and treatment  - Diet: tolerating bolus TF     #Skin:  - Skin on admission: IAD to bilateral groin and sacrum; RUQ incision with sutures with palpable skull flap; Left hemicraniectomy with sutures MILLER; psoriasis BL shin  - Pressure injury/Skin: Turn Q2hrs while in bed, OOB to Chair, PT/OT     #Precaution  - Fall, Aspiration, seizure    – IDT 3/2:  Social work:  patient on discharge can live with his mother who has a first-floor set up.  Nursing–incontinent of bowel and bladder, total assist with toileting  Speech: N.p.o./PEG, severe language deficits–poor command following, poor orientation, decreased initiation, delayed responses.  Tolerating finger occlusion of trach–starting PMV trials  Aphasia, severe cognitive deficits  OT's: Total assist with ADLs and transfers, restless  PT: Sitting–max assist,–goal min assist with bed mobility,  mod assist with transfers, ambulation 10 feet max assist with hemiwalker, wheelchair mobility 50 feet min assist.  ELOS: 3/28 ZOHAIB  Goals:  1.  Improved secretion management and tolerate PMV  2.  transfers with 1 person assist  3.  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 He is  s/p Left decompressive hemicraniectomy and right EVD placement 1/28/23. Hospital course further c/b respiratory failure s/p intubation and extubation,  agitation requiring precedex, sepsis s/p ABX,  hypotension requiring bolus, dysphagia s/p PEG 2/14 and trach on 2/13, insomnia s/p seroquel, pain s/p oxycodone and fentanyl. Patient now with Right Hemiparesis, dysphagia s/p PEG, Aphasia, Cognitive deficits, gait Instability, ADL impairments and Functional impairments.    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    - ST: to diagnose and treat deficits in swallowing, cognition and communication.  - Helmet when OOB  - Baseline CT head 3/1- High right frontal malik hole is seen. Postoperative changes compatible with a left temporal frontal parietal craniectomy is again seen. Mild parenchyma extending through the craniectomy defect is again seen. There is abnormal high attenuation seen involving the left basal ganglia/corona radiata region. This finding is likely compatible with evolving areas acute parenchymal hemorrhage. Surrounding edema is seen. Mass effect on the left lateral ventricle is seen. Left-to-right shift (3.1 mm) is seen. Evaluation of the osseous structures with the appropriate window aside from postop changes appear normal. Complete opacification of the right sphenoid sinus is seen consisting of mucosal thickening. Both mastoid and middle ear regions appear clear. IMPRESSION: Postop changes described above. Evolving area parenchymal hemorrhage as described above.  - OT–right resting hand splint  ****-Mother to bring in medication list later this afternoon    Sleep/Restlessness- not sleeping at night   --Discontinued Seroquel --due to Orthostatic hypotension and cognitive SE risk.   --Increased Trazodone to 100mg qhs-- helping with sleep and restlessness.  Pt's mother reports he was on this at home.  unsure of dose  --cont. melatonin  --monitor  --Attending discussed with nursing that patient needs supervision for safety monitoring as pt.  is impulsive, restless and high fall risk as well as is reaching for trach  --Cont. left UE mitten      Bilateral lower extremity DVTs–  – Lower extremity Doppler from 3/1–right femoral vein DVT and bilateral lower extremity calf DVTs  – spoke with patient's neurosurgery PA–who was in contact with Dr. Cueva patient's neurosurgeon–gave clearance for patient to start therapeutic Lovenox.  --Vascular Surgery consult -- reaached out to Dr. Hodges 3/7 - nothing further to do if patient is on therapeutic lovenox.   -- c/w therapeutic lovenox for 3 months.    Orthostatic hypotension–  -Stopped doxazosin 3/2-- monitor PVRs, low PVR's per nursing  -Episode of orthostasis in therapy-- started midodrine 5 mg @8am --d/w hospitalist, Dr. Parrish  --stop Seroquel as can contribute to OH    #Acute Respiratory Failure  - Intubation and extubation  - s/p Trach 2/13  - Nebs PRN  - Decannulated over weekend, Pulm monitoring, tolerating well    #GI PPX  - Nexium 40mg daily    #DM II  - A1c 6.0  -dw hospitalist and dc'd lantus and lispro     #Pain management  - Tylenol PRN,   --cont. Neurontin 600mg nightly    #Bowel Regimen  - Senna, miralax PRN    #Bladder management  -voiding well with low PVRs  -- off doxazosin 3/2    #Dysphagia    - s/p PEG 2/14  - Passed MBS this morning-- diet upgraded to Puree with thin liquids-- 1:1 assist with meals  - SLP: evaluation and treatment  - Diet: tolerating bolus TF     #Skin:  - Skin on admission: IAD to bilateral groin and sacrum; RUQ incision with sutures with palpable skull flap; Left hemicraniectomy with sutures MILLER; psoriasis BL shin  - Pressure injury/Skin: Turn Q2hrs while in bed, OOB to Chair, PT/OT     #Precaution  - Fall, Aspiration, seizure    – IDT 3/2:  Social work:  patient on discharge can live with his mother who has a first-floor set up.  Nursing–incontinent of bowel and bladder, total assist with toileting  Speech: N.p.o./PEG, severe language deficits–poor command following, poor orientation, decreased initiation, delayed responses.  Tolerating finger occlusion of trach–starting PMV trials  Aphasia, severe cognitive deficits  OT's: Total assist with ADLs and transfers, restless  PT: Sitting–max assist,–goal min assist with bed mobility,  mod assist with transfers, ambulation 10 feet max assist with hemiwalker, wheelchair mobility 50 feet min assist.  ELOS: 3/28 ZOHAIB  Goals:  1.  Improved secretion management and tolerate PMV  2.  transfers with 1 person assist  3.  Communicate basic wants and needs with yes no answers

## 2023-03-08 NOTE — PROGRESS NOTE ADULT - ATTENDING COMMENTS
Pt. seen with fellow.  Agree with documentation above as per fellow with amendments made as appropriate. Patient medically stable. Making progress towards rehab goals.     Left ICH  Episode of orthostasis in therapy-- started midodrine 5 mg @8am --d/w hospitalist, Dr. Parrish  --stop Seroquel as can contribute to OH    Increase Trazodone to 100mg-- will help with sleep and restlessness-- pt. was on this at home     Mother to bring in home med list later today. Pt. seen with fellow.  Agree with documentation above as per fellow with amendments made as appropriate. Patient medically stable. Making progress towards rehab goals.     Left ICH  Episode of orthostasis in therapy-- started midodrine 5 mg @8am --d/w hospitalist, Dr. Parrish  --stop Seroquel as can contribute to OH    Increase Trazodone to 100mg-- will help with sleep and restlessness-- pt. was on this at home     Mother to bring in home med list later today.    MBS today-- diet upgraded to Puree with thin liquid.

## 2023-03-08 NOTE — PROGRESS NOTE ADULT - SUBJECTIVE AND OBJECTIVE BOX
42y old  male who presents with a chief complaint of left IPH/ICH     Patient seen and examined. No acute events overnight.  on 1:1 observation  noted hypotensive to 70's in PT on standing    Vital Signs Last 24 Hrs  T(C): 36.6 (08 Mar 2023 08:48), Max: 36.8 (07 Mar 2023 19:37)  T(F): 97.9 (08 Mar 2023 08:48), Max: 98.2 (07 Mar 2023 19:37)  HR: 93 (08 Mar 2023 08:48) (91 - 93)  BP: 122/83 (08 Mar 2023 08:48) (109/79 - 122/83)  BP(mean): --  RR: 16 (08 Mar 2023 08:48) (16 - 17)  SpO2: 96% (08 Mar 2023 08:48) (96% - 98%)    Parameters below as of 08 Mar 2023 08:48  Patient On (Oxygen Delivery Method): room air      GENERAL- NAD  EAR/NOSE/MOUTH/THROAT - no pharyngeal exudates, no oral leisions,  MMM  EYES- LEW, conjunctiva and Sclera clear  NECK- supple, trach  RESPIRATORY-  clear to auscultation bilaterally, non laboured breathing  CARDIOVASCULAR - SIS2, RRR  GI - soft NT BS present, peg+  EXTREMITIES- no pedal edema  NEUROLOGY- aphasia, right sided weakness > left       Complete Blood Count (03.06.23 @ 06:21)    Nucleated RBC: 0 /100 WBCs    WBC Count: 7.92 K/uL    RBC Count: 4.48 M/uL    Hemoglobin: 13.4 g/dL    Hematocrit: 41.2 %    Mean Cell Volume: 92.0 fl    Mean Cell Hemoglobin: 29.9 pg    Mean Cell Hemoglobin Conc: 32.5 gm/dL    Red Cell Distrib Width: 14.5 %    Platelet Count - Automated: 240 K/uL      < from: CT Head No Cont (03.05.23 @ 09:44) >  IMPRESSION: Slightly improved subacute/chronic left frontal lobe   hematoma, as above. No new hemorrhage.    < end of copied text >    < from: Xray Chest 1 View- PORTABLE-Routine (Xray Chest 1 View- PORTABLE-Routine .) (03.03.23 @ 14:49) >    No acute pulmonary disease.     Tracheostomy projects over the air column. Correlate clinically.    < end of copied text >    < from: US Duplex Venous Lower Ext Complete, Bilateral (03.01.23 @ 16:55) >    IMPRESSION:  Partially occlusive thrombus in the right common femoral vein.  Bilateral calf vein thrombosis as described above.  Results were discussed with Dr. Eulalia cevallos at 5:10 PM on 3/1/2023.    < end of copied text >    MEDICATIONS  (STANDING):  AQUAPHOR (petrolatum Ointment) 1 Application(s) Topical two times a day  bacitracin   Ointment 1 Application(s) Topical daily  enoxaparin Injectable 90 milliGRAM(s) SubCutaneous <User Schedule>  gabapentin 600 milliGRAM(s) Oral at bedtime  melatonin 6 milliGRAM(s) Oral at bedtime  midodrine 5 milliGRAM(s) Oral <User Schedule>  nystatin Powder 1 Application(s) Topical two times a day  pantoprazole   Suspension 40 milliGRAM(s) Oral daily  polyethylene glycol 3350 17 Gram(s) Oral daily  senna Syrup 5 milliLiter(s) Oral at bedtime  traZODone 100 milliGRAM(s) Oral at bedtime    MEDICATIONS  (PRN):  acetaminophen    Suspension .. 650 milliGRAM(s) Oral every 6 hours PRN Temp greater or equal to 38C (100.4F), Mild Pain (1 - 3)

## 2023-03-08 NOTE — PROGRESS NOTE ADULT - ASSESSMENT
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 He is  s/p Left decompressive hemicraniectomy and right EVD placement 1/28/23. Hospital course further s/f respiratory failure s/p intubation and extubation,  agitation requiring precedex, sepsis s/p ABX,  hypotension requiring bolus, dysphagia s/p PEG 2/14 and trach on 2/13, insomnia s/p seroquel, pain s/p oxycodone and fentanyl. Patient now with Left Hemiparesis, dysphagia s/p PEG, Aphasia, Cognitive deficits, gait Instability, ADL impairments and Functional impairments- pt/ot/dvt ppx    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 with mass effect and 8mm midline shift  -s/p intubation for airway protection and extubation  -s/p Decompressive hemicraniectomy and EVD placement 1/28  -Underwent wound revision with NSGY and plastics on 2/17    # Bilateral lower extremity DVTs–  – Lower extremity Doppler from 3/1–right femoral vein DVT and bilateral lower extremity calf DVTs  –  neurosurgeon gave clearance for patient to start therapeutic Lovenox.  --Vascular Surgery consult - Dr. Hodges 3/7 - nothing further to do if patient is on therapeutic Lovenox   -- c/w therapeutic Lovenox for 3 months.    #Acute Respiratory Failure  -s/p intubation/extubation, Trach 2/13  -Pulm consulted- recc noted    # hypotension-   - start midodrine 5 in am prior to PT  - monitor    #T2DM, HbA1c 6.0  -diet controlled  -monitor    #Dysphagia s/p PEG 2/14  - Continue TF    #GERD  -PPI    #DVT ppx - on therapeutic Lovenox    will follow  d/w dr. cevallos   time spent in e/m visit  50 min

## 2023-03-08 NOTE — PROGRESS NOTE ADULT - SUBJECTIVE AND OBJECTIVE BOX
SUBJECTIVE/OBJECTIVE: Patient seen and examined. No acute overnight events. Not reported to sleep by nursing. Episode of orthostasis in therapy- SBP's in 70. Upon evaluation, patient appears comfortable. Limited by aphasia and attention.    REVIEW OF SYSTEMS  Aphasia  Right hemiparesis   Cognitive deficits       VITALS  42y  Vital Signs Last 24 Hrs  T(C): 36.6 (08 Mar 2023 08:48), Max: 36.8 (07 Mar 2023 19:37)  T(F): 97.9 (08 Mar 2023 08:48), Max: 98.2 (07 Mar 2023 19:37)  HR: 93 (08 Mar 2023 08:48) (91 - 93)  BP: 122/83 (08 Mar 2023 08:48) (109/79 - 122/83)  RR: 16 (08 Mar 2023 08:48) (16 - 17)  SpO2: 96% (08 Mar 2023 08:48) (96% - 98%)    Parameters below as of 08 Mar 2023 08:48  Patient On (Oxygen Delivery Method): room air    PHYSICAL EXAM:   Constitutional - NAD, Comfortable  HEENT - EOMI, left hemicraniectomy, sutures removed 3/7,  +Helmet in place   Neck - trach removal, stoma covered   Chest -  CTAB   Cardio - warm and well perfused, RRR, no murmur  Abdomen -  Soft, NTND, +PEG, Bone flap marsupialized in epigastric region.  Sutures removed, healing well, with no significant erythema and no drainage.  Extremities - No peripheral edema, No calf tenderness   Neurologic Exam:                    Cognitive -             Orientation: Awake, Alert, unable to assess orientation due to aphasia and cognitive deficits            Communication- able to sing "hello" and hum a tune.             Attention:  Impaired; makes eye contact but follows commands inconsistently            Memory: Impaired            Thought: Impaired, Impulsive     Speech - non-verbal -s/p trach, impaired comprehension, +aphasia, poor mimicking     Cranial Nerves - Limited due to comprehension impairment and aphasia; No facial asymmetry, Tongue midline, EOMI, Pupils dilated but reactive, +dysphagia     Motor -                      LEFT    UE - at least 3/5 and moving spontaneously; exam limited by command following;  WNL                    RIGHT UE - ShAB 0/5, EF 0/5, EE 0/5, WE 0/5,  0/5-- MAS 1 finger flexors                     LEFT    LE -  at least 3/5 and moving spontaneously; exam limited by command following                    RIGHT LE - HE 1/5, Hip adductor 1/5, KE 0/5, DF 0/5, PF 0/5        Sensory - decrease sensation to LT-- RUE     Reflexes - DTR 2+/ 4 , neg Grider's b/l, neg Babinski's b/l     Coordination - FTN / HTS impaired on right  Psychiatric - Mood stable, Affect WNL  Skin on admission: IAD to bilateral groin and sacrum; RUQ incision with sutures with palpable skull flap; Left hemicraniectomy with sutures MILLER; psoriasis BL shin      MEDICATIONS:  MEDICATIONS  (STANDING):  AQUAPHOR (petrolatum Ointment) 1 Application(s) Topical two times a day  bacitracin   Ointment 1 Application(s) Topical daily  enoxaparin Injectable 90 milliGRAM(s) SubCutaneous <User Schedule>  gabapentin 600 milliGRAM(s) Oral at bedtime  melatonin 6 milliGRAM(s) Oral at bedtime  midodrine. 5 milliGRAM(s) Oral <User Schedule>  nystatin Powder 1 Application(s) Topical two times a day  pantoprazole   Suspension 40 milliGRAM(s) Oral daily  polyethylene glycol 3350 17 Gram(s) Oral daily  senna Syrup 5 milliLiter(s) Oral at bedtime  traZODone 100 milliGRAM(s) Oral at bedtime    MEDICATIONS  (PRN):  acetaminophen    Suspension .. 650 milliGRAM(s) Oral every 6 hours PRN Temp greater or equal to 38C (100.4F), Mild Pain (1 - 3)     SUBJECTIVE/OBJECTIVE: Patient seen and examined. No acute overnight events. Not reported to sleep by nursing. Episode of orthostasis in therapy- SBP's in 70s in [employer] standing frame. +symptomatic. When supine after,  as per therapy.  Upon evaluation, patient appears comfortable. Limited by aphasia and attention. Participating in OT    REVIEW OF SYSTEMS  Aphasia  Right hemiparesis   Cognitive deficits       VITALS  42y  Vital Signs Last 24 Hrs  T(C): 36.6 (08 Mar 2023 08:48), Max: 36.8 (07 Mar 2023 19:37)  T(F): 97.9 (08 Mar 2023 08:48), Max: 98.2 (07 Mar 2023 19:37)  HR: 93 (08 Mar 2023 08:48) (91 - 93)  BP: 122/83 (08 Mar 2023 08:48) (109/79 - 122/83)  RR: 16 (08 Mar 2023 08:48) (16 - 17)  SpO2: 96% (08 Mar 2023 08:48) (96% - 98%)    Parameters below as of 08 Mar 2023 08:48  Patient On (Oxygen Delivery Method): room air    PHYSICAL EXAM:   Constitutional - NAD, Comfortable  HEENT - EOMI, left hemicraniectomy, sutures removed 3/7,  +Helmet in place   Neck - trach removal, stoma covered   Chest -  CTAB   Cardio - warm and well perfused, RRR, no murmur  Abdomen -  Soft, NTND, +PEG, Bone flap marsupialized in epigastric region.  Sutures removed, healing well, with no significant erythema and no drainage.  Extremities - No peripheral edema, No calf tenderness   Neurologic Exam:                    Cognitive -             Orientation: Awake, Alert, unable to assess orientation due to aphasia and cognitive deficits            Communication- able to sing "hello" and hum a tune.             Attention:  Impaired; makes eye contact but follows commands inconsistently            Memory: Impaired            Thought: Impaired, Impulsive     Speech - non-verbal -s/p trach, impaired comprehension, +aphasia, poor mimicking     Cranial Nerves - Limited due to comprehension impairment and aphasia; No facial asymmetry, Tongue midline, EOMI, Pupils dilated but reactive, +dysphagia     Motor -                      LEFT    UE - at least 3/5 and moving spontaneously; exam limited by command following;  WNL                    RIGHT UE - ShAB 0/5, EF 0/5, EE 0/5, WE 0/5,  0/5-- MAS 1 finger flexors                     LEFT    LE -  at least 3/5 and moving spontaneously; exam limited by command following                    RIGHT LE - HE 1/5, Hip adductor 1/5, KE 0/5, DF 0/5, PF 0/5        Sensory - decrease sensation to LT-- RUE     Reflexes - DTR 2+/ 4 , neg Grider's b/l, neg Babinski's b/l     Coordination - FTN / HTS impaired on right  Psychiatric - Mood stable, Affect WNL  Skin on admission: IAD to bilateral groin and sacrum; RUQ incision with sutures with palpable skull flap; Left hemicraniectomy with sutures MILLER; psoriasis BL shin      MEDICATIONS:  MEDICATIONS  (STANDING):  AQUAPHOR (petrolatum Ointment) 1 Application(s) Topical two times a day  bacitracin   Ointment 1 Application(s) Topical daily  enoxaparin Injectable 90 milliGRAM(s) SubCutaneous <User Schedule>  gabapentin 600 milliGRAM(s) Oral at bedtime  melatonin 6 milliGRAM(s) Oral at bedtime  midodrine. 5 milliGRAM(s) Oral <User Schedule>  nystatin Powder 1 Application(s) Topical two times a day  pantoprazole   Suspension 40 milliGRAM(s) Oral daily  polyethylene glycol 3350 17 Gram(s) Oral daily  senna Syrup 5 milliLiter(s) Oral at bedtime  traZODone 100 milliGRAM(s) Oral at bedtime    MEDICATIONS  (PRN):  acetaminophen    Suspension .. 650 milliGRAM(s) Oral every 6 hours PRN Temp greater or equal to 38C (100.4F), Mild Pain (1 - 3)

## 2023-03-09 LAB
ALBUMIN SERPL ELPH-MCNC: 3 G/DL — LOW (ref 3.3–5)
ALP SERPL-CCNC: 105 U/L — SIGNIFICANT CHANGE UP (ref 40–120)
ALT FLD-CCNC: 23 U/L — SIGNIFICANT CHANGE UP (ref 10–45)
ANION GAP SERPL CALC-SCNC: 10 MMOL/L — SIGNIFICANT CHANGE UP (ref 5–17)
AST SERPL-CCNC: 15 U/L — SIGNIFICANT CHANGE UP (ref 10–40)
BILIRUB SERPL-MCNC: 0.4 MG/DL — SIGNIFICANT CHANGE UP (ref 0.2–1.2)
BUN SERPL-MCNC: 11 MG/DL — SIGNIFICANT CHANGE UP (ref 7–23)
CALCIUM SERPL-MCNC: 9.1 MG/DL — SIGNIFICANT CHANGE UP (ref 8.4–10.5)
CHLORIDE SERPL-SCNC: 101 MMOL/L — SIGNIFICANT CHANGE UP (ref 96–108)
CO2 SERPL-SCNC: 25 MMOL/L — SIGNIFICANT CHANGE UP (ref 22–31)
CREAT SERPL-MCNC: 0.52 MG/DL — SIGNIFICANT CHANGE UP (ref 0.5–1.3)
EGFR: 129 ML/MIN/1.73M2 — SIGNIFICANT CHANGE UP
GLUCOSE SERPL-MCNC: 126 MG/DL — HIGH (ref 70–99)
HCT VFR BLD CALC: 37.6 % — LOW (ref 39–50)
HGB BLD-MCNC: 12.4 G/DL — LOW (ref 13–17)
MCHC RBC-ENTMCNC: 30 PG — SIGNIFICANT CHANGE UP (ref 27–34)
MCHC RBC-ENTMCNC: 33 GM/DL — SIGNIFICANT CHANGE UP (ref 32–36)
MCV RBC AUTO: 91 FL — SIGNIFICANT CHANGE UP (ref 80–100)
NRBC # BLD: 0 /100 WBCS — SIGNIFICANT CHANGE UP (ref 0–0)
PLATELET # BLD AUTO: 239 K/UL — SIGNIFICANT CHANGE UP (ref 150–400)
POTASSIUM SERPL-MCNC: 4.2 MMOL/L — SIGNIFICANT CHANGE UP (ref 3.5–5.3)
POTASSIUM SERPL-SCNC: 4.2 MMOL/L — SIGNIFICANT CHANGE UP (ref 3.5–5.3)
PROT SERPL-MCNC: 6.9 G/DL — SIGNIFICANT CHANGE UP (ref 6–8.3)
RBC # BLD: 4.13 M/UL — LOW (ref 4.2–5.8)
RBC # FLD: 14.3 % — SIGNIFICANT CHANGE UP (ref 10.3–14.5)
SODIUM SERPL-SCNC: 136 MMOL/L — SIGNIFICANT CHANGE UP (ref 135–145)
WBC # BLD: 7.19 K/UL — SIGNIFICANT CHANGE UP (ref 3.8–10.5)
WBC # FLD AUTO: 7.19 K/UL — SIGNIFICANT CHANGE UP (ref 3.8–10.5)

## 2023-03-09 PROCEDURE — 99232 SBSQ HOSP IP/OBS MODERATE 35: CPT

## 2023-03-09 RX ORDER — MIDODRINE HYDROCHLORIDE 2.5 MG/1
10 TABLET ORAL
Refills: 0 | Status: DISCONTINUED | OUTPATIENT
Start: 2023-03-09 | End: 2023-04-06

## 2023-03-09 RX ORDER — ATORVASTATIN CALCIUM 80 MG/1
80 TABLET, FILM COATED ORAL AT BEDTIME
Refills: 0 | Status: DISCONTINUED | OUTPATIENT
Start: 2023-03-09 | End: 2023-04-06

## 2023-03-09 RX ADMIN — MIDODRINE HYDROCHLORIDE 5 MILLIGRAM(S): 2.5 TABLET ORAL at 08:47

## 2023-03-09 RX ADMIN — Medication 6 MILLIGRAM(S): at 21:24

## 2023-03-09 RX ADMIN — NYSTATIN CREAM 1 APPLICATION(S): 100000 CREAM TOPICAL at 06:25

## 2023-03-09 RX ADMIN — PANTOPRAZOLE SODIUM 40 MILLIGRAM(S): 20 TABLET, DELAYED RELEASE ORAL at 11:36

## 2023-03-09 RX ADMIN — Medication 1 APPLICATION(S): at 17:23

## 2023-03-09 RX ADMIN — Medication 1 APPLICATION(S): at 11:39

## 2023-03-09 RX ADMIN — ENOXAPARIN SODIUM 90 MILLIGRAM(S): 100 INJECTION SUBCUTANEOUS at 21:25

## 2023-03-09 RX ADMIN — POLYETHYLENE GLYCOL 3350 17 GRAM(S): 17 POWDER, FOR SOLUTION ORAL at 11:37

## 2023-03-09 RX ADMIN — ATORVASTATIN CALCIUM 80 MILLIGRAM(S): 80 TABLET, FILM COATED ORAL at 21:24

## 2023-03-09 RX ADMIN — Medication 100 MILLIGRAM(S): at 21:24

## 2023-03-09 RX ADMIN — Medication 1 APPLICATION(S): at 06:24

## 2023-03-09 RX ADMIN — ENOXAPARIN SODIUM 90 MILLIGRAM(S): 100 INJECTION SUBCUTANEOUS at 08:47

## 2023-03-09 RX ADMIN — GABAPENTIN 600 MILLIGRAM(S): 400 CAPSULE ORAL at 21:23

## 2023-03-09 RX ADMIN — NYSTATIN CREAM 1 APPLICATION(S): 100000 CREAM TOPICAL at 17:23

## 2023-03-09 NOTE — PROGRESS NOTE ADULT - SUBJECTIVE AND OBJECTIVE BOX
SUBJECTIVE/OBJECTIVE: Patient seen and examined in therapies. Comfortable appearing. No acute overnight events. Nursing reports sleep initiation difficulty but slept from midnight through until morning. Limited by aphasia and attention. Participating in PT. Endorses dizziness when upright despite improved yet soft BPs since initiation of midodrine.     REVIEW OF SYSTEMS  Aphasia  Right hemiparesis   Cognitive deficits     Vital Signs Last 24 Hrs  T(C): 36.4 (09 Mar 2023 08:42), Max: 37.1 (08 Mar 2023 19:21)  T(F): 97.5 (09 Mar 2023 08:42), Max: 98.7 (08 Mar 2023 19:21)  HR: 81 (09 Mar 2023 11:24) (79 - 93)  BP: 112/71 (09 Mar 2023 11:24) (100/67 - 118/78)  RR: 16 (09 Mar 2023 08:42) (16 - 16)  SpO2: 98% (09 Mar 2023 08:42) (97% - 98%)    Parameters below as of 09 Mar 2023 08:42  Patient On (Oxygen Delivery Method): room air      PHYSICAL EXAM:   Constitutional - NAD, Comfortable  HEENT - EOMI, left hemicraniectomy, sutures removed 3/7, +Helmet in place   Neck - s/p trach, stoma covered   Chest -  NAD, no dyspnea or accessory mm use  Cardio - warm and well perfused   Abdomen -  Soft, NTND, +PEG, Bone flap marsupialized in epigastric region.  Sutures removed, healing well, with no significant erythema and no drainage.  Extremities - No peripheral edema, No calf tenderness   Neurologic Exam:                    Cognitive -             Orientation: Awake, Alert, unable to assess orientation due to aphasia and cognitive deficits            Communication- able to sing "hello" and hum a tune.             Attention:  Impaired; makes eye contact but follows commands inconsistently            Memory: Impaired            Thought: Impaired, Impulsive     Speech - non-verbal -s/p trach, impaired comprehension, +aphasia, poor mimicking     Cranial Nerves - Limited due to comprehension impairment and aphasia; No facial asymmetry, Tongue midline, EOMI, Pupils dilated but reactive, +dysphagia     Motor -                      LEFT    UE - at least 3/5 and moving spontaneously; exam limited by command following;  WNL                    RIGHT UE - ShAB 0/5, EF 0/5, EE 0/5, WE 0/5,  0/5-- MAS 1 finger flexors                     LEFT    LE -  at least 3/5 and moving spontaneously; exam limited by command following                    RIGHT LE - HE 1/5, Hip adductor 1/5, KE 0/5, DF 0/5, PF 0/5        Sensory - decrease sensation to LT-- RUE     Reflexes - DTR 2+/ 4 , neg Grider's b/l, neg Babinski's b/l     Coordination - FTN / HTS impaired on right  Psychiatric - Mood stable, Affect WNL  Skin on admission: IAD to bilateral groin and sacrum; RUQ incision s/p suture removal with palpable skull flap; Left hemicraniectomy with sutures MILLER; psoriasis BL shin    MEDICATIONS  (STANDING):  AQUAPHOR (petrolatum Ointment) 1 Application(s) Topical two times a day  bacitracin   Ointment 1 Application(s) Topical daily  enoxaparin Injectable 90 milliGRAM(s) SubCutaneous <User Schedule>  gabapentin 600 milliGRAM(s) Oral at bedtime  melatonin 6 milliGRAM(s) Oral at bedtime  midodrine. 10 milliGRAM(s) Oral <User Schedule>  nystatin Powder 1 Application(s) Topical two times a day  pantoprazole   Suspension 40 milliGRAM(s) Oral daily  polyethylene glycol 3350 17 Gram(s) Oral daily  senna Syrup 5 milliLiter(s) Oral at bedtime  traZODone 100 milliGRAM(s) Oral at bedtime    MEDICATIONS  (PRN):  acetaminophen    Suspension .. 650 milliGRAM(s) Oral every 6 hours PRN Temp greater or equal to 38C (100.4F), Mild Pain (1 - 3)     SUBJECTIVE/OBJECTIVE: Patient seen and examined in therapies. Comfortable appearing. No acute overnight events. Nursing reports sleep initiation difficulty but slept from midnight through until morning. Limited by aphasia and attention. Seen in PT. Endorses dizziness when upright despite improved yet soft BPs since initiation of midodrine. Unable to tolerate Standing frame today due to dizziness.  Nursing  reports patient tolerating puréed diet well–eating 100%    REVIEW OF SYSTEMS  Aphasia  Right hemiparesis   Cognitive deficits     Vital Signs Last 24 Hrs  T(C): 36.4 (09 Mar 2023 08:42), Max: 37.1 (08 Mar 2023 19:21)  T(F): 97.5 (09 Mar 2023 08:42), Max: 98.7 (08 Mar 2023 19:21)  HR: 81 (09 Mar 2023 11:24) (79 - 93)  BP: 112/71 (09 Mar 2023 11:24) (100/67 - 118/78)  RR: 16 (09 Mar 2023 08:42) (16 - 16)  SpO2: 98% (09 Mar 2023 08:42) (97% - 98%)    Parameters below as of 09 Mar 2023 08:42  Patient On (Oxygen Delivery Method): room air      PHYSICAL EXAM:   Constitutional - NAD, Comfortable  HEENT - EOMI, left hemicraniectomy, sutures removed 3/7, +Helmet in place   Neck - s/p trach, stoma covered   Chest -  NAD, no dyspnea or accessory mm use  Cardio - warm and well perfused   Abdomen -  Soft, NTND, +PEG, Bone flap marsupialized in epigastric region.  Sutures removed, healing well, with no significant erythema and no drainage.  Extremities - No peripheral edema, No calf tenderness   Neurologic Exam:                    Cognitive -             Orientation: Awake, Alert, unable to assess orientation due to aphasia and cognitive deficits            Communication- able to sing "hello" and hum a tune.             Attention:  Impaired; makes eye contact but follows commands inconsistently            Memory: Impaired            Thought: Impaired, Impulsive     Speech - non-verbal -s/p trach, impaired comprehension, +aphasia, poor mimicking     Cranial Nerves - Limited due to comprehension impairment and aphasia; No facial asymmetry, Tongue midline, EOMI, Pupils dilated but reactive, +dysphagia     Motor -                      LEFT    UE - at least 3/5 and moving spontaneously; exam limited by command following;  WNL                    RIGHT UE - ShAB 0/5, EF 0/5, EE 0/5, WE 0/5,  0/5-- MAS 1 finger flexors                     LEFT    LE -  at least 3/5 and moving spontaneously; exam limited by command following                    RIGHT LE - HE 1/5, Hip adductor 1/5, KE 0/5, DF 0/5, PF 0/5        Sensory - decrease sensation to LT-- RUE     Reflexes - DTR 2+/ 4 , neg Grider's b/l, neg Babinski's b/l     Coordination - FTN / HTS impaired on right  Psychiatric - Mood stable, Affect WNL  Skin on admission: IAD to bilateral groin and sacrum; RUQ incision s/p suture removal with palpable skull flap; Left hemicraniectomy with sutures MILLER; psoriasis BL shin    MEDICATIONS  (STANDING):  AQUAPHOR (petrolatum Ointment) 1 Application(s) Topical two times a day  bacitracin   Ointment 1 Application(s) Topical daily  enoxaparin Injectable 90 milliGRAM(s) SubCutaneous <User Schedule>  gabapentin 600 milliGRAM(s) Oral at bedtime  melatonin 6 milliGRAM(s) Oral at bedtime  midodrine. 10 milliGRAM(s) Oral <User Schedule>  nystatin Powder 1 Application(s) Topical two times a day  pantoprazole   Suspension 40 milliGRAM(s) Oral daily  polyethylene glycol 3350 17 Gram(s) Oral daily  senna Syrup 5 milliLiter(s) Oral at bedtime  traZODone 100 milliGRAM(s) Oral at bedtime    MEDICATIONS  (PRN):  acetaminophen    Suspension .. 650 milliGRAM(s) Oral every 6 hours PRN Temp greater or equal to 38C (100.4F), Mild Pain (1 - 3)

## 2023-03-09 NOTE — PROGRESS NOTE ADULT - ASSESSMENT
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 He is  s/p Left decompressive hemicraniectomy and right EVD placement 1/28/23. Hospital course further c/b respiratory failure s/p intubation and extubation,  agitation requiring precedex, sepsis s/p ABX, hypotension requiring bolus, dysphagia s/p PEG 2/14 and trach on 2/13, insomnia s/p seroquel, pain s/p oxycodone and fentanyl. Patient now with Right Hemiparesis, dysphagia s/p PEG, Aphasia, Cognitive deficits, gait Instability, ADL impairments and Functional impairments.    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    - ST: to diagnose and treat deficits in swallowing, cognition and communication.  - Helmet when OOB  - Baseline CT head 3/1- High right frontal malik hole is seen. Postoperative changes compatible with a left temporal frontal parietal craniectomy is again seen. Mild parenchyma extending through the craniectomy defect is again seen. There is abnormal high attenuation seen involving the left basal ganglia/corona radiata region. This finding is likely compatible with evolving areas acute parenchymal hemorrhage. Surrounding edema is seen. Mass effect on the left lateral ventricle is seen. Left-to-right shift (3.1 mm) is seen. Evaluation of the osseous structures with the appropriate window aside from postop changes appear normal. Complete opacification of the right sphenoid sinus is seen consisting of mucosal thickening. Both mastoid and middle ear regions appear clear. IMPRESSION: Postop changes described above. Evolving area parenchymal hemorrhage as described above.  - OT–right resting hand splint      Sleep/Restlessness- not sleeping at night   --Discontinued Seroquel --due to Orthostatic hypotension and cognitive SE risk.   --Increased Trazodone to 100mg qhs 3/10-- helping with sleep and restlessness.  Pt's mother reports he was on 50-100mg qhs and 50mg daytime home meds.   --cont. melatonin  --monitor  --Attending discussed with nursing that patient needs supervision for safety monitoring as pt.  is impulsive, restless and high fall risk as well as is reaching for trach  --Cont. left UE mitten      Bilateral lower extremity DVTs–  – Lower extremity Doppler from 3/1–right femoral vein DVT and bilateral lower extremity calf DVTs  – spoke with patient's neurosurgery PA–who was in contact with Dr. Cueva patient's neurosurgeon–gave clearance for patient to start therapeutic Lovenox.  --Vascular Surgery consult -- reaached out to Dr. Hodges 3/7 - nothing further to do if patient is on therapeutic lovenox.   -- c/w therapeutic lovenox for 3 months.    Orthostatic hypotension–  -Stopped doxazosin 3/2-- monitor PVRs, low PVR's per nursing  -- BPs in therapies improved but soft, patient reports dizziness -- increased midodrine to 10mg    #Acute Respiratory Failure  - Intubation and extubation  - s/p Trach 2/13  - Nebs PRN  - Decannulated over weekend, Pulm monitoring, tolerating well    #GI PPX  - Nexium 40mg daily    #DM II  - A1c 6.0  -dw hospitalist and dc'd lantus and lispro     #Pain management  - Tylenol PRN,   --cont. Neurontin 600mg nightly    #Bowel Regimen  - Senna, miralax PRN    #Bladder management  -voiding well with low PVRs  -- off doxazosin 3/2    #Dysphagia    - s/p PEG 2/14  - Passed MBS this morning-- diet upgraded to Puree with thin liquids-- 1:1 assist with meals  - SLP: evaluation and treatment  - Diet: tolerating bolus TF     #Skin:  - Skin on admission: IAD to bilateral groin and sacrum; RUQ incision with sutures with palpable skull flap; Left hemicraniectomy with sutures MILLER; psoriasis BL shin  - Pressure injury/Skin: Turn Q2hrs while in bed, OOB to Chair, PT/OT     #Precaution  - Fall, Aspiration, seizure    #home meds  - per list provided by mother Georgia: Rousuvastatin 40mg daily and Paroxetine 30mg daily  - Initiate 10mg paroxetine  - equivalent atorvastatin to home med dose    – IDT 3/2:  Social work:  patient on discharge can live with his mother who has a first-floor set up.  Nursing–incontinent of bowel and bladder, total assist with toileting  Speech: N.p.o./PEG, severe language deficits–poor command following, poor orientation, decreased initiation, delayed responses.  Tolerating finger occlusion of trach–starting PMV trials  Aphasia, severe cognitive deficits  OT's: Total assist with ADLs and transfers, restless  PT: Sitting–max assist,–goal min assist with bed mobility,  mod assist with transfers, ambulation 10 feet max assist with hemiwalker, wheelchair mobility 50 feet min assist.  ELOS: 3/28 ZOHAIB  Goals:  1.  Improved secretion management and tolerate PMV  2.  transfers with 1 person assist  3.  Communicate basic wants and needs with yes no answers    Discussed medications and progress with Laura yang. All questions answered.  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 He is  s/p Left decompressive hemicraniectomy and right EVD placement 1/28/23. Hospital course further c/b respiratory failure s/p intubation and extubation,  agitation requiring precedex, sepsis s/p ABX, hypotension requiring bolus, dysphagia s/p PEG 2/14 and trach on 2/13, insomnia s/p seroquel, pain s/p oxycodone and fentanyl. Patient now with Right Hemiparesis, dysphagia s/p PEG, Aphasia, Cognitive deficits, gait Instability, ADL impairments and Functional impairments.    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    - ST: to diagnose and treat deficits in swallowing, cognition and communication.  - Helmet when OOB  - Baseline CT head 3/1- High right frontal malik hole is seen. Postoperative changes compatible with a left temporal frontal parietal craniectomy is again seen. Mild parenchyma extending through the craniectomy defect is again seen. There is abnormal high attenuation seen involving the left basal ganglia/corona radiata region. This finding is likely compatible with evolving areas acute parenchymal hemorrhage. Surrounding edema is seen. Mass effect on the left lateral ventricle is seen. Left-to-right shift (3.1 mm) is seen. Evaluation of the osseous structures with the appropriate window aside from postop changes appear normal. Complete opacification of the right sphenoid sinus is seen consisting of mucosal thickening. Both mastoid and middle ear regions appear clear. IMPRESSION: Postop changes described above. Evolving area parenchymal hemorrhage as described above.  - OT–right resting hand splint      Sleep/Restlessness- not sleeping at night   --Discontinued Seroquel --due to Orthostatic hypotension and cognitive SE risk.   --Increased Trazodone to 100mg qhs 3/8-- helping with sleep and restlessness.  Pt's mother reports he was on 50-100mg qhs and 50mg daytime home meds.   --cont. melatonin  --monitor  --Attending discussed with nursing that patient needs supervision for safety monitoring as pt.  is impulsive, restless and high fall risk as well as is reaching for trach  --Cont. left UE mitten    Depression/anxiety  --Pt was on Paxil 30mg at home as per mother--   --Will start 10mg tomorrow AM.        Bilateral lower extremity DVTs–  – Lower extremity Doppler from 3/1–right femoral vein DVT and bilateral lower extremity calf DVTs  – spoke with patient's neurosurgery PA–who was in contact with Dr. Cueva patient's neurosurgeon–gave clearance for patient to start therapeutic Lovenox.  --Vascular Surgery consult -- reaached out to Dr. Hodges 3/7 - nothing further to do if patient is on therapeutic lovenox.   -- c/w therapeutic lovenox for 3 months.    Orthostatic hypotension–  -Stopped doxazosin 3/2-- monitor PVRs, low PVR's per nursing  -- BPs in therapies improved but soft, patient reports dizziness -- increased midodrine to 10mg    #Acute Respiratory Failure  - Intubation and extubation  - s/p Trach 2/13  - Nebs PRN  - Decannulated over weekend, Pulm monitoring, tolerating well    #GI PPX  - Nexium 40mg daily    #DM II  - A1c 6.0  -dw hospitalist and dc'd lantus and lispro     #Pain management  - Tylenol PRN,   --cont. Neurontin 600mg nightly    #Bowel Regimen  - Senna, miralax PRN    #Bladder management  -voiding well with low PVRs  -- off doxazosin 3/2    #Dysphagia    - s/p PEG 2/14  - Passed MBS this morning-- diet upgraded to Puree with thin liquids-- 1:1 assist with meals  - SLP: evaluation and treatment  - Diet: tolerating bolus TF     #Skin:  - Skin on admission: IAD to bilateral groin and sacrum; RUQ incision with sutures with palpable skull flap; Left hemicraniectomy with sutures MILLER; psoriasis BL shin  - Pressure injury/Skin: Turn Q2hrs while in bed, OOB to Chair, PT/OT     #Precaution  - Fall, Aspiration, seizure    #home meds  - per list provided by Laura yang: Rousuvastatin 40mg daily and Paroxetine 30mg daily  - Initiate 10mg paroxetine  - equivalent atorvastatin to home med dose    – IDT 3/9:  Social work:  patient on discharge can live with his mother who has a first-floor set up.  Nursing–incontinent of bowel and bladder, total assist with toileting  Speech: Puree/thin diet, severe language deficits–poor command following, poor orientation, Some spontaneous naming, & gestures, Speech jargon, Aphasia, severe cognitive deficits  OT's: Total assist with ADLs and transfers, restless  PT: Sitting –max assist,, Supine to sit--Max A x2; Improved trunk control; Tot A transfers   goals-- mod assist with transfers, ambulation 10 feet max assist with hemiwalker, wheelchair mobility 50 feet min assist.  ELOS: 3/28 ZOHAIB  Goals:  1.  Improved secretion management and tolerate PMV  2.  transfers with 1 person assist  3.  Communicate basic wants and needs with yes no answers    Discussed medications and progress with Laura yang. All questions answered.

## 2023-03-09 NOTE — PROGRESS NOTE ADULT - ATTENDING COMMENTS
Pt. seen with resident.  Agree with documentation above as per resident with amendments made as appropriate. Patient medically stable. Making progress towards rehab goals.     Left IP  Pt was on Paxil 30mg at home as per mother--   --Will start 10mg tomorrow AM.      Pt. limited by dizziness and borderline low BP in PT-- increase midodrine to 10mg-- monitor    cont. trazodone for sleep    Needs 2:1 supervision for impulsivity and fall risk.  Left mitten as pt. tries to pull at PEG.

## 2023-03-10 PROCEDURE — 99232 SBSQ HOSP IP/OBS MODERATE 35: CPT

## 2023-03-10 RX ORDER — BACITRACIN ZINC 500 UNIT/G
1 OINTMENT IN PACKET (EA) TOPICAL
Refills: 0 | Status: DISCONTINUED | OUTPATIENT
Start: 2023-03-10 | End: 2023-04-06

## 2023-03-10 RX ORDER — GABAPENTIN 400 MG/1
600 CAPSULE ORAL AT BEDTIME
Refills: 0 | Status: DISCONTINUED | OUTPATIENT
Start: 2023-03-10 | End: 2023-03-20

## 2023-03-10 RX ORDER — GABAPENTIN 400 MG/1
600 CAPSULE ORAL THREE TIMES A DAY
Refills: 0 | Status: DISCONTINUED | OUTPATIENT
Start: 2023-03-10 | End: 2023-03-10

## 2023-03-10 RX ADMIN — PANTOPRAZOLE SODIUM 40 MILLIGRAM(S): 20 TABLET, DELAYED RELEASE ORAL at 17:11

## 2023-03-10 RX ADMIN — Medication 10 MILLIGRAM(S): at 05:51

## 2023-03-10 RX ADMIN — Medication 1 APPLICATION(S): at 17:11

## 2023-03-10 RX ADMIN — NYSTATIN CREAM 1 APPLICATION(S): 100000 CREAM TOPICAL at 05:51

## 2023-03-10 RX ADMIN — GABAPENTIN 600 MILLIGRAM(S): 400 CAPSULE ORAL at 21:19

## 2023-03-10 RX ADMIN — Medication 1 APPLICATION(S): at 17:17

## 2023-03-10 RX ADMIN — Medication 1 APPLICATION(S): at 05:51

## 2023-03-10 RX ADMIN — Medication 6 MILLIGRAM(S): at 21:20

## 2023-03-10 RX ADMIN — ENOXAPARIN SODIUM 90 MILLIGRAM(S): 100 INJECTION SUBCUTANEOUS at 08:35

## 2023-03-10 RX ADMIN — MIDODRINE HYDROCHLORIDE 10 MILLIGRAM(S): 2.5 TABLET ORAL at 07:42

## 2023-03-10 RX ADMIN — Medication 100 MILLIGRAM(S): at 21:20

## 2023-03-10 RX ADMIN — SENNA PLUS 5 MILLILITER(S): 8.6 TABLET ORAL at 21:20

## 2023-03-10 RX ADMIN — ATORVASTATIN CALCIUM 80 MILLIGRAM(S): 80 TABLET, FILM COATED ORAL at 21:20

## 2023-03-10 RX ADMIN — NYSTATIN CREAM 1 APPLICATION(S): 100000 CREAM TOPICAL at 17:10

## 2023-03-10 RX ADMIN — ENOXAPARIN SODIUM 90 MILLIGRAM(S): 100 INJECTION SUBCUTANEOUS at 21:19

## 2023-03-10 NOTE — PROGRESS NOTE ADULT - ASSESSMENT
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 He is  s/p Left decompressive hemicraniectomy and right EVD placement 1/28/23. Hospital course further c/b respiratory failure s/p intubation and extubation,  agitation requiring precedex, sepsis s/p ABX, hypotension requiring bolus, dysphagia s/p PEG 2/14 and trach on 2/13, insomnia s/p seroquel, pain s/p oxycodone and fentanyl. Patient now with Right Hemiparesis, dysphagia s/p PEG, Aphasia, Cognitive deficits, gait Instability, ADL impairments and Functional impairments.    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    - ST: to diagnose and treat deficits in swallowing, cognition and communication.  - Helmet when OOB  - Baseline CT head 3/1- High right frontal malik hole is seen. Postoperative changes compatible with a left temporal frontal parietal craniectomy is again seen. Mild parenchyma extending through the craniectomy defect is again seen. There is abnormal high attenuation seen involving the left basal ganglia/corona radiata region. This finding is likely compatible with evolving areas acute parenchymal hemorrhage. Surrounding edema is seen. Mass effect on the left lateral ventricle is seen. Left-to-right shift (3.1 mm) is seen. Evaluation of the osseous structures with the appropriate window aside from postop changes appear normal. Complete opacification of the right sphenoid sinus is seen consisting of mucosal thickening. Both mastoid and middle ear regions appear clear. IMPRESSION: Postop changes described above. Evolving area parenchymal hemorrhage as described above.  - OT–right resting hand splint      Sleep/Restlessness- not sleeping at night   --Discontinued Seroquel --due to Orthostatic hypotension and cognitive SE risk.   --c/w Trazodone to 100mg qhs 3/8-- helping with sleep and restlessness.  Pt's mother reports he was on 50-100mg qhs and 50mg daytime home meds.   --cont. melatonin  --monitor  --Attending discussed with nursing that patient needs supervision for safety monitoring as pt.  is impulsive, restless and high fall risk as well as is reaching for trach  --Cont. left UE mitten    Depression/anxiety  --Pt was on Paxil 30mg at home as per mother--   --Paxil 10mg started today      Bilateral lower extremity DVTs–  – Lower extremity Doppler from 3/1–right femoral vein DVT and bilateral lower extremity calf DVTs  – spoke with patient's neurosurgery PA–who was in contact with Dr. Cueva patient's neurosurgeon–gave clearance for patient to start therapeutic Lovenox.  --Vascular Surgery consult -- reaached out to Dr. Hodges 3/7 - nothing further to do if patient is on therapeutic lovenox.   -- c/w therapeutic lovenox for 3 months.    Orthostatic hypotension–  -Stopped doxazosin 3/2-- monitor PVRs, low PVR's per nursing  -no report of OH since midodrine 10mg   -cont to monitor    #Acute Respiratory Failure  - Intubation and extubation  - s/p Trach 2/13  - Nebs PRN  - Decannulated over weekend 3/5, Pulm monitoring, tolerating well    #GI PPX  - Nexium 40mg daily    #DM II  - A1c 6.0  -dw hospitalist and dc'd lantus and lispro     #Pain management  - Tylenol PRN,   --cont. Neurontin 600mg nightly    #Bowel Regimen  - Senna, miralax PRN    #Bladder management  -voiding well with low PVRs, D/C PVRs  -- off doxazosin 3/2    #Dysphagia    - s/p PEG 2/14  - Puree with thin liquids-- 1:1 assist with meals  - SLP: evaluation and treatment  - Diet: tolerating bolus TF     #Skin:  - Skin on admission: IAD to bilateral groin and sacrum; RUQ incision with sutures with palpable skull flap; Left hemicraniectomy with sutures MILLER; psoriasis BL shin  - Pressure injury/Skin: Turn Q2hrs while in bed, OOB to Chair, PT/OT     #Precaution  - Fall, Aspiration, seizure    #home meds  - per list provided by Laura yang: Rousuvastatin 40mg daily and Paroxetine 30mg daily  - Initiate 10mg paroxetine  - cw high intensity atorvastatin to home med dose    – IDT 3/9:  Social work:  patient on discharge can live with his mother who has a first-floor set up.  Nursing–incontinent of bowel and bladder, total assist with toileting  Speech: Puree/thin diet, severe language deficits–poor command following, poor orientation, Some spontaneous naming, & gestures, Speech jargon, Aphasia, severe cognitive deficits  OT's: Total assist with ADLs and transfers, restless  PT: Sitting –max assist,, Supine to sit--Max A x2; Improved trunk control; Tot A transfers   goals-- mod assist with transfers, ambulation 10 feet max assist with hemiwalker, wheelchair mobility 50 feet min assist.  ELOS: 3/28 ZOHAIB  Goals:  1.  Improved secretion management and tolerate PMV  2.  transfers with 1 person assist  3.  Communicate basic wants and needs with yes no answers    Discussed medications and progress with Laura yang on 3/9. All questions answered.

## 2023-03-10 NOTE — PROGRESS NOTE ADULT - ATTENDING COMMENTS
Pt. seen with resident.  Agree with documentation above as per resident with amendments made as appropriate. Patient medically stable. Making progress towards rehab goals.     Left IPH  -voiding well with low PVRs, D/C PVRs  -- off doxazosin 3/2    Monitor on Paxil--

## 2023-03-10 NOTE — PROGRESS NOTE ADULT - SUBJECTIVE AND OBJECTIVE BOX
Follow-up Pulmonary Progress Note  Chief Complaint : Other nontraumatic intracerebral hemorrhage      pt on room air  no secretions  stoma  practically closed no stridor small pinsized hole       Allergies :No Known Allergies      PAST MEDICAL & SURGICAL HISTORY:  Substance abuse    Hypertension    Depression    Anxiety    No significant past surgical history        Medications:  MEDICATIONS  (STANDING):  AQUAPHOR (petrolatum Ointment) 1 Application(s) Topical two times a day  atorvastatin 80 milliGRAM(s) Oral at bedtime  bacitracin   Ointment 1 Application(s) Topical daily  bacitracin   Ointment 1 Application(s) Topical two times a day  enoxaparin Injectable 90 milliGRAM(s) SubCutaneous <User Schedule>  gabapentin 600 milliGRAM(s) Oral at bedtime  melatonin 6 milliGRAM(s) Oral at bedtime  midodrine. 10 milliGRAM(s) Oral <User Schedule>  nystatin Powder 1 Application(s) Topical two times a day  pantoprazole   Suspension 40 milliGRAM(s) Oral daily  PARoxetine 10 milliGRAM(s) Oral <User Schedule>  polyethylene glycol 3350 17 Gram(s) Oral daily  senna Syrup 5 milliLiter(s) Oral at bedtime  traZODone 100 milliGRAM(s) Oral at bedtime    MEDICATIONS  (PRN):  acetaminophen    Suspension .. 650 milliGRAM(s) Oral every 6 hours PRN Temp greater or equal to 38C (100.4F), Mild Pain (1 - 3)      Antibiotics History      Heme Medications   enoxaparin Injectable 90 milliGRAM(s) SubCutaneous <User Schedule>, 03-02-23 @ 00:00      GI Medications  pantoprazole   Suspension 40 milliGRAM(s) Oral daily, 03-01-23 @ 00:00, Routine  polyethylene glycol 3350 17 Gram(s) Oral daily, 02-28-23 @ 14:19, Routine  senna Syrup 5 milliLiter(s) Oral at bedtime, 02-28-23 @ 14:19, Routine        LABS:                        12.4   7.19  )-----------( 239      ( 09 Mar 2023 06:05 )             37.6     03-09    136  |  101  |  11  ----------------------------<  126<H>  4.2   |  25  |  0.52    Ca    9.1      09 Mar 2023 06:05    TPro  6.9  /  Alb  3.0<L>  /  TBili  0.4  /  DBili  x   /  AST  15  /  ALT  23  /  AlkPhos  105  03-09       VITALS:  T(C): 36.6 (03-10-23 @ 07:40), Max: 36.8 (03-09-23 @ 20:32)  T(F): 97.8 (03-10-23 @ 07:40), Max: 98.3 (03-09-23 @ 20:32)  HR: 82 (03-10-23 @ 07:40) (82 - 92)  BP: 123/81 (03-10-23 @ 07:40) (123/81 - 137/79)  BP(mean): --  ABP: --  ABP(mean): --  RR: 16 (03-10-23 @ 07:40) (15 - 16)  SpO2: 95% (03-10-23 @ 07:40) (95% - 97%)  CVP(mm Hg): --  CVP(cm H2O): --    Ins and Outs

## 2023-03-10 NOTE — PROGRESS NOTE ADULT - ASSESSMENT
41 y/o M with PMH of prior substance use, HTN, anxiety who presented to [facility] on 1/23 with  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 He is  s/p Left decompressive hemicraniectomy and right EVD placement 1/28/23. Hospital course further s/f respiratory failure s/p intubation and extubation, agitation requiring precedex, sepsis s/p ABX, hypotension requiring bolus, dysphagia s/p PEG 2/14 and trach on 2/13, insomnia s/p seroquel, pain s/p oxycodone and fentanyl. Patient now with admitted to [facility] acute inpatient rehab.     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 with mass effect and 8mm midline shift  -s/p intubation for airway protection and extubation  -s/p Decompressive hemicraniectomy and EVD placement 1/28  -Underwent wound revision with NSGY and plastics on 2/17    #Bilateral lower extremity DVTs  -Lower extremity Doppler from 3/1–right femoral vein DVT and bilateral lower extremity calf DVTs  -Neurosurgeon gave clearance for patient to start therapeutic Lovenox  -Vascular Surgery Dr. Hodges 3/7 - recommended nothing further to do if patient is on therapeutic Lovenox   -Continue therapeutic Lovenox for 3 months    #Acute Respiratory Failure  -s/p intubation/extubation, Trach 2/13  -Pulm consulted, recs noted    #Hypotension  -midodrine in AM prior to PT, increased to 10 mg 3/9  -monitor    #T2DM  -HbA1c 6.0  -diet controlled  -monitor    #Dysphagia s/p PEG 2/14  -Continue TF    #GERD  -PPI    #DVT ppx  -on therapeutic Lovenox    d/w Dr. Talavera

## 2023-03-10 NOTE — PROGRESS NOTE ADULT - SUBJECTIVE AND OBJECTIVE BOX
Patient is a 42y old  Male who presents with a chief complaint of left IPH/ICH (10 Mar 2023 12:02)    Patient seen and examined at bedside. No acute overnight events.     ALLERGIES:  No Known Allergies    MEDICATIONS  (STANDING):  AQUAPHOR (petrolatum Ointment) 1 Application(s) Topical two times a day  atorvastatin 80 milliGRAM(s) Oral at bedtime  bacitracin   Ointment 1 Application(s) Topical daily  bacitracin   Ointment 1 Application(s) Topical two times a day  enoxaparin Injectable 90 milliGRAM(s) SubCutaneous <User Schedule>  gabapentin 600 milliGRAM(s) Oral at bedtime  melatonin 6 milliGRAM(s) Oral at bedtime  midodrine. 10 milliGRAM(s) Oral <User Schedule>  nystatin Powder 1 Application(s) Topical two times a day  pantoprazole   Suspension 40 milliGRAM(s) Oral daily  PARoxetine 10 milliGRAM(s) Oral <User Schedule>  polyethylene glycol 3350 17 Gram(s) Oral daily  senna Syrup 5 milliLiter(s) Oral at bedtime  traZODone 100 milliGRAM(s) Oral at bedtime    MEDICATIONS  (PRN):  acetaminophen    Suspension .. 650 milliGRAM(s) Oral every 6 hours PRN Temp greater or equal to 38C (100.4F), Mild Pain (1 - 3)    Vital Signs Last 24 Hrs  T(F): 97.8 (10 Mar 2023 07:40), Max: 98.3 (09 Mar 2023 20:32)  HR: 82 (10 Mar 2023 07:40) (82 - 92)  BP: 123/81 (10 Mar 2023 07:40) (123/81 - 137/79)  RR: 16 (10 Mar 2023 07:40) (15 - 16)  SpO2: 95% (10 Mar 2023 07:40) (95% - 97%)      PHYSICAL EXAM:  GENERAL- NAD  EAR/NOSE/MOUTH/THROAT - no pharyngeal exudates, MMM  EYES- LEW, conjunctiva and Sclera clear  NECK- supple, trach  RESPIRATORY-  clear to auscultation bilaterally, non laboured breathing  CARDIOVASCULAR - SIS2, RRR  GI - soft NT BS present, peg+  EXTREMITIES - no pedal edema  NEUROLOGY - aphasia, right sided weakness > left       LABS:                        12.4   7.19  )-----------( 239      ( 09 Mar 2023 06:05 )             37.6       03-09    136  |  101  |  11  ----------------------------<  126  4.2   |  25  |  0.52    Ca    9.1      09 Mar 2023 06:05    TPro  6.9  /  Alb  3.0  /  TBili  0.4  /  DBili  x   /  AST  15  /  ALT  23  /  AlkPhos  105  03-09                                      COVID-19 PCR: NotDetec (02-28-23 @ 16:00)      RADIOLOGY & ADDITIONAL TESTS:     Care Discussed with Consultants/Other Providers: PM&R

## 2023-03-10 NOTE — PROGRESS NOTE ADULT - SUBJECTIVE AND OBJECTIVE BOX
SUBJECTIVE/OBJECTIVE: Patient seen and examined in bed during AM rounds. Less cooperative this morning but alert and calm. Denies abd pain. Witnessed LUE LLE spont movement. Limited by aphasia and attention.     REVIEW OF SYSTEMS  Aphasia  Right hemiparesis   Cognitive deficits   RECENT LABS    Vital Signs Last 24 Hrs  T(C): 36.6 (10 Mar 2023 07:40), Max: 36.8 (09 Mar 2023 20:32)  T(F): 97.8 (10 Mar 2023 07:40), Max: 98.3 (09 Mar 2023 20:32)  HR: 82 (10 Mar 2023 07:40) (82 - 92)  BP: 123/81 (10 Mar 2023 07:40) (123/81 - 137/79)  RR: 16 (10 Mar 2023 07:40) (15 - 16)  SpO2: 95% (10 Mar 2023 07:40) (95% - 97%)    Parameters below as of 10 Mar 2023 07:40  Patient On (Oxygen Delivery Method): room air                        12.4   7.19  )-----------( 239      ( 09 Mar 2023 06:05 )             37.6     03-09    136  |  101  |  11  ----------------------------<  126<H>  4.2   |  25  |  0.52    Ca    9.1      09 Mar 2023 06:05    TPro  6.9  /  Alb  3.0<L>  /  TBili  0.4  /  DBili  x   /  AST  15  /  ALT  23  /  AlkPhos  105  03-09      CAPILLARY BLOOD GLUCOSE      PHYSICAL EXAM:   Constitutional - NAD, Comfortable  HEENT - EOMI, left hemicraniectomy, sutures removed 3/7, +Helmet in place   Neck - s/p trach, stoma covered   Chest -  NAD, no dyspnea or accessory mm use  Cardio - warm and well perfused   Abdomen -  Soft, NTND, +PEG, Bone flap marsupialized in epigastric region. minor dehiscence of last suture of marsupialization, suture now removed. Sutures removed, healing well, with no significant erythema and no drainage.   Extremities - No peripheral edema, No calf tenderness   Neurologic Exam:                    Cognitive -             Orientation: Awake, Alert, unable to assess orientation due to aphasia and cognitive deficits            Communication- able to sing "hello" and hum a tune.             Attention:  Impaired; makes eye contact but follows commands inconsistently            Memory: Impaired            Thought: Impaired, Impulsive     Speech - non-verbal -s/p trach, impaired comprehension, +aphasia, poor mimicking     Cranial Nerves - Limited due to comprehension impairment and aphasia; No facial asymmetry, Tongue midline, EOMI, Pupils dilated but reactive, +dysphagia     Motor -                      LEFT    UE - at least 3/5 and moving spontaneously; exam limited by command following;  WNL                    RIGHT UE - ShAB 0/5, EF 0/5, EE 0/5, WE 0/5,  0/5-- MAS 1 finger flexors                     LEFT    LE -  at least 3/5 and moving spontaneously; exam limited by command following                    RIGHT LE - HE 1/5, Hip adductor 1/5, KE 0/5, DF 0/5, PF 0/5        Sensory - decrease sensation to LT-- RUE     Reflexes - DTR 2+/ 4 , neg Grider's b/l, neg Babinski's b/l     Coordination - FTN / HTS impaired on right  Psychiatric - Mood stable, Affect WNL  Skin on admission: IAD to bilateral groin and sacrum; RUQ incision s/p suture removal with palpable skull flap; Left hemicraniectomy with sutures MILLER; psoriasis BL shin    MEDICATIONS  (STANDING):  AQUAPHOR (petrolatum Ointment) 1 Application(s) Topical two times a day  atorvastatin 80 milliGRAM(s) Oral at bedtime  bacitracin   Ointment 1 Application(s) Topical daily  bacitracin   Ointment 1 Application(s) Topical two times a day  enoxaparin Injectable 90 milliGRAM(s) SubCutaneous <User Schedule>  gabapentin 600 milliGRAM(s) Oral at bedtime  melatonin 6 milliGRAM(s) Oral at bedtime  midodrine. 10 milliGRAM(s) Oral <User Schedule>  nystatin Powder 1 Application(s) Topical two times a day  pantoprazole   Suspension 40 milliGRAM(s) Oral daily  PARoxetine 10 milliGRAM(s) Oral <User Schedule>  polyethylene glycol 3350 17 Gram(s) Oral daily  senna Syrup 5 milliLiter(s) Oral at bedtime  traZODone 100 milliGRAM(s) Oral at bedtime    MEDICATIONS  (PRN):  acetaminophen    Suspension .. 650 milliGRAM(s) Oral every 6 hours PRN Temp greater or equal to 38C (100.4F), Mild Pain (1 - 3)       SUBJECTIVE/OBJECTIVE: Patient seen and examined in bed during AM rounds. alert and calm. Denies abd pain. Reports he slept at night.   Witnessed LUE LLE spont movement. Limited by aphasia and attention. Pt. slept during the night and voiding with low PVRs as per nursing.     REVIEW OF SYSTEMS  Aphasia  Right hemiparesis   Cognitive deficits   RECENT LABS    Vital Signs Last 24 Hrs  T(C): 36.6 (10 Mar 2023 07:40), Max: 36.8 (09 Mar 2023 20:32)  T(F): 97.8 (10 Mar 2023 07:40), Max: 98.3 (09 Mar 2023 20:32)  HR: 82 (10 Mar 2023 07:40) (82 - 92)  BP: 123/81 (10 Mar 2023 07:40) (123/81 - 137/79)  RR: 16 (10 Mar 2023 07:40) (15 - 16)  SpO2: 95% (10 Mar 2023 07:40) (95% - 97%)    Parameters below as of 10 Mar 2023 07:40  Patient On (Oxygen Delivery Method): room air                        12.4   7.19  )-----------( 239      ( 09 Mar 2023 06:05 )             37.6     03-09    136  |  101  |  11  ----------------------------<  126<H>  4.2   |  25  |  0.52    Ca    9.1      09 Mar 2023 06:05    TPro  6.9  /  Alb  3.0<L>  /  TBili  0.4  /  DBili  x   /  AST  15  /  ALT  23  /  AlkPhos  105  03-09      CAPILLARY BLOOD GLUCOSE      PHYSICAL EXAM:   Constitutional - NAD, Comfortable  HEENT - EOMI, left hemicraniectomy, sutures removed 3/7, +Helmet in place   Neck - s/p trach, stoma covered   Chest -  NAD, no dyspnea or accessory mm use  Cardio - warm and well perfused   Abdomen -  Soft, NTND, +PEG, Bone flap marsupialized in epigastric region. minor dehiscence of last suture of marsupialization, suture now removed. Sutures removed, healing well, with no significant erythema and no drainage.   Extremities - No peripheral edema, No calf tenderness   Neurologic Exam:                    Cognitive -             Orientation: Awake, Alert, unable to assess orientation due to aphasia and cognitive deficits            Communication- able to sing "hello" and hum a tune.             Attention:  Impaired; makes eye contact but follows commands inconsistently            Memory: Impaired            Thought: Impaired, Impulsive     Speech - non-verbal -s/p trach, impaired comprehension, +aphasia, poor mimicking     Cranial Nerves - Limited due to comprehension impairment and aphasia; No facial asymmetry, Tongue midline, EOMI, Pupils dilated but reactive, +dysphagia     Motor -                      LEFT    UE - at least 3/5 and moving spontaneously; exam limited by command following;  WNL                    RIGHT UE - ShAB 0/5, EF 0/5, EE 0/5, WE 0/5,  0/5-- MAS 1 finger flexors                     LEFT    LE -  at least 3/5 and moving spontaneously; exam limited by command following                    RIGHT LE - HE 1/5, Hip adductor 1/5, KE 0/5, DF 0/5, PF 0/5        Sensory - decrease sensation to LT-- RUE     Reflexes - DTR 2+/ 4 , neg Grider's b/l, neg Babinski's b/l     Coordination - FTN / HTS impaired on right  Psychiatric - Mood stable, Affect WNL  Skin on admission: IAD to bilateral groin and sacrum; RUQ incision s/p suture removal with palpable skull flap; Left hemicraniectomy with sutures MILLER; psoriasis BL shin    MEDICATIONS  (STANDING):  AQUAPHOR (petrolatum Ointment) 1 Application(s) Topical two times a day  atorvastatin 80 milliGRAM(s) Oral at bedtime  bacitracin   Ointment 1 Application(s) Topical daily  bacitracin   Ointment 1 Application(s) Topical two times a day  enoxaparin Injectable 90 milliGRAM(s) SubCutaneous <User Schedule>  gabapentin 600 milliGRAM(s) Oral at bedtime  melatonin 6 milliGRAM(s) Oral at bedtime  midodrine. 10 milliGRAM(s) Oral <User Schedule>  nystatin Powder 1 Application(s) Topical two times a day  pantoprazole   Suspension 40 milliGRAM(s) Oral daily  PARoxetine 10 milliGRAM(s) Oral <User Schedule>  polyethylene glycol 3350 17 Gram(s) Oral daily  senna Syrup 5 milliLiter(s) Oral at bedtime  traZODone 100 milliGRAM(s) Oral at bedtime    MEDICATIONS  (PRN):  acetaminophen    Suspension .. 650 milliGRAM(s) Oral every 6 hours PRN Temp greater or equal to 38C (100.4F), Mild Pain (1 - 3)

## 2023-03-10 NOTE — PROGRESS NOTE ADULT - ASSESSMENT
Physical Examination:  GENERAL:               Alert,  No acute distress.    HEENT:                      No JVD, Moist MM, s/p craniotomy , stoma with pinsized hole   PULM:                     Bilateral air entry, course to auscultation bilaterally, no significant sputum production, No Rales, No Rhonchi, No Wheezing  CVS:                         S1, S2,  No Murmur  ABD:                        Soft, nondistended, nontender, normoactive bowel sounds,   EXT:                         No edema, nontender, No Cyanosis or Clubbing   NEURO:                  Alert,  Not interactive, does not follow commands  PSYC:                      Calm, not Insight.      Assessment  Chronic Respiratory failure s/p trach s/p self decannulation 3/5/23  S/P ICH  h/o polysubstance abuse  underlying HTN, Depression    Plan  stoma can be open to air,   monitor on RA  No gross air trapping with out trach today  PT as tolerated    Rest of care as per primary team

## 2023-03-11 PROCEDURE — 99232 SBSQ HOSP IP/OBS MODERATE 35: CPT | Mod: GC

## 2023-03-11 RX ADMIN — PANTOPRAZOLE SODIUM 40 MILLIGRAM(S): 20 TABLET, DELAYED RELEASE ORAL at 12:33

## 2023-03-11 RX ADMIN — ENOXAPARIN SODIUM 90 MILLIGRAM(S): 100 INJECTION SUBCUTANEOUS at 08:21

## 2023-03-11 RX ADMIN — ATORVASTATIN CALCIUM 80 MILLIGRAM(S): 80 TABLET, FILM COATED ORAL at 21:52

## 2023-03-11 RX ADMIN — Medication 1 APPLICATION(S): at 05:11

## 2023-03-11 RX ADMIN — SENNA PLUS 5 MILLILITER(S): 8.6 TABLET ORAL at 21:51

## 2023-03-11 RX ADMIN — ENOXAPARIN SODIUM 90 MILLIGRAM(S): 100 INJECTION SUBCUTANEOUS at 21:51

## 2023-03-11 RX ADMIN — Medication 6 MILLIGRAM(S): at 21:51

## 2023-03-11 RX ADMIN — MIDODRINE HYDROCHLORIDE 10 MILLIGRAM(S): 2.5 TABLET ORAL at 08:20

## 2023-03-11 RX ADMIN — GABAPENTIN 600 MILLIGRAM(S): 400 CAPSULE ORAL at 21:51

## 2023-03-11 RX ADMIN — Medication 10 MILLIGRAM(S): at 05:12

## 2023-03-11 RX ADMIN — Medication 1 APPLICATION(S): at 12:33

## 2023-03-11 RX ADMIN — Medication 1 APPLICATION(S): at 17:41

## 2023-03-11 RX ADMIN — NYSTATIN CREAM 1 APPLICATION(S): 100000 CREAM TOPICAL at 05:11

## 2023-03-11 RX ADMIN — Medication 100 MILLIGRAM(S): at 21:52

## 2023-03-11 RX ADMIN — NYSTATIN CREAM 1 APPLICATION(S): 100000 CREAM TOPICAL at 17:29

## 2023-03-11 NOTE — PROGRESS NOTE ADULT - SUBJECTIVE AND OBJECTIVE BOX
Cc: Gait dysfunction    HPI: Patient with no new medical issues today.  Pain controlled, no chest pain, no N/V, no Fevers/Chills. No other new ROS  Has been tolerating rehabilitation program.    acetaminophen    Suspension .. 650 milliGRAM(s) Oral every 6 hours PRN  AQUAPHOR (petrolatum Ointment) 1 Application(s) Topical two times a day  atorvastatin 80 milliGRAM(s) Oral at bedtime  bacitracin   Ointment 1 Application(s) Topical two times a day  bacitracin   Ointment 1 Application(s) Topical daily  enoxaparin Injectable 90 milliGRAM(s) SubCutaneous <User Schedule>  gabapentin 600 milliGRAM(s) Oral at bedtime  melatonin 6 milliGRAM(s) Oral at bedtime  midodrine. 10 milliGRAM(s) Oral <User Schedule>  nystatin Powder 1 Application(s) Topical two times a day  pantoprazole   Suspension 40 milliGRAM(s) Oral daily  PARoxetine 10 milliGRAM(s) Oral <User Schedule>  polyethylene glycol 3350 17 Gram(s) Oral daily  senna Syrup 5 milliLiter(s) Oral at bedtime  traZODone 100 milliGRAM(s) Oral at bedtime      T(C): 36.5 (03-11-23 @ 08:43), Max: 36.8 (03-10-23 @ 21:30)  HR: 72 (03-11-23 @ 08:43) (69 - 72)  BP: 111/73 (03-11-23 @ 08:43) (111/73 - 116/70)  RR: 16 (03-11-23 @ 08:43) (16 - 16)  SpO2: 96% (03-11-23 @ 08:43) (96% - 97%)    In NAD  HEENT- EOMI  Heart- RRR, S1S2  Lungs- CTA bl.  Abd- + BS, NT  Ext- No calf pain  Neuro- Exam unchanged          Imp: Patient with diagnosis of  IPH admitted for comprehensive acute rehabilitation.    Plan:  - Continue PT/OT/SLP  - DVT prophylaxis  - Skin- Turn q2h, check skin daily  - Continue current medications; patient medically stable.   -Active issues- none  - Patient is stable to continue current rehabilitation program.

## 2023-03-12 PROCEDURE — 99232 SBSQ HOSP IP/OBS MODERATE 35: CPT | Mod: GC

## 2023-03-12 RX ADMIN — Medication 100 MILLIGRAM(S): at 21:03

## 2023-03-12 RX ADMIN — ENOXAPARIN SODIUM 90 MILLIGRAM(S): 100 INJECTION SUBCUTANEOUS at 21:03

## 2023-03-12 RX ADMIN — PANTOPRAZOLE SODIUM 40 MILLIGRAM(S): 20 TABLET, DELAYED RELEASE ORAL at 12:11

## 2023-03-12 RX ADMIN — GABAPENTIN 600 MILLIGRAM(S): 400 CAPSULE ORAL at 21:04

## 2023-03-12 RX ADMIN — NYSTATIN CREAM 1 APPLICATION(S): 100000 CREAM TOPICAL at 05:18

## 2023-03-12 RX ADMIN — Medication 1 APPLICATION(S): at 12:11

## 2023-03-12 RX ADMIN — Medication 1 APPLICATION(S): at 17:05

## 2023-03-12 RX ADMIN — POLYETHYLENE GLYCOL 3350 17 GRAM(S): 17 POWDER, FOR SOLUTION ORAL at 12:10

## 2023-03-12 RX ADMIN — MIDODRINE HYDROCHLORIDE 10 MILLIGRAM(S): 2.5 TABLET ORAL at 08:21

## 2023-03-12 RX ADMIN — NYSTATIN CREAM 1 APPLICATION(S): 100000 CREAM TOPICAL at 17:05

## 2023-03-12 RX ADMIN — Medication 1 APPLICATION(S): at 05:17

## 2023-03-12 RX ADMIN — SENNA PLUS 5 MILLILITER(S): 8.6 TABLET ORAL at 21:03

## 2023-03-12 RX ADMIN — ATORVASTATIN CALCIUM 80 MILLIGRAM(S): 80 TABLET, FILM COATED ORAL at 21:04

## 2023-03-12 RX ADMIN — Medication 10 MILLIGRAM(S): at 05:17

## 2023-03-12 RX ADMIN — Medication 6 MILLIGRAM(S): at 21:04

## 2023-03-12 RX ADMIN — Medication 1 APPLICATION(S): at 05:18

## 2023-03-12 RX ADMIN — ENOXAPARIN SODIUM 90 MILLIGRAM(S): 100 INJECTION SUBCUTANEOUS at 08:21

## 2023-03-12 NOTE — PROGRESS NOTE ADULT - SUBJECTIVE AND OBJECTIVE BOX
Cc: Gait dysfunction/ TBI     HPI: Patient with no new medical issues today.  Pain controlled, no chest pain, no N/V, no Fevers/Chills. No other new ROS  Has been tolerating rehabilitation program.    acetaminophen    Suspension .. 650 milliGRAM(s) Oral every 6 hours PRN  AQUAPHOR (petrolatum Ointment) 1 Application(s) Topical two times a day  atorvastatin 80 milliGRAM(s) Oral at bedtime  bacitracin   Ointment 1 Application(s) Topical two times a day  bacitracin   Ointment 1 Application(s) Topical daily  enoxaparin Injectable 90 milliGRAM(s) SubCutaneous <User Schedule>  gabapentin 600 milliGRAM(s) Oral at bedtime  melatonin 6 milliGRAM(s) Oral at bedtime  midodrine. 10 milliGRAM(s) Oral <User Schedule>  nystatin Powder 1 Application(s) Topical two times a day  pantoprazole   Suspension 40 milliGRAM(s) Oral daily  PARoxetine 10 milliGRAM(s) Oral <User Schedule>  polyethylene glycol 3350 17 Gram(s) Oral daily  senna Syrup 5 milliLiter(s) Oral at bedtime  traZODone 100 milliGRAM(s) Oral at bedtime      Vital Signs Last 24 Hrs  T(C): 37.2 (12 Mar 2023 08:25), Max: 37.2 (12 Mar 2023 08:25)  T(F): 98.9 (12 Mar 2023 08:25), Max: 98.9 (12 Mar 2023 08:25)  HR: 81 (12 Mar 2023 08:25) (74 - 81)  BP: 118/79 (12 Mar 2023 08:25) (118/79 - 123/74)  BP(mean): --  RR: 16 (12 Mar 2023 08:25) (16 - 16)  SpO2: 96% (12 Mar 2023 08:25) (96% - 96%)    Parameters below as of 12 Mar 2023 08:25  Patient On (Oxygen Delivery Method): room air        In NAD  HEENT- EOMI  Heart- RRR, S1S2  Lungs- CTA bl.  Abd- + BS, NT  Ext- No calf pain  Neuro- Exam unchanged          Imp: Patient with diagnosis of  IPH admitted for comprehensive acute rehabilitation.    Plan:  - Continue PT/OT/SLP  - DVT prophylaxis  - Skin- Turn q2h, check skin daily  - Continue current medications; patient medically stable.   -Active issues- agitation/ restlessness continue 1:1   - Patient is stable to continue current rehabilitation program.

## 2023-03-13 LAB
ALBUMIN SERPL ELPH-MCNC: 3 G/DL — LOW (ref 3.3–5)
ALP SERPL-CCNC: 115 U/L — SIGNIFICANT CHANGE UP (ref 40–120)
ALT FLD-CCNC: 24 U/L — SIGNIFICANT CHANGE UP (ref 10–45)
ANION GAP SERPL CALC-SCNC: 12 MMOL/L — SIGNIFICANT CHANGE UP (ref 5–17)
AST SERPL-CCNC: 14 U/L — SIGNIFICANT CHANGE UP (ref 10–40)
BILIRUB SERPL-MCNC: 0.4 MG/DL — SIGNIFICANT CHANGE UP (ref 0.2–1.2)
BUN SERPL-MCNC: 5 MG/DL — LOW (ref 7–23)
CALCIUM SERPL-MCNC: 9.1 MG/DL — SIGNIFICANT CHANGE UP (ref 8.4–10.5)
CHLORIDE SERPL-SCNC: 102 MMOL/L — SIGNIFICANT CHANGE UP (ref 96–108)
CO2 SERPL-SCNC: 25 MMOL/L — SIGNIFICANT CHANGE UP (ref 22–31)
CREAT SERPL-MCNC: 0.59 MG/DL — SIGNIFICANT CHANGE UP (ref 0.5–1.3)
EGFR: 124 ML/MIN/1.73M2 — SIGNIFICANT CHANGE UP
GLUCOSE SERPL-MCNC: 124 MG/DL — HIGH (ref 70–99)
HCT VFR BLD CALC: 39.2 % — SIGNIFICANT CHANGE UP (ref 39–50)
HGB BLD-MCNC: 13 G/DL — SIGNIFICANT CHANGE UP (ref 13–17)
MCHC RBC-ENTMCNC: 29.7 PG — SIGNIFICANT CHANGE UP (ref 27–34)
MCHC RBC-ENTMCNC: 33.2 GM/DL — SIGNIFICANT CHANGE UP (ref 32–36)
MCV RBC AUTO: 89.7 FL — SIGNIFICANT CHANGE UP (ref 80–100)
NRBC # BLD: 0 /100 WBCS — SIGNIFICANT CHANGE UP (ref 0–0)
PLATELET # BLD AUTO: 278 K/UL — SIGNIFICANT CHANGE UP (ref 150–400)
POTASSIUM SERPL-MCNC: 4 MMOL/L — SIGNIFICANT CHANGE UP (ref 3.5–5.3)
POTASSIUM SERPL-SCNC: 4 MMOL/L — SIGNIFICANT CHANGE UP (ref 3.5–5.3)
PROT SERPL-MCNC: 7.1 G/DL — SIGNIFICANT CHANGE UP (ref 6–8.3)
RBC # BLD: 4.37 M/UL — SIGNIFICANT CHANGE UP (ref 4.2–5.8)
RBC # FLD: 13.8 % — SIGNIFICANT CHANGE UP (ref 10.3–14.5)
SODIUM SERPL-SCNC: 139 MMOL/L — SIGNIFICANT CHANGE UP (ref 135–145)
WBC # BLD: 7.23 K/UL — SIGNIFICANT CHANGE UP (ref 3.8–10.5)
WBC # FLD AUTO: 7.23 K/UL — SIGNIFICANT CHANGE UP (ref 3.8–10.5)

## 2023-03-13 PROCEDURE — 99232 SBSQ HOSP IP/OBS MODERATE 35: CPT

## 2023-03-13 RX ORDER — DONEPEZIL HYDROCHLORIDE 10 MG/1
5 TABLET, FILM COATED ORAL AT BEDTIME
Refills: 0 | Status: DISCONTINUED | OUTPATIENT
Start: 2023-03-13 | End: 2023-03-13

## 2023-03-13 RX ORDER — DONEPEZIL HYDROCHLORIDE 10 MG/1
5 TABLET, FILM COATED ORAL DAILY
Refills: 0 | Status: DISCONTINUED | OUTPATIENT
Start: 2023-03-13 | End: 2023-03-21

## 2023-03-13 RX ADMIN — Medication 100 MILLIGRAM(S): at 21:08

## 2023-03-13 RX ADMIN — Medication 1 APPLICATION(S): at 12:19

## 2023-03-13 RX ADMIN — Medication 6 MILLIGRAM(S): at 21:08

## 2023-03-13 RX ADMIN — ENOXAPARIN SODIUM 90 MILLIGRAM(S): 100 INJECTION SUBCUTANEOUS at 21:07

## 2023-03-13 RX ADMIN — Medication 10 MILLIGRAM(S): at 05:50

## 2023-03-13 RX ADMIN — ENOXAPARIN SODIUM 90 MILLIGRAM(S): 100 INJECTION SUBCUTANEOUS at 09:56

## 2023-03-13 RX ADMIN — NYSTATIN CREAM 1 APPLICATION(S): 100000 CREAM TOPICAL at 05:51

## 2023-03-13 RX ADMIN — SENNA PLUS 5 MILLILITER(S): 8.6 TABLET ORAL at 21:08

## 2023-03-13 RX ADMIN — DONEPEZIL HYDROCHLORIDE 5 MILLIGRAM(S): 10 TABLET, FILM COATED ORAL at 12:21

## 2023-03-13 RX ADMIN — NYSTATIN CREAM 1 APPLICATION(S): 100000 CREAM TOPICAL at 18:16

## 2023-03-13 RX ADMIN — MIDODRINE HYDROCHLORIDE 10 MILLIGRAM(S): 2.5 TABLET ORAL at 09:56

## 2023-03-13 RX ADMIN — POLYETHYLENE GLYCOL 3350 17 GRAM(S): 17 POWDER, FOR SOLUTION ORAL at 12:18

## 2023-03-13 RX ADMIN — Medication 1 APPLICATION(S): at 18:17

## 2023-03-13 RX ADMIN — ATORVASTATIN CALCIUM 80 MILLIGRAM(S): 80 TABLET, FILM COATED ORAL at 21:08

## 2023-03-13 RX ADMIN — Medication 1 APPLICATION(S): at 05:51

## 2023-03-13 RX ADMIN — PANTOPRAZOLE SODIUM 40 MILLIGRAM(S): 20 TABLET, DELAYED RELEASE ORAL at 12:19

## 2023-03-13 RX ADMIN — GABAPENTIN 600 MILLIGRAM(S): 400 CAPSULE ORAL at 21:08

## 2023-03-13 RX ADMIN — Medication 1 APPLICATION(S): at 05:50

## 2023-03-13 NOTE — PROGRESS NOTE ADULT - ASSESSMENT
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 He is  s/p Left decompressive hemicraniectomy and right EVD placement 1/28/23. Hospital course further c/b respiratory failure s/p intubation and extubation,  agitation requiring precedex, sepsis s/p ABX, hypotension requiring bolus, dysphagia s/p PEG 2/14 and trach on 2/13, insomnia s/p seroquel, pain s/p oxycodone and fentanyl. Patient now with Right Hemiparesis, dysphagia s/p PEG, Aphasia, Cognitive deficits, gait Instability, ADL impairments and Functional impairments.    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    - ST: to diagnose and treat deficits in swallowing, cognition and communication.  - Helmet when OOB  - Baseline CT head 3/1- High right frontal malik hole is seen. Postoperative changes compatible with a left temporal frontal parietal craniectomy is again seen. Mild parenchyma extending through the craniectomy defect is again seen. There is abnormal high attenuation seen involving the left basal ganglia/corona radiata region. This finding is likely compatible with evolving areas acute parenchymal hemorrhage. Surrounding edema is seen. Mass effect on the left lateral ventricle is seen. Left-to-right shift (3.1 mm) is seen. Evaluation of the osseous structures with the appropriate window aside from postop changes appear normal. Complete opacification of the right sphenoid sinus is seen consisting of mucosal thickening. Both mastoid and middle ear regions appear clear. IMPRESSION: Postop changes described above. Evolving area parenchymal hemorrhage as described above.  - OT–right resting hand splint      Sleep/Restlessness-   --c/w Trazodone to 100mg qhs 3/8-- helping with sleep and restlessness.  Pt's mother reports he was on 50-100mg qhs and 50mg daytime home meds.   --cont. melatonin  --monitor  --Attending discussed with nursing that patient needs supervision for safety monitoring as pt.  is impulsive, restless and high fall risk as well as is reaching for trach  --Cont. left UE mitten    Depression/anxiety  --Pt was on Paxil 30mg at home as per mother--   --Paxil 10mg started 3/10--Increase later in week    Aphasia--  --trial donepezil 5mg daily -- start today      Bilateral lower extremity DVTs–  – Lower extremity Doppler from 3/1–right femoral vein DVT and bilateral lower extremity calf DVTs  – spoke with patient's neurosurgery PA–who was in contact with Dr. Cueva patient's neurosurgeon–gave clearance for patient to start therapeutic Lovenox.  --Vascular Surgery consult -- reaached out to Dr. Hodges 3/7 - nothing further to do if patient is on therapeutic lovenox.   -- c/w therapeutic lovenox for 3 months.    Orthostatic hypotension–  -Stopped doxazosin 3/2-- monitor PVRs, low PVR's per nursing  -no report of OH since midodrine 10mg   -cont to monitor    #Acute Respiratory Failure  - Intubation and extubation  - s/p Trach 2/13  - Decannulated 3/5, Pulm monitoring, tolerating well    #GI PPX  - Nexium 40mg daily    #DM II  - A1c 6.0  -management as per hospitalist--off insulin     #Pain management  - Tylenol PRN,   --cont. Neurontin 600mg nightly    #Bowel Regimen  - Senna, miralax PRN    #Bladder management  -voiding well with low PVRs,   -- off doxazosin 3/2    #Dysphagia    - s/p PEG 2/14  - Puree with thin liquids-- 1:1 assist with meals  - SLP: evaluation and treatment  - Diet: tolerating bolus TF     #Skin:  - Skin on admission: IAD to bilateral groin and sacrum; RUQ incision with sutures with palpable skull flap; Left hemicraniectomy with sutures MILLER; psoriasis BL shin  - Pressure injury/Skin: Turn Q2hrs while in bed, OOB to Chair, PT/OT     #Precaution  - Fall, Aspiration, seizure    #home meds  - per list provided by Laura yang: Rousuvastatin 40mg daily and Paroxetine 30mg daily  - Initiated 10mg paroxetine  - cw high intensity atorvastatin to home med dose    – IDT 3/9:  Social work:  patient on discharge can live with his mother who has a first-floor set up.  Nursing–incontinent of bowel and bladder, total assist with toileting  Speech: Puree/thin diet, severe language deficits–poor command following, poor orientation, Some spontaneous naming, & gestures, Speech jargon, Aphasia, severe cognitive deficits  OT's: Total assist with ADLs and transfers, restless  PT: Sitting –max assist,, Supine to sit--Max A x2; Improved trunk control; Tot A transfers   goals-- mod assist with transfers, ambulation 10 feet max assist with hemiwalker, wheelchair mobility 50 feet min assist.  ELOS: 3/28 ZOHAIB  Goals:  1.  Improved secretion management and tolerate PMV  2.  transfers with 1 person assist  3.  Communicate basic wants and needs with yes no answers    Discussed medications and progress with Laura yang on 3/9. All questions answered.

## 2023-03-13 NOTE — PROGRESS NOTE ADULT - SUBJECTIVE AND OBJECTIVE BOX
Patient is a 42y old  Male who presents with a chief complaint of left IPH/ICH (12 Mar 2023 12:13)      Patient seen and examined at bedside, stable, NAD.     ALLERGIES:  No Known Allergies    MEDICATIONS  (STANDING):  AQUAPHOR (petrolatum Ointment) 1 Application(s) Topical two times a day  atorvastatin 80 milliGRAM(s) Oral at bedtime  bacitracin   Ointment 1 Application(s) Topical two times a day  bacitracin   Ointment 1 Application(s) Topical daily  donepezil 5 milliGRAM(s) Oral at bedtime  enoxaparin Injectable 90 milliGRAM(s) SubCutaneous <User Schedule>  gabapentin 600 milliGRAM(s) Oral at bedtime  melatonin 6 milliGRAM(s) Oral at bedtime  midodrine. 10 milliGRAM(s) Oral <User Schedule>  nystatin Powder 1 Application(s) Topical two times a day  pantoprazole   Suspension 40 milliGRAM(s) Oral daily  PARoxetine 10 milliGRAM(s) Oral <User Schedule>  polyethylene glycol 3350 17 Gram(s) Oral daily  senna Syrup 5 milliLiter(s) Oral at bedtime  traZODone 100 milliGRAM(s) Oral at bedtime    MEDICATIONS  (PRN):  acetaminophen    Suspension .. 650 milliGRAM(s) Oral every 6 hours PRN Temp greater or equal to 38C (100.4F), Mild Pain (1 - 3)    Vital Signs Last 24 Hrs  T(F): 98.2 (12 Mar 2023 20:55), Max: 98.2 (12 Mar 2023 20:55)  HR: 70 (12 Mar 2023 20:55) (70 - 70)  BP: 124/74 (12 Mar 2023 20:55) (124/74 - 124/74)  RR: 16 (12 Mar 2023 20:55) (16 - 16)  SpO2: 96% (12 Mar 2023 20:55) (96% - 96%)  I&O's Summary    12 Mar 2023 07:01  -  13 Mar 2023 07:00  --------------------------------------------------------  IN: 0 mL / OUT: 2 mL / NET: -2 mL          PHYSICAL EXAM:  General: NAD  ENT: MMM, no scleral icterus  Neck: Supple, No JVD  Lungs: Clear to auscultation bilaterally, no wheezes, rales, rhonchi  Cardio: RRR, S1/S2  Abdomen: Soft, Nontender, Nondistended; Bowel sounds present, +PEG  Extremities: No calf tenderness, No pitting edema    LABS:                        13.0   7.23  )-----------( 278      ( 13 Mar 2023 06:11 )             39.2       03-13    139  |  102  |  5   ----------------------------<  124  4.0   |  25  |  0.59    Ca    9.1      13 Mar 2023 06:11    TPro  7.1  /  Alb  3.0  /  TBili  0.4  /  DBili  x   /  AST  14  /  ALT  24  /  AlkPhos  115  03-13                                      COVID-19 PCR: NotDetec (02-28-23 @ 16:00)      RADIOLOGY & ADDITIONAL TESTS: reviewed    Care Discussed with Consultants/Other Providers: yes, rehab

## 2023-03-13 NOTE — CHART NOTE - NSCHARTNOTEFT_GEN_A_CORE
Nutrition Follow Up Note  Hospital Course   (Per Electronic Medical Record)    Source:  Patient [X]  Nursing Staff [X] PCA  Medical Record [X]      Diet: Diet, Pureed (03-08-23 @ 15:01) [Active]    At this time patient w/ adequate appetite/intake consuming % of meals, upgraded to pureed diet texture (3/8). Requesting water x 2 TID, noted in Kardex. No reported issues w/ chewing and swallowing current diet texture. No N/V/C/D reported, patient w/ fecal incontinence, last BM 3/11 Per nursing flowsheets.       Enteral/Parenteral Nutrition: Not Applicable    Current Weight: 193.7 lb on 2/28  Recommend obtain current weights    Pertinent Medications: MEDICATIONS  (STANDING):  AQUAPHOR (petrolatum Ointment) 1 Application(s) Topical two times a day  atorvastatin 80 milliGRAM(s) Oral at bedtime  bacitracin   Ointment 1 Application(s) Topical two times a day  bacitracin   Ointment 1 Application(s) Topical daily  donepezil 5 milliGRAM(s) Oral at bedtime  enoxaparin Injectable 90 milliGRAM(s) SubCutaneous <User Schedule>  gabapentin 600 milliGRAM(s) Oral at bedtime  melatonin 6 milliGRAM(s) Oral at bedtime  midodrine. 10 milliGRAM(s) Oral <User Schedule>  nystatin Powder 1 Application(s) Topical two times a day  pantoprazole   Suspension 40 milliGRAM(s) Oral daily  PARoxetine 10 milliGRAM(s) Oral <User Schedule>  polyethylene glycol 3350 17 Gram(s) Oral daily  senna Syrup 5 milliLiter(s) Oral at bedtime  traZODone 100 milliGRAM(s) Oral at bedtime    MEDICATIONS  (PRN):  acetaminophen    Suspension .. 650 milliGRAM(s) Oral every 6 hours PRN Temp greater or equal to 38C (100.4F), Mild Pain (1 - 3)    Pertinent Labs:  03-13 Na139 mmol/L Glu 124 mg/dL<H> K+ 4.0 mmol/L Cr  0.59 mg/dL BUN 5 mg/dL<L> 03-13 Alb 3.0 g/dL<L>    Skin: Surgical Incision, Site left head, Right upper abdomen    Edema: No edema noted per nursing flowsheet    Last Bowel Movement: on 3/11 Per nursing flowsheets     Estimated Needs:   [X] No Change Since Previous Assessment    Previous Nutrition Diagnosis:   Malnutrition Moderate, Acute  related to decreased ability to meet estimated energy requirements  as evidenced by subcutaneous fat loss and signs of muscle depletion    Nutrition Diagnosis is [X] Ongoing - addressed w/ PO intake % at meals.      New Nutrition Diagnosis: [X] Not Applicable  [X] Chewing Difficulties    [X] Swallowing Difficulties    related to dysphagia as evidenced by modified IDDSI pureed diet texture addressed w/ pureed diet.        Interventions:   1. Recommend Continue Nutrition Plan of Care.    Monitoring & Evaluation:   [X] Weights   [X] PO Intake   [X] Skin Integrity   [X] Follow Up (Per Protocol)  [X] Tolerance to Diet Prescription   [X] Other: Labs    Registered Dietitian/Nutritionist Remains Available.  Zahra Rain RD, MS, CDN    Phone# [phone]

## 2023-03-13 NOTE — PROGRESS NOTE ADULT - SUBJECTIVE AND OBJECTIVE BOX
HPI:  This is 42 year old male with PMH of prior substance use, HTN, anxiety who presented to [facility] on 1/23. On 1/23 morning, he told his family that he was not feeling well and took 2 tabs of Vyvanse (usually takes as needed). Shortly after, he was at a group therapy meeting over the phone when he complained to his father he was having a severe headache. He then became more lethargic, and developed nausea and vomited. He was brought to ED by his father. On arrival, noted to be lethargic, vomiting, with garbled speech. 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 Post CT, more lethargic with dilated right pupil and bilateral reactive but sluggish pupils. He was given mannitol and intubated for airway protection and was taken to neuro ICU.    On 1/25, HCT with enlarging R lateral ventricle and slightly worsening MLS concerning for trapped ventricle. 1/27 acute drop in SpO2 on 1/27, lavaged and suctioned out thick clot. Placed back on ACVC and FiO2 weaned from 70% to 40% overnight. On 1/28/23, he had episode of emesis, & fever of 100.3. He was  given tylenol with improvement in temperature but still little to no movement on the L side (previously would localize arm to suction and spontaneously bend L leg). Pt. Had left decompressive hemicraniectomy and right EVD  placed.    After the procedure, patient opened eyes for the first time and regained LUE/LLE movement. As he became febrile again this was less prominent. Ceftriaxone was switched to Zosyn. 1/29 HCT mild increase extraaxial fluid collection. on 1/30, repeat  HCT unchanged, his MTL was discontinued. Zosyn and vancomycin switched to cefepime. On 1/31,  HCT shoeds worsening edema +/- extraaxial fluid collection, elevated ICPs, and mannitol was restarted.      MTL was weaned,  EVD adjusted and eventually removed on 2/10.  Hospital course further c/b agitation requiring precedex, hypotension requiring bolus, dysphagia s/p PEG 2/14  and trach on 2/13, insomnia s/p seroquel, pain s/p oxycodone and fentanyl. He underwent wound revision with NSGY and plastics on 2/17. Keppra discontinued due to signs of agitation. Tolerated trach collar.     Patient was evaluated by PM&R and therapy for functional deficits, gait/ADL impairments and acute rehabilitation was recommended. Patient was medically optimized for discharge to [facility] IRU on 2/28/23.     (28 Feb 2023 13:10)          Subjective:  Slept through the night as per nursing.  Patient calm this morning.  Nods head yes when asked if slept.  Appears comfortable.  Limited by aphasia.  Eating well      VITALS  Vital Signs Last 24 Hrs  T(C): 36.8 (12 Mar 2023 20:55), Max: 36.8 (12 Mar 2023 20:55)  T(F): 98.2 (12 Mar 2023 20:55), Max: 98.2 (12 Mar 2023 20:55)  HR: 70 (12 Mar 2023 20:55) (70 - 70)  BP: 124/74 (12 Mar 2023 20:55) (124/74 - 124/74)  BP(mean): --  RR: 16 (12 Mar 2023 20:55) (16 - 16)  SpO2: 96% (12 Mar 2023 20:55) (96% - 96%)    Parameters below as of 12 Mar 2023 20:55  Patient On (Oxygen Delivery Method): room air        REVIEW OF SYMPTOMS  Neurological deficits      PHYSICAL EXAM  Constitutional - NAD, Comfortable  HEENT - EOMI, left hemicraniectomy, sutures removed 3/7, +Helmet in place   Neck - s/p trach, stoma covered   Chest -  NAD, no dyspnea or accessory mm use, Lungs sound clear, limited by poor inspiratory effort  Cardio - warm and well perfused   Abdomen -  Soft, NTND, +PEG, Bone flap marsupialized in epigastric region. minor dehiscence of last suture of marsupialization, suture now removed. Sutures removed, healing well, with no significant erythema and no drainage.   Extremities - No peripheral edema, No calf tenderness   Neurologic Exam:                    Cognitive -             Orientation: Awake, Alert, unable to assess orientation due to aphasia and cognitive deficits            Communication- able to sing "hello" and hum a tune.  Poor command following, Non-fluent, unable to repeat            Attention:  Impaired; makes eye contact but follows commands inconsistently            Memory: Impaired            Thought: Impaired, Impulsive     Speech - non-verbal -s/p trach, impaired comprehension, +aphasia, poor mimicking     Cranial Nerves - Limited due to comprehension impairment and aphasia; No facial asymmetry, Tongue midline, EOMI, Pupils dilated but reactive, +dysphagia     Motor -                      LEFT    UE - at least 3/5 and moving spontaneously; exam limited by command following;  WNL                    RIGHT UE - ShAB 0/5, EF 0/5, EE 0/5, WE 0/5,  0/5-- MAS 1 finger flexors                     LEFT    LE -  at least 3/5 and moving spontaneously; exam limited by command following                    RIGHT LE - HE 1/5, Hip adductor 1/5, KE 0/5, DF 0/5, PF 0/5        Sensory - decrease sensation to LT-- RUE       Coordination - FTN / HTS impaired on right  Psychiatric - Mood stable, Affect WNL      RECENT LABS                        13.0   7.23  )-----------( 278      ( 13 Mar 2023 06:11 )             39.2     03-13    139  |  102  |  5<L>  ----------------------------<  124<H>  4.0   |  25  |  0.59    Ca    9.1      13 Mar 2023 06:11    TPro  7.1  /  Alb  3.0<L>  /  TBili  0.4  /  DBili  x   /  AST  14  /  ALT  24  /  AlkPhos  115  03-13            RADIOLOGY/OTHER RESULTS      MEDICATIONS  (STANDING):  AQUAPHOR (petrolatum Ointment) 1 Application(s) Topical two times a day  atorvastatin 80 milliGRAM(s) Oral at bedtime  bacitracin   Ointment 1 Application(s) Topical two times a day  bacitracin   Ointment 1 Application(s) Topical daily  donepezil 5 milliGRAM(s) Oral daily  enoxaparin Injectable 90 milliGRAM(s) SubCutaneous <User Schedule>  gabapentin 600 milliGRAM(s) Oral at bedtime  melatonin 6 milliGRAM(s) Oral at bedtime  midodrine. 10 milliGRAM(s) Oral <User Schedule>  nystatin Powder 1 Application(s) Topical two times a day  pantoprazole   Suspension 40 milliGRAM(s) Oral daily  PARoxetine 10 milliGRAM(s) Oral <User Schedule>  polyethylene glycol 3350 17 Gram(s) Oral daily  senna Syrup 5 milliLiter(s) Oral at bedtime  traZODone 100 milliGRAM(s) Oral at bedtime    MEDICATIONS  (PRN):  acetaminophen    Suspension .. 650 milliGRAM(s) Oral every 6 hours PRN Temp greater or equal to 38C (100.4F), Mild Pain (1 - 3)

## 2023-03-13 NOTE — PROGRESS NOTE ADULT - ASSESSMENT
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 He is s/p Left decompressive hemicraniectomy and right EVD placement 1/28/23. Hospital course further s/f respiratory failure s/p intubation and extubation, agitation requiring precedex, sepsis s/p ABX, hypotension requiring bolus, dysphagia s/p PEG 2/14 and trach on 2/13, insomnia s/p seroquel, pain s/p oxycodone and fentanyl. Patient now with admitted to [facility] acute inpatient rehab.    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 with mass effect and 8mm midline shift  -s/p intubation for airway protection and extubation  -s/p Decompressive hemicraniectomy and EVD placement 1/28  -Underwent wound revision with NSGY and plastics on 2/17  -Continue comprehensive rehab program - PT/OT/SLP per rehab team  -Pain management, bowel regimen per rehab     #Bilateral lower extremity DVTs  -Lower extremity Doppler from 3/1–right femoral vein DVT and bilateral lower extremity calf DVTs  -Neurosurgeon gave clearance for patient to start therapeutic Lovenox  -Vascular Surgery Dr. Hodges 3/7 - recommended nothing further to do if patient is on therapeutic Lovenox   -Continue therapeutic Lovenox for 3 months    #Acute Respiratory Failure  -s/p intubation/extubation, Trach 2/13  -Pulm consulted, recs noted    #Hypotension  -Improved on Midodrine, continue    #T2DM, HbA1c 6.0  -diet controlled  -monitor    #Dysphagia s/p PEG 2/14  -Continue TF    #HLD  -Continue lipitor    #GERD  -PPI    #DVT ppx - lovenox

## 2023-03-14 PROCEDURE — 99232 SBSQ HOSP IP/OBS MODERATE 35: CPT

## 2023-03-14 RX ADMIN — Medication 6 MILLIGRAM(S): at 21:20

## 2023-03-14 RX ADMIN — NYSTATIN CREAM 1 APPLICATION(S): 100000 CREAM TOPICAL at 17:26

## 2023-03-14 RX ADMIN — ATORVASTATIN CALCIUM 80 MILLIGRAM(S): 80 TABLET, FILM COATED ORAL at 21:18

## 2023-03-14 RX ADMIN — Medication 1 APPLICATION(S): at 05:04

## 2023-03-14 RX ADMIN — Medication 100 MILLIGRAM(S): at 21:18

## 2023-03-14 RX ADMIN — Medication 10 MILLIGRAM(S): at 05:03

## 2023-03-14 RX ADMIN — GABAPENTIN 600 MILLIGRAM(S): 400 CAPSULE ORAL at 21:21

## 2023-03-14 RX ADMIN — MIDODRINE HYDROCHLORIDE 10 MILLIGRAM(S): 2.5 TABLET ORAL at 08:07

## 2023-03-14 RX ADMIN — PANTOPRAZOLE SODIUM 40 MILLIGRAM(S): 20 TABLET, DELAYED RELEASE ORAL at 12:39

## 2023-03-14 RX ADMIN — NYSTATIN CREAM 1 APPLICATION(S): 100000 CREAM TOPICAL at 05:03

## 2023-03-14 RX ADMIN — Medication 1 APPLICATION(S): at 17:27

## 2023-03-14 RX ADMIN — ENOXAPARIN SODIUM 90 MILLIGRAM(S): 100 INJECTION SUBCUTANEOUS at 21:18

## 2023-03-14 RX ADMIN — POLYETHYLENE GLYCOL 3350 17 GRAM(S): 17 POWDER, FOR SOLUTION ORAL at 12:32

## 2023-03-14 RX ADMIN — Medication 1 APPLICATION(S): at 05:03

## 2023-03-14 RX ADMIN — Medication 1 APPLICATION(S): at 17:25

## 2023-03-14 RX ADMIN — Medication 1 APPLICATION(S): at 12:39

## 2023-03-14 RX ADMIN — SENNA PLUS 5 MILLILITER(S): 8.6 TABLET ORAL at 21:18

## 2023-03-14 RX ADMIN — DONEPEZIL HYDROCHLORIDE 5 MILLIGRAM(S): 10 TABLET, FILM COATED ORAL at 15:13

## 2023-03-14 RX ADMIN — ENOXAPARIN SODIUM 90 MILLIGRAM(S): 100 INJECTION SUBCUTANEOUS at 08:07

## 2023-03-14 NOTE — PROGRESS NOTE ADULT - ASSESSMENT
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 He is  s/p Left decompressive hemicraniectomy and right EVD placement 1/28/23. Hospital course further c/b respiratory failure s/p intubation and extubation,  agitation requiring precedex, sepsis s/p ABX, hypotension requiring bolus, dysphagia s/p PEG 2/14 and trach on 2/13, insomnia s/p seroquel, pain s/p oxycodone and fentanyl. Patient now with Right Hemiparesis, dysphagia s/p PEG, Aphasia, Cognitive deficits, gait Instability, ADL impairments and Functional impairments.    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    - ST: to diagnose and treat deficits in swallowing, cognition and communication.  - Helmet when OOB  - Baseline CT head 3/1- High right frontal malik hole is seen. Postoperative changes compatible with a left temporal frontal parietal craniectomy is again seen. Mild parenchyma extending through the craniectomy defect is again seen. There is abnormal high attenuation seen involving the left basal ganglia/corona radiata region. This finding is likely compatible with evolving areas acute parenchymal hemorrhage. Surrounding edema is seen. Mass effect on the left lateral ventricle is seen. Left-to-right shift (3.1 mm) is seen. Evaluation of the osseous structures with the appropriate window aside from postop changes appear normal. Complete opacification of the right sphenoid sinus is seen consisting of mucosal thickening. Both mastoid and middle ear regions appear clear. IMPRESSION: Postop changes described above. Evolving area parenchymal hemorrhage as described above.  - OT–right resting hand splint      Sleep/Restlessness-   --c/w Trazodone to 100mg qhs 3/8-- helping with sleep and restlessness.  Pt's mother reports he was on 50-100mg qhs and 50mg daytime home meds.   --cont. melatonin  --monitor  --Attending discussed with nursing that patient needs supervision for safety monitoring as pt.  is impulsive, restless and high fall risk as well as is reaching for trach  --d/c left mitten. less agitated.     Depression/anxiety  --Pt was on Paxil 30mg at home as per mother--   --c/w Paxil 10mg started 3/10, increase 3/16?    Aphasia--  --c/w donepazil trial      Bilateral lower extremity DVTs–  – Lower extremity Doppler from 3/1–right femoral vein DVT and bilateral lower extremity calf DVTs  – spoke with patient's neurosurgery PA–who was in contact with Dr. Cueva patient's neurosurgeon–gave clearance for patient to start therapeutic Lovenox.  --Vascular Surgery consult -- reaached out to Dr. Hodges 3/7 - nothing further to do if patient is on therapeutic lovenox.   -- c/w therapeutic lovenox for 3 months.    Orthostatic hypotension–  -Stopped doxazosin 3/2-- monitor PVRs, low PVR's per nursing  -no report of OH since midodrine 10mg   -cont to monitor    #Acute Respiratory Failure  - Intubation and extubation  - s/p Trach 2/13  - Decannulated 3/5, Pulm monitoring, tolerating well    #GI PPX  - Nexium 40mg daily    #DM II  - A1c 6.0  -management as per hospitalist--off insulin     #Pain management  - Tylenol PRN,   --cont. Neurontin 600mg nightly    #Bowel Regimen  - Senna, miralax PRN    #Bladder management  -voiding well with low PVRs,   -- off doxazosin 3/2    #Dysphagia    - s/p PEG 2/14  - Puree with thin liquids-- 1:1 assist with meals  - SLP: evaluation and treatment  - Diet: tolerating bolus TF     #Skin:  - Skin on admission: IAD to bilateral groin and sacrum; RUQ incision with sutures with palpable skull flap; Left hemicraniectomy with sutures MILLER; psoriasis BL shin  - Pressure injury/Skin: Turn Q2hrs while in bed, OOB to Chair, PT/OT     #Precaution  - Fall, Aspiration, seizure    #home meds  - per list provided by mother [place]: Rousuvastatin 40mg daily and Paroxetine 30mg daily  -  cw paroxetine  - cw high intensity atorvastatin to home med dose    – IDT 3/9:  Social work:  patient on discharge can live with his mother who has a first-floor set up.  Nursing–incontinent of bowel and bladder, total assist with toileting  Speech: Puree/thin diet, severe language deficits–poor command following, poor orientation, Some spontaneous naming, & gestures, Speech jargon, Aphasia, severe cognitive deficits  OT's: Total assist with ADLs and transfers, restless  PT: Sitting –max assist,, Supine to sit--Max A x2; Improved trunk control; Tot A transfers   goals-- mod assist with transfers, ambulation 10 feet max assist with hemiwalker, wheelchair mobility 50 feet min assist.  ELOS: 3/28 ZOHAIB  Goals:  1.  Improved secretion management and tolerate PMV  2.  transfers with 1 person assist  3.  Communicate basic wants and needs with yes no answers    Discussed medications and progress with Laura yang on 3/9. All questions answered.  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 He is  s/p Left decompressive hemicraniectomy and right EVD placement 1/28/23. Hospital course further c/b respiratory failure s/p intubation and extubation,  agitation requiring precedex, sepsis s/p ABX, hypotension requiring bolus, dysphagia s/p PEG 2/14 and trach on 2/13, insomnia s/p seroquel, pain s/p oxycodone and fentanyl. Patient now with Right Hemiparesis, dysphagia s/p PEG, Aphasia, Cognitive deficits, gait Instability, ADL impairments and Functional impairments.    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    - ST: to diagnose and treat deficits in swallowing, cognition and communication.  - Helmet when OOB  - Baseline CT head 3/1- High right frontal malik hole is seen. Postoperative changes compatible with a left temporal frontal parietal craniectomy is again seen. Mild parenchyma extending through the craniectomy defect is again seen. There is abnormal high attenuation seen involving the left basal ganglia/corona radiata region. This finding is likely compatible with evolving areas acute parenchymal hemorrhage. Surrounding edema is seen. Mass effect on the left lateral ventricle is seen. Left-to-right shift (3.1 mm) is seen. Evaluation of the osseous structures with the appropriate window aside from postop changes appear normal. Complete opacification of the right sphenoid sinus is seen consisting of mucosal thickening. Both mastoid and middle ear regions appear clear. IMPRESSION: Postop changes described above. Evolving area parenchymal hemorrhage as described above.  - OT–right resting hand splint      Sleep/Restlessness-   --c/w Trazodone to 100mg qhs 3/8-- helping with sleep and restlessness.  Pt's mother reports he was on 50-100mg qhs and 50mg daytime home meds.   --cont. melatonin  --monitor  --Attending discussed with nursing that patient needs supervision for safety monitoring as pt.  is impulsive, restless and high fall risk as well as is reaching for trach  --d/c left mitten. less agitated.     Depression/anxiety  --Pt was on Paxil 30mg at home as per mother--   --c/w Paxil 10mg started 3/10, increase 3/16?    Aphasia--  --c/w donepezil trial      Bilateral lower extremity DVTs–  – Lower extremity Doppler from 3/1–right femoral vein DVT and bilateral lower extremity calf DVTs  – spoke with patient's neurosurgery PA–who was in contact with Dr. Cueva patient's neurosurgeon–gave clearance for patient to start therapeutic Lovenox.  --Vascular Surgery consult -- reaached out to Dr. Hodges 3/7 - nothing further to do if patient is on therapeutic lovenox.   -- c/w therapeutic lovenox for 3 months.    Orthostatic hypotension–  -Stopped doxazosin 3/2-- monitor PVRs, low PVR's per nursing  -no report of OH since midodrine 10mg   -cont to monitor    #Acute Respiratory Failure  - Intubation and extubation  - s/p Trach 2/13  - Decannulated 3/5, Pulm monitoring, tolerating well    #GI PPX  - Nexium 40mg daily    #DM II  - A1c 6.0  -management as per hospitalist--off insulin     #Pain management  - Tylenol PRN,   --cont. Neurontin 600mg nightly    #Bowel Regimen  - Senna, miralax PRN    #Bladder management  -voiding well with low PVRs,   -- off doxazosin 3/2    #Dysphagia    - s/p PEG 2/14  - Puree with thin liquids-- 1:1 assist with meals  - SLP: evaluation and treatment  - Diet: tolerating bolus TF     #Skin:  - Skin on admission: IAD to bilateral groin and sacrum; RUQ incision with sutures with palpable skull flap; Left hemicraniectomy with sutures MILLER; psoriasis BL shin  - Pressure injury/Skin: Turn Q2hrs while in bed, OOB to Chair, PT/OT     #Precaution  - Fall, Aspiration, seizure    #home meds  - per list provided by mother, [place]: Rousuvastatin 40mg daily and Paroxetine 30mg daily  -  cw paroxetine  - cw high intensity atorvastatin to home med dose    – IDT 3/9:  Social work:  patient on discharge can live with his mother who has a first-floor set up.  Nursing–incontinent of bowel and bladder, total assist with toileting  Speech: Puree/thin diet, severe language deficits–poor command following, poor orientation, Some spontaneous naming, & gestures, Speech jargon, Aphasia, severe cognitive deficits  OT's: Total assist with ADLs and transfers, restless  PT: Sitting –max assist,, Supine to sit--Max A x2; Improved trunk control; Tot A transfers   goals-- mod assist with transfers, ambulation 10 feet max assist with hemiwalker, wheelchair mobility 50 feet min assist.  ELOS: 3/28 ZOHAIB  Goals:  1.  Improved secretion management and tolerate PMV  2.  transfers with 1 person assist  3.  Communicate basic wants and needs with yes no answers    Discussed medications and progress with Laura yang on 3/9. All questions answered.

## 2023-03-14 NOTE — PROGRESS NOTE ADULT - ATTENDING COMMENTS
Pt. seen with resident.  Agree with documentation above as per resident with amendments made as appropriate. Patient medically stable. Making progress towards rehab goals.     left IPH  --c/w donepezil trial--Patient appears to be tolerating so far without side effects.      Discontinued left hand mitten–discussed with nursing

## 2023-03-14 NOTE — PROGRESS NOTE ADULT - ASSESSMENT
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 He is s/p Left decompressive hemicraniectomy and right EVD placement 1/28/23. Hospital course further s/f respiratory failure s/p intubation and extubation, agitation requiring precedex, sepsis s/p ABX, hypotension requiring bolus, dysphagia s/p PEG 2/14 and trach on 2/13, insomnia s/p seroquel, pain s/p oxycodone and fentanyl. Patient now with admitted to [facility] acute inpatient rehab.    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 with mass effect and 8mm midline shift  -s/p intubation for airway protection and extubation  -s/p Decompressive hemicraniectomy and EVD placement 1/28  -Underwent wound revision with NSGY and plastics on 2/17  -Continue comprehensive rehab program - PT/OT/SLP per rehab team  -Pain management, bowel regimen per rehab   -Donepezil     #Depression/anxiety  -Paxil per rehab  -Neuropsych as indicated    #Bilateral lower extremity DVTs  -Lower extremity Doppler from 3/1–right femoral vein DVT and bilateral lower extremity calf DVTs  -Neurosurgeon gave clearance for patient to start therapeutic Lovenox  -Vascular Surgery Dr. Hodges 3/7 - recommended nothing further to do if patient is on therapeutic Lovenox   -Continue therapeutic Lovenox for 3 months    #Acute Respiratory Failure  -s/p intubation/extubation, Trach 2/13  -Pulm consulted, recs noted    #Hypotension  -Improved on Midodrine, continue    #T2DM, HbA1c 6.0  -diet controlled  -monitor    #Dysphagia s/p PEG 2/14  -Puree with thin liquids    #HLD  -Continue lipitor    #GERD  -PPI    #DVT ppx - lovenox

## 2023-03-14 NOTE — PROGRESS NOTE ADULT - SUBJECTIVE AND OBJECTIVE BOX
HPI:  This is 42 year old male with PMH of prior substance use, HTN, anxiety who presented to [facility] on 1/23. On 1/23 morning, he told his family that he was not feeling well and took 2 tabs of Vyvanse (usually takes as needed). Shortly after, he was at a group therapy meeting over the phone when he complained to his father he was having a severe headache. He then became more lethargic, and developed nausea and vomited. He was brought to ED by his father. On arrival, noted to be lethargic, vomiting, with garbled speech. 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 Post CT, more lethargic with dilated right pupil and bilateral reactive but sluggish pupils. He was given mannitol and intubated for airway protection and was taken to neuro ICU.  On 1/25, HCT with enlarging R lateral ventricle and slightly worsening MLS concerning for trapped ventricle. 1/27 acute drop in SpO2 on 1/27, lavaged and suctioned out thick clot. Placed back on ACVC and FiO2 weaned from 70% to 40% overnight. On 1/28/23, he had episode of emesis, & fever of 100.3. He was  given tylenol with improvement in temperature but still little to no movement on the L side (previously would localize arm to suction and spontaneously bend L leg). Pt. Had left decompressive hemicraniectomy and right EVD  placed.  After the procedure, patient opened eyes for the first time and regained LUE/LLE movement. As he became febrile again this was less prominent. Ceftriaxone was switched to Zosyn. 1/29 HCT mild increase extraaxial fluid collection. on 1/30, repeat  HCT unchanged, his MTL was discontinued. Zosyn and vancomycin switched to cefepime. On 1/31,  HCT shoeds worsening edema +/- extraaxial fluid collection, elevated ICPs, and mannitol was restarted.    MTL was weaned,  EVD adjusted and eventually removed on 2/10.  Hospital course further c/b agitation requiring precedex, hypotension requiring bolus, dysphagia s/p PEG 2/14  and trach on 2/13, insomnia s/p seroquel, pain s/p oxycodone and fentanyl. He underwent wound revision with NSGY and plastics on 2/17. Keppra discontinued due to signs of agitation. Tolerated trach collar.   Patient was evaluated by PM&R and therapy for functional deficits, gait/ADL impairments and acute rehabilitation was recommended. Patient was medically optimized for discharge to [facility] IRU on 2/28/23.      Subjective/obj:  Seen and examined in WC. Denies pain. Less restless. Gave high 5.     Vital Signs Last 24 Hrs  T(C): 36.8 (14 Mar 2023 08:17), Max: 36.9 (13 Mar 2023 19:43)  T(F): 98.3 (14 Mar 2023 08:17), Max: 98.4 (13 Mar 2023 19:43)  HR: 82 (14 Mar 2023 08:17) (67 - 82)  BP: 113/77 (14 Mar 2023 08:17) (113/77 - 118/67)  RR: 15 (14 Mar 2023 08:17) (15 - 16)  SpO2: 96% (14 Mar 2023 08:17) (96% - 96%)    Parameters below as of 14 Mar 2023 08:17  Patient On (Oxygen Delivery Method): room air      PHYSICAL EXAM  Constitutional - NAD, Comfortable  HEENT - EOMI, left hemicraniectomy, sutures removed 3/7, +Helmet in place   Neck - s/p trach  Chest -  NAD, no dyspnea or accessory mm use, CTA BL, limited by poor inspiratory effort  Cardio - warm and well perfused   Abdomen -  Soft, NTND, +PEG, Bone flap marsupialized in epigastric region.  Sutures removed, healing well, with no significant erythema and no drainage.   Extremities - No peripheral edema, No calf tenderness   Neurologic Exam:                    Cognitive -             Orientation: Awake, Alert, unable to assess orientation due to aphasia and cognitive deficits            Communication- able to sing "hello" and hum a tune.  Poor command following, Non-fluent, unable to repeat            Attention:  Impaired; makes eye contact but follows commands inconsistently            Memory: Impaired            Thought: Impaired, Impulsive     Speech - non-verbal -s/p trach, impaired comprehension, +aphasia, poor mimicking     Cranial Nerves - Limited due to comprehension impairment and aphasia; No facial asymmetry, Tongue midline, EOMI, Pupils dilated but reactive, +dysphagia     Motor -                       LEFT    UE - at least 3/5 and moving spontaneously; exam limited by command following;  WNL                    RIGHT UE - ShAB 0/5, EF 0/5, EE 0/5, WE 0/5,  0/5-- MAS 1 finger flexors                     LEFT    LE -  at least 3/5 and moving spontaneously; exam limited by command following                    RIGHT LE - HE 1/5, Hip adductor 1/5, KE 0/5, DF 0/5, PF 0/5        Sensory - decrease sensation to LT-- RUE       Coordination - FTN / HTS impaired on right  Psychiatric - Mood stable, Affect WNL      MEDICATIONS  (STANDING):  AQUAPHOR (petrolatum Ointment) 1 Application(s) Topical two times a day  atorvastatin 80 milliGRAM(s) Oral at bedtime  bacitracin   Ointment 1 Application(s) Topical two times a day  bacitracin   Ointment 1 Application(s) Topical daily  donepezil 5 milliGRAM(s) Oral daily  enoxaparin Injectable 90 milliGRAM(s) SubCutaneous <User Schedule>  gabapentin 600 milliGRAM(s) Oral at bedtime  melatonin 6 milliGRAM(s) Oral at bedtime  midodrine. 10 milliGRAM(s) Oral <User Schedule>  nystatin Powder 1 Application(s) Topical two times a day  pantoprazole   Suspension 40 milliGRAM(s) Oral daily  PARoxetine 10 milliGRAM(s) Oral <User Schedule>  polyethylene glycol 3350 17 Gram(s) Oral daily  senna Syrup 5 milliLiter(s) Oral at bedtime  traZODone 100 milliGRAM(s) Oral at bedtime    MEDICATIONS  (PRN):  acetaminophen    Suspension .. 650 milliGRAM(s) Oral every 6 hours PRN Temp greater or equal to 38C (100.4F), Mild Pain (1 - 3)     HPI:  This is 42 year old male with PMH of prior substance use, HTN, anxiety who presented to [facility] on 1/23. On 1/23 morning, he told his family that he was not feeling well and took 2 tabs of Vyvanse (usually takes as needed). Shortly after, he was at a group therapy meeting over the phone when he complained to his father he was having a severe headache. He then became more lethargic, and developed nausea and vomited. He was brought to ED by his father. On arrival, noted to be lethargic, vomiting, with garbled speech. 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 Post CT, more lethargic with dilated right pupil and bilateral reactive but sluggish pupils. He was given mannitol and intubated for airway protection and was taken to neuro ICU.  On 1/25, HCT with enlarging R lateral ventricle and slightly worsening MLS concerning for trapped ventricle. 1/27 acute drop in SpO2 on 1/27, lavaged and suctioned out thick clot. Placed back on ACVC and FiO2 weaned from 70% to 40% overnight. On 1/28/23, he had episode of emesis, & fever of 100.3. He was  given tylenol with improvement in temperature but still little to no movement on the L side (previously would localize arm to suction and spontaneously bend L leg). Pt. Had left decompressive hemicraniectomy and right EVD  placed.  After the procedure, patient opened eyes for the first time and regained LUE/LLE movement. As he became febrile again this was less prominent. Ceftriaxone was switched to Zosyn. 1/29 HCT mild increase extraaxial fluid collection. on 1/30, repeat  HCT unchanged, his MTL was discontinued. Zosyn and vancomycin switched to cefepime. On 1/31,  HCT shoeds worsening edema +/- extraaxial fluid collection, elevated ICPs, and mannitol was restarted.    MTL was weaned,  EVD adjusted and eventually removed on 2/10.  Hospital course further c/b agitation requiring precedex, hypotension requiring bolus, dysphagia s/p PEG 2/14  and trach on 2/13, insomnia s/p seroquel, pain s/p oxycodone and fentanyl. He underwent wound revision with NSGY and plastics on 2/17. Keppra discontinued due to signs of agitation. Tolerated trach collar.   Patient was evaluated by PM&R and therapy for functional deficits, gait/ADL impairments and acute rehabilitation was recommended. Patient was medically optimized for discharge to [facility] IRU on 2/28/23.      Subjective/obj:  Seen and examined in WC. Denies pain. Less restless. Gave high 5. Nursing reports patient slept through the night, less impulsive.  Nursing states can DC left hand mitten.  Patient limited by aphasia but appears to indicate that he did sleep last night and denies pain, nausea, or abdominal discomfort.  Denies dizziness.  Tolerated therapy this morning.    Vital Signs Last 24 Hrs  T(C): 36.8 (14 Mar 2023 08:17), Max: 36.9 (13 Mar 2023 19:43)  T(F): 98.3 (14 Mar 2023 08:17), Max: 98.4 (13 Mar 2023 19:43)  HR: 82 (14 Mar 2023 08:17) (67 - 82)  BP: 113/77 (14 Mar 2023 08:17) (113/77 - 118/67)  RR: 15 (14 Mar 2023 08:17) (15 - 16)  SpO2: 96% (14 Mar 2023 08:17) (96% - 96%)    Parameters below as of 14 Mar 2023 08:17  Patient On (Oxygen Delivery Method): room air      PHYSICAL EXAM  Constitutional - NAD, Comfortable  HEENT - EOMI, left hemicraniectomy, sutures removed 3/7, +Helmet in place   Neck - s/p trach  Chest -  NAD, no dyspnea or accessory mm use, CTA BL, limited by poor inspiratory effort  Cardio - warm and well perfused   Abdomen -  Soft, NTND, +PEG, Bone flap marsupialized in epigastric region.  Sutures removed, healing well, with no significant erythema and no drainage.   Extremities - No peripheral edema, No calf tenderness   Neurologic Exam:                    Cognitive -             Orientation: Awake, Alert, unable to assess orientation due to aphasia and cognitive deficits            Communication- able to sing "hello" and hum a tune.  Poor command following, Non-fluent, unable to repeat            Attention:  Impaired; makes eye contact but follows commands inconsistently            Memory: Impaired            Thought: Impaired, Impulsive     Speech - non-verbal -s/p trach, impaired comprehension, +aphasia, poor mimicking     Cranial Nerves - Limited due to comprehension impairment and aphasia; No facial asymmetry, Tongue midline, EOMI, Pupils dilated but reactive, +dysphagia     Motor -                       LEFT    UE - at least 3/5 and moving spontaneously; exam limited by command following;  WNL                    RIGHT UE - ShAB 0/5, EF 0/5, EE 0/5, WE 0/5,  0/5-- MAS 1 finger flexors                     LEFT    LE -  at least 3/5 and moving spontaneously; exam limited by command following                    RIGHT LE - HE 1/5, Hip adductor 1/5, KE 0/5, DF 0/5, PF 0/5        Sensory - decrease sensation to LT-- RUE       Coordination - FTN / HTS impaired on right  Psychiatric - Mood stable, Affect WNL      MEDICATIONS  (STANDING):  AQUAPHOR (petrolatum Ointment) 1 Application(s) Topical two times a day  atorvastatin 80 milliGRAM(s) Oral at bedtime  bacitracin   Ointment 1 Application(s) Topical two times a day  bacitracin   Ointment 1 Application(s) Topical daily  donepezil 5 milliGRAM(s) Oral daily  enoxaparin Injectable 90 milliGRAM(s) SubCutaneous <User Schedule>  gabapentin 600 milliGRAM(s) Oral at bedtime  melatonin 6 milliGRAM(s) Oral at bedtime  midodrine. 10 milliGRAM(s) Oral <User Schedule>  nystatin Powder 1 Application(s) Topical two times a day  pantoprazole   Suspension 40 milliGRAM(s) Oral daily  PARoxetine 10 milliGRAM(s) Oral <User Schedule>  polyethylene glycol 3350 17 Gram(s) Oral daily  senna Syrup 5 milliLiter(s) Oral at bedtime  traZODone 100 milliGRAM(s) Oral at bedtime    MEDICATIONS  (PRN):  acetaminophen    Suspension .. 650 milliGRAM(s) Oral every 6 hours PRN Temp greater or equal to 38C (100.4F), Mild Pain (1 - 3)

## 2023-03-14 NOTE — PROGRESS NOTE ADULT - SUBJECTIVE AND OBJECTIVE BOX
Follow-up Pulmonary Progress Note  Chief Complaint : Other nontraumatic intracerebral hemorrhage          No new events overnight.  Denies SOB/CP.       Allergies :No Known Allergies      PAST MEDICAL & SURGICAL HISTORY:  Substance abuse    Hypertension    Depression    Anxiety    No significant past surgical history        Medications:  MEDICATIONS  (STANDING):  AQUAPHOR (petrolatum Ointment) 1 Application(s) Topical two times a day  atorvastatin 80 milliGRAM(s) Oral at bedtime  bacitracin   Ointment 1 Application(s) Topical two times a day  bacitracin   Ointment 1 Application(s) Topical daily  donepezil 5 milliGRAM(s) Oral daily  enoxaparin Injectable 90 milliGRAM(s) SubCutaneous <User Schedule>  gabapentin 600 milliGRAM(s) Oral at bedtime  melatonin 6 milliGRAM(s) Oral at bedtime  midodrine. 10 milliGRAM(s) Oral <User Schedule>  nystatin Powder 1 Application(s) Topical two times a day  pantoprazole   Suspension 40 milliGRAM(s) Oral daily  PARoxetine 10 milliGRAM(s) Oral <User Schedule>  polyethylene glycol 3350 17 Gram(s) Oral daily  senna Syrup 5 milliLiter(s) Oral at bedtime  traZODone 100 milliGRAM(s) Oral at bedtime    MEDICATIONS  (PRN):  acetaminophen    Suspension .. 650 milliGRAM(s) Oral every 6 hours PRN Temp greater or equal to 38C (100.4F), Mild Pain (1 - 3)      Antibiotics History      Heme Medications   enoxaparin Injectable 90 milliGRAM(s) SubCutaneous <User Schedule>, 03-02-23 @ 00:00      GI Medications  pantoprazole   Suspension 40 milliGRAM(s) Oral daily, 03-01-23 @ 00:00, Routine  polyethylene glycol 3350 17 Gram(s) Oral daily, 02-28-23 @ 14:19, Routine  senna Syrup 5 milliLiter(s) Oral at bedtime, 02-28-23 @ 14:19, Routine        LABS:                        13.0   7.23  )-----------( 278      ( 13 Mar 2023 06:11 )             39.2     03-13    139  |  102  |  5<L>  ----------------------------<  124<H>  4.0   |  25  |  0.59    Ca    9.1      13 Mar 2023 06:11    TPro  7.1  /  Alb  3.0<L>  /  TBili  0.4  /  DBili  x   /  AST  14  /  ALT  24  /  AlkPhos  115  03-13                        CULTURES: (if applicable)          CAPILLARY BLOOD GLUCOSE          RADIOLOGY  CXR:      CT:    ECHO:      VITALS:  T(C): 36.8 (03-14-23 @ 08:17), Max: 36.9 (03-13-23 @ 19:43)  T(F): 98.3 (03-14-23 @ 08:17), Max: 98.4 (03-13-23 @ 19:43)  HR: 82 (03-14-23 @ 08:17) (67 - 82)  BP: 113/77 (03-14-23 @ 08:17) (113/77 - 118/67)  BP(mean): --  ABP: --  ABP(mean): --  RR: 15 (03-14-23 @ 08:17) (15 - 16)  SpO2: 96% (03-14-23 @ 08:17) (96% - 96%)  CVP(mm Hg): --  CVP(cm H2O): --    Ins and Outs

## 2023-03-14 NOTE — PROGRESS NOTE ADULT - SUBJECTIVE AND OBJECTIVE BOX
Patient is a 42y old  Male who presents with a chief complaint of left IPH/ICH (13 Mar 2023 13:10)    Patient seen and examined at bedside. mood pleasant, NAD.    ALLERGIES:  No Known Allergies    MEDICATIONS  (STANDING):  AQUAPHOR (petrolatum Ointment) 1 Application(s) Topical two times a day  atorvastatin 80 milliGRAM(s) Oral at bedtime  bacitracin   Ointment 1 Application(s) Topical two times a day  bacitracin   Ointment 1 Application(s) Topical daily  donepezil 5 milliGRAM(s) Oral daily  enoxaparin Injectable 90 milliGRAM(s) SubCutaneous <User Schedule>  gabapentin 600 milliGRAM(s) Oral at bedtime  melatonin 6 milliGRAM(s) Oral at bedtime  midodrine. 10 milliGRAM(s) Oral <User Schedule>  nystatin Powder 1 Application(s) Topical two times a day  pantoprazole   Suspension 40 milliGRAM(s) Oral daily  PARoxetine 10 milliGRAM(s) Oral <User Schedule>  polyethylene glycol 3350 17 Gram(s) Oral daily  senna Syrup 5 milliLiter(s) Oral at bedtime  traZODone 100 milliGRAM(s) Oral at bedtime    MEDICATIONS  (PRN):  acetaminophen    Suspension .. 650 milliGRAM(s) Oral every 6 hours PRN Temp greater or equal to 38C (100.4F), Mild Pain (1 - 3)    Vital Signs Last 24 Hrs  T(F): 98.3 (14 Mar 2023 08:17), Max: 98.4 (13 Mar 2023 19:43)  HR: 82 (14 Mar 2023 08:17) (67 - 82)  BP: 113/77 (14 Mar 2023 08:17) (113/77 - 118/67)  RR: 15 (14 Mar 2023 08:17) (15 - 16)  SpO2: 96% (14 Mar 2023 08:17) (96% - 96%)  I&O's Summary      PHYSICAL EXAM:  General: NAD  ENT: MMM, no scleral icterus  Neck: Supple, No JVD  Lungs: Clear to auscultation bilaterally, no wheezes, rales, rhonchi  Cardio: RRR, S1/S2  Abdomen: Soft, Nontender, Nondistended; Bowel sounds present, +PEG  Extremities: No calf tenderness, No pitting edema    LABS:                        13.0   7.23  )-----------( 278      ( 13 Mar 2023 06:11 )             39.2       03-13    139  |  102  |  5   ----------------------------<  124  4.0   |  25  |  0.59    Ca    9.1      13 Mar 2023 06:11    TPro  7.1  /  Alb  3.0  /  TBili  0.4  /  DBili  x   /  AST  14  /  ALT  24  /  AlkPhos  115  03-13                                      COVID-19 PCR: NotDetec (02-28-23 @ 16:00)      RADIOLOGY & ADDITIONAL TESTS: reviewed    Care Discussed with Consultants/Other Providers: yes, rehab

## 2023-03-14 NOTE — PROGRESS NOTE ADULT - ASSESSMENT
Physical Examination:  GENERAL:               Alert,  No acute distress.    HEENT:                      No JVD, Moist MM, s/p craniotomy , stoma clear with minimal discharge  PULM:                     Bilateral air entry, No Rales, No Rhonchi, No Wheezing  CVS:                         S1, S2,  No Murmur  ABD:                        Soft, nondistended, nontender, normoactive bowel sounds,   EXT:                         No edema, nontender, No Cyanosis or Clubbing   NEURO:                  Alert,  Not interactive, does not follow commands  PSYC:                      Calm, not Insight.      Assessment  Chronic Respiratory failure s/p trach s/p self decannulation 3/5/23  S/P ICH  h/o polysubstance abuse  underlying HTN, Depression    Plan  stoma can be open to air,   monitor on RA  PT as tolerated    Rest of care as per primary team, reconsult pulmonary if needed

## 2023-03-15 PROCEDURE — 99232 SBSQ HOSP IP/OBS MODERATE 35: CPT

## 2023-03-15 RX ORDER — HYDROCORTISONE 1 %
1 OINTMENT (GRAM) TOPICAL
Refills: 0 | Status: DISCONTINUED | OUTPATIENT
Start: 2023-03-15 | End: 2023-03-29

## 2023-03-15 RX ADMIN — Medication 1 APPLICATION(S): at 17:41

## 2023-03-15 RX ADMIN — ENOXAPARIN SODIUM 90 MILLIGRAM(S): 100 INJECTION SUBCUTANEOUS at 09:54

## 2023-03-15 RX ADMIN — SENNA PLUS 5 MILLILITER(S): 8.6 TABLET ORAL at 21:24

## 2023-03-15 RX ADMIN — DONEPEZIL HYDROCHLORIDE 5 MILLIGRAM(S): 10 TABLET, FILM COATED ORAL at 11:37

## 2023-03-15 RX ADMIN — Medication 100 MILLIGRAM(S): at 21:24

## 2023-03-15 RX ADMIN — POLYETHYLENE GLYCOL 3350 17 GRAM(S): 17 POWDER, FOR SOLUTION ORAL at 11:38

## 2023-03-15 RX ADMIN — PANTOPRAZOLE SODIUM 40 MILLIGRAM(S): 20 TABLET, DELAYED RELEASE ORAL at 11:37

## 2023-03-15 RX ADMIN — ENOXAPARIN SODIUM 90 MILLIGRAM(S): 100 INJECTION SUBCUTANEOUS at 21:25

## 2023-03-15 RX ADMIN — Medication 10 MILLIGRAM(S): at 05:28

## 2023-03-15 RX ADMIN — GABAPENTIN 600 MILLIGRAM(S): 400 CAPSULE ORAL at 21:26

## 2023-03-15 RX ADMIN — Medication 6 MILLIGRAM(S): at 21:26

## 2023-03-15 RX ADMIN — Medication 1 APPLICATION(S): at 05:27

## 2023-03-15 RX ADMIN — NYSTATIN CREAM 1 APPLICATION(S): 100000 CREAM TOPICAL at 05:27

## 2023-03-15 RX ADMIN — NYSTATIN CREAM 1 APPLICATION(S): 100000 CREAM TOPICAL at 17:40

## 2023-03-15 RX ADMIN — ATORVASTATIN CALCIUM 80 MILLIGRAM(S): 80 TABLET, FILM COATED ORAL at 21:25

## 2023-03-15 RX ADMIN — Medication 1 APPLICATION(S): at 21:46

## 2023-03-15 RX ADMIN — Medication 1 APPLICATION(S): at 17:42

## 2023-03-15 RX ADMIN — Medication 1 APPLICATION(S): at 11:37

## 2023-03-15 RX ADMIN — Medication 1 APPLICATION(S): at 05:28

## 2023-03-15 RX ADMIN — MIDODRINE HYDROCHLORIDE 10 MILLIGRAM(S): 2.5 TABLET ORAL at 09:54

## 2023-03-15 NOTE — PROGRESS NOTE ADULT - SUBJECTIVE AND OBJECTIVE BOX
HPI:  This is 42 year old male with PMH of prior substance use, HTN, anxiety who presented to [facility] on 1/23. On 1/23 morning, he told his family that he was not feeling well and took 2 tabs of Vyvanse (usually takes as needed). Shortly after, he was at a group therapy meeting over the phone when he complained to his father he was having a severe headache. He then became more lethargic, and developed nausea and vomited. He was brought to ED by his father. On arrival, noted to be lethargic, vomiting, with garbled speech. 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 Post CT, more lethargic with dilated right pupil and bilateral reactive but sluggish pupils. He was given mannitol and intubated for airway protection and was taken to neuro ICU.    On 1/25, HCT with enlarging R lateral ventricle and slightly worsening MLS concerning for trapped ventricle. 1/27 acute drop in SpO2 on 1/27, lavaged and suctioned out thick clot. Placed back on ACVC and FiO2 weaned from 70% to 40% overnight. On 1/28/23, he had episode of emesis, & fever of 100.3. He was  given tylenol with improvement in temperature but still little to no movement on the L side (previously would localize arm to suction and spontaneously bend L leg). Pt. Had left decompressive hemicraniectomy and right EVD  placed.    After the procedure, patient opened eyes for the first time and regained LUE/LLE movement. As he became febrile again this was less prominent. Ceftriaxone was switched to Zosyn. 1/29 HCT mild increase extraaxial fluid collection. on 1/30, repeat  HCT unchanged, his MTL was discontinued. Zosyn and vancomycin switched to cefepime. On 1/31,  HCT shoeds worsening edema +/- extraaxial fluid collection, elevated ICPs, and mannitol was restarted.      MTL was weaned,  EVD adjusted and eventually removed on 2/10.  Hospital course further c/b agitation requiring precedex, hypotension requiring bolus, dysphagia s/p PEG 2/14  and trach on 2/13, insomnia s/p seroquel, pain s/p oxycodone and fentanyl. He underwent wound revision with NSGY and plastics on 2/17. Keppra discontinued due to signs of agitation. Tolerated trach collar.     Patient was evaluated by PM&R and therapy for functional deficits, gait/ADL impairments and acute rehabilitation was recommended. Patient was medically optimized for discharge to  IRU on 2/28/23.     (28 Feb 2023 13:10)          Subjective: Patient slept through the night as per nursing.  No agitation reported by nursing.  But patient can be impulsive.  Patient answers with yes no.  He indicates he slept last night.  He denies pain, nausea, or abdominal pain.  No vomiting.  Patient noted to be following commands a little better today.  PT noted patient was definitely more alert and increase in participation, but noted to be frustrated today.  At the end of his therapy session he kicked equipment out of the way and when returned to the room told therapist to get the ­­___ out of there.  d/w Neuropsych Dr. Wright        VITALS  Vital Signs Last 24 Hrs  T(C): 36.7 (15 Mar 2023 10:01), Max: 36.7 (15 Mar 2023 10:01)  T(F): 98 (15 Mar 2023 10:01), Max: 98 (15 Mar 2023 10:01)  HR: 70 (15 Mar 2023 16:52) (70 - 97)  BP: 116/76 (15 Mar 2023 16:52) (108/72 - 122/74)  BP(mean): --  RR: 16 (15 Mar 2023 16:52) (15 - 16)  SpO2: 96% (15 Mar 2023 16:52) (96% - 99%)    Parameters below as of 15 Mar 2023 16:52  Patient On (Oxygen Delivery Method): room air        REVIEW OF SYMPTOMS  Neurological deficits      PHYSICAL EXAM  Constitutional - NAD, Comfortable  HEENT - EOMI, left hemicraniectomy, sutures removed 3/7, +Helmet in place   Neck - trach stoma site with small area of granulation-- I covered with steri-strips  Chest -  breathing comfortably on RA  Cardio - warm and well perfused   Abdomen -  Soft, NTND, +PEG, Bone flap marsupialized in epigastric region.  Sutures removed, healing well, with no significant erythema and no drainage.   Extremities - No peripheral edema, No calf tenderness   Neurologic Exam:                    Cognitive -             Orientation: Awake, Alert, unable to assess orientation due to aphasia and cognitive deficits            Communication- Starting When asked to say his name spelled out [id number].  When asked to show 2 fingers, showed me 3 fingers and stated these were 3 fingers; on-fluent, unable to repeat            Attention:  Impaired; makes eye contact but follows commands inconsistently            Memory: Impaired            Thought: Impaired, Impulsive     Speech - non-verbal -s/p trach, impaired comprehension, +aphasia, poor mimicking     Cranial Nerves - Limited due to comprehension impairment and aphasia; No facial asymmetry, Tongue midline, EOMI, Pupils dilated but reactive, +dysphagia     Motor -                       LEFT    UE - at least 3/5 and moving spontaneously; exam limited by command following;  WNL                    RIGHT UE - ShAB 0/5, EF 0/5, EE 0/5, WE 0/5,  0/5-- MAS 1 finger flexors                     LEFT    LE -  at least 3/5 and moving spontaneously; exam limited by command following                    RIGHT LE - HE 1/5, Hip adductor 1/5, KE 0/5, DF 0/5, PF 0/5        Sensory - decrease sensation to LT-- RUE       Coordination - FTN / HTS impaired on right  Psychiatric - Mood stable, Affect WNL      RECENT LABS                  RADIOLOGY/OTHER RESULTS      MEDICATIONS  (STANDING):  AQUAPHOR (petrolatum Ointment) 1 Application(s) Topical two times a day  atorvastatin 80 milliGRAM(s) Oral at bedtime  bacitracin   Ointment 1 Application(s) Topical two times a day  bacitracin   Ointment 1 Application(s) Topical daily  donepezil 5 milliGRAM(s) Oral daily  enoxaparin Injectable 90 milliGRAM(s) SubCutaneous <User Schedule>  gabapentin 600 milliGRAM(s) Oral at bedtime  melatonin 6 milliGRAM(s) Oral at bedtime  midodrine. 10 milliGRAM(s) Oral <User Schedule>  nystatin Powder 1 Application(s) Topical two times a day  pantoprazole   Suspension 40 milliGRAM(s) Oral daily  PARoxetine 20 milliGRAM(s) Oral <User Schedule>  polyethylene glycol 3350 17 Gram(s) Oral daily  senna Syrup 5 milliLiter(s) Oral at bedtime  traZODone 100 milliGRAM(s) Oral at bedtime    MEDICATIONS  (PRN):  acetaminophen    Suspension .. 650 milliGRAM(s) Oral every 6 hours PRN Temp greater or equal to 38C (100.4F), Mild Pain (1 - 3)

## 2023-03-15 NOTE — CHART NOTE - NSCHARTNOTEFT_GEN_A_CORE
Approached Pt to f/u incident during PT earlier today when Pt reportedly kicked a piece of equipment and then said a verbal expletive to his PT. Pt's father was present but talking on the phone apparently with Pt's mother who asked why a psychologist was talking to Pt if he was unable to talk. Pt was asked a few yes/no questions about his mood, which he did not answer and looked at his father instead. Attempted to get information about Pt from father, but he said that he had to complete the phone call he was in and left the room, and when approached again a few minutes later, stated that he'd better dedicate the time to his son. Pt unwilling to participate, ? family dynamics issues.  Neuropsychologist remains available.

## 2023-03-15 NOTE — PROGRESS NOTE ADULT - ASSESSMENT
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 He is  s/p Left decompressive hemicraniectomy and right EVD placement 1/28/23. Hospital course further c/b respiratory failure s/p intubation and extubation,  agitation requiring precedex, sepsis s/p ABX, hypotension requiring bolus, dysphagia s/p PEG 2/14 and trach on 2/13, insomnia s/p seroquel, pain s/p oxycodone and fentanyl. Patient now with Right Hemiparesis, dysphagia s/p PEG, Aphasia, Cognitive deficits, gait Instability, ADL impairments and Functional impairments.    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    - ST: to diagnose and treat deficits in swallowing, cognition and communication.  - Helmet when OOB  - Baseline CT head 3/1- High right frontal malik hole is seen. Postoperative changes compatible with a left temporal frontal parietal craniectomy is again seen. Mild parenchyma extending through the craniectomy defect is again seen. There is abnormal high attenuation seen involving the left basal ganglia/corona radiata region. This finding is likely compatible with evolving areas acute parenchymal hemorrhage. Surrounding edema is seen. Mass effect on the left lateral ventricle is seen. Left-to-right shift (3.1 mm) is seen. Evaluation of the osseous structures with the appropriate window aside from postop changes appear normal. Complete opacification of the right sphenoid sinus is seen consisting of mucosal thickening. Both mastoid and middle ear regions appear clear. IMPRESSION: Postop changes described above. Evolving area parenchymal hemorrhage as described above.  - OT–right resting hand splint      Sleep/Restlessness-   --c/w Trazodone to 100mg qhs 3/8-- helping with sleep and restlessness.  Pt's mother reports he was on 50-100mg qhs and 50mg daytime home meds.   --cont. melatonin  --monitor  --Attending discussed with nursing that patient needs supervision for safety monitoring as pt.  is impulsive, restless and high fall risk as well as is reaching for trach      Depression/anxiety  --Pt was on Paxil 30mg at home as per mother--   --increased Paxil to 20mg started 3/16  --Neuropsychology follow up-- d/w Dr. Fernández    Aphasia--  --c/w donepezil trial-started 3/13-Monitor for SE's-- seems to be tolerating well      Bilateral lower extremity DVTs–  – Lower extremity Doppler from 3/1–right femoral vein DVT and bilateral lower extremity calf DVTs  – spoke with patient's neurosurgery PA–who was in contact with Dr. Cueva patient's neurosurgeon–gave clearance for patient to start therapeutic Lovenox.  --Vascular Surgery consult -- reaached out to Dr. Hodges 3/7 - nothing further to do if patient is on therapeutic lovenox.   -- c/w therapeutic lovenox for 3 months.    Orthostatic hypotension–  -Stopped doxazosin 3/2-- monitor PVRs, low PVR's per nursing  -no report of OH since midodrine 10mg   -cont to monitor    #Acute Respiratory Failure  - Intubation and extubation  - s/p Trach 2/13  - Decannulated 3/5, Pulm monitoring, tolerating well  --Pulm note 3/14 reviewed and appreciated    #GI PPX  - Nexium 40mg daily    #DM II  - A1c 6.0  -management as per hospitalist--off insulin     #Pain management  - Tylenol PRN,   --cont. Neurontin 600mg nightly    #Bowel Regimen  - Senna, miralax PRN    #Bladder management  -voiding well with low PVRs,   -- off doxazosin 3/2    #Dysphagia    - s/p PEG 2/14  - Puree with thin liquids-- 1:1 assist with meals  - SLP: evaluation and treatment  - Diet: tolerating bolus TF     #Skin:  - Skin on admission: IAD to bilateral groin and sacrum; RUQ incision with sutures with palpable skull flap; Left hemicraniectomy with sutures MILLER; psoriasis BL shin  - Pressure injury/Skin: Turn Q2hrs while in bed, OOB to Chair, PT/OT     #Precaution  - Fall, Aspiration, seizure    #home meds  - per list provided by Laura yang: Rousuvastatin 40mg daily and Paroxetine 30mg daily  -  cw paroxetine  - cw high intensity atorvastatin to home med dose    – IDT 3/9:  Social work:  patient on discharge can live with his mother who has a first-floor set up.  Nursing–incontinent of bowel and bladder, total assist with toileting  Speech: Puree/thin diet, severe language deficits–poor command following, poor orientation, Some spontaneous naming, & gestures, Speech jargon, Aphasia, severe cognitive deficits  OT's: Total assist with ADLs and transfers, restless  PT: Sitting –max assist,, Supine to sit--Max A x2; Improved trunk control; Tot A transfers   goals-- mod assist with transfers, ambulation 10 feet max assist with hemiwalker, wheelchair mobility 50 feet min assist.  ELOS: 3/28 ZOHAIB  Goals:  1.  Improved secretion management and tolerate PMV  2.  transfers with 1 person assist  3.  Communicate basic wants and needs with yes no answers    Discussed medications and progress with Laura yang on 3/9. All questions answered.

## 2023-03-16 LAB
ALBUMIN SERPL ELPH-MCNC: 3.3 G/DL — SIGNIFICANT CHANGE UP (ref 3.3–5)
ALP SERPL-CCNC: 109 U/L — SIGNIFICANT CHANGE UP (ref 40–120)
ALT FLD-CCNC: 29 U/L — SIGNIFICANT CHANGE UP (ref 10–45)
ANION GAP SERPL CALC-SCNC: 9 MMOL/L — SIGNIFICANT CHANGE UP (ref 5–17)
AST SERPL-CCNC: 14 U/L — SIGNIFICANT CHANGE UP (ref 10–40)
BILIRUB SERPL-MCNC: 0.4 MG/DL — SIGNIFICANT CHANGE UP (ref 0.2–1.2)
BUN SERPL-MCNC: 5 MG/DL — LOW (ref 7–23)
CALCIUM SERPL-MCNC: 9.5 MG/DL — SIGNIFICANT CHANGE UP (ref 8.4–10.5)
CHLORIDE SERPL-SCNC: 103 MMOL/L — SIGNIFICANT CHANGE UP (ref 96–108)
CO2 SERPL-SCNC: 30 MMOL/L — SIGNIFICANT CHANGE UP (ref 22–31)
CREAT SERPL-MCNC: 0.68 MG/DL — SIGNIFICANT CHANGE UP (ref 0.5–1.3)
EGFR: 119 ML/MIN/1.73M2 — SIGNIFICANT CHANGE UP
GLUCOSE SERPL-MCNC: 119 MG/DL — HIGH (ref 70–99)
HCT VFR BLD CALC: 40.7 % — SIGNIFICANT CHANGE UP (ref 39–50)
HGB BLD-MCNC: 13.3 G/DL — SIGNIFICANT CHANGE UP (ref 13–17)
MCHC RBC-ENTMCNC: 30 PG — SIGNIFICANT CHANGE UP (ref 27–34)
MCHC RBC-ENTMCNC: 32.7 GM/DL — SIGNIFICANT CHANGE UP (ref 32–36)
MCV RBC AUTO: 91.9 FL — SIGNIFICANT CHANGE UP (ref 80–100)
NRBC # BLD: 0 /100 WBCS — SIGNIFICANT CHANGE UP (ref 0–0)
PLATELET # BLD AUTO: 326 K/UL — SIGNIFICANT CHANGE UP (ref 150–400)
POTASSIUM SERPL-MCNC: 4.4 MMOL/L — SIGNIFICANT CHANGE UP (ref 3.5–5.3)
POTASSIUM SERPL-SCNC: 4.4 MMOL/L — SIGNIFICANT CHANGE UP (ref 3.5–5.3)
PROT SERPL-MCNC: 7.3 G/DL — SIGNIFICANT CHANGE UP (ref 6–8.3)
RBC # BLD: 4.43 M/UL — SIGNIFICANT CHANGE UP (ref 4.2–5.8)
RBC # FLD: 14 % — SIGNIFICANT CHANGE UP (ref 10.3–14.5)
SODIUM SERPL-SCNC: 142 MMOL/L — SIGNIFICANT CHANGE UP (ref 135–145)
WBC # BLD: 7.81 K/UL — SIGNIFICANT CHANGE UP (ref 3.8–10.5)
WBC # FLD AUTO: 7.81 K/UL — SIGNIFICANT CHANGE UP (ref 3.8–10.5)

## 2023-03-16 PROCEDURE — 99232 SBSQ HOSP IP/OBS MODERATE 35: CPT

## 2023-03-16 RX ORDER — ESCITALOPRAM OXALATE 10 MG/1
10 TABLET, FILM COATED ORAL
Refills: 0 | Status: DISCONTINUED | OUTPATIENT
Start: 2023-03-17 | End: 2023-04-06

## 2023-03-16 RX ADMIN — ATORVASTATIN CALCIUM 80 MILLIGRAM(S): 80 TABLET, FILM COATED ORAL at 21:47

## 2023-03-16 RX ADMIN — NYSTATIN CREAM 1 APPLICATION(S): 100000 CREAM TOPICAL at 17:34

## 2023-03-16 RX ADMIN — Medication 1 APPLICATION(S): at 11:39

## 2023-03-16 RX ADMIN — GABAPENTIN 600 MILLIGRAM(S): 400 CAPSULE ORAL at 21:47

## 2023-03-16 RX ADMIN — Medication 1 APPLICATION(S): at 06:14

## 2023-03-16 RX ADMIN — NYSTATIN CREAM 1 APPLICATION(S): 100000 CREAM TOPICAL at 06:14

## 2023-03-16 RX ADMIN — Medication 20 MILLIGRAM(S): at 06:15

## 2023-03-16 RX ADMIN — Medication 100 MILLIGRAM(S): at 21:46

## 2023-03-16 RX ADMIN — Medication 1 APPLICATION(S): at 17:34

## 2023-03-16 RX ADMIN — ENOXAPARIN SODIUM 90 MILLIGRAM(S): 100 INJECTION SUBCUTANEOUS at 21:46

## 2023-03-16 RX ADMIN — DONEPEZIL HYDROCHLORIDE 5 MILLIGRAM(S): 10 TABLET, FILM COATED ORAL at 11:39

## 2023-03-16 RX ADMIN — PANTOPRAZOLE SODIUM 40 MILLIGRAM(S): 20 TABLET, DELAYED RELEASE ORAL at 11:39

## 2023-03-16 RX ADMIN — Medication 1 APPLICATION(S): at 17:33

## 2023-03-16 RX ADMIN — MIDODRINE HYDROCHLORIDE 10 MILLIGRAM(S): 2.5 TABLET ORAL at 07:47

## 2023-03-16 RX ADMIN — ENOXAPARIN SODIUM 90 MILLIGRAM(S): 100 INJECTION SUBCUTANEOUS at 08:49

## 2023-03-16 RX ADMIN — Medication 6 MILLIGRAM(S): at 21:46

## 2023-03-16 RX ADMIN — Medication 1 APPLICATION(S): at 06:15

## 2023-03-16 NOTE — PROGRESS NOTE ADULT - ASSESSMENT
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 He is  s/p Left decompressive hemicraniectomy and right EVD placement 1/28/23. Hospital course further c/b respiratory failure s/p intubation and extubation,  agitation requiring precedex, sepsis s/p ABX, hypotension requiring bolus, dysphagia s/p PEG 2/14 and trach on 2/13, insomnia s/p seroquel, pain s/p oxycodone and fentanyl. Patient now with Right Hemiparesis, dysphagia s/p PEG, Aphasia, Cognitive deficits, gait Instability, ADL impairments and Functional impairments.    #IPH  - Large left frontal and basal ganglia IPH with mass effect and 8mm midline shift  - s/p intubation for airway protection and extubation  - s/p Decompressive hemicraniectomy and EVD placement 1/28  -  underwent wound revision with NSGY and plastics on 2/17  - Cont Comprehensive Rehab Program: PT/OT/ST, 3hours daily and 5 days weekly  - PT: Focused on improving strength, endurance, coordination, balance, functional mobility, and transfers  - OT: Focused on improving strength, fine motor skills, coordination, posture and ADLs.    - ST: to diagnose and treat deficits in swallowing, cognition and communication.  - Helmet when OOB  - Baseline CT head 3/1- High right frontal malik hole is seen. Postoperative changes compatible with a left temporal frontal parietal craniectomy is again seen. Mild parenchyma extending through the craniectomy defect is again seen. There is abnormal high attenuation seen involving the left basal ganglia/corona radiata region. This finding is likely compatible with evolving areas acute parenchymal hemorrhage. Surrounding edema is seen. Mass effect on the left lateral ventricle is seen. Left-to-right shift (3.1 mm) is seen. Evaluation of the osseous structures with the appropriate window aside from postop changes appear normal. Complete opacification of the right sphenoid sinus is seen consisting of mucosal thickening. Both mastoid and middle ear regions appear clear. IMPRESSION: Postop changes described above. Evolving area parenchymal hemorrhage as described above.  - OT–right resting hand splint      Sleep/Restlessness-   --c/w Trazodone to 100mg qhs 3/8-- helping with sleep and restlessness.  Pt's mother reports he was on 50-100mg qhs and 50mg daytime home meds.   --cont. melatonin  --monitor  --Attending discussed with nursing that patient needs supervision for safety monitoring as pt.  is impulsive, restless and high fall risk     Depression/anxiety  --Pt was on Paxil 30mg at home as per mother--   --increased Paxil to 20mg started 3/16  --Neuropsychology following-- 3/15 note reviewed    Aphasia--  --c/w donepezil trial-started 3/13-Monitor for SE's-- seems to be tolerating well      Bilateral lower extremity DVTs–  – Lower extremity Doppler from 3/1–right femoral vein DVT and bilateral lower extremity calf DVTs  – spoke with patient's neurosurgery PA–who was in contact with Dr. Cueva patient's neurosurgeon–gave clearance for patient to start therapeutic Lovenox.  --Vascular Surgery consult -- reaached out to Dr. Hodges 3/7 - nothing further to do if patient is on therapeutic lovenox.   -- c/w therapeutic lovenox for 3 months.    Orthostatic hypotension–  -Stopped doxazosin 3/2-- monitor PVRs, low PVR's per nursing  -no report of OH since midodrine 10mg   -cont to monitor    #Acute Respiratory Failure  - Intubation and extubation  - s/p Trach 2/13  - Decannulated 3/5, Pulm monitoring, tolerating well  --Pulm note 3/14 reviewed and appreciated    #GI PPX  - Nexium 40mg daily    #DM II  - A1c 6.0  -management as per hospitalist--off insulin     #Pain management  - Tylenol PRN,   --cont. Neurontin 600mg nightly    #Bowel Regimen  - Senna, miralax PRN    #Bladder management  -voiding well with low PVRs,   -- off doxazosin 3/2    #Dysphagia    - s/p PEG 2/14  - Puree with thin liquids-- 1:1 assist with meals--eating well  - SLP: evaluation and treatment  - Diet: tolerating bolus TF     #Skin:  - Skin on admission: IAD to bilateral groin and sacrum; RUQ incision with sutures with palpable skull flap; Left hemicraniectomy with sutures MILLER; psoriasis BL shin  - Pressure injury/Skin: Turn Q2hrs while in bed, OOB to Chair, PT/OT     #Precaution  - Fall, Aspiration, seizure    #home meds  - per list provided by Laura yang: Rousuvastatin 40mg daily and Paroxetine 30mg daily  -  cw paroxetine  - cw high intensity atorvastatin to home med dose    – IDT 3/9:  Social work:  patient on discharge can live with his mother who has a first-floor set up.  Nursing–incontinent of bowel and bladder, total assist with toileting  Speech: Puree/thin diet, severe language deficits–poor command following, poor orientation, Some spontaneous naming, & gestures, Speech jargon, Aphasia, severe cognitive deficits  OT's: Total assist with ADLs and transfers, restless  PT: Sitting –max assist,, Supine to sit--Max A x2; Improved trunk control; Tot A transfers   goals-- mod assist with transfers, ambulation 10 feet max assist with hemiwalker, wheelchair mobility 50 feet min assist.  ELOS: 3/28 ZOHAIB  Goals:  1.  Improved secretion management and tolerate PMV  2.  transfers with 1 person assist  3.  Communicate basic wants and needs with yes no answers    Discussed medications and progress with Laura yang on 3/9. All questions answered.  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 He is  s/p Left decompressive hemicraniectomy and right EVD placement 1/28/23. Hospital course further c/b respiratory failure s/p intubation and extubation,  agitation requiring precedex, sepsis s/p ABX, hypotension requiring bolus, dysphagia s/p PEG 2/14 and trach on 2/13, insomnia s/p seroquel, pain s/p oxycodone and fentanyl. Patient now with Right Hemiparesis, dysphagia s/p PEG, Aphasia, Cognitive deficits, gait Instability, ADL impairments and Functional impairments.    #IPH  - Large left frontal and basal ganglia IPH with mass effect and 8mm midline shift  - s/p intubation for airway protection and extubation  - s/p Decompressive hemicraniectomy and EVD placement 1/28  -  underwent wound revision with NSGY and plastics on 2/17  - Cont Comprehensive Rehab Program: PT/OT/ST, 3hours daily and 5 days weekly  - PT: Focused on improving strength, endurance, coordination, balance, functional mobility, and transfers  - OT: Focused on improving strength, fine motor skills, coordination, posture and ADLs.    - ST: to diagnose and treat deficits in swallowing, cognition and communication.  - Helmet when OOB  - Baseline CT head 3/1- High right frontal malik hole is seen. Postoperative changes compatible with a left temporal frontal parietal craniectomy is again seen. Mild parenchyma extending through the craniectomy defect is again seen. There is abnormal high attenuation seen involving the left basal ganglia/corona radiata region. This finding is likely compatible with evolving areas acute parenchymal hemorrhage. Surrounding edema is seen. Mass effect on the left lateral ventricle is seen. Left-to-right shift (3.1 mm) is seen. Evaluation of the osseous structures with the appropriate window aside from postop changes appear normal. Complete opacification of the right sphenoid sinus is seen consisting of mucosal thickening. Both mastoid and middle ear regions appear clear. IMPRESSION: Postop changes described above. Evolving area parenchymal hemorrhage as described above.  - OT–right resting hand splint      Sleep/Restlessness-   --c/w Trazodone to 100mg qhs 3/8-- helping with sleep and restlessness.  Pt's mother reports he was on 50-100mg qhs and 50mg daytime home meds.   --cont. melatonin  --monitor  --Attending discussed with nursing that patient needs supervision for safety monitoring as pt.  is impulsive, restless and high fall risk     Depression/anxiety  --Pt was on Paxil 30mg at home as per mother--   --increased Paxil to 20mg started 3/16  --Neuropsychology following-- 3/15 note reviewed    Aphasia--  --c/w donepezil trial-started 3/13-Monitor for SE's-- seems to be tolerating well      Bilateral lower extremity DVTs–  – Lower extremity Doppler from 3/1–right femoral vein DVT and bilateral lower extremity calf DVTs  – spoke with patient's neurosurgery PA–who was in contact with Dr. Cueva patient's neurosurgeon–gave clearance for patient to start therapeutic Lovenox.  --Vascular Surgery consult -- reaached out to Dr. Hodges 3/7 - nothing further to do if patient is on therapeutic lovenox.   -- c/w therapeutic lovenox for 3 months.    Orthostatic hypotension–  -Stopped doxazosin 3/2-- monitor PVRs, low PVR's per nursing  -no report of OH since midodrine 10mg   -cont to monitor    #Acute Respiratory Failure  - Intubation and extubation  - s/p Trach 2/13  - Decannulated 3/5, Pulm monitoring, tolerating well  --Pulm note 3/14 reviewed and appreciated    #GI PPX  - Nexium 40mg daily    #DM II  - A1c 6.0  -management as per hospitalist--off insulin     #Pain management  - Tylenol PRN,   --cont. Neurontin 600mg nightly    #Bowel Regimen  - Senna, miralax PRN    #Bladder management  -voiding well with low PVRs,   -- off doxazosin 3/2    #Dysphagia    - s/p PEG 2/14  - Puree with thin liquids-- 1:1 assist with meals--eating well  - SLP: evaluation and treatment  - Diet: tolerating bolus TF     #Skin:  - Skin on admission: IAD to bilateral groin and sacrum; RUQ incision with sutures with palpable skull flap; Left hemicraniectomy with sutures MILLER; psoriasis BL shin  - Pressure injury/Skin: Turn Q2hrs while in bed, OOB to Chair, PT/OT     #Precaution  - Fall, Aspiration, seizure    #home meds  - per list provided by Laura yang: Rousuvastatin 40mg daily and Paroxetine 30mg daily  -  cw paroxetine  - cw high intensity atorvastatin to home med dose    – IDT 3/16:  Social work:  patient on discharge can live with his mother who has a first-floor set up.  Nursing–incontinent of bowel and bladder, total assist with toileting  Poorer participation this week in therapies as patient awareness returns and mood therefore declines.   Speech: Puree/thin diet, severe language deficits–poor command following, poor orientation, Some spontaneous naming, & gestures, Speech jargon, Aphasia, severe cognitive deficits. Supervision eating. Did not pass minced/moist trial.   OT's: Total assist with ADLs and transfers, restless. Max-A UBD.   PT: Sitting –max assist, Supine to sit--Max A x2; Improved trunk control; Tot A transfers. wax/wane participation.    goals-- mod assist with transfers, ambulation 10 feet max assist with hemiwalker, wheelchair mobility 50 feet min assist.  ELOS: 3/28 ZOHAIB  Goals:  1.  Improved secretion management and tolerate PMV  2.  transfers with 1 person assist  3.  Communicate basic wants and needs with yes no answers    Discussed medications and progress with Laura yang on 3/9. All questions answered.  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 He is  s/p Left decompressive hemicraniectomy and right EVD placement 1/28/23. Hospital course further c/b respiratory failure s/p intubation and extubation,  agitation requiring precedex, sepsis s/p ABX, hypotension requiring bolus, dysphagia s/p PEG 2/14 and trach on 2/13, insomnia s/p seroquel, pain s/p oxycodone and fentanyl. Patient now with Right Hemiparesis, dysphagia s/p PEG, Aphasia, Cognitive deficits, gait Instability, ADL impairments and Functional impairments.    #IPH  - Large left frontal and basal ganglia IPH with mass effect and 8mm midline shift  - s/p intubation for airway protection and extubation  - s/p Decompressive hemicraniectomy and EVD placement 1/28  -  underwent wound revision with NSGY and plastics on 2/17  - Cont Comprehensive Rehab Program: PT/OT/ST, 3hours daily and 5 days weekly  - PT: Focused on improving strength, endurance, coordination, balance, functional mobility, and transfers  - OT: Focused on improving strength, fine motor skills, coordination, posture and ADLs.    - ST: to diagnose and treat deficits in swallowing, cognition and communication.  - Helmet when OOB  - Baseline CT head 3/1- High right frontal malik hole is seen. Postoperative changes compatible with a left temporal frontal parietal craniectomy is again seen. Mild parenchyma extending through the craniectomy defect is again seen. There is abnormal high attenuation seen involving the left basal ganglia/corona radiata region. This finding is likely compatible with evolving areas acute parenchymal hemorrhage. Surrounding edema is seen. Mass effect on the left lateral ventricle is seen. Left-to-right shift (3.1 mm) is seen. Evaluation of the osseous structures with the appropriate window aside from postop changes appear normal. Complete opacification of the right sphenoid sinus is seen consisting of mucosal thickening. Both mastoid and middle ear regions appear clear. IMPRESSION: Postop changes described above. Evolving area parenchymal hemorrhage as described above.  - OT–right resting hand splint      Sleep/Restlessness-   --c/w Trazodone to 100mg qhs 3/8-- helping with sleep and restlessness.  Pt's mother reports he was on 50-100mg qhs and 50mg daytime home meds.   --cont. melatonin  --monitor  --Attending discussed with nursing that patient needs supervision for safety monitoring as pt.  is impulsive, restless and high fall risk     Depression/anxiety  --Pt was on Paxil 30mg at home as per mother--   --increased Paxil to 20mg started 3/16  --Neuropsychology following-- 3/15 note reviewed    Aphasia--  --c/w donepezil trial-started 3/13-Monitor for SE's-- seems to be tolerating well      Bilateral lower extremity DVTs–  – Lower extremity Doppler from 3/1–right femoral vein DVT and bilateral lower extremity calf DVTs  – spoke with patient's neurosurgery PA–who was in contact with Dr. Cueva patient's neurosurgeon–gave clearance for patient to start therapeutic Lovenox.  --Vascular Surgery consult -- reaached out to Dr. Hodges 3/7 - nothing further to do if patient is on therapeutic lovenox.   -- c/w therapeutic lovenox for 3 months.    Orthostatic hypotension–  -Stopped doxazosin 3/2-- monitor PVRs, low PVR's per nursing  -no report of OH since midodrine 10mg   -cont to monitor    #Acute Respiratory Failure  - Intubation and extubation  - s/p Trach 2/13  - Decannulated 3/5, Pulm monitoring, tolerating well  --Pulm note 3/14 reviewed and appreciated    #GI PPX  - Nexium 40mg daily    #DM II  - A1c 6.0  -management as per hospitalist--off insulin     #Pain management  - Tylenol PRN,   --cont. Neurontin 600mg nightly    #Bowel Regimen  - Senna, miralax PRN    #Bladder management  -voiding well with low PVRs,   -- off doxazosin 3/2    #Dysphagia    - s/p PEG 2/14  - Puree with thin liquids-- 1:1 assist with meals--eating well  - SLP: evaluation and treatment  - Diet: tolerating bolus TF     #Skin:  - Skin on admission: IAD to bilateral groin and sacrum; RUQ incision with sutures with palpable skull flap; Left hemicraniectomy with sutures MILLER; psoriasis BL shin  - Pressure injury/Skin: Turn Q2hrs while in bed, OOB to Chair, PT/OT     #Precaution  - Fall, Aspiration, seizure    #home meds  - per list provided by Laura yang: Rousuvastatin 40mg daily and Paroxetine 30mg daily  -  cw paroxetine  - cw high intensity atorvastatin to home med dose    – IDT 3/16:  Social work:  patient on discharge can live with his mother who has a first-floor set up.  Nursing–incontinent of bowel and bladder, total assist with toileting  Poorer participation this week in therapies as patient awareness returns and mood therefore declines.   Speech: tolerating Puree/thin diet--trialing minced and moist diet, severe language deficits–Aphasia, some spontaneous naming, & gestures, Speech jargon,  poor command following, Severe cognitive deficits-- poor orientation,   Did not pass minced/moist trial.   OT's: Supervision eating. Total assist with ADLs and transfers, restless. Max-A UBD.   PT: Sitting –max assist, Supine to sit--Max A x2; Improved trunk control; Tot A transfers. wax/wane participation.    goals-- mod assist with transfers, ambulation 10 feet max assist with hemiwalker, wheelchair mobility 50 feet min assist.  ELOS: 3/28 ZOHAIB  Goals:  1.  Improved secretion management and tolerate PMV  2.  transfers with 1 person assist  3.  Communicate basic wants and needs with yes no answers    Discussed medications and progress with Laura yang on 3/9. All questions answered.  ASSESSMENT/PLAN  This is 42 year old male with PMH of prior substance use, HTN, anxiety who presented to 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 He is  s/p Left decompressive hemicraniectomy and right EVD placement 1/28/23. Hospital course further c/b respiratory failure s/p intubation and extubation,  agitation requiring precedex, sepsis s/p ABX, hypotension requiring bolus, dysphagia s/p PEG 2/14 and trach on 2/13, insomnia s/p seroquel, pain s/p oxycodone and fentanyl. Patient now with Right Hemiparesis, dysphagia s/p PEG, Aphasia, Cognitive deficits, gait Instability, ADL impairments and Functional impairments.    #IPH  - Large left frontal and basal ganglia IPH with mass effect and 8mm midline shift  - s/p intubation for airway protection and extubation  - s/p Decompressive hemicraniectomy and EVD placement 1/28  -  underwent wound revision with NSGY and plastics on 2/17  - Cont Comprehensive Rehab Program: PT/OT/ST, 3hours daily and 5 days weekly  - PT: Focused on improving strength, endurance, coordination, balance, functional mobility, and transfers  - OT: Focused on improving strength, fine motor skills, coordination, posture and ADLs.    - ST: to diagnose and treat deficits in swallowing, cognition and communication.  - Helmet when OOB  - Baseline CT head 3/1- High right frontal malik hole is seen. Postoperative changes compatible with a left temporal frontal parietal craniectomy is again seen. Mild parenchyma extending through the craniectomy defect is again seen. There is abnormal high attenuation seen involving the left basal ganglia/corona radiata region. This finding is likely compatible with evolving areas acute parenchymal hemorrhage. Surrounding edema is seen. Mass effect on the left lateral ventricle is seen. Left-to-right shift (3.1 mm) is seen. Evaluation of the osseous structures with the appropriate window aside from postop changes appear normal. Complete opacification of the right sphenoid sinus is seen consisting of mucosal thickening. Both mastoid and middle ear regions appear clear. IMPRESSION: Postop changes described above. Evolving area parenchymal hemorrhage as described above.  - OT–right resting hand splint      Sleep/Restlessness-   --c/w Trazodone to 100mg qhs 3/8-- helping with sleep and restlessness.  Pt's mother reports he was on 50-100mg qhs and 50mg daytime home meds.   --cont. melatonin  --monitor  --Attending discussed with nursing that patient needs supervision for safety monitoring as pt.  is impulsive, restless and high fall risk     Depression/anxiety  --Pt was on Paxil 30mg at home as per mother--   --increased Paxil to 20mg started 3/16  --Neuropsychology following-- 3/15 note reviewed    Aphasia--  --c/w donepezil trial-started 3/13-Monitor for SE's-- seems to be tolerating well      Bilateral lower extremity DVTs–  – Lower extremity Doppler from 3/1–right femoral vein DVT and bilateral lower extremity calf DVTs  – spoke with patient's neurosurgery PA–who was in contact with Dr. uCeva patient's neurosurgeon–gave clearance for patient to start therapeutic Lovenox.  --Vascular Surgery consult -- reaached out to Dr. Hodges 3/7 - nothing further to do if patient is on therapeutic lovenox.   -- c/w therapeutic lovenox for 3 months.    Orthostatic hypotension–  -Stopped doxazosin 3/2-- monitor PVRs, low PVR's per nursing  -no report of OH since midodrine 10mg   -cont to monitor    #Acute Respiratory Failure  - Intubation and extubation  - s/p Trach 2/13  - Decannulated 3/5, Pulm monitoring, tolerating well  --Pulm note 3/14 reviewed and appreciated    #GI PPX  - Nexium 40mg daily    #DM II  - A1c 6.0  -management as per hospitalist--off insulin     #Pain management  - Tylenol PRN,   --cont. Neurontin 600mg nightly    #Bowel Regimen  - Senna, miralax PRN    #Bladder management  -voiding well with low PVRs,   -- off doxazosin 3/2    #Dysphagia    - s/p PEG 2/14  - Puree with thin liquids-- 1:1 assist with meals--eating well  - SLP: evaluation and treatment  - Diet: tolerating bolus TF     #Skin:  - Skin on admission: IAD to bilateral groin and sacrum; RUQ incision with sutures with palpable skull flap; Left hemicraniectomy with sutures MILLER; psoriasis BL shin  - Pressure injury/Skin: Turn Q2hrs while in bed, OOB to Chair, PT/OT     #Precaution  - Fall, Aspiration, seizure    #home meds  - per list provided by mother, [place]: Rousuvastatin 40mg daily and Paroxetine 30mg daily  -  cw paroxetine  - cw high intensity atorvastatin to home med dose    – IDT 3/16:  Social work:  patient on discharge can live with his mother who has a first-floor set up.  Nursing–incontinent of bowel and bladder, total assist with toileting  Poorer participation this week in therapies as patient awareness returns and mood therefore declines.   Speech: tolerating Puree/thin diet--trialing minced and moist diet, severe language deficits–Aphasia, some spontaneous naming, & gestures, Speech jargon,  poor command following, Severe cognitive deficits-- poor orientation,   Did not pass minced/moist trial.   OT's: Supervision eating. Total assist with ADLs and transfers, restless. Max-A UBD.   PT: Sitting –max assist, Supine to sit--Max A x2; Improved trunk control; Tot A transfers. wax/wane participation.    goals-- mod assist with transfers, ambulation 10 feet max assist with hemiwalker, wheelchair mobility 50 feet min assist.  ELOS: 3/28 ZOHAIB  Goals:  1.  Improved secretion management and tolerate PMV  2.  transfers with 1 person assist  3.  Communicate basic wants and needs with yes no answers    Discussed IDT updated both parents on 3/16. All questions answered.

## 2023-03-16 NOTE — PROGRESS NOTE ADULT - SUBJECTIVE AND OBJECTIVE BOX
HPI:  This is 42 year old male with PMH of prior substance use, HTN, anxiety who presented to [facility] on 1/23. On 1/23 morning, he told his family that he was not feeling well and took 2 tabs of Vyvanse (usually takes as needed). Shortly after, he was at a group therapy meeting over the phone when he complained to his father he was having a severe headache. He then became more lethargic, and developed nausea and vomited. He was brought to ED by his father. On arrival, noted to be lethargic, vomiting, with garbled speech. 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 Post CT, more lethargic with dilated right pupil and bilateral reactive but sluggish pupils. He was given mannitol and intubated for airway protection and was taken to neuro ICU.    On 1/25, HCT with enlarging R lateral ventricle and slightly worsening MLS concerning for trapped ventricle. 1/27 acute drop in SpO2 on 1/27, lavaged and suctioned out thick clot. Placed back on ACVC and FiO2 weaned from 70% to 40% overnight. On 1/28/23, he had episode of emesis, & fever of 100.3. He was  given tylenol with improvement in temperature but still little to no movement on the L side (previously would localize arm to suction and spontaneously bend L leg). Pt. Had left decompressive hemicraniectomy and right EVD  placed.    After the procedure, patient opened eyes for the first time and regained LUE/LLE movement. As he became febrile again this was less prominent. Ceftriaxone was switched to Zosyn. 1/29 HCT mild increase extraaxial fluid collection. on 1/30, repeat  HCT unchanged, his MTL was discontinued. Zosyn and vancomycin switched to cefepime. On 1/31,  HCT shoeds worsening edema +/- extraaxial fluid collection, elevated ICPs, and mannitol was restarted.      MTL was weaned,  EVD adjusted and eventually removed on 2/10.  Hospital course further c/b agitation requiring precedex, hypotension requiring bolus, dysphagia s/p PEG 2/14  and trach on 2/13, insomnia s/p seroquel, pain s/p oxycodone and fentanyl. He underwent wound revision with NSGY and plastics on 2/17. Keppra discontinued due to signs of agitation. Tolerated trach collar.     Patient was evaluated by PM&R and therapy for functional deficits, gait/ADL impairments and acute rehabilitation was recommended. Patient was medically optimized for discharge to  IRU on 2/28/23.            Subjective:  Pt. slept during the night as per nursing and is cooperative.  PT reports pt. transfers and standing better--making good progress.  Pt. seen eating breakfast this AM.  Answers with y/N -- indicates he slept, denies pain, nausea or dizziness.  Appears comfortable.        VITALS  Vital Signs Last 24 Hrs  T(C): 36.7 (16 Mar 2023 07:48), Max: 36.9 (15 Mar 2023 20:15)  T(F): 98 (16 Mar 2023 07:48), Max: 98.4 (15 Mar 2023 20:15)  HR: 68 (16 Mar 2023 07:48) (68 - 74)  BP: 119/76 (16 Mar 2023 07:48) (111/68 - 119/76)  BP(mean): --  RR: 16 (16 Mar 2023 07:48) (16 - 16)  SpO2: 95% (16 Mar 2023 07:48) (95% - 97%)    Parameters below as of 16 Mar 2023 07:48  Patient On (Oxygen Delivery Method): room air        REVIEW OF SYMPTOMS  Neurological deficits      PHYSICAL EXAM  Constitutional - NAD, Comfortable  HEENT - EOMI, left hemicraniectomy, sutures removed 3/7, +Helmet in place   Neck - trach stoma site with small area of granulation--  covered with steri-strips  Chest -  breathing comfortably on RA  Cardio - warm and well perfused   Abdomen -  Soft, NTND, +PEG, Bone flap marsupialized in epigastric region.  Sutures removed, healing well, with no significant erythema and no drainage.   Extremities - No peripheral edema, No calf tenderness   Neurologic Exam:                    Cognitive -             Orientation: Awake, Alert, unable to assess orientation due to aphasia and cognitive deficits            Communication- comprehension improving-- inconsistent command following; non-fluent, unable to repeat            Attention:  Impaired; makes eye contact but follows commands inconsistently            Memory: Impaired            Thought: Impaired, Impulsive     Speech - non-verbal -s/p trach, impaired comprehension, +aphasia, poor mimicking     Cranial Nerves - Limited due to comprehension impairment and aphasia; No facial asymmetry, Tongue midline, EOMI, Pupils dilated but reactive, +dysphagia     Motor -                       LEFT    UE - at least 3/5 and moving spontaneously; exam limited by command following;  WNL                    RIGHT UE - ShAB 0/5, EF 0/5, EE 0/5, WE 0/5,  0/5-- MAS 1 finger flexors                     LEFT    LE -  at least 3/5 and moving spontaneously; exam limited by command following                    RIGHT LE - HE 1/5, Hip adductor 1/5, KE 0/5, DF 0/5, PF 0/5        Sensory - decrease sensation to LT-- RUE       Coordination - FTN / HTS impaired on right  Psychiatric - Mood stable, Affect WNL      RECENT LABS                        13.3   7.81  )-----------( 326      ( 16 Mar 2023 07:51 )             40.7     03-16    142  |  103  |  5<L>  ----------------------------<  119<H>  4.4   |  30  |  0.68    Ca    9.5      16 Mar 2023 07:51    TPro  7.3  /  Alb  3.3  /  TBili  0.4  /  DBili  x   /  AST  14  /  ALT  29  /  AlkPhos  109  03-16            RADIOLOGY/OTHER RESULTS      MEDICATIONS  (STANDING):  AQUAPHOR (petrolatum Ointment) 1 Application(s) Topical two times a day  atorvastatin 80 milliGRAM(s) Oral at bedtime  bacitracin   Ointment 1 Application(s) Topical two times a day  bacitracin   Ointment 1 Application(s) Topical daily  donepezil 5 milliGRAM(s) Oral daily  enoxaparin Injectable 90 milliGRAM(s) SubCutaneous <User Schedule>  gabapentin 600 milliGRAM(s) Oral at bedtime  hydrocortisone 1% Cream 1 Application(s) Topical two times a day  melatonin 6 milliGRAM(s) Oral at bedtime  midodrine. 10 milliGRAM(s) Oral <User Schedule>  nystatin Powder 1 Application(s) Topical two times a day  pantoprazole   Suspension 40 milliGRAM(s) Oral daily  PARoxetine 20 milliGRAM(s) Oral <User Schedule>  polyethylene glycol 3350 17 Gram(s) Oral daily  senna Syrup 5 milliLiter(s) Oral at bedtime  traZODone 100 milliGRAM(s) Oral at bedtime    MEDICATIONS  (PRN):  acetaminophen    Suspension .. 650 milliGRAM(s) Oral every 6 hours PRN Temp greater or equal to 38C (100.4F), Mild Pain (1 - 3)         HPI:  This is 42 year old male with PMH of prior substance use, HTN, anxiety who presented to [facility] on 1/23. On 1/23 morning, he told his family that he was not feeling well and took 2 tabs of Vyvanse (usually takes as needed). Shortly after, he was at a group therapy meeting over the phone when he complained to his father he was having a severe headache. He then became more lethargic, and developed nausea and vomited. He was brought to ED by his father. On arrival, noted to be lethargic, vomiting, with garbled speech. 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 Post CT, more lethargic with dilated right pupil and bilateral reactive but sluggish pupils. He was given mannitol and intubated for airway protection and was taken to neuro ICU.    On 1/25, HCT with enlarging R lateral ventricle and slightly worsening MLS concerning for trapped ventricle. 1/27 acute drop in SpO2 on 1/27, lavaged and suctioned out thick clot. Placed back on ACVC and FiO2 weaned from 70% to 40% overnight. On 1/28/23, he had episode of emesis, & fever of 100.3. He was  given tylenol with improvement in temperature but still little to no movement on the L side (previously would localize arm to suction and spontaneously bend L leg). Pt. Had left decompressive hemicraniectomy and right EVD  placed.    After the procedure, patient opened eyes for the first time and regained LUE/LLE movement. As he became febrile again this was less prominent. Ceftriaxone was switched to Zosyn. 1/29 HCT mild increase extraaxial fluid collection. on 1/30, repeat  HCT unchanged, his MTL was discontinued. Zosyn and vancomycin switched to cefepime. On 1/31,  HCT shoeds worsening edema +/- extraaxial fluid collection, elevated ICPs, and mannitol was restarted.      MTL was weaned,  EVD adjusted and eventually removed on 2/10.  Hospital course further c/b agitation requiring precedex, hypotension requiring bolus, dysphagia s/p PEG 2/14  and trach on 2/13, insomnia s/p seroquel, pain s/p oxycodone and fentanyl. He underwent wound revision with NSGY and plastics on 2/17. Keppra discontinued due to signs of agitation. Tolerated trach collar.     Patient was evaluated by PM&R and therapy for functional deficits, gait/ADL impairments and acute rehabilitation was recommended. Patient was medically optimized for discharge to [facility] IRU on 2/28/23.        Subjective:  Pt. slept during the night as per nursing and is cooperative.  PT reports pt. transfers and standing better--making good progress.  Pt. seen eating breakfast this AM.  Answers with y/N -- indicates he slept, denies pain, nausea or dizziness.  Appears comfortable.        VITALS  Vital Signs Last 24 Hrs  T(C): 36.7 (16 Mar 2023 07:48), Max: 36.9 (15 Mar 2023 20:15)  T(F): 98 (16 Mar 2023 07:48), Max: 98.4 (15 Mar 2023 20:15)  HR: 68 (16 Mar 2023 07:48) (68 - 74)  BP: 119/76 (16 Mar 2023 07:48) (111/68 - 119/76)  BP(mean): --  RR: 16 (16 Mar 2023 07:48) (16 - 16)  SpO2: 95% (16 Mar 2023 07:48) (95% - 97%)    Parameters below as of 16 Mar 2023 07:48  Patient On (Oxygen Delivery Method): room air        REVIEW OF SYMPTOMS  Neurological deficits      PHYSICAL EXAM  Constitutional - NAD, Comfortable  HEENT - EOMI, left hemicraniectomy, sutures removed 3/7, +Helmet in place   Neck - trach stoma site with small area of granulation--  covered with steri-strips  Chest -  breathing comfortably on RA  Cardio - warm and well perfused   Abdomen -  Soft, NTND, +PEG, Bone flap marsupialized in epigastric region.  Sutures removed, healing well, with no significant erythema and no drainage.   Extremities - No peripheral edema, No calf tenderness   Neurologic Exam:                    Cognitive -             Orientation: Awake, Alert, unable to assess orientation due to aphasia and cognitive deficits            Communication- comprehension improving-- inconsistent command following; non-fluent, unable to repeat            Attention:  Impaired; makes eye contact but follows commands inconsistently            Memory: Impaired            Thought: Impaired, Impulsive     Speech - non-verbal -s/p trach, impaired comprehension, +aphasia, poor mimicking     Cranial Nerves - Limited due to comprehension impairment and aphasia; No facial asymmetry, Tongue midline, EOMI, Pupils dilated but reactive, +dysphagia     Motor -                       LEFT    UE - at least 3/5 and moving spontaneously; exam limited by command following;  WNL                    RIGHT UE - ShAB 0/5, EF 0/5, EE 0/5, WE 0/5,  0/5-- MAS 1 finger flexors                     LEFT    LE -  at least 3/5 and moving spontaneously; exam limited by command following                    RIGHT LE - HE 1/5, Hip adductor 1/5, KE 0/5, DF 0/5, PF 0/5        Sensory - decrease sensation to LT-- RUE       Coordination - FTN / HTS impaired on right  Psychiatric - Mood stable, Affect WNL      RECENT LABS                        13.3   7.81  )-----------( 326      ( 16 Mar 2023 07:51 )             40.7     03-16    142  |  103  |  5<L>  ----------------------------<  119<H>  4.4   |  30  |  0.68    Ca    9.5      16 Mar 2023 07:51    TPro  7.3  /  Alb  3.3  /  TBili  0.4  /  DBili  x   /  AST  14  /  ALT  29  /  AlkPhos  109  03-16            RADIOLOGY/OTHER RESULTS      MEDICATIONS  (STANDING):  AQUAPHOR (petrolatum Ointment) 1 Application(s) Topical two times a day  atorvastatin 80 milliGRAM(s) Oral at bedtime  bacitracin   Ointment 1 Application(s) Topical two times a day  bacitracin   Ointment 1 Application(s) Topical daily  donepezil 5 milliGRAM(s) Oral daily  enoxaparin Injectable 90 milliGRAM(s) SubCutaneous <User Schedule>  gabapentin 600 milliGRAM(s) Oral at bedtime  hydrocortisone 1% Cream 1 Application(s) Topical two times a day  melatonin 6 milliGRAM(s) Oral at bedtime  midodrine. 10 milliGRAM(s) Oral <User Schedule>  nystatin Powder 1 Application(s) Topical two times a day  pantoprazole   Suspension 40 milliGRAM(s) Oral daily  PARoxetine 20 milliGRAM(s) Oral <User Schedule>  polyethylene glycol 3350 17 Gram(s) Oral daily  senna Syrup 5 milliLiter(s) Oral at bedtime  traZODone 100 milliGRAM(s) Oral at bedtime    MEDICATIONS  (PRN):  acetaminophen    Suspension .. 650 milliGRAM(s) Oral every 6 hours PRN Temp greater or equal to 38C (100.4F), Mild Pain (1 - 3)

## 2023-03-17 PROCEDURE — 99232 SBSQ HOSP IP/OBS MODERATE 35: CPT

## 2023-03-17 RX ADMIN — Medication 1 APPLICATION(S): at 05:31

## 2023-03-17 RX ADMIN — NYSTATIN CREAM 1 APPLICATION(S): 100000 CREAM TOPICAL at 05:31

## 2023-03-17 RX ADMIN — NYSTATIN CREAM 1 APPLICATION(S): 100000 CREAM TOPICAL at 17:31

## 2023-03-17 RX ADMIN — ENOXAPARIN SODIUM 90 MILLIGRAM(S): 100 INJECTION SUBCUTANEOUS at 21:50

## 2023-03-17 RX ADMIN — DONEPEZIL HYDROCHLORIDE 5 MILLIGRAM(S): 10 TABLET, FILM COATED ORAL at 11:42

## 2023-03-17 RX ADMIN — Medication 100 MILLIGRAM(S): at 21:50

## 2023-03-17 RX ADMIN — ATORVASTATIN CALCIUM 80 MILLIGRAM(S): 80 TABLET, FILM COATED ORAL at 21:50

## 2023-03-17 RX ADMIN — ESCITALOPRAM OXALATE 10 MILLIGRAM(S): 10 TABLET, FILM COATED ORAL at 07:59

## 2023-03-17 RX ADMIN — GABAPENTIN 600 MILLIGRAM(S): 400 CAPSULE ORAL at 21:50

## 2023-03-17 RX ADMIN — PANTOPRAZOLE SODIUM 40 MILLIGRAM(S): 20 TABLET, DELAYED RELEASE ORAL at 11:42

## 2023-03-17 RX ADMIN — Medication 1 APPLICATION(S): at 17:30

## 2023-03-17 RX ADMIN — Medication 1 APPLICATION(S): at 05:30

## 2023-03-17 RX ADMIN — POLYETHYLENE GLYCOL 3350 17 GRAM(S): 17 POWDER, FOR SOLUTION ORAL at 11:42

## 2023-03-17 RX ADMIN — MIDODRINE HYDROCHLORIDE 10 MILLIGRAM(S): 2.5 TABLET ORAL at 07:59

## 2023-03-17 RX ADMIN — Medication 6 MILLIGRAM(S): at 21:50

## 2023-03-17 RX ADMIN — Medication 1 APPLICATION(S): at 17:31

## 2023-03-17 RX ADMIN — ENOXAPARIN SODIUM 90 MILLIGRAM(S): 100 INJECTION SUBCUTANEOUS at 08:02

## 2023-03-17 RX ADMIN — Medication 1 APPLICATION(S): at 11:42

## 2023-03-17 NOTE — PROGRESS NOTE ADULT - SUBJECTIVE AND OBJECTIVE BOX
HPI:  This is 42 year old male with PMH of prior substance use, HTN, anxiety who presented to [facility] on 1/23. On 1/23 morning, he told his family that he was not feeling well and took 2 tabs of Vyvanse (usually takes as needed). Shortly after, he was at a group therapy meeting over the phone when he complained to his father he was having a severe headache. He then became more lethargic, and developed nausea and vomited. He was brought to ED by his father. On arrival, noted to be lethargic, vomiting, with garbled speech. 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 Post CT, more lethargic with dilated right pupil and bilateral reactive but sluggish pupils. He was given mannitol and intubated for airway protection and was taken to neuro ICU.  On 1/25, HCT with enlarging R lateral ventricle and slightly worsening MLS concerning for trapped ventricle. 1/27 acute drop in SpO2 on 1/27, lavaged and suctioned out thick clot. Placed back on ACVC and FiO2 weaned from 70% to 40% overnight. On 1/28/23, he had episode of emesis, & fever of 100.3. He was  given tylenol with improvement in temperature but still little to no movement on the L side (previously would localize arm to suction and spontaneously bend L leg). Pt. Had left decompressive hemicraniectomy and right EVD  placed.  After the procedure, patient opened eyes for the first time and regained LUE/LLE movement. As he became febrile again this was less prominent. Ceftriaxone was switched to Zosyn. 1/29 HCT mild increase extraaxial fluid collection. on 1/30, repeat  HCT unchanged, his MTL was discontinued. Zosyn and vancomycin switched to cefepime. On 1/31,  HCT shoeds worsening edema +/- extraaxial fluid collection, elevated ICPs, and mannitol was restarted.    MTL was weaned,  EVD adjusted and eventually removed on 2/10.  Hospital course further c/b agitation requiring precedex, hypotension requiring bolus, dysphagia s/p PEG 2/14  and trach on 2/13, insomnia s/p seroquel, pain s/p oxycodone and fentanyl. He underwent wound revision with NSGY and plastics on 2/17. Keppra discontinued due to signs of agitation. Tolerated trach collar.   Patient was evaluated by PM&R and therapy for functional deficits, gait/ADL impairments and acute rehabilitation was recommended. Patient was medically optimized for discharge to [facility] IRU on 2/28/23.    Subjective/obj:  Seen and examined in bed. Patient seen rolling to right side on own. Awake, alert, answers Y/N appropriately. Able to give High 5. Shows thumbs up instead of two fingers. Staff reported refusing therapies today.     RECENT LABS    Vital Signs Last 24 Hrs  T(C): 36.3 (17 Mar 2023 07:51), Max: 36.3 (17 Mar 2023 07:51)  T(F): 97.4 (17 Mar 2023 07:51), Max: 97.4 (17 Mar 2023 07:51)  HR: 83 (17 Mar 2023 07:51) (70 - 83)  BP: 113/76 (17 Mar 2023 07:51) (113/76 - 142/80)  RR: 16 (17 Mar 2023 07:51) (16 - 16)  SpO2: 95% (17 Mar 2023 07:51) (95% - 95%)    Parameters below as of 17 Mar 2023 07:51  Patient On (Oxygen Delivery Method): room air               13.3   7.81  )-----------( 326      ( 16 Mar 2023 07:51 )             40.7     03-16    142  |  103  |  5<L>  ----------------------------<  119<H>  4.4   |  30  |  0.68    Ca    9.5      16 Mar 2023 07:51    TPro  7.3  /  Alb  3.3  /  TBili  0.4  /  DBili  x   /  AST  14  /  ALT  29  /  AlkPhos  109  03-16  CAPILLARY BLOOD GLUCOSE    PHYSICAL EXAM  Constitutional - NAD, Comfortable  HEENT - EOMI, left hemicraniectomy, sutures removed 3/7   Neck - s/p trach  Chest -  NAD, no dyspnea or accessory mm use, CTA BL, limited by poor inspiratory effort, limited by ability to complete directed tasks  Cardio - warm and well perfused   Abdomen -  Soft, NTND, +PEG, Bone flap marsupialized in epigastric region.  Sutures removed, healing well, with no significant erythema and no drainage.   Extremities - No peripheral edema, No calf tenderness   Neurologic Exam:                    Cognitive -             Orientation: Awake, Alert, unable to assess orientation due to aphasia and cognitive deficits            Communication- able to sing "hello" and hum a tune.  Poor command following, Non-fluent, unable to repeat            Attention:  Impaired; makes eye contact but follows commands inconsistently            Memory: Impaired            Thought: Impaired, Impulsive     Speech - non-verbal -s/p trach, impaired comprehension, +aphasia, poor mimicking     Cranial Nerves - Limited due to comprehension impairment and aphasia; No facial asymmetry, Tongue midline, EOMI, Pupils dilated but reactive, +dysphagia     Motor -                       LEFT    UE - at least 3/5 and moving spontaneously; exam limited by command following;  WNL                    RIGHT UE - ShAB 0/5, EF 0/5, EE 0/5, WE 0/5,  0/5-- MAS 1 finger flexors                     LEFT    LE -  at least 3/5 and moving spontaneously; exam limited by command following                    RIGHT LE - HE 1/5, Hip adductor 1/5, KE 0/5, DF 0/5, PF 0/5        Sensory - decrease sensation to LT-- RUE     Coordination - FTN / HTS impaired on right  Psychiatric - more alert and more agitated, refusing to participate in therapies.     MEDICATIONS  (STANDING):  AQUAPHOR (petrolatum Ointment) 1 Application(s) Topical two times a day  atorvastatin 80 milliGRAM(s) Oral at bedtime  bacitracin   Ointment 1 Application(s) Topical two times a day  bacitracin   Ointment 1 Application(s) Topical daily  donepezil 5 milliGRAM(s) Oral daily  enoxaparin Injectable 90 milliGRAM(s) SubCutaneous <User Schedule>  escitalopram 10 milliGRAM(s) Oral <User Schedule>  gabapentin 600 milliGRAM(s) Oral at bedtime  hydrocortisone 1% Cream 1 Application(s) Topical two times a day  melatonin 6 milliGRAM(s) Oral at bedtime  midodrine. 10 milliGRAM(s) Oral <User Schedule>  nystatin Powder 1 Application(s) Topical two times a day  pantoprazole   Suspension 40 milliGRAM(s) Oral daily  polyethylene glycol 3350 17 Gram(s) Oral daily  senna Syrup 5 milliLiter(s) Oral at bedtime  traZODone 100 milliGRAM(s) Oral at bedtime    MEDICATIONS  (PRN):  acetaminophen    Suspension .. 650 milliGRAM(s) Oral every 6 hours PRN Temp greater or equal to 38C (100.4F), Mild Pain (1 - 3)         HPI:  This is 42 year old male with PMH of prior substance use, HTN, anxiety who presented to [facility] on 1/23. On 1/23 morning, he told his family that he was not feeling well and took 2 tabs of Vyvanse (usually takes as needed). Shortly after, he was at a group therapy meeting over the phone when he complained to his father he was having a severe headache. He then became more lethargic, and developed nausea and vomited. He was brought to ED by his father. On arrival, noted to be lethargic, vomiting, with garbled speech. 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 Post CT, more lethargic with dilated right pupil and bilateral reactive but sluggish pupils. He was given mannitol and intubated for airway protection and was taken to neuro ICU.  On 1/25, HCT with enlarging R lateral ventricle and slightly worsening MLS concerning for trapped ventricle. 1/27 acute drop in SpO2 on 1/27, lavaged and suctioned out thick clot. Placed back on ACVC and FiO2 weaned from 70% to 40% overnight. On 1/28/23, he had episode of emesis, & fever of 100.3. He was  given tylenol with improvement in temperature but still little to no movement on the L side (previously would localize arm to suction and spontaneously bend L leg). Pt. Had left decompressive hemicraniectomy and right EVD  placed.  After the procedure, patient opened eyes for the first time and regained LUE/LLE movement. As he became febrile again this was less prominent. Ceftriaxone was switched to Zosyn. 1/29 HCT mild increase extraaxial fluid collection. on 1/30, repeat  HCT unchanged, his MTL was discontinued. Zosyn and vancomycin switched to cefepime. On 1/31,  HCT shoeds worsening edema +/- extraaxial fluid collection, elevated ICPs, and mannitol was restarted.    MTL was weaned,  EVD adjusted and eventually removed on 2/10.  Hospital course further c/b agitation requiring precedex, hypotension requiring bolus, dysphagia s/p PEG 2/14  and trach on 2/13, insomnia s/p seroquel, pain s/p oxycodone and fentanyl. He underwent wound revision with NSGY and plastics on 2/17. Keppra discontinued due to signs of agitation. Tolerated trach collar.   Patient was evaluated by PM&R and therapy for functional deficits, gait/ADL impairments and acute rehabilitation was recommended. Patient was medically optimized for discharge to [facility] IRU on 2/28/23.    Subjective/obj:  Seen and examined in bed. Patient seen rolling to right side on own. Awake, alert, answers Y/N appropriately. Able to give High 5. Shows thumbs up instead of two fingers. Staff reported intermittently refusing therapies today. has been more frustrated this week.     RECENT LABS    Vital Signs Last 24 Hrs  T(C): 36.3 (17 Mar 2023 07:51), Max: 36.3 (17 Mar 2023 07:51)  T(F): 97.4 (17 Mar 2023 07:51), Max: 97.4 (17 Mar 2023 07:51)  HR: 83 (17 Mar 2023 07:51) (70 - 83)  BP: 113/76 (17 Mar 2023 07:51) (113/76 - 142/80)  RR: 16 (17 Mar 2023 07:51) (16 - 16)  SpO2: 95% (17 Mar 2023 07:51) (95% - 95%)    Parameters below as of 17 Mar 2023 07:51  Patient On (Oxygen Delivery Method): room air               13.3   7.81  )-----------( 326      ( 16 Mar 2023 07:51 )             40.7     03-16    142  |  103  |  5<L>  ----------------------------<  119<H>  4.4   |  30  |  0.68    Ca    9.5      16 Mar 2023 07:51    TPro  7.3  /  Alb  3.3  /  TBili  0.4  /  DBili  x   /  AST  14  /  ALT  29  /  AlkPhos  109  03-16  CAPILLARY BLOOD GLUCOSE    PHYSICAL EXAM  Constitutional - NAD, Comfortable  HEENT - EOMI, left hemicraniectomy, sutures removed 3/7   Neck - s/p trach  Chest -  NAD, no dyspnea or accessory mm use, CTA BL, limited by poor inspiratory effort, limited by ability to complete directed tasks  Cardio - warm and well perfused   Abdomen -  Soft, NTND, +PEG, Bone flap marsupialized in epigastric region.  Sutures removed, healing well, with no significant erythema and no drainage.   Extremities - No peripheral edema, No calf tenderness   Neurologic Exam:                    Cognitive -             Orientation: Awake, Alert, unable to assess orientation due to aphasia and cognitive deficits            Communication- able to sing "hello" and hum a tune.  Poor command following, Non-fluent, unable to repeat            Attention:  Impaired; makes eye contact but follows commands inconsistently            Memory: Impaired            Thought: Impaired, Impulsive     Speech - non-verbal -s/p trach, impaired comprehension, +aphasia, poor mimicking     Cranial Nerves - Limited due to comprehension impairment and aphasia; No facial asymmetry, Tongue midline, EOMI, Pupils dilated but reactive, +dysphagia     Motor -                       LEFT    UE - at least 3/5 and moving spontaneously; exam limited by command following;  WNL                    RIGHT UE - ShAB 0/5, EF 0/5, EE 0/5, WE 0/5,  0/5-- MAS 1 finger flexors                     LEFT    LE -  at least 3/5 and moving spontaneously; exam limited by command following                    RIGHT LE - HE 1/5, Hip adductor 1/5, KE 0/5, DF 0/5, PF 0/5        Sensory - decrease sensation to LT-- RUE     Coordination - FTN / HTS impaired on right  Psychiatric - more alert and more agitated, refusing to participate in therapies.     MEDICATIONS  (STANDING):  AQUAPHOR (petrolatum Ointment) 1 Application(s) Topical two times a day  atorvastatin 80 milliGRAM(s) Oral at bedtime  bacitracin   Ointment 1 Application(s) Topical two times a day  bacitracin   Ointment 1 Application(s) Topical daily  donepezil 5 milliGRAM(s) Oral daily  enoxaparin Injectable 90 milliGRAM(s) SubCutaneous <User Schedule>  escitalopram 10 milliGRAM(s) Oral <User Schedule>  gabapentin 600 milliGRAM(s) Oral at bedtime  hydrocortisone 1% Cream 1 Application(s) Topical two times a day  melatonin 6 milliGRAM(s) Oral at bedtime  midodrine. 10 milliGRAM(s) Oral <User Schedule>  nystatin Powder 1 Application(s) Topical two times a day  pantoprazole   Suspension 40 milliGRAM(s) Oral daily  polyethylene glycol 3350 17 Gram(s) Oral daily  senna Syrup 5 milliLiter(s) Oral at bedtime  traZODone 100 milliGRAM(s) Oral at bedtime    MEDICATIONS  (PRN):  acetaminophen    Suspension .. 650 milliGRAM(s) Oral every 6 hours PRN Temp greater or equal to 38C (100.4F), Mild Pain (1 - 3)

## 2023-03-17 NOTE — PROGRESS NOTE ADULT - ATTENDING COMMENTS
Pt. seen with resident.  Agree with documentation above as per resident with amendments made as appropriate. Patient medically stable. Making progress towards rehab goals.     left [facility]  Staff reported intermittently refusing therapies today. has been more frustrated this week. changed paxil to lexapro as may be more effective for anxiety/depression that is limiting pt's participation in therapy.  if not improved by Monday will consult Psychiatry.

## 2023-03-17 NOTE — PROGRESS NOTE ADULT - ASSESSMENT
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 He is  s/p Left decompressive hemicraniectomy and right EVD placement 1/28/23. Hospital course further c/b respiratory failure s/p intubation and extubation,  agitation requiring precedex, sepsis s/p ABX, hypotension requiring bolus, dysphagia s/p PEG 2/14 and trach on 2/13, insomnia s/p seroquel, pain s/p oxycodone and fentanyl. Patient now with Right Hemiparesis, dysphagia s/p PEG, Aphasia, Cognitive deficits, gait Instability, ADL impairments and Functional impairments.    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    - ST: to diagnose and treat deficits in swallowing, cognition and communication.  - Helmet when OOB  - Baseline CT head 3/1- High right frontal malik hole is seen. Postoperative changes compatible with a left temporal frontal parietal craniectomy is again seen. Mild parenchyma extending through the craniectomy defect is again seen. There is abnormal high attenuation seen involving the left basal ganglia/corona radiata region. This finding is likely compatible with evolving areas acute parenchymal hemorrhage. Surrounding edema is seen. Mass effect on the left lateral ventricle is seen. Left-to-right shift (3.1 mm) is seen. Evaluation of the osseous structures with the appropriate window aside from postop changes appear normal. Complete opacification of the right sphenoid sinus is seen consisting of mucosal thickening. Both mastoid and middle ear regions appear clear. IMPRESSION: Postop changes described above. Evolving area parenchymal hemorrhage as described above.  - OT– right resting hand splint      Sleep/Restlessness-   --c/w Trazodone to 100mg qhs 3/8-- helping with sleep and restlessness. Pt's mother reports he was on 50-100mg qhs and 50mg daytime home meds.   --cont. melatonin  --monitor  --Attending discussed with nursing that patient needs supervision for safety monitoring as pt.  is impulsive, restless and high fall risk as well as is reaching for trach    Depression/anxiety  --Pt was on Paxil 30mg at home as per mother--   --c/w Paxil 10mg started 3/10, d/c  -- Initiated Lexapro at equivalent dose to paxil.     Aphasia--  --c/w donepezil     Bilateral lower extremity DVTs–  – Lower extremity Doppler from 3/1–right femoral vein DVT and bilateral lower extremity calf DVTs  – spoke with patient's neurosurgery PA–who was in contact with Dr. Cueva patient's neurosurgeon–gave clearance for patient to start therapeutic Lovenox.  --Vascular Surgery consult -- reaached out to Dr. Hodges 3/7 - nothing further to do if patient is on therapeutic lovenox.   -- c/w therapeutic lovenox for 3 months.    Orthostatic hypotension–  -Stopped doxazosin 3/2-- monitor PVRs, low PVR's per nursing  -no report of OH since midodrine 10mg   -cont to monitor    #Acute Respiratory Failure  - Intubation and extubation  - s/p Trach 2/13  - Decannulated 3/5, Pulm monitoring, tolerating well    #GI PPX  - Nexium 40mg daily    #DM II  - A1c 6.0  -management as per hospitalist--off insulin     #Pain management  - Tylenol PRN,   --cont. Neurontin 600mg nightly    #Bowel Regimen  - Senna, miralax PRN    #Bladder management  -voiding well with low PVRs,   -- off doxazosin 3/2    #Dysphagia    - s/p PEG 2/14  - Puree with thin liquids-- 1:1 assist with meals  - SLP: evaluation and treatment  - Diet: tolerating bolus TF     #Skin:  - Skin on admission: IAD to bilateral groin and sacrum; RUQ incision with sutures with palpable skull flap; Left hemicraniectomy with sutures MILLER; psoriasis BL shin  - Pressure injury/Skin: Turn Q2hrs while in bed, OOB to Chair, PT/OT     #Precaution  - Fall, Aspiration, seizure    #home meds  - per list provided by aLura yang: Rousuvastatin 40mg daily and Paroxetine 30mg daily  -  cw paroxetine  - cw high intensity atorvastatin to home med dose    – IDT 3/9:  Social work:  patient on discharge can live with his mother who has a first-floor set up.  Nursing–incontinent of bowel and bladder, total assist with toileting  Speech: Puree/thin diet, severe language deficits–poor command following, poor orientation, Some spontaneous naming, & gestures, Speech jargon, Aphasia, severe cognitive deficits  OT's: Total assist with ADLs and transfers, restless  PT: Sitting –max assist,, Supine to sit--Max A x2; Improved trunk control; Tot A transfers   goals-- mod assist with transfers, ambulation 10 feet max assist with hemiwalker, wheelchair mobility 50 feet min assist.  ELOS: 3/28 ZOHAIB  Goals:  1.  Improved secretion management and tolerate PMV  2.  transfers with 1 person assist  3.  Communicate basic wants and needs with yes no answers    Discussed medications and progress with Laura yang on 3/9. All questions answered.  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 He is  s/p Left decompressive hemicraniectomy and right EVD placement 1/28/23. Hospital course further c/b respiratory failure s/p intubation and extubation,  agitation requiring precedex, sepsis s/p ABX, hypotension requiring bolus, dysphagia s/p PEG 2/14 and trach on 2/13, insomnia s/p seroquel, pain s/p oxycodone and fentanyl. Patient now with Right Hemiparesis, dysphagia s/p PEG, Aphasia, Cognitive deficits, gait Instability, ADL impairments and Functional impairments.    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    - ST: to diagnose and treat deficits in swallowing, cognition and communication.  - Helmet when OOB  - Baseline CT head 3/1- High right frontal malik hole is seen. Postoperative changes compatible with a left temporal frontal parietal craniectomy is again seen. Mild parenchyma extending through the craniectomy defect is again seen. There is abnormal high attenuation seen involving the left basal ganglia/corona radiata region. This finding is likely compatible with evolving areas acute parenchymal hemorrhage. Surrounding edema is seen. Mass effect on the left lateral ventricle is seen. Left-to-right shift (3.1 mm) is seen. Evaluation of the osseous structures with the appropriate window aside from postop changes appear normal. Complete opacification of the right sphenoid sinus is seen consisting of mucosal thickening. Both mastoid and middle ear regions appear clear. IMPRESSION: Postop changes described above. Evolving area parenchymal hemorrhage as described above.  - OT– right resting hand splint      Sleep/Restlessness-   --c/w Trazodone to 100mg qhs 3/8-- helping with sleep and restlessness. Pt's mother reports he was on 50-100mg qhs and 50mg daytime home meds.   --cont. melatonin  --monitor  --Attending discussed with nursing that patient needs supervision for safety monitoring as pt.  is impulsive, restless and high fall risk as well as is reaching for trach    Depression/anxiety  --Pt was on Paxil 30mg at home as per mother--   --c/w Paxil 10mg started 3/10, d/c  -- Initiated Lexapro at equivalent dose to paxil as may be more effective for anxiety/depression that is limiting pt's participation in therapy.     Aphasia--  --c/w donepezil     Bilateral lower extremity DVTs–  – Lower extremity Doppler from 3/1–right femoral vein DVT and bilateral lower extremity calf DVTs  – spoke with patient's neurosurgery PA–who was in contact with Dr. Cueva patient's neurosurgeon–gave clearance for patient to start therapeutic Lovenox.  --Vascular Surgery consult -- reaached out to Dr. Hodges 3/7 - nothing further to do if patient is on therapeutic lovenox.   -- c/w therapeutic lovenox for 3 months.    Orthostatic hypotension–  -Stopped doxazosin 3/2-- monitor PVRs, low PVR's per nursing  -no report of OH since midodrine 10mg   -cont to monitor    #Acute Respiratory Failure  - Intubation and extubation  - s/p Trach 2/13  - Decannulated 3/5, Pulm monitoring, tolerating well    #GI PPX  - Nexium 40mg daily    #DM II  - A1c 6.0  -management as per hospitalist--off insulin     #Pain management  - Tylenol PRN,   --cont. Neurontin 600mg nightly    #Bowel Regimen  - Senna, miralax PRN    #Bladder management  -voiding well with low PVRs,   -- off doxazosin 3/2    #Dysphagia    - s/p PEG 2/14  - Puree with thin liquids-- 1:1 assist with meals  - SLP: evaluation and treatment  - Diet: tolerating bolus TF     #Skin:  - Skin on admission: IAD to bilateral groin and sacrum; RUQ incision with sutures with palpable skull flap; Left hemicraniectomy with sutures MILLER; psoriasis BL shin  - Pressure injury/Skin: Turn Q2hrs while in bed, OOB to Chair, PT/OT     #Precaution  - Fall, Aspiration, seizure    #home meds  - per list provided by Laura yang: Rousuvastatin 40mg daily and Paroxetine 30mg daily  -  cw paroxetine  - cw high intensity atorvastatin to home med dose    – IDT 3/9:  Social work:  patient on discharge can live with his mother who has a first-floor set up.  Nursing–incontinent of bowel and bladder, total assist with toileting  Speech: Puree/thin diet, severe language deficits–poor command following, poor orientation, Some spontaneous naming, & gestures, Speech jargon, Aphasia, severe cognitive deficits  OT's: Total assist with ADLs and transfers, restless  PT: Sitting –max assist,, Supine to sit--Max A x2; Improved trunk control; Tot A transfers   goals-- mod assist with transfers, ambulation 10 feet max assist with hemiwalker, wheelchair mobility 50 feet min assist.  ELOS: 3/28 ZOHAIB  Goals:  1.  Improved secretion management and tolerate PMV  2.  transfers with 1 person assist  3.  Communicate basic wants and needs with yes no answers    Discussed medications and progress with Laura yang on 3/9. All questions answered.

## 2023-03-18 PROCEDURE — 99232 SBSQ HOSP IP/OBS MODERATE 35: CPT

## 2023-03-18 RX ADMIN — Medication 1 APPLICATION(S): at 12:02

## 2023-03-18 RX ADMIN — Medication 1 APPLICATION(S): at 05:55

## 2023-03-18 RX ADMIN — POLYETHYLENE GLYCOL 3350 17 GRAM(S): 17 POWDER, FOR SOLUTION ORAL at 12:02

## 2023-03-18 RX ADMIN — NYSTATIN CREAM 1 APPLICATION(S): 100000 CREAM TOPICAL at 05:55

## 2023-03-18 RX ADMIN — ATORVASTATIN CALCIUM 80 MILLIGRAM(S): 80 TABLET, FILM COATED ORAL at 21:29

## 2023-03-18 RX ADMIN — Medication 1 APPLICATION(S): at 17:44

## 2023-03-18 RX ADMIN — ESCITALOPRAM OXALATE 10 MILLIGRAM(S): 10 TABLET, FILM COATED ORAL at 07:39

## 2023-03-18 RX ADMIN — ENOXAPARIN SODIUM 90 MILLIGRAM(S): 100 INJECTION SUBCUTANEOUS at 08:03

## 2023-03-18 RX ADMIN — MIDODRINE HYDROCHLORIDE 10 MILLIGRAM(S): 2.5 TABLET ORAL at 07:38

## 2023-03-18 RX ADMIN — DONEPEZIL HYDROCHLORIDE 5 MILLIGRAM(S): 10 TABLET, FILM COATED ORAL at 12:02

## 2023-03-18 RX ADMIN — Medication 100 MILLIGRAM(S): at 21:29

## 2023-03-18 RX ADMIN — NYSTATIN CREAM 1 APPLICATION(S): 100000 CREAM TOPICAL at 17:44

## 2023-03-18 RX ADMIN — GABAPENTIN 600 MILLIGRAM(S): 400 CAPSULE ORAL at 21:29

## 2023-03-18 RX ADMIN — Medication 1 APPLICATION(S): at 17:43

## 2023-03-18 RX ADMIN — Medication 6 MILLIGRAM(S): at 21:29

## 2023-03-18 RX ADMIN — ENOXAPARIN SODIUM 90 MILLIGRAM(S): 100 INJECTION SUBCUTANEOUS at 21:43

## 2023-03-18 RX ADMIN — PANTOPRAZOLE SODIUM 40 MILLIGRAM(S): 20 TABLET, DELAYED RELEASE ORAL at 12:02

## 2023-03-18 NOTE — PROGRESS NOTE ADULT - SUBJECTIVE AND OBJECTIVE BOX
Hospitalist: Kaylee Healy DO    CHIEF COMPLAINT: Patient is a 43y old  male who presents with a chief complaint of left IPH/ICH (18 Mar 2023 08:25)      SUBJECTIVE / OVERNIGHT EVENTS: Patient seen and examined. No acute events overnight. Pain well controlled and patient without any complaints.    MEDICATIONS  (STANDING):  AQUAPHOR (petrolatum Ointment) 1 Application(s) Topical two times a day  atorvastatin 80 milliGRAM(s) Oral at bedtime  bacitracin   Ointment 1 Application(s) Topical two times a day  bacitracin   Ointment 1 Application(s) Topical daily  donepezil 5 milliGRAM(s) Oral daily  enoxaparin Injectable 90 milliGRAM(s) SubCutaneous <User Schedule>  escitalopram 10 milliGRAM(s) Oral <User Schedule>  gabapentin 600 milliGRAM(s) Oral at bedtime  hydrocortisone 1% Cream 1 Application(s) Topical two times a day  melatonin 6 milliGRAM(s) Oral at bedtime  midodrine. 10 milliGRAM(s) Oral <User Schedule>  nystatin Powder 1 Application(s) Topical two times a day  pantoprazole   Suspension 40 milliGRAM(s) Oral daily  polyethylene glycol 3350 17 Gram(s) Oral daily  senna Syrup 5 milliLiter(s) Oral at bedtime  traZODone 100 milliGRAM(s) Oral at bedtime    MEDICATIONS  (PRN):  acetaminophen    Suspension .. 650 milliGRAM(s) Oral every 6 hours PRN Temp greater or equal to 38C (100.4F), Mild Pain (1 - 3)      VITALS:  T(F): 98 (03-18-23 @ 07:36), Max: 98.5 (03-17-23 @ 20:22)  HR: 75 (03-18-23 @ 07:36) (73 - 82)  BP: 117/73 (03-18-23 @ 07:36) (108/74 - 123/75)  RR: 15 (03-18-23 @ 07:36) (15 - 16)  SpO2: 95% (03-18-23 @ 07:36)      REVIEW OF SYSTEMS:  For ROV please refer back to H&P     PHYSICAL EXAM:  General: NAD  ENT: MMM, no scleral icterus  Neck: Supple, No JVD  Lungs: Clear to auscultation bilaterally, no wheezes, rales, rhonchi  Cardio: RRR, S1/S2  Abdomen: Soft, Nontender, Nondistended; Bowel sounds present, +PEG  Extremities: No calf tenderness, No pitting edema        MICROBIOLOGY:          RADIOLOGY & ADDITIONAL TESTS:    Imaging Personally Reviewed:    [X] Consultant(s) Notes Reviewed:  [X] Care Discussed with Consultants/Other Providers:

## 2023-03-18 NOTE — PROGRESS NOTE ADULT - ASSESSMENT
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 He is s/p Left decompressive hemicraniectomy and right EVD placement 1/28/23. Hospital course further s/f respiratory failure s/p intubation and extubation, agitation requiring precedex, sepsis s/p ABX, hypotension requiring bolus, dysphagia s/p PEG 2/14 and trach on 2/13, insomnia s/p seroquel, pain s/p oxycodone and fentanyl. Patient now with admitted to [facility] acute inpatient rehab.    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 with mass effect and 8mm midline shift  -s/p intubation for airway protection and extubation  -s/p Decompressive hemicraniectomy and EVD placement 1/28  -Underwent wound revision with NSGY and plastics on 2/17  -Continue comprehensive rehab program - PT/OT/SLP per rehab team  -Pain management, bowel regimen per rehab   -Donepezil     #Depression/anxiety  -Paxil per rehab  -Neuropsych as indicated    #Bilateral lower extremity DVTs  -Lower extremity Doppler from 3/1–right femoral vein DVT and bilateral lower extremity calf DVTs  -Neurosurgeon gave clearance for patient to start therapeutic Lovenox  -Vascular Surgery Dr. Hodges 3/7 - recommended nothing further to do if patient is on therapeutic Lovenox   -Continue therapeutic Lovenox for 3 months    #Acute Respiratory Failure  -s/p intubation/extubation, Trach 2/13  -Pulm consulted, recs noted    #Hypotension  -Improved on Midodrine, continue    #T2DM, HbA1c 6.0  -diet controlled  -monitor    #Dysphagia s/p PEG 2/14  -Puree with thin liquids    #HLD  -Continue lipitor    #GERD  -PPI    #DVT ppx - lovenox

## 2023-03-18 NOTE — PROGRESS NOTE ADULT - SUBJECTIVE AND OBJECTIVE BOX
Cc: IPH/ICH    HPI: Patient with no new medical issues today.  Pain controlled, no chest pain, no N/V, no Fevers/Chills. No other new ROS  Has been tolerating rehabilitation program.    acetaminophen    Suspension .. 650 milliGRAM(s) Oral every 6 hours PRN  AQUAPHOR (petrolatum Ointment) 1 Application(s) Topical two times a day  atorvastatin 80 milliGRAM(s) Oral at bedtime  bacitracin   Ointment 1 Application(s) Topical two times a day  bacitracin   Ointment 1 Application(s) Topical daily  donepezil 5 milliGRAM(s) Oral daily  enoxaparin Injectable 90 milliGRAM(s) SubCutaneous <User Schedule>  escitalopram 10 milliGRAM(s) Oral <User Schedule>  gabapentin 600 milliGRAM(s) Oral at bedtime  hydrocortisone 1% Cream 1 Application(s) Topical two times a day  melatonin 6 milliGRAM(s) Oral at bedtime  midodrine. 10 milliGRAM(s) Oral <User Schedule>  nystatin Powder 1 Application(s) Topical two times a day  pantoprazole   Suspension 40 milliGRAM(s) Oral daily  polyethylene glycol 3350 17 Gram(s) Oral daily  senna Syrup 5 milliLiter(s) Oral at bedtime  traZODone 100 milliGRAM(s) Oral at bedtime    T(C): 36.7 (03-18-23 @ 07:36), Max: 36.9 (03-17-23 @ 20:22)  HR: 75 (03-18-23 @ 07:36) (73 - 82)  BP: 117/73 (03-18-23 @ 07:36) (108/74 - 123/75)  RR: 15 (03-18-23 @ 07:36) (15 - 16)  SpO2: 95% (03-18-23 @ 07:36) (95% - 96%)    In NAD  HEENT- EOMI  Heart- Well Perfused  Lungs- No resp distress, no use of accessory resp muscles  Abd- + BS, NT  Ext- No calf pain  Neuro- Exam unchanged  Psych- Affect wnl    Imp: Patient with diagnosis of IPH/ICH admitted for comprehensive acute rehabilitation.    Plan:  - Continue therapies  - DVT prophylaxis- Lovenox  - Skin- Turn q2h, check skin daily  - Continue current medications; patient medically stable.   - Patient is stable to continue current rehabilitation program.

## 2023-03-19 PROCEDURE — 99232 SBSQ HOSP IP/OBS MODERATE 35: CPT

## 2023-03-19 PROCEDURE — 70450 CT HEAD/BRAIN W/O DYE: CPT | Mod: 26

## 2023-03-19 RX ORDER — OXYCODONE HYDROCHLORIDE 5 MG/1
2.5 TABLET ORAL ONCE
Refills: 0 | Status: DISCONTINUED | OUTPATIENT
Start: 2023-03-19 | End: 2023-03-19

## 2023-03-19 RX ADMIN — ESCITALOPRAM OXALATE 10 MILLIGRAM(S): 10 TABLET, FILM COATED ORAL at 08:15

## 2023-03-19 RX ADMIN — Medication 100 MILLIGRAM(S): at 21:06

## 2023-03-19 RX ADMIN — NYSTATIN CREAM 1 APPLICATION(S): 100000 CREAM TOPICAL at 17:20

## 2023-03-19 RX ADMIN — Medication 1 APPLICATION(S): at 11:50

## 2023-03-19 RX ADMIN — Medication 1 APPLICATION(S): at 17:21

## 2023-03-19 RX ADMIN — OXYCODONE HYDROCHLORIDE 2.5 MILLIGRAM(S): 5 TABLET ORAL at 17:18

## 2023-03-19 RX ADMIN — Medication 650 MILLIGRAM(S): at 14:57

## 2023-03-19 RX ADMIN — Medication 6 MILLIGRAM(S): at 21:06

## 2023-03-19 RX ADMIN — SENNA PLUS 5 MILLILITER(S): 8.6 TABLET ORAL at 21:06

## 2023-03-19 RX ADMIN — ENOXAPARIN SODIUM 90 MILLIGRAM(S): 100 INJECTION SUBCUTANEOUS at 21:06

## 2023-03-19 RX ADMIN — Medication 650 MILLIGRAM(S): at 15:53

## 2023-03-19 RX ADMIN — Medication 1 APPLICATION(S): at 05:57

## 2023-03-19 RX ADMIN — OXYCODONE HYDROCHLORIDE 2.5 MILLIGRAM(S): 5 TABLET ORAL at 18:32

## 2023-03-19 RX ADMIN — DONEPEZIL HYDROCHLORIDE 5 MILLIGRAM(S): 10 TABLET, FILM COATED ORAL at 11:49

## 2023-03-19 RX ADMIN — PANTOPRAZOLE SODIUM 40 MILLIGRAM(S): 20 TABLET, DELAYED RELEASE ORAL at 11:49

## 2023-03-19 RX ADMIN — ATORVASTATIN CALCIUM 80 MILLIGRAM(S): 80 TABLET, FILM COATED ORAL at 21:06

## 2023-03-19 RX ADMIN — GABAPENTIN 600 MILLIGRAM(S): 400 CAPSULE ORAL at 21:05

## 2023-03-19 RX ADMIN — MIDODRINE HYDROCHLORIDE 10 MILLIGRAM(S): 2.5 TABLET ORAL at 08:15

## 2023-03-19 RX ADMIN — ENOXAPARIN SODIUM 90 MILLIGRAM(S): 100 INJECTION SUBCUTANEOUS at 08:15

## 2023-03-19 RX ADMIN — NYSTATIN CREAM 1 APPLICATION(S): 100000 CREAM TOPICAL at 05:56

## 2023-03-19 NOTE — PROGRESS NOTE ADULT - SUBJECTIVE AND OBJECTIVE BOX
Cc: IPH/ICH    HPI: Patient with no new medical issues today.  Pain controlled, no chest pain, no N/V, no Fevers/Chills. No other new ROS  Has been tolerating rehabilitation program.    MEDICATIONS  (STANDING):  AQUAPHOR (petrolatum Ointment) 1 Application(s) Topical two times a day  atorvastatin 80 milliGRAM(s) Oral at bedtime  bacitracin   Ointment 1 Application(s) Topical two times a day  bacitracin   Ointment 1 Application(s) Topical daily  donepezil 5 milliGRAM(s) Oral daily  enoxaparin Injectable 90 milliGRAM(s) SubCutaneous <User Schedule>  escitalopram 10 milliGRAM(s) Oral <User Schedule>  gabapentin 600 milliGRAM(s) Oral at bedtime  hydrocortisone 1% Cream 1 Application(s) Topical two times a day  melatonin 6 milliGRAM(s) Oral at bedtime  midodrine. 10 milliGRAM(s) Oral <User Schedule>  nystatin Powder 1 Application(s) Topical two times a day  pantoprazole   Suspension 40 milliGRAM(s) Oral daily  polyethylene glycol 3350 17 Gram(s) Oral daily  senna Syrup 5 milliLiter(s) Oral at bedtime  traZODone 100 milliGRAM(s) Oral at bedtime    MEDICATIONS  (PRN):  acetaminophen    Suspension .. 650 milliGRAM(s) Oral every 6 hours PRN Temp greater or equal to 38C (100.4F), Mild Pain (1 - 3)    Vital Signs Last 24 Hrs  T(C): 36.7 (19 Mar 2023 07:50), Max: 36.9 (18 Mar 2023 21:02)  T(F): 98 (19 Mar 2023 07:50), Max: 98.4 (18 Mar 2023 21:02)  HR: 75 (19 Mar 2023 07:50) (75 - 85)  BP: 113/77 (19 Mar 2023 07:50) (113/77 - 117/79)  BP(mean): --  RR: 16 (19 Mar 2023 07:50) (16 - 16)  SpO2: 96% (19 Mar 2023 07:50) (95% - 96%)    In NAD  HEENT- EOMI  Heart- Well Perfused  Lungs- No resp distress, no use of accessory resp muscles  Abd- + BS, NT  Ext- No calf pain  Neuro- Exam unchanged  Psych- Affect wnl    Imp: Patient with diagnosis of IPH/ICH admitted for comprehensive acute rehabilitation.    Plan:  - Continue therapies  - f/u [facility] (routine) per Dr. Talavera- d/w resident who will f/u.   - DVT prophylaxis- Lovenox  - Skin- Turn q2h, check skin daily  - Continue current medications; patient medically stable.   - Patient is stable to continue current rehabilitation program.

## 2023-03-19 NOTE — PROGRESS NOTE ADULT - SUBJECTIVE AND OBJECTIVE BOX
Hospitalist: Kaylee Healy DO    CHIEF COMPLAINT: Patient is a 43y old  male who presents with a chief complaint of left IPH/ICH (19 Mar 2023 08:20)      SUBJECTIVE / OVERNIGHT EVENTS: Patient seen and examined. No acute events overnight. Pain well controlled and patient without any complaints.    MEDICATIONS  (STANDING):  AQUAPHOR (petrolatum Ointment) 1 Application(s) Topical two times a day  atorvastatin 80 milliGRAM(s) Oral at bedtime  bacitracin   Ointment 1 Application(s) Topical two times a day  bacitracin   Ointment 1 Application(s) Topical daily  donepezil 5 milliGRAM(s) Oral daily  enoxaparin Injectable 90 milliGRAM(s) SubCutaneous <User Schedule>  escitalopram 10 milliGRAM(s) Oral <User Schedule>  gabapentin 600 milliGRAM(s) Oral at bedtime  hydrocortisone 1% Cream 1 Application(s) Topical two times a day  melatonin 6 milliGRAM(s) Oral at bedtime  midodrine. 10 milliGRAM(s) Oral <User Schedule>  nystatin Powder 1 Application(s) Topical two times a day  pantoprazole   Suspension 40 milliGRAM(s) Oral daily  polyethylene glycol 3350 17 Gram(s) Oral daily  senna Syrup 5 milliLiter(s) Oral at bedtime  traZODone 100 milliGRAM(s) Oral at bedtime    MEDICATIONS  (PRN):  acetaminophen    Suspension .. 650 milliGRAM(s) Oral every 6 hours PRN Temp greater or equal to 38C (100.4F), Mild Pain (1 - 3)      VITALS:  T(F): 98 (03-19-23 @ 07:50), Max: 98.4 (03-18-23 @ 21:02)  HR: 75 (03-19-23 @ 07:50) (75 - 85)  BP: 113/77 (03-19-23 @ 07:50) (113/77 - 117/79)  RR: 16 (03-19-23 @ 07:50) (16 - 16)  SpO2: 96% (03-19-23 @ 07:50)      REVIEW OF SYSTEMS:  For ROV please refer back to H&P     PHYSICAL EXAM:  General: NAD  ENT: MMM, no scleral icterus  Neck: Supple, No JVD  Lungs: Clear to auscultation bilaterally, no wheezes, rales, rhonchi  Cardio: RRR, S1/S2  Abdomen: Soft, Nontender, Nondistended; Bowel sounds present, +PEG  Extremities: No calf tenderness, No pitting edema    RADIOLOGY & ADDITIONAL TESTS:    Imaging Personally Reviewed:    [X] Consultant(s) Notes Reviewed:  [X] Care Discussed with Consultants/Other Providers:

## 2023-03-19 NOTE — PROGRESS NOTE ADULT - ASSESSMENT
43 y/o M with PMH of prior substance use, HTN, anxiety who presented to [facility] 1/23 with 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 He is s/p Left decompressive hemicraniectomy and right EVD placement 1/28/23. Hospital course further s/f respiratory failure s/p intubation and extubation, agitation requiring precedex, sepsis s/p ABX, hypotension requiring bolus, dysphagia s/p PEG 2/14 and trach on 2/13, insomnia s/p seroquel, pain s/p oxycodone and fentanyl. Patient now with admitted to [facility] acute inpatient rehab.    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 with mass effect and 8mm midline shift  -s/p intubation for airway protection and extubation  -s/p Decompressive hemicraniectomy and EVD placement 1/28  -Underwent wound revision with NSGY and plastics on 2/17  -Continue comprehensive rehab program - PT/OT/SLP per rehab team  -Pain management, bowel regimen per rehab   -Donepezil     #Depression/anxiety  -Paxil per rehab  -Neuropsych as indicated    #Bilateral lower extremity DVTs  -Lower extremity Doppler from 3/1–right femoral vein DVT and bilateral lower extremity calf DVTs  -Neurosurgeon gave clearance for patient to start therapeutic Lovenox  -Vascular Surgery Dr. Hodges 3/7 - recommended nothing further to do if patient is on therapeutic Lovenox   -Continue therapeutic Lovenox for 3 months    #Acute Respiratory Failure  -s/p intubation/extubation, Trach 2/13  -Pulm consulted, recs noted    #Hypotension  -Improved on Midodrine, continue    #T2DM, HbA1c 6.0  -diet controlled  -monitor    #Dysphagia s/p PEG 2/14  -Puree with thin liquids    #HLD  -Continue lipitor    #GERD  -PPI    #DVT ppx - lovenox

## 2023-03-20 LAB
ALBUMIN SERPL ELPH-MCNC: 3.2 G/DL — LOW (ref 3.3–5)
ALP SERPL-CCNC: 113 U/L — SIGNIFICANT CHANGE UP (ref 40–120)
ALT FLD-CCNC: 28 U/L — SIGNIFICANT CHANGE UP (ref 10–45)
ANION GAP SERPL CALC-SCNC: 9 MMOL/L — SIGNIFICANT CHANGE UP (ref 5–17)
AST SERPL-CCNC: 10 U/L — SIGNIFICANT CHANGE UP (ref 10–40)
BILIRUB SERPL-MCNC: 0.3 MG/DL — SIGNIFICANT CHANGE UP (ref 0.2–1.2)
BUN SERPL-MCNC: 5 MG/DL — LOW (ref 7–23)
CALCIUM SERPL-MCNC: 9.1 MG/DL — SIGNIFICANT CHANGE UP (ref 8.4–10.5)
CHLORIDE SERPL-SCNC: 104 MMOL/L — SIGNIFICANT CHANGE UP (ref 96–108)
CO2 SERPL-SCNC: 29 MMOL/L — SIGNIFICANT CHANGE UP (ref 22–31)
CREAT SERPL-MCNC: 0.66 MG/DL — SIGNIFICANT CHANGE UP (ref 0.5–1.3)
EGFR: 120 ML/MIN/1.73M2 — SIGNIFICANT CHANGE UP
GLUCOSE SERPL-MCNC: 124 MG/DL — HIGH (ref 70–99)
HCT VFR BLD CALC: 40.5 % — SIGNIFICANT CHANGE UP (ref 39–50)
HGB BLD-MCNC: 13 G/DL — SIGNIFICANT CHANGE UP (ref 13–17)
MCHC RBC-ENTMCNC: 29.7 PG — SIGNIFICANT CHANGE UP (ref 27–34)
MCHC RBC-ENTMCNC: 32.1 GM/DL — SIGNIFICANT CHANGE UP (ref 32–36)
MCV RBC AUTO: 92.5 FL — SIGNIFICANT CHANGE UP (ref 80–100)
NRBC # BLD: 0 /100 WBCS — SIGNIFICANT CHANGE UP (ref 0–0)
PLATELET # BLD AUTO: 309 K/UL — SIGNIFICANT CHANGE UP (ref 150–400)
POTASSIUM SERPL-MCNC: 3.8 MMOL/L — SIGNIFICANT CHANGE UP (ref 3.5–5.3)
POTASSIUM SERPL-SCNC: 3.8 MMOL/L — SIGNIFICANT CHANGE UP (ref 3.5–5.3)
PROT SERPL-MCNC: 7 G/DL — SIGNIFICANT CHANGE UP (ref 6–8.3)
RBC # BLD: 4.38 M/UL — SIGNIFICANT CHANGE UP (ref 4.2–5.8)
RBC # FLD: 13.8 % — SIGNIFICANT CHANGE UP (ref 10.3–14.5)
SODIUM SERPL-SCNC: 142 MMOL/L — SIGNIFICANT CHANGE UP (ref 135–145)
WBC # BLD: 7.9 K/UL — SIGNIFICANT CHANGE UP (ref 3.8–10.5)
WBC # FLD AUTO: 7.9 K/UL — SIGNIFICANT CHANGE UP (ref 3.8–10.5)

## 2023-03-20 PROCEDURE — 99232 SBSQ HOSP IP/OBS MODERATE 35: CPT

## 2023-03-20 PROCEDURE — 99233 SBSQ HOSP IP/OBS HIGH 50: CPT

## 2023-03-20 RX ORDER — GABAPENTIN 400 MG/1
400 CAPSULE ORAL AT BEDTIME
Refills: 0 | Status: DISCONTINUED | OUTPATIENT
Start: 2023-03-20 | End: 2023-03-22

## 2023-03-20 RX ADMIN — Medication 1 APPLICATION(S): at 17:19

## 2023-03-20 RX ADMIN — Medication 100 MILLIGRAM(S): at 21:29

## 2023-03-20 RX ADMIN — POLYETHYLENE GLYCOL 3350 17 GRAM(S): 17 POWDER, FOR SOLUTION ORAL at 11:19

## 2023-03-20 RX ADMIN — NYSTATIN CREAM 1 APPLICATION(S): 100000 CREAM TOPICAL at 17:20

## 2023-03-20 RX ADMIN — ATORVASTATIN CALCIUM 80 MILLIGRAM(S): 80 TABLET, FILM COATED ORAL at 21:30

## 2023-03-20 RX ADMIN — GABAPENTIN 400 MILLIGRAM(S): 400 CAPSULE ORAL at 21:29

## 2023-03-20 RX ADMIN — ENOXAPARIN SODIUM 90 MILLIGRAM(S): 100 INJECTION SUBCUTANEOUS at 21:30

## 2023-03-20 RX ADMIN — PANTOPRAZOLE SODIUM 40 MILLIGRAM(S): 20 TABLET, DELAYED RELEASE ORAL at 11:19

## 2023-03-20 RX ADMIN — Medication 1 APPLICATION(S): at 05:43

## 2023-03-20 RX ADMIN — ENOXAPARIN SODIUM 90 MILLIGRAM(S): 100 INJECTION SUBCUTANEOUS at 08:01

## 2023-03-20 RX ADMIN — NYSTATIN CREAM 1 APPLICATION(S): 100000 CREAM TOPICAL at 05:42

## 2023-03-20 RX ADMIN — MIDODRINE HYDROCHLORIDE 10 MILLIGRAM(S): 2.5 TABLET ORAL at 07:58

## 2023-03-20 RX ADMIN — DONEPEZIL HYDROCHLORIDE 5 MILLIGRAM(S): 10 TABLET, FILM COATED ORAL at 11:19

## 2023-03-20 RX ADMIN — Medication 1 APPLICATION(S): at 11:19

## 2023-03-20 RX ADMIN — ESCITALOPRAM OXALATE 10 MILLIGRAM(S): 10 TABLET, FILM COATED ORAL at 07:58

## 2023-03-20 RX ADMIN — Medication 1 APPLICATION(S): at 17:20

## 2023-03-20 RX ADMIN — Medication 6 MILLIGRAM(S): at 21:29

## 2023-03-20 RX ADMIN — SENNA PLUS 5 MILLILITER(S): 8.6 TABLET ORAL at 21:30

## 2023-03-20 NOTE — PROGRESS NOTE ADULT - SUBJECTIVE AND OBJECTIVE BOX
HPI:  This is 42 year old male with PMH of prior substance use, HTN, anxiety who presented to [facility] on 1/23. On 1/23 morning, he told his family that he was not feeling well and took 2 tabs of Vyvanse (usually takes as needed). Shortly after, he was at a group therapy meeting over the phone when he complained to his father he was having a severe headache. He then became more lethargic, and developed nausea and vomited. He was brought to ED by his father. On arrival, noted to be lethargic, vomiting, with garbled speech. 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 Post CT, more lethargic with dilated right pupil and bilateral reactive but sluggish pupils. He was given mannitol and intubated for airway protection and was taken to neuro ICU.  On 1/25, HCT with enlarging R lateral ventricle and slightly worsening MLS concerning for trapped ventricle. 1/27 acute drop in SpO2 on 1/27, lavaged and suctioned out thick clot. Placed back on ACVC and FiO2 weaned from 70% to 40% overnight. On 1/28/23, he had episode of emesis, & fever of 100.3. He was  given tylenol with improvement in temperature but still little to no movement on the L side (previously would localize arm to suction and spontaneously bend L leg). Pt. Had left decompressive hemicraniectomy and right EVD  placed.  After the procedure, patient opened eyes for the first time and regained LUE/LLE movement. As he became febrile again this was less prominent. Ceftriaxone was switched to Zosyn. 1/29 HCT mild increase extraaxial fluid collection. on 1/30, repeat  HCT unchanged, his MTL was discontinued. Zosyn and vancomycin switched to cefepime. On 1/31,  HCT shoeds worsening edema +/- extraaxial fluid collection, elevated ICPs, and mannitol was restarted.    MTL was weaned,  EVD adjusted and eventually removed on 2/10.  Hospital course further c/b agitation requiring precedex, hypotension requiring bolus, dysphagia s/p PEG 2/14  and trach on 2/13, insomnia s/p seroquel, pain s/p oxycodone and fentanyl. He underwent wound revision with NSGY and plastics on 2/17. Keppra discontinued due to signs of agitation. Tolerated trach collar.   Patient was evaluated by PM&R and therapy for functional deficits, gait/ADL impairments and acute rehabilitation was recommended. Patient was medically optimized for discharge to [facility] IRU on 2/28/23.    Subjective/obj:  Seen and examined in physical therapy. Patient was sitting upright, resting on PT.  Able to sit-up max-A when prompted. Therapist noted that he initially was able to sit min-A but tires very quickly. Patient slow to following instruction to lift head; does not say name when prompted though he has previously. Appears irritated with difficulty of PT. Denies headache, abd pain, other discomforts. Staff reported munchkins in patient room, seen eating them, not approved for soft foods yet. Family to be notified.       RECENT LABS    Vital Signs Last 24 Hrs  T(C): 36.4 (20 Mar 2023 07:57), Max: 36.6 (19 Mar 2023 20:00)  T(F): 97.6 (20 Mar 2023 07:57), Max: 97.9 (19 Mar 2023 20:00)  HR: 86 (20 Mar 2023 07:57) (85 - 86)  BP: 115/80 (20 Mar 2023 07:57) (115/71 - 115/80)  RR: 16 (20 Mar 2023 07:57) (16 - 16)  SpO2: 95% (20 Mar 2023 07:57) (95% - 97%)    Parameters below as of 20 Mar 2023 07:57  Patient On (Oxygen Delivery Method): room air                 13.0   7.90  )-----------( 309      ( 20 Mar 2023 06:22 )             40.5     03-20    142  |  104  |  5<L>  ----------------------------<  124<H>  3.8   |  29  |  0.66    Ca    9.1      20 Mar 2023 06:22    TPro  7.0  /  Alb  3.2<L>  /  TBili  0.3  /  DBili  x   /  AST  10  /  ALT  28  /  AlkPhos  113  03-20    CAPILLARY BLOOD GLUCOSE      PHYSICAL EXAM  Constitutional - NAD, Comfortable, irritable  HEENT - EOMI, left hemicraniectomy, sutures removed 3/7   Neck - s/p trach  Chest -  NAD, no dyspnea or accessory mm use  Cardio - warm and well perfused   Abdomen -  Soft, NTND, +PEG, Bone flap marsupialized in epigastric region.  Sutures removed, healing well, with no significant erythema and no drainage.   Extremities - No peripheral edema, No calf tenderness   Neurologic Exam:                    Cognitive -             Orientation: Awake, Alert, unable to assess orientation due to aphasia and cognitive deficits            Communication- able to sing "hello" and hum a tune.  Poor command following, Non-fluent, unable to repeat            Attention:  Impaired; makes eye contact but follows commands inconsistently            Memory: Impaired            Thought: Impaired, Impulsive     Speech - non-verbal -s/p trach, impaired comprehension, +aphasia, poor mimicking     Cranial Nerves - Limited due to comprehension impairment and aphasia; No facial asymmetry, Tongue midline, EOMI, Pupils dilated but reactive, +dysphagia     Motor -                       LEFT    UE - at least 3/5 and moving spontaneously; exam limited by command following;  WNL                    RIGHT UE - ShAB 0/5, EF 0/5, EE 0/5, WE 0/5,  0/5-- MAS 1 finger flexors                     LEFT    LE -  at least 3/5 and moving spontaneously; exam limited by command following                    RIGHT LE - HE 1/5, Hip adductor 1/5, KE 0/5, DF 0/5, PF 0/5        Sensory - decrease sensation to LT-- RUE     Coordination - FTN / HTS impaired on right  Psychiatric - participating in therapies today, irritable     MEDICATIONS  (STANDING):  AQUAPHOR (petrolatum Ointment) 1 Application(s) Topical two times a day  atorvastatin 80 milliGRAM(s) Oral at bedtime  bacitracin   Ointment 1 Application(s) Topical two times a day  bacitracin   Ointment 1 Application(s) Topical daily  donepezil 5 milliGRAM(s) Oral daily  enoxaparin Injectable 90 milliGRAM(s) SubCutaneous <User Schedule>  escitalopram 10 milliGRAM(s) Oral <User Schedule>  gabapentin 400 milliGRAM(s) Oral at bedtime  hydrocortisone 1% Cream 1 Application(s) Topical two times a day  melatonin 6 milliGRAM(s) Oral at bedtime  midodrine. 10 milliGRAM(s) Oral <User Schedule>  nystatin Powder 1 Application(s) Topical two times a day  pantoprazole   Suspension 40 milliGRAM(s) Oral daily  polyethylene glycol 3350 17 Gram(s) Oral daily  senna Syrup 5 milliLiter(s) Oral at bedtime  traZODone 100 milliGRAM(s) Oral at bedtime    MEDICATIONS  (PRN):  acetaminophen    Suspension .. 650 milliGRAM(s) Oral every 6 hours PRN Temp greater or equal to 38C (100.4F), Mild Pain (1 - 3)           HPI:  This is 42 year old male with PMH of prior substance use, HTN, anxiety who presented to [facility] on 1/23. On 1/23 morning, he told his family that he was not feeling well and took 2 tabs of Vyvanse (usually takes as needed). Shortly after, he was at a group therapy meeting over the phone when he complained to his father he was having a severe headache. He then became more lethargic, and developed nausea and vomited. He was brought to ED by his father. On arrival, noted to be lethargic, vomiting, with garbled speech. 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 Post CT, more lethargic with dilated right pupil and bilateral reactive but sluggish pupils. He was given mannitol and intubated for airway protection and was taken to neuro ICU.  On 1/25, HCT with enlarging R lateral ventricle and slightly worsening MLS concerning for trapped ventricle. 1/27 acute drop in SpO2 on 1/27, lavaged and suctioned out thick clot. Placed back on ACVC and FiO2 weaned from 70% to 40% overnight. On 1/28/23, he had episode of emesis, & fever of 100.3. He was  given tylenol with improvement in temperature but still little to no movement on the L side (previously would localize arm to suction and spontaneously bend L leg). Pt. Had left decompressive hemicraniectomy and right EVD  placed.  After the procedure, patient opened eyes for the first time and regained LUE/LLE movement. As he became febrile again this was less prominent. Ceftriaxone was switched to Zosyn. 1/29 HCT mild increase extraaxial fluid collection. on 1/30, repeat  HCT unchanged, his MTL was discontinued. Zosyn and vancomycin switched to cefepime. On 1/31,  HCT shoeds worsening edema +/- extraaxial fluid collection, elevated ICPs, and mannitol was restarted.    MTL was weaned,  EVD adjusted and eventually removed on 2/10.  Hospital course further c/b agitation requiring precedex, hypotension requiring bolus, dysphagia s/p PEG 2/14  and trach on 2/13, insomnia s/p seroquel, pain s/p oxycodone and fentanyl. He underwent wound revision with NSGY and plastics on 2/17. Keppra discontinued due to signs of agitation. Tolerated trach collar.   Patient was evaluated by PM&R and therapy for functional deficits, gait/ADL impairments and acute rehabilitation was recommended. Patient was medically optimized for discharge to [facility] IRU on 2/28/23.    Subjective/obj:  Seen and examined in physical therapy. Patient was sitting upright, resting on PT.  Able to sit-up max-A when prompted. Therapist noted that he initially was able to sit min-A but tires very quickly. Patient slow to following instruction to lift head; does not say name when prompted though he has previously. Appears irritated with difficulty of speech. Participating better in PT today and mood seems to be improved.  Denies headache, abd pain, other discomforts. Staff reported munchkins in patient room, seen eating them, not approved for soft foods yet. Family to be notified.       RECENT LABS    Vital Signs Last 24 Hrs  T(C): 36.4 (20 Mar 2023 07:57), Max: 36.6 (19 Mar 2023 20:00)  T(F): 97.6 (20 Mar 2023 07:57), Max: 97.9 (19 Mar 2023 20:00)  HR: 86 (20 Mar 2023 07:57) (85 - 86)  BP: 115/80 (20 Mar 2023 07:57) (115/71 - 115/80)  RR: 16 (20 Mar 2023 07:57) (16 - 16)  SpO2: 95% (20 Mar 2023 07:57) (95% - 97%)    Parameters below as of 20 Mar 2023 07:57  Patient On (Oxygen Delivery Method): room air                 13.0   7.90  )-----------( 309      ( 20 Mar 2023 06:22 )             40.5     03-20    142  |  104  |  5<L>  ----------------------------<  124<H>  3.8   |  29  |  0.66    Ca    9.1      20 Mar 2023 06:22    TPro  7.0  /  Alb  3.2<L>  /  TBili  0.3  /  DBili  x   /  AST  10  /  ALT  28  /  AlkPhos  113  03-20    CAPILLARY BLOOD GLUCOSE      PHYSICAL EXAM  Constitutional - NAD, Comfortable, irritable  HEENT - EOMI, left hemicraniectomy, sutures removed 3/7   Neck - s/p trach decannulation  Chest -  NAD, no dyspnea or accessory mm use  Cardio - warm and well perfused   Abdomen -  Soft, NTND, +PEG, Bone flap marsupialized in epigastric region.  Sutures removed, healing well, with no significant erythema and no drainage.   Extremities - No peripheral edema, No calf tenderness   Neurologic Exam:                    Cognitive -             Orientation: Awake, Alert, unable to assess orientation due to aphasia and cognitive deficits            Communication- able to sing "hello" and hum a tune.  Poor command following, Non-fluent, unable to repeat            Attention:  Impaired; makes eye contact but follows commands inconsistently            Memory: Impaired            Thought: Impaired, Impulsive     Speech - non-verbal -s/p trach, impaired comprehension, +aphasia, poor mimicking     Cranial Nerves - Limited due to comprehension impairment and aphasia; No facial asymmetry, Tongue midline, EOMI, Pupils dilated but reactive, +dysphagia     Motor -                       LEFT    UE - at least 3/5 and moving spontaneously; exam limited by command following;  WNL                    RIGHT UE - ShAB 0/5, EF 0/5, EE 0/5, WE 0/5,  0/5-- MAS 1 finger flexors                     LEFT    LE -  at least 3/5 and moving spontaneously; exam limited by command following                    RIGHT LE - HE 1/5, Hip adductor 1/5, KE 0/5, DF 0/5, PF 0/5        Sensory - decrease sensation to LT-- RUE     Coordination - FTN / HTS impaired on right  Psychiatric - participating  better in therapies today,      RADIOLOGY   EXAM:  CT BRAIN   ORDERED BY: SHARI CEDILLO     PROCEDURE DATE:  03/19/2023          INTERPRETATION:  CT head without IV contrast    CLINICAL INFORMATION: Follow-up craniectomy and evolving hemorrhage    TECHNIQUE: Contiguous axial 5 mm sections were obtained through the head. Sagittal and coronal 2-D reformatted images were also obtained.   This scan was performed using automatic exposure control (radiation dose reduction software) to obtain a diagnostic image quality scan with patient dose as low as reasonably achievable.    FINDINGS:   CT dated 03/05/2023 is available for review.    The brain demonstrates LEFT frontoparietal craniectomy with underlying evolution of previously noted hematoma with now measuring 6.7 x 1.8 cm. There is mild underlying vasogenic edema. No acute cerebral cortical infarct is seen.  No intracranial hemorrhage is found.  No mass effect is found in the brain.    The ventricles, sulci and basal cisterns appear unremarkable. There is no midline shift.    The orbits are unremarkable.  The paranasal sinuses are clear.  The nasal cavity appears intact.  The nasopharynx is symmetric.  The central skull base, petrous temporal bones remain intact. RIGHT frontal malik holeis noted.      IMPRESSION:   LEFT frontoparietal craniectomy with underlying evolution of previously noted hematoma with now measuring 6.7 x 1.8 cm. Mild underlying vasogenic edema.      MEDICATIONS  (STANDING):  AQUAPHOR (petrolatum Ointment) 1 Application(s) Topical two times a day  atorvastatin 80 milliGRAM(s) Oral at bedtime  bacitracin   Ointment 1 Application(s) Topical two times a day  bacitracin   Ointment 1 Application(s) Topical daily  donepezil 5 milliGRAM(s) Oral daily  enoxaparin Injectable 90 milliGRAM(s) SubCutaneous <User Schedule>  escitalopram 10 milliGRAM(s) Oral <User Schedule>  gabapentin 400 milliGRAM(s) Oral at bedtime  hydrocortisone 1% Cream 1 Application(s) Topical two times a day  melatonin 6 milliGRAM(s) Oral at bedtime  midodrine. 10 milliGRAM(s) Oral <User Schedule>  nystatin Powder 1 Application(s) Topical two times a day  pantoprazole   Suspension 40 milliGRAM(s) Oral daily  polyethylene glycol 3350 17 Gram(s) Oral daily  senna Syrup 5 milliLiter(s) Oral at bedtime  traZODone 100 milliGRAM(s) Oral at bedtime    MEDICATIONS  (PRN):  acetaminophen    Suspension .. 650 milliGRAM(s) Oral every 6 hours PRN Temp greater or equal to 38C (100.4F), Mild Pain (1 - 3)

## 2023-03-20 NOTE — PROGRESS NOTE ADULT - ASSESSMENT
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 He is  s/p Left decompressive hemicraniectomy and right EVD placement 1/28/23. Hospital course further c/b respiratory failure s/p intubation and extubation,  agitation requiring precedex, sepsis s/p ABX, hypotension requiring bolus, dysphagia s/p PEG 2/14 and trach on 2/13, insomnia s/p seroquel, pain s/p oxycodone and fentanyl. Patient now with Right Hemiparesis, dysphagia s/p PEG, Aphasia, Cognitive deficits, gait Instability, ADL impairments and Functional impairments.    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    - ST: to diagnose and treat deficits in swallowing, cognition and communication.  - Helmet when OOB  - Baseline CT head 3/1- High right frontal malik hole is seen. Postoperative changes compatible with a left temporal frontal parietal craniectomy is again seen. Mild parenchyma extending through the craniectomy defect is again seen. There is abnormal high attenuation seen involving the left basal ganglia/corona radiata region. This finding is likely compatible with evolving areas acute parenchymal hemorrhage. Surrounding edema is seen. Mass effect on the left lateral ventricle is seen. Left-to-right shift (3.1 mm) is seen. Evaluation of the osseous structures with the appropriate window aside from postop changes appear normal. Complete opacification of the right sphenoid sinus is seen consisting of mucosal thickening. Both mastoid and middle ear regions appear clear. IMPRESSION: Postop changes described above. Evolving area parenchymal hemorrhage as described above.  - OT– right resting hand splint      Sleep/Restlessness-   --c/w Trazodone to 100mg qhs 3/8-- helping with sleep and restlessness. Pt's mother reports he was on 50-100mg qhs and 50mg daytime home meds.   --cont. melatonin  --monitor  --Attending discussed with nursing that patient needs supervision for safety monitoring as pt.  is impulsive, restless and high fall risk as well as is reaching for trach    Depression/anxiety  --Pt was on Paxil 30mg at home as per mother-- Paxil 10mg started 3/10 but d/c in favor of lexapro  -- Initiated Lexapro 10mg at equivalent dose to paxil as may be more effective for anxiety/depression that is limiting pt's participation in therapy.     Aphasia--  --c/w donepezil     Bilateral lower extremity DVTs–  – Lower extremity Doppler from 3/1–right femoral vein DVT and bilateral lower extremity calf DVTs  – spoke with patient's neurosurgery PA–who was in contact with Dr. Cueva patient's neurosurgeon–gave clearance for patient to start therapeutic Lovenox.  --Vascular Surgery consult -- reaached out to Dr. Hodges 3/7 - nothing further to do if patient is on therapeutic lovenox.   -- c/w therapeutic lovenox for 3 months.    Orthostatic hypotension–  -Stopped doxazosin 3/2-- monitor PVRs, low PVR's per nursing  -no report of OH since midodrine 10mg   -cont to monitor    #Acute Respiratory Failure  - Intubation and extubation  - s/p Trach 2/13  - Decannulated 3/5, Pulm monitoring, tolerating well    #GI PPX  - Nexium 40mg daily    #DM II  - A1c 6.0  -management as per hospitalist--off insulin     #Pain management  - Tylenol PRN,   -- decreased. Neurontin 400mg nightly, trial for improved participation in therapies/decreased drowsiness    #Bowel Regimen  - Senna, miralax PRN    #Bladder management  -voiding well with low PVRs,   -- off doxazosin 3/2    #Dysphagia    - s/p PEG 2/14  - Puree with thin liquids-- 1:1 assist with meals  - SLP reported they would reach out to family about bringing in outside foods, not deemed safe on evaluation.   - SLP: evaluation and treatment  - Diet: tolerating bolus TF     #Skin:  - Skin on admission: IAD to bilateral groin and sacrum; RUQ incision with sutures with palpable skull flap; Left hemicraniectomy with sutures MILLER; psoriasis BL shin  - Pressure injury/Skin: Turn Q2hrs while in bed, OOB to Chair, PT/OT     #Precaution  - Fall, Aspiration, seizure    #home meds  - per list provided by Laura yang: Rousuvastatin 40mg daily and Paroxetine 30mg daily  -  cw paroxetine  - cw high intensity atorvastatin to home med dose    – IDT 3/9:  Social work:  patient on discharge can live with his mother who has a first-floor set up.  Nursing–incontinent of bowel and bladder, total assist with toileting  Speech: Puree/thin diet, severe language deficits–poor command following, poor orientation, Some spontaneous naming, & gestures, Speech jargon, Aphasia, severe cognitive deficits  OT's: Total assist with ADLs and transfers, restless  PT: Sitting –max assist,, Supine to sit--Max A x1 (3/20); Improved trunk control; Tot A transfers   goals-- mod assist with transfers, ambulation 10 feet max assist with hemiwalker, wheelchair mobility 50 feet min assist.  ELOS: 3/28 ZOHAIB  Goals:  1.  Improved secretion management and tolerate PMV  2.  transfers with 1 person assist  3.  Communicate basic wants and needs with yes no answers    Discussed medications and progress with Laura yang on 3/9. All questions answered.  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 He is  s/p Left decompressive hemicraniectomy and right EVD placement 1/28/23. Hospital course further c/b respiratory failure s/p intubation and extubation,  agitation requiring precedex, sepsis s/p ABX, hypotension requiring bolus, dysphagia s/p PEG 2/14 and trach on 2/13, insomnia s/p seroquel, pain s/p oxycodone and fentanyl. Patient now with Right Hemiparesis, dysphagia s/p PEG, Aphasia, Cognitive deficits, gait Instability, ADL impairments and Functional impairments.    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    - ST: to diagnose and treat deficits in swallowing, cognition and communication.  - Helmet when OOB  - follow up CT head 3/19 reviewed-  Postoperative changes compatible with a left temporal frontal parietal craniectomy is again seen. left frontoparietal hematoma with some improvement.  improved vasogenic edema is seen. Mass effect on the left lateral ventricle and Left-to-right shift (3.1 mm)has now resolved.   - OT– right resting hand splint      Sleep/Restlessness-   --c/w Trazodone to 100mg qhs 3/8-- helping with sleep and restlessness. Pt's mother reports he was on 50-100mg qhs and 50mg daytime home meds.   --cont. melatonin  --monitor  --Attending discussed with nursing that patient needs supervision for safety monitoring as pt.  is impulsive, restless and high fall risk     Depression/anxiety  --Pt was on Paxil 30mg at home as per mother-- Paxil 10mg started 3/10 but d/c in favor of lexapro  -- Initiated Lexapro 10mg at equivalent dose to paxil as may be more effective for anxiety/depression that is limiting pt's participation in therapy. --seems to be helping --will continue to monitor    Aphasia--  --c/w donepezil     Bilateral lower extremity DVTs–  – Lower extremity Doppler from 3/1–right femoral vein DVT and bilateral lower extremity calf DVTs  – spoke with patient's neurosurgery PA–who was in contact with Dr. Cueva patient's neurosurgeon–gave clearance for patient to start therapeutic Lovenox.  --Vascular Surgery consult -- reaached out to Dr. Hodges 3/7 - nothing further to do if patient is on therapeutic lovenox.   -- c/w therapeutic lovenox for 3 months.    Orthostatic hypotension–  -Stopped doxazosin 3/2-- monitor PVRs, low PVR's per nursing  -no report of OH since midodrine 10mg   -cont to monitor    #Acute Respiratory Failure  - Intubation and extubation  - s/p Trach 2/13  - Decannulated 3/5, Pulm monitoring, tolerating well    #GI PPX  - Nexium 40mg daily    #DM II  - A1c 6.0  -management as per hospitalist--off insulin     #Pain management  - Tylenol PRN,   -- decreased Neurontin 400mg nightly, trial for improved energy in therapies & decreased drowsiness in the daytime--pt. w/o significant neuropathic pain-- will monitor    #Bowel Regimen  - Senna, miralax PRN    #Bladder management  -voiding well with low PVRs,   -- off doxazosin 3/2    #Dysphagia    - s/p PEG 2/14  - Puree with thin liquids-- 1:1 assist with meals  - SLP reported they would reach out to family about bringing in outside foods, not deemed safe on evaluation.   - SLP: evaluation and treatment  - Diet: tolerating bolus TF     #Skin:  - Skin on admission: IAD to bilateral groin and sacrum; RUQ incision with sutures with palpable skull flap; Left hemicraniectomy with sutures MILLER; psoriasis BL shin  - Pressure injury/Skin: Turn Q2hrs while in bed, OOB to Chair, PT/OT     #Precaution  - Fall, Aspiration, seizure    #home meds  - per list provided by Laura yang: Rousuvastatin 40mg daily and Paroxetine 30mg daily  -  cw paroxetine  - cw high intensity atorvastatin to home med dose    – IDT 3/9:  Social work:  patient on discharge can live with his mother who has a first-floor set up.  Nursing–incontinent of bowel and bladder, total assist with toileting  Speech: Puree/thin diet, severe language deficits–poor command following, poor orientation, Some spontaneous naming, & gestures, Speech jargon, Aphasia, severe cognitive deficits  OT's: Total assist with ADLs and transfers, restless  PT: Sitting –max assist,, Supine to sit--Max A x1 (3/20); Improved trunk control; Tot A transfers   goals-- mod assist with transfers, ambulation 10 feet max assist with hemiwalker, wheelchair mobility 50 feet min assist.  ELOS: 3/28 ZOHAIB  Goals:  1.  Improved secretion management and tolerate PMV  2.  transfers with 1 person assist  3.  Communicate basic wants and needs with yes no answers

## 2023-03-20 NOTE — PROGRESS NOTE ADULT - SUBJECTIVE AND OBJECTIVE BOX
Patient is a 43y old  Male who presents with a chief complaint of left IPH/ICH (20 Mar 2023 12:32)    Patient seen and examined at bedside. No acute overnight events     ALLERGIES:  No Known Allergies    MEDICATIONS  (STANDING):  AQUAPHOR (petrolatum Ointment) 1 Application(s) Topical two times a day  atorvastatin 80 milliGRAM(s) Oral at bedtime  bacitracin   Ointment 1 Application(s) Topical two times a day  bacitracin   Ointment 1 Application(s) Topical daily  donepezil 5 milliGRAM(s) Oral daily  enoxaparin Injectable 90 milliGRAM(s) SubCutaneous <User Schedule>  escitalopram 10 milliGRAM(s) Oral <User Schedule>  gabapentin 400 milliGRAM(s) Oral at bedtime  hydrocortisone 1% Cream 1 Application(s) Topical two times a day  melatonin 6 milliGRAM(s) Oral at bedtime  midodrine. 10 milliGRAM(s) Oral <User Schedule>  nystatin Powder 1 Application(s) Topical two times a day  pantoprazole   Suspension 40 milliGRAM(s) Oral daily  polyethylene glycol 3350 17 Gram(s) Oral daily  senna Syrup 5 milliLiter(s) Oral at bedtime  traZODone 100 milliGRAM(s) Oral at bedtime    MEDICATIONS  (PRN):  acetaminophen    Suspension .. 650 milliGRAM(s) Oral every 6 hours PRN Temp greater or equal to 38C (100.4F), Mild Pain (1 - 3)    Vital Signs Last 24 Hrs  T(F): 97.6 (20 Mar 2023 07:57), Max: 97.9 (19 Mar 2023 20:00)  HR: 86 (20 Mar 2023 07:57) (85 - 86)  BP: 115/80 (20 Mar 2023 07:57) (115/71 - 115/80)  RR: 16 (20 Mar 2023 07:57) (16 - 16)  SpO2: 95% (20 Mar 2023 07:57) (95% - 97%)  I&O's Summary    PHYSICAL EXAM:  General: NAD, awake, alert   ENT: MMM, no tonsilar exudate  Neck: Supple, No JVD  Lungs: Clear to auscultation bilaterally, no wheezes. Good air entry bilaterally   Cardio: RRR, S1/S2, No murmurs  Abdomen: Soft, Nontender, Nondistended; Bowel sounds present +PEG  Extremities: No calf tenderness, No pitting edema    LABS:                        13.0   7.90  )-----------( 309      ( 20 Mar 2023 06:22 )             40.5       03-20    142  |  104  |  5   ----------------------------<  124  3.8   |  29  |  0.66    Ca    9.1      20 Mar 2023 06:22    TPro  7.0  /  Alb  3.2  /  TBili  0.3  /  DBili  x   /  AST  10  /  ALT  28  /  AlkPhos  113  03-20     COVID-19 PCR: NotDetec (02-28-23 @ 16:00)    RADIOLOGY & ADDITIONAL TESTS:     Care Discussed with Consultants/Other Providers:

## 2023-03-20 NOTE — PROGRESS NOTE ADULT - ATTENDING COMMENTS
Pt. seen with resident.  Agree with documentation above as per resident with amendments made as appropriate. Patient medically stable. Making progress towards rehab goals.     Left ICH   follow up CT head 3/19 reviewed-  Postoperative changes compatible with a left temporal frontal parietal craniectomy is again seen. left frontoparietal hematoma with some improvement.  improved vasogenic edema is seen. Mass effect on the left lateral ventricle and Left-to-right shift (3.1 mm)has now resolved.   --  --Patient seen sitting on mat with PT.  Improved participation with therapy and less frustration with PT.  Patient initially was sitting with min assist for a few minutes but fatigues rather quickly and then required max assist.  Head and trunk control declined as patient progress in therapy.  --Patient limited by poor endurance   decreased Neurontin 400mg nightly, trial for improved energy in therapies & decreased drowsiness in the daytime--pt. w/o significant neuropathic pain-- will monitor    Patient did get frustrated when asked to try to say his name, but he was unable to complete this.  But overall patient's mood and motivation seems to be better today than last week.  We will continue Lexapro for depression and anxiety.  Continue to monitor Pt. seen with resident.  Agree with documentation above as per resident with amendments made as appropriate. Patient medically stable. Making progress towards rehab goals.     Left ICH   follow up CT head 3/19 reviewed-  Postoperative changes compatible with a left temporal frontal parietal craniectomy is again seen. left frontoparietal hematoma with some improvement.  improved vasogenic edema is seen. Mass effect on the left lateral ventricle and Left-to-right shift (3.1 mm)has now resolved.   --  --Patient seen sitting on mat with PT.  Improved participation with therapy and less frustration with PT.  Patient initially was sitting with min assist for a few minutes but fatigues rather quickly and then required max assist.  Head and trunk control declined as patient progress in therapy.  --Patient limited by poor endurance   decreased Neurontin 400mg nightly, trial for improved energy in therapies & decreased drowsiness in the daytime--pt. w/o significant neuropathic pain-- will monitor    Patient did get frustrated when asked to try to say his name, but he was unable to complete this.  But overall patient's mood and motivation seems to be better today than last week.  We will continue Lexapro for depression and anxiety.  Continue to monitor    ** Spoke with pt's  Mother, [place] and Father via phone____, to provide update on pt's status,  medical update, medications, progress in therapy, CT imaging results, rehab plan of care, general prognosis, discharge plan to [facility] and discharge expectations.  Explained we may be able to extend pt's stay based on insurance coverage.  Pt. scheduled for Neurosurgery follow up with Dr. Cueva next week on 3/28 as per mother. All questions answered.

## 2023-03-20 NOTE — PROGRESS NOTE ADULT - ASSESSMENT
43 y/o M with PMH of prior substance use, HTN, anxiety who presented to [facility] 1/23 with 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 He is s/p Left decompressive hemicraniectomy and right EVD placement 1/28/23. Hospital course further s/f respiratory failure s/p intubation and extubation, agitation requiring precedex, sepsis s/p ABX, hypotension requiring bolus, dysphagia s/p PEG 2/14 and trach on 2/13, insomnia s/p seroquel, pain s/p oxycodone and fentanyl. Patient now with admitted to [facility] acute inpatient rehab.    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 with mass effect and 8mm midline shift  -s/p intubation for airway protection and extubation  -s/p Decompressive hemicraniectomy and EVD placement 1/28  -Underwent wound revision with NSGY and plastics on 2/17  -Continue comprehensive rehab program - PT/OT/SLP per rehab team  -Pain management, bowel regimen per rehab   -Donepezil     #Depression/anxiety  -Paxil per rehab  -Neuropsych as indicated    #Bilateral lower extremity DVTs  -Lower extremity Doppler from 3/1–right femoral vein DVT and bilateral lower extremity calf DVTs  -Neurosurgeon gave clearance for patient to start therapeutic Lovenox  -Vascular Surgery Dr. Hodges 3/7 - recommended nothing further to do if patient is on therapeutic Lovenox   -Continue therapeutic Lovenox for 3 months    #Acute Respiratory Failure  -s/p intubation/extubation, Trach 2/13  -Pulm consulted, recs noted    #Hypotension  -Improved on Midodrine, continue    #T2DM, HbA1c 6.0  -diet controlled  -monitor    #Dysphagia s/p PEG 2/14  -Puree with thin liquids  -Educate family on SLP recs    #HLD  -Continue lipitor    #GERD  -PPI    #DVT ppx - lovenox

## 2023-03-20 NOTE — CHART NOTE - NSCHARTNOTEFT_GEN_A_CORE
Nutrition Follow Up Note  Hospital Course   (Per Electronic Medical Record)    Source:  Patient [X]  [X] PCA, SLP  Medical Record [X]      Diet: Diet, Pureed (03-08-23 @ 15:01) [Active]    Patient w/ dysphagia s/p PEG 2/14, diet upgraded to pureed 3/8. At this time patient w/ adequate appetite/intake consuming % of meals. Patient consuming food from home at time of visit, reviewed IDDSI pureed diet texture, discussed w/ SLP. No issues w/ chewing and swallowing current diet texture. Denies N/V/C/D, last BM 3/18 Per nursing flowsheets. Noted: Patient w/ T2DM HbA1c 6.0% diet controlled.     Enteral/Parenteral Nutrition: Not Applicable    Current Weight: 193.7lb on 3/9  Recommend obtain current weights    Pertinent Medications: MEDICATIONS  (STANDING):  AQUAPHOR (petrolatum Ointment) 1 Application(s) Topical two times a day  atorvastatin 80 milliGRAM(s) Oral at bedtime  bacitracin   Ointment 1 Application(s) Topical two times a day  bacitracin   Ointment 1 Application(s) Topical daily  donepezil 5 milliGRAM(s) Oral daily  enoxaparin Injectable 90 milliGRAM(s) SubCutaneous <User Schedule>  escitalopram 10 milliGRAM(s) Oral <User Schedule>  gabapentin 600 milliGRAM(s) Oral at bedtime  hydrocortisone 1% Cream 1 Application(s) Topical two times a day  melatonin 6 milliGRAM(s) Oral at bedtime  midodrine. 10 milliGRAM(s) Oral <User Schedule>  nystatin Powder 1 Application(s) Topical two times a day  pantoprazole   Suspension 40 milliGRAM(s) Oral daily  polyethylene glycol 3350 17 Gram(s) Oral daily  senna Syrup 5 milliLiter(s) Oral at bedtime  traZODone 100 milliGRAM(s) Oral at bedtime    MEDICATIONS  (PRN):  acetaminophen    Suspension .. 650 milliGRAM(s) Oral every 6 hours PRN Temp greater or equal to 38C (100.4F), Mild Pain (1 - 3)    Pertinent Labs:  03-20 Na142 mmol/L Glu 124 mg/dL<H> K+ 3.8 mmol/L Cr  0.66 mg/dL BUN 5 mg/dL<L> 03-20 Alb 3.2 g/dL<L>    Skin: Surgical Incision, Site left head     Edema: No edema noted per nursing flowsheet    Last Bowel Movement: on 3/18    Estimated Needs:   [X] No Change Since Previous Assessment    Previous Nutrition Diagnosis:   Malnutrition Moderate, Acute  related to decreased ability to meet estimated energy requirements  as evidenced by subcutaneous fat loss and signs of muscle depletion    Nutrition Diagnosis is [X] Ongoing - addressed w/ PO intake % at meals.      New Nutrition Diagnosis: [X] Not Applicable    Interventions:   [X] Chewing Difficulties    [X] Swallowing Difficulties    related to dysphagia as evidenced by modified IDDSI pureed diet texture addressed w/ pureed diet.      Monitoring & Evaluation:   [X] Weights   [X] PO Intake   [X] Skin Integrity   [X] Follow Up (Per Protocol)  [X] Tolerance to Diet Prescription   [X] Other: Labs    Registered Dietitian/Nutritionist Remains Available.  Zahra Rain RD, MS, CDN    Phone# [phone]

## 2023-03-21 PROCEDURE — 99232 SBSQ HOSP IP/OBS MODERATE 35: CPT

## 2023-03-21 RX ORDER — PETROLATUM,WHITE
1 JELLY (GRAM) TOPICAL
Refills: 0 | Status: DISCONTINUED | OUTPATIENT
Start: 2023-03-21 | End: 2023-04-06

## 2023-03-21 RX ORDER — DONEPEZIL HYDROCHLORIDE 10 MG/1
10 TABLET, FILM COATED ORAL DAILY
Refills: 0 | Status: DISCONTINUED | OUTPATIENT
Start: 2023-03-21 | End: 2023-03-22

## 2023-03-21 RX ADMIN — NYSTATIN CREAM 1 APPLICATION(S): 100000 CREAM TOPICAL at 19:16

## 2023-03-21 RX ADMIN — Medication 1 APPLICATION(S): at 12:24

## 2023-03-21 RX ADMIN — ATORVASTATIN CALCIUM 80 MILLIGRAM(S): 80 TABLET, FILM COATED ORAL at 21:36

## 2023-03-21 RX ADMIN — DONEPEZIL HYDROCHLORIDE 10 MILLIGRAM(S): 10 TABLET, FILM COATED ORAL at 22:02

## 2023-03-21 RX ADMIN — Medication 1 APPLICATION(S): at 19:15

## 2023-03-21 RX ADMIN — Medication 6 MILLIGRAM(S): at 21:36

## 2023-03-21 RX ADMIN — Medication 100 MILLIGRAM(S): at 21:37

## 2023-03-21 RX ADMIN — GABAPENTIN 400 MILLIGRAM(S): 400 CAPSULE ORAL at 21:36

## 2023-03-21 RX ADMIN — POLYETHYLENE GLYCOL 3350 17 GRAM(S): 17 POWDER, FOR SOLUTION ORAL at 12:25

## 2023-03-21 RX ADMIN — MIDODRINE HYDROCHLORIDE 10 MILLIGRAM(S): 2.5 TABLET ORAL at 08:42

## 2023-03-21 RX ADMIN — ENOXAPARIN SODIUM 90 MILLIGRAM(S): 100 INJECTION SUBCUTANEOUS at 21:37

## 2023-03-21 RX ADMIN — DONEPEZIL HYDROCHLORIDE 5 MILLIGRAM(S): 10 TABLET, FILM COATED ORAL at 12:25

## 2023-03-21 RX ADMIN — Medication 1 APPLICATION(S): at 19:12

## 2023-03-21 RX ADMIN — PANTOPRAZOLE SODIUM 40 MILLIGRAM(S): 20 TABLET, DELAYED RELEASE ORAL at 12:25

## 2023-03-21 RX ADMIN — ESCITALOPRAM OXALATE 10 MILLIGRAM(S): 10 TABLET, FILM COATED ORAL at 10:01

## 2023-03-21 RX ADMIN — Medication 1 APPLICATION(S): at 19:14

## 2023-03-21 RX ADMIN — NYSTATIN CREAM 1 APPLICATION(S): 100000 CREAM TOPICAL at 06:17

## 2023-03-21 RX ADMIN — Medication 1 APPLICATION(S): at 06:16

## 2023-03-21 RX ADMIN — Medication 1 APPLICATION(S): at 06:15

## 2023-03-21 RX ADMIN — Medication 1 APPLICATION(S): at 06:17

## 2023-03-21 RX ADMIN — ENOXAPARIN SODIUM 90 MILLIGRAM(S): 100 INJECTION SUBCUTANEOUS at 10:01

## 2023-03-21 NOTE — CHART NOTE - NSCHARTNOTEFT_GEN_A_CORE
Pt was seen briefly. Pt appeared mildly restless and elated and making loud unintelligible utterances whenever this writer talked to him. He denied anxiety, sadness, pain, or any impeding concerns. Pt was grabbing the peg tube with his left hand, and gesturing as if trying to pull it out. PCA Castro placed the peg tube under the abdominal corset, Pt resisted somewhat but eventually relented. Explained to Pt that pulling the tube could be painful and potentially problematic, but he laughed right after. Encouraged Pt to refrain from pulling the tube. Will inform Dr. Talavera.

## 2023-03-21 NOTE — PROGRESS NOTE ADULT - ASSESSMENT
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 He is  s/p Left decompressive hemicraniectomy and right EVD placement 1/28/23. Hospital course further c/b respiratory failure s/p intubation and extubation,  agitation requiring precedex, sepsis s/p ABX, hypotension requiring bolus, dysphagia s/p PEG 2/14 and trach on 2/13, insomnia s/p seroquel, pain s/p oxycodone and fentanyl. Patient now with Right Hemiparesis, dysphagia s/p PEG, Aphasia, Cognitive deficits, gait Instability, ADL impairments and Functional impairments.    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    - ST: to diagnose and treat deficits in swallowing, cognition and communication.  - Helmet when OOB  - follow up CT head 3/19 reviewed-  Postoperative changes compatible with a left temporal frontal parietal craniectomy is again seen. left frontoparietal hematoma with some improvement.  improved vasogenic edema is seen. Mass effect on the left lateral ventricle and Left-to-right shift (3.1 mm)has now resolved.   - OT– right resting hand splint  - Called Dr Cueva's office--patient has a follow up office visit on 3/28 at 2pm-- they are unsure when his cranioplasty will be, have to submit for authorization next tues.     Sleep/Restlessness-   --c/w Trazodone to 100mg qhs 3/8-- helping with sleep and restlessness. Pt's mother reports he was on 50-100mg qhs and 50mg daytime home meds.   --cont. melatonin  --monitor  --Attending discussed with nursing that patient needs supervision for safety monitoring as pt.  is impulsive, restless and high fall risk     Depression/anxiety  --Pt was on Paxil 30mg at home as per mother-- Paxil 10mg started 3/10 but d/c in favor of lexapro  -- Initiated Lexapro 10mg at equivalent dose to paxil as may be more effective for anxiety/depression that is limiting pt's participation in therapy. --seems to be helping --Continue to monitor    Aphasia  --Donepezil increased to 10 mg (3/21)- monitor for side effects- GI upset, N/V, abdominal pain. Tolerated 5 mg well.    Bilateral lower extremity DVTs–  – Lower extremity Doppler from 3/1–right femoral vein DVT and bilateral lower extremity calf DVTs  – spoke with patient's neurosurgery PA–who was in contact with Dr. Cueva patient's neurosurgeon–gave clearance for patient to start therapeutic Lovenox.  --Vascular Surgery consult -- reaached out to Dr. Hodges 3/7 - nothing further to do if patient is on therapeutic lovenox.   -- C/w therapeutic lovenox for 3 months.    Orthostatic hypotension–  -Stopped doxazosin 3/2-- monitor PVRs, low PVR's per nursing  -no report of OH since midodrine 10mg   -Cont to monitor    #Acute Respiratory Failure  - Intubation and extubation  - s/p Trach 2/13  - Decannulated 3/5, Pulm monitoring, tolerating well    #GI PPX  - Nexium 40mg daily    #DM II  - A1c 6.0  -management as per hospitalist--off insulin     #Pain management- left leg pain today? Unclear but seems more neuropathic/increased tone  - Tylenol PRN  - Neurontin decreased yesterday to 400mg nightly, trial for improved energy in therapies & decreased drowsiness in the daytime-- Consider increasing if reported continued neuropathic pain  - Continue to monitor     #Bowel Regimen  - Senna, miralax PRN    #Bladder management  -voiding well with low PVRs,   -- off doxazosin 3/2    #Dysphagia    - s/p PEG 2/14  - Puree with thin liquids-- 1:1 assist with meals  - SLP reported they would reach out to family about bringing in outside foods, not deemed safe on evaluation.   - SLP: evaluation and treatment  - Diet: tolerating bolus TF     #Skin:  - Skin on admission: IAD to bilateral groin and sacrum; RUQ incision with sutures with palpable skull flap; Left hemicraniectomy with sutures MILLER; psoriasis BL shin  - Pressure injury/Skin: Turn Q2hrs while in bed, OOB to Chair, PT/OT     #Precaution  - Fall, Aspiration, seizure    #home meds  - per list provided by Laura yang: Rousuvastatin 40mg daily and Paroxetine 30mg daily  -  cw paroxetine  - cw high intensity atorvastatin to home med dose    – IDT 3/9:  Social work:  patient on discharge can live with his mother who has a first-floor set up.  Nursing–incontinent of bowel and bladder, total assist with toileting  Speech: Puree/thin diet, severe language deficits–poor command following, poor orientation, Some spontaneous naming, & gestures, Speech jargon, Aphasia, severe cognitive deficits  OT's: Total assist with ADLs and transfers, restless  PT: Sitting –max assist,, Supine to sit--Max A x1 (3/20); Improved trunk control; Tot A transfers   goals-- mod assist with transfers, ambulation 10 feet max assist with hemiwalker, wheelchair mobility 50 feet min assist.  ELOS: 3/28 ZOHAIB  Goals:  1.  Improved secretion management and tolerate PMV  2.  transfers with 1 person assist  3.  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 He is  s/p Left decompressive hemicraniectomy and right EVD placement 1/28/23. Hospital course further c/b respiratory failure s/p intubation and extubation,  agitation requiring precedex, sepsis s/p ABX, hypotension requiring bolus, dysphagia s/p PEG 2/14 and trach on 2/13, insomnia s/p seroquel, pain s/p oxycodone and fentanyl. Patient now with Right Hemiparesis, dysphagia s/p PEG, Aphasia, Cognitive deficits, gait Instability, ADL impairments and Functional impairments.    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    - ST: to diagnose and treat deficits in swallowing, cognition and communication.  - Helmet when OOB  - follow up CT head 3/19 reviewed-  Postoperative changes compatible with a left temporal frontal parietal craniectomy is again seen. left frontoparietal hematoma with some improvement.  improved vasogenic edema is seen. Mass effect on the left lateral ventricle and Left-to-right shift (3.1 mm)has now resolved.   - OT– right resting hand splint  - Called Dr Cueva's office--patient has a follow up office visit on 3/28 at 2pm-- they are unsure when his cranioplasty will be, have to submit for authorization next tues.   --will obtain approval And social work to coordinate transportation for outpatient neurosurgery appointment    Sleep/Restlessness-   --c/w Trazodone to 100mg qhs 3/8-- helping with sleep and restlessness. Pt's mother reports he was on 50-100mg qhs and 50mg daytime home meds.   --cont. melatonin  --monitor  --Attending discussed with nursing that patient needs supervision for safety monitoring as pt.  is impulsive, restless and high fall risk     Depression/anxiety  --Pt was on Paxil 30mg at home as per mother-- Paxil 10mg started 3/10 but d/c in favor of lexapro  -- cont. Lexapro 10mg at equivalent dose to paxil --has been more effective for pt's anxiety/depression  --Continue to monitor    Aphasia  --Donepezil increased to 10 mg (3/21)- monitor for side effects- GI upset, N/V, abdominal pain. Tolerated 5 mg well.    Bilateral lower extremity DVTs–  – Lower extremity Doppler from 3/1–right femoral vein DVT and bilateral lower extremity calf DVTs  – spoke with patient's neurosurgery PA–who was in contact with Dr. Cueva patient's neurosurgeon–gave clearance for patient to start therapeutic Lovenox.  --Vascular Surgery consult -- reaached out to Dr. Hodges 3/7 - nothing further to do if patient is on therapeutic lovenox.   -- C/w therapeutic lovenox for 3 months.    Orthostatic hypotension–  -Stopped doxazosin 3/2-- monitor PVRs, low PVR's per nursing  -no report of OH since midodrine 10mg   -Cont to monitor    #Acute Respiratory Failure  - Intubation and extubation  - s/p Trach 2/13  - Decannulated 3/5, Pulm monitoring, tolerating well    #GI PPX  - Nexium 40mg daily    #DM II  - A1c 6.0  -management as per hospitalist--off insulin     #Pain management- left leg pain today? Unclear but seems more neuropathic/increased tone  - Tylenol PRN  - Neurontin decreased yesterday to 400mg nightly, trial for improved energy in therapies & decreased drowsiness in the daytime-- Consider increasing if reported continued neuropathic pain  - Continue to monitor     #Bowel Regimen  - Senna, miralax PRN    #Bladder management  -voiding well with low PVRs,   -- off doxazosin 3/2    #Dysphagia    - s/p PEG 2/14  - Puree with thin liquids-- 1:1 assist with meals  - SLP: evaluation and treatment  - Diet: tolerating bolus TF     #Skin:  - Skin on admission: IAD to bilateral groin and sacrum; RUQ incision with sutures with palpable skull flap; Left hemicraniectomy with sutures MILLER; psoriasis BL shin  - Pressure injury/Skin: Turn Q2hrs while in bed, OOB to Chair, PT/OT     #Precaution  - Fall, Aspiration, seizure    #home meds  - per list provided by mother, [place]: Rousuvastatin 40mg daily and Paroxetine 30mg daily  -  cw paroxetine  - cw high intensity atorvastatin to home med dose    – IDT 3/9:  Social work:  patient on discharge can live with his mother who has a first-floor set up.  Nursing–incontinent of bowel and bladder, total assist with toileting  Speech: Puree/thin diet, severe language deficits–poor command following, poor orientation, Some spontaneous naming, & gestures, Speech jargon, Aphasia, severe cognitive deficits  OT's: Total assist with ADLs and transfers, restless  PT: Sitting –max assist,, Supine to sit--Max A x1 (3/20); Improved trunk control; Tot A transfers   goals-- mod assist with transfers, ambulation 10 feet max assist with hemiwalker, wheelchair mobility 50 feet min assist.  ELOS: 3/28 ZOHAIB  Goals:  1.  Improved secretion management and tolerate PMV  2.  transfers with 1 person assist  3.  Communicate basic wants and needs with yes no answers

## 2023-03-21 NOTE — PROGRESS NOTE ADULT - SUBJECTIVE AND OBJECTIVE BOX
SUBJECTIVE/OBJECTIVE-  Patient seen and examined at bedside. Continues to exhibit expressive aphasia though  vocalizing more. Difficulty with attention and command following, though able to follow simple commands--inconsistent.       REVIEW OF SYSTEMS  +neurological deficits  +R hemiparesis  +Cognitive deficits     VITALS  43y  Vital Signs Last 24 Hrs  T(C): 36.6 (21 Mar 2023 08:18), Max: 37.1 (20 Mar 2023 21:23)  T(F): 97.8 (21 Mar 2023 08:18), Max: 98.7 (20 Mar 2023 21:23)  HR: 86 (21 Mar 2023 08:18) (73 - 86)  BP: 110/79 (21 Mar 2023 08:18) (110/79 - 125/81)  RR: 17 (21 Mar 2023 08:18) (15 - 17)  SpO2: 97% (21 Mar 2023 08:18) (97% - 98%)    Parameters below as of 21 Mar 2023 08:18  Patient On (Oxygen Delivery Method): room air    PHYSICAL EXAM:  Constitutional - NAD, Comfortable  HEENT - EOMI, left hemicraniectomy, sutures removed 3/7- helmet in place    Neck - s/p trach decannulation  Chest -  NAD, no dyspnea or accessory mm use  Cardio - warm and well perfused   Abdomen -  Soft, NTND, +PEG, Bone flap marsupialized in epigastric region.  Sutures removed, healing well, with no significant erythema and no drainage.   Extremities - No peripheral edema, No calf tenderness   Neurologic Exam:                    Cognitive -             Orientation: Awake, Alert, unable to assess orientation due to aphasia and cognitive deficits            Communication- able to sing "hello" and hum a tune.  Poor command following, Non-fluent, unable to repeat            Attention:  Impaired; makes eye contact but follows commands inconsistently            Memory: Impaired            Thought: Impaired, Impulsive     Speech - non-verbal -s/p trach, impaired comprehension, +aphasia     Cranial Nerves - Limited due to comprehension impairment and aphasia; No facial asymmetry, Tongue midline, EOMI, Pupils dilated but reactive, +dysphagia     Motor -                       LEFT    UE - at least 3/5 and moving spontaneously; exam limited by command following;  WNL                    RIGHT UE - ShAB 0/5, EF 0/5, EE 0/5, WE 0/5,  0/5-- MAS 1+ finger flexors                     LEFT    LE -  at least 3/5 and moving spontaneously; exam limited by command following                    RIGHT LE - HE 1/5, Hip adductor 1/5, KE 0/5, DF 0/5, PF 0/5   heel with callous     Sensory - decrease sensation to LT-- RUE     Coordination - FTN / HTS impaired on right  Psychiatric - Calm     RECENT LABS:                        13.0   7.90  )-----------( 309      ( 20 Mar 2023 06:22 )             40.5     03-20    142  |  104  |  5<L>  ----------------------------<  124<H>  3.8   |  29  |  0.66    Ca    9.1      20 Mar 2023 06:22    TPro  7.0  /  Alb  3.2<L>  /  TBili  0.3  /  DBili  x   /  AST  10  /  ALT  28  /  AlkPhos  113  03-20    LIVER FUNCTIONS - ( 20 Mar 2023 06:22 )  Alb: 3.2 g/dL / Pro: 7.0 g/dL / ALK PHOS: 113 U/L / ALT: 28 U/L / AST: 10 U/L / GGT: x           MEDICATIONS:  MEDICATIONS  (STANDING):  AQUAPHOR (petrolatum Ointment) 1 Application(s) Topical two times a day  AQUAPHOR (petrolatum Ointment) 1 Application(s) Topical two times a day  atorvastatin 80 milliGRAM(s) Oral at bedtime  bacitracin   Ointment 1 Application(s) Topical two times a day  bacitracin   Ointment 1 Application(s) Topical daily  donepezil 5 milliGRAM(s) Oral daily  enoxaparin Injectable 90 milliGRAM(s) SubCutaneous <User Schedule>  escitalopram 10 milliGRAM(s) Oral <User Schedule>  gabapentin 400 milliGRAM(s) Oral at bedtime  hydrocortisone 1% Cream 1 Application(s) Topical two times a day  melatonin 6 milliGRAM(s) Oral at bedtime  midodrine. 10 milliGRAM(s) Oral <User Schedule>  nystatin Powder 1 Application(s) Topical two times a day  pantoprazole   Suspension 40 milliGRAM(s) Oral daily  polyethylene glycol 3350 17 Gram(s) Oral daily  senna Syrup 5 milliLiter(s) Oral at bedtime  traZODone 100 milliGRAM(s) Oral at bedtime    MEDICATIONS  (PRN):  acetaminophen    Suspension .. 650 milliGRAM(s) Oral every 6 hours PRN Temp greater or equal to 38C (100.4F), Mild Pain (1 - 3)     SUBJECTIVE/OBJECTIVE-  Patient seen and examined at bedside. Continues to exhibit expressive aphasia though  vocalizing more. Difficulty with attention and command following, --has some improved command following and awareness--inconsistent.  PT notes that patient did better in today's therapy session.      REVIEW OF SYSTEMS  +neurological deficits  +R hemiparesis  +Cognitive deficits     VITALS  43y  Vital Signs Last 24 Hrs  T(C): 36.6 (21 Mar 2023 08:18), Max: 37.1 (20 Mar 2023 21:23)  T(F): 97.8 (21 Mar 2023 08:18), Max: 98.7 (20 Mar 2023 21:23)  HR: 86 (21 Mar 2023 08:18) (73 - 86)  BP: 110/79 (21 Mar 2023 08:18) (110/79 - 125/81)  RR: 17 (21 Mar 2023 08:18) (15 - 17)  SpO2: 97% (21 Mar 2023 08:18) (97% - 98%)    Parameters below as of 21 Mar 2023 08:18  Patient On (Oxygen Delivery Method): room air    PHYSICAL EXAM:  Constitutional - NAD, Comfortable  HEENT - EOMI, left hemicraniectomy, sutures removed 3/7- helmet in place    Neck - s/p trach decannulation  Chest -  NAD, no dyspnea or accessory mm use  Cardio - warm and well perfused   Abdomen -  Soft, NTND, +PEG, Bone flap marsupialized in epigastric region.  Sutures removed, healing well, with no significant erythema and no drainage.   Extremities - No peripheral edema, No calf tenderness   Neurologic Exam:                    Cognitive -             Orientation: Awake, Alert, unable to assess orientation due to aphasia and cognitive deficits            Communication- able to sing "hello" and hum a tune.  Poor command following, Non-fluent, unable to repeat            Attention:  Impaired; makes eye contact but follows commands inconsistently            Memory: Impaired            Thought: Impaired, Impulsive     Speech - non-verbal -s/p trach, impaired comprehension, +aphasia     Cranial Nerves - Limited due to comprehension impairment and aphasia; No facial asymmetry, Tongue midline, EOMI, Pupils dilated but reactive, +dysphagia     Motor -                       LEFT    UE - at least 3/5 and moving spontaneously; exam limited by command following;  WNL                    RIGHT UE - ShAB 0/5, EF 0/5, EE 0/5, WE 0/5,  0/5-- MAS 1+ finger flexors                     LEFT    LE -  at least 3/5 and moving spontaneously; exam limited by command following                    RIGHT LE - HE 1/5, Hip adductor 1/5, KE 0/5, DF 0/5, PF 0/5   heel with callous     Sensory - decrease sensation to LT-- RUE     Coordination - FTN / HTS impaired on right  Psychiatric - Calm     RECENT LABS:                        13.0   7.90  )-----------( 309      ( 20 Mar 2023 06:22 )             40.5     03-20    142  |  104  |  5<L>  ----------------------------<  124<H>  3.8   |  29  |  0.66    Ca    9.1      20 Mar 2023 06:22    TPro  7.0  /  Alb  3.2<L>  /  TBili  0.3  /  DBili  x   /  AST  10  /  ALT  28  /  AlkPhos  113  03-20    LIVER FUNCTIONS - ( 20 Mar 2023 06:22 )  Alb: 3.2 g/dL / Pro: 7.0 g/dL / ALK PHOS: 113 U/L / ALT: 28 U/L / AST: 10 U/L / GGT: x           MEDICATIONS:  MEDICATIONS  (STANDING):  AQUAPHOR (petrolatum Ointment) 1 Application(s) Topical two times a day  AQUAPHOR (petrolatum Ointment) 1 Application(s) Topical two times a day  atorvastatin 80 milliGRAM(s) Oral at bedtime  bacitracin   Ointment 1 Application(s) Topical two times a day  bacitracin   Ointment 1 Application(s) Topical daily  donepezil 5 milliGRAM(s) Oral daily  enoxaparin Injectable 90 milliGRAM(s) SubCutaneous <User Schedule>  escitalopram 10 milliGRAM(s) Oral <User Schedule>  gabapentin 400 milliGRAM(s) Oral at bedtime  hydrocortisone 1% Cream 1 Application(s) Topical two times a day  melatonin 6 milliGRAM(s) Oral at bedtime  midodrine. 10 milliGRAM(s) Oral <User Schedule>  nystatin Powder 1 Application(s) Topical two times a day  pantoprazole   Suspension 40 milliGRAM(s) Oral daily  polyethylene glycol 3350 17 Gram(s) Oral daily  senna Syrup 5 milliLiter(s) Oral at bedtime  traZODone 100 milliGRAM(s) Oral at bedtime    MEDICATIONS  (PRN):  acetaminophen    Suspension .. 650 milliGRAM(s) Oral every 6 hours PRN Temp greater or equal to 38C (100.4F), Mild Pain (1 - 3)

## 2023-03-21 NOTE — PROGRESS NOTE ADULT - ATTENDING COMMENTS
Pt. seen with resident & fellow.  Agree with documentation above as per fellow with amendments made as appropriate. Patient medically stable. Making progress towards rehab goals.     Left IP--  as per Dr Cueva's office--patient has a follow up office visit on 3/28 at 2pm-- they are unsure when his cranioplasty will be, have to submit for authorization next tues.   --will obtain approval And social work to coordinate transportation for outpatient neurosurgery appointment    Aphasia–patient awareness and expression has improved but still with significant comprehension deficits and nonfluent aphasia.  We will trial increasing donepezil to 10 mg daily start tomorrow monitor for side effects.    Patient more alert this morning with an improved participation in therapy with decrease in Neurontin nightly to 400 mg.  Patient did  in the midday  seem to have nerve pain in the right lower extremity.  Patient did participate in therapy.  We will continue to monitor pain for now.  If noted to continue will increase Neurontin back to 600 mg

## 2023-03-21 NOTE — PROGRESS NOTE ADULT - ASSESSMENT
41 y/o M with PMH of prior substance use, HTN, anxiety who presented to [facility] 1/23 with 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 He is s/p Left decompressive hemicraniectomy and right EVD placement 1/28/23. Hospital course further s/f respiratory failure s/p intubation and extubation, agitation requiring precedex, sepsis s/p ABX, hypotension requiring bolus, dysphagia s/p PEG 2/14 and trach on 2/13, insomnia s/p seroquel, pain s/p oxycodone and fentanyl. Patient now with admitted to [facility] acute inpatient rehab.    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 with mass effect and 8mm midline shift  -s/p intubation for airway protection and extubation  -s/p Decompressive hemicraniectomy and EVD placement 1/28  -Underwent wound revision with NSGY and plastics on 2/17  -Continue comprehensive rehab program - PT/OT/SLP per rehab team  -Pain management, bowel regimen per rehab   -Donepezil     #Depression/anxiety  -Paxil per rehab  -Neuropsych as indicated    #Bilateral lower extremity DVTs  -Lower extremity Doppler from 3/1–right femoral vein DVT and bilateral lower extremity calf DVTs  -Neurosurgeon gave clearance for patient to start therapeutic Lovenox  -Vascular Surgery input noted - Dr. Hodges 3/7 recommended nothing further to do if patient is on therapeutic Lovenox   -Continue therapeutic Lovenox for 3 months    #Hypotension  -Improved on Midodrine, monitor closely     #T2DM, HbA1c 6.0  -diet controlled  -monitor    #Dysphagia s/p PEG 2/14  -Puree with thin liquids  -Educate family on SLP recs    #HLD  -Continue lipitor    #GERD  -PPI    #DVT ppx - lovenox

## 2023-03-21 NOTE — PROGRESS NOTE ADULT - SUBJECTIVE AND OBJECTIVE BOX
Patient is a 43y old  Male who presents with a chief complaint of left IPH/ICH (20 Mar 2023 13:37)    Patient seen and examined at bedside. No acute overnight events.     ALLERGIES:  No Known Allergies    MEDICATIONS  (STANDING):  AQUAPHOR (petrolatum Ointment) 1 Application(s) Topical two times a day  AQUAPHOR (petrolatum Ointment) 1 Application(s) Topical two times a day  atorvastatin 80 milliGRAM(s) Oral at bedtime  bacitracin   Ointment 1 Application(s) Topical two times a day  bacitracin   Ointment 1 Application(s) Topical daily  donepezil 5 milliGRAM(s) Oral daily  enoxaparin Injectable 90 milliGRAM(s) SubCutaneous <User Schedule>  escitalopram 10 milliGRAM(s) Oral <User Schedule>  gabapentin 400 milliGRAM(s) Oral at bedtime  hydrocortisone 1% Cream 1 Application(s) Topical two times a day  melatonin 6 milliGRAM(s) Oral at bedtime  midodrine. 10 milliGRAM(s) Oral <User Schedule>  nystatin Powder 1 Application(s) Topical two times a day  pantoprazole   Suspension 40 milliGRAM(s) Oral daily  polyethylene glycol 3350 17 Gram(s) Oral daily  senna Syrup 5 milliLiter(s) Oral at bedtime  traZODone 100 milliGRAM(s) Oral at bedtime    MEDICATIONS  (PRN):  acetaminophen    Suspension .. 650 milliGRAM(s) Oral every 6 hours PRN Temp greater or equal to 38C (100.4F), Mild Pain (1 - 3)    Vital Signs Last 24 Hrs  T(F): 97.8 (21 Mar 2023 08:18), Max: 98.7 (20 Mar 2023 21:23)  HR: 86 (21 Mar 2023 08:18) (73 - 86)  BP: 110/79 (21 Mar 2023 08:18) (110/79 - 125/81)  RR: 17 (21 Mar 2023 08:18) (15 - 17)  SpO2: 97% (21 Mar 2023 08:18) (97% - 98%)  I&O's Summary    PHYSICAL EXAM:  General: NAD, awake, alert. +helmet  ENT: MMM, no tonsilar exudate  Neck: dressing over anterior neck  Lungs: Clear to auscultation bilaterally, no wheezes. Good air entry bilaterally   Cardio: RRR, S1/S2, No murmurs  Abdomen: Soft, Nontender, Nondistended; Bowel sounds present +PEG  Extremities: No calf tenderness, No pitting edema    LABS:                        13.0   7.90  )-----------( 309      ( 20 Mar 2023 06:22 )             40.5       03-20    142  |  104  |  5   ----------------------------<  124  3.8   |  29  |  0.66    Ca    9.1      20 Mar 2023 06:22    TPro  7.0  /  Alb  3.2  /  TBili  0.3  /  DBili  x   /  AST  10  /  ALT  28  /  AlkPhos  113  03-20     COVID-19 PCR: NotDetec (02-28-23 @ 16:00)    RADIOLOGY & ADDITIONAL TESTS:     Care Discussed with Consultants/Other Providers:

## 2023-03-22 PROCEDURE — 99233 SBSQ HOSP IP/OBS HIGH 50: CPT

## 2023-03-22 PROCEDURE — 99232 SBSQ HOSP IP/OBS MODERATE 35: CPT

## 2023-03-22 RX ORDER — OLANZAPINE 15 MG/1
5 TABLET, FILM COATED ORAL ONCE
Refills: 0 | Status: COMPLETED | OUTPATIENT
Start: 2023-03-22 | End: 2023-03-22

## 2023-03-22 RX ORDER — OLANZAPINE 15 MG/1
2.5 TABLET, FILM COATED ORAL ONCE
Refills: 0 | Status: DISCONTINUED | OUTPATIENT
Start: 2023-03-22 | End: 2023-03-22

## 2023-03-22 RX ORDER — QUETIAPINE FUMARATE 200 MG/1
12.5 TABLET, FILM COATED ORAL ONCE
Refills: 0 | Status: COMPLETED | OUTPATIENT
Start: 2023-03-22 | End: 2023-03-22

## 2023-03-22 RX ORDER — OLANZAPINE 15 MG/1
5 TABLET, FILM COATED ORAL ONCE
Refills: 0 | Status: DISCONTINUED | OUTPATIENT
Start: 2023-03-22 | End: 2023-03-22

## 2023-03-22 RX ORDER — DONEPEZIL HYDROCHLORIDE 10 MG/1
5 TABLET, FILM COATED ORAL DAILY
Refills: 0 | Status: DISCONTINUED | OUTPATIENT
Start: 2023-03-22 | End: 2023-04-06

## 2023-03-22 RX ORDER — OLANZAPINE 15 MG/1
2.5 TABLET, FILM COATED ORAL ONCE
Refills: 0 | Status: COMPLETED | OUTPATIENT
Start: 2023-03-22 | End: 2023-03-22

## 2023-03-22 RX ORDER — GABAPENTIN 400 MG/1
600 CAPSULE ORAL AT BEDTIME
Refills: 0 | Status: DISCONTINUED | OUTPATIENT
Start: 2023-03-22 | End: 2023-04-06

## 2023-03-22 RX ADMIN — Medication 1 APPLICATION(S): at 05:34

## 2023-03-22 RX ADMIN — GABAPENTIN 600 MILLIGRAM(S): 400 CAPSULE ORAL at 21:46

## 2023-03-22 RX ADMIN — Medication 1 APPLICATION(S): at 05:29

## 2023-03-22 RX ADMIN — Medication 1 APPLICATION(S): at 05:32

## 2023-03-22 RX ADMIN — OLANZAPINE 5 MILLIGRAM(S): 15 TABLET, FILM COATED ORAL at 14:00

## 2023-03-22 RX ADMIN — Medication 6 MILLIGRAM(S): at 21:49

## 2023-03-22 RX ADMIN — NYSTATIN CREAM 1 APPLICATION(S): 100000 CREAM TOPICAL at 05:30

## 2023-03-22 RX ADMIN — Medication 100 MILLIGRAM(S): at 21:49

## 2023-03-22 RX ADMIN — ESCITALOPRAM OXALATE 10 MILLIGRAM(S): 10 TABLET, FILM COATED ORAL at 08:19

## 2023-03-22 RX ADMIN — ATORVASTATIN CALCIUM 80 MILLIGRAM(S): 80 TABLET, FILM COATED ORAL at 21:48

## 2023-03-22 RX ADMIN — MIDODRINE HYDROCHLORIDE 10 MILLIGRAM(S): 2.5 TABLET ORAL at 08:19

## 2023-03-22 NOTE — PROGRESS NOTE ADULT - SUBJECTIVE AND OBJECTIVE BOX
Patient is a 43y old  Male who presents with a chief complaint of left IPH/ICH (21 Mar 2023 14:51)    Patient seen and examined at bedside. No acute overnight events. working with OT this morning.    ALLERGIES:  No Known Allergies    MEDICATIONS  (STANDING):  AQUAPHOR (petrolatum Ointment) 1 Application(s) Topical two times a day  AQUAPHOR (petrolatum Ointment) 1 Application(s) Topical two times a day  atorvastatin 80 milliGRAM(s) Oral at bedtime  bacitracin   Ointment 1 Application(s) Topical two times a day  bacitracin   Ointment 1 Application(s) Topical daily  donepezil 5 milliGRAM(s) Oral daily  enoxaparin Injectable 90 milliGRAM(s) SubCutaneous <User Schedule>  escitalopram 10 milliGRAM(s) Oral <User Schedule>  gabapentin 400 milliGRAM(s) Oral at bedtime  hydrocortisone 1% Cream 1 Application(s) Topical two times a day  melatonin 6 milliGRAM(s) Oral at bedtime  midodrine. 10 milliGRAM(s) Oral <User Schedule>  nystatin Powder 1 Application(s) Topical two times a day  pantoprazole   Suspension 40 milliGRAM(s) Oral daily  polyethylene glycol 3350 17 Gram(s) Oral daily  QUEtiapine 12.5 milliGRAM(s) Oral once  senna Syrup 5 milliLiter(s) Oral at bedtime  traZODone 100 milliGRAM(s) Oral at bedtime    MEDICATIONS  (PRN):  acetaminophen    Suspension .. 650 milliGRAM(s) Oral every 6 hours PRN Temp greater or equal to 38C (100.4F), Mild Pain (1 - 3)    Vital Signs Last 24 Hrs  T(F): 97.9 (22 Mar 2023 08:07), Max: 98.6 (21 Mar 2023 21:32)  HR: 85 (22 Mar 2023 08:07) (85 - 85)  BP: 126/81 (22 Mar 2023 08:07) (117/78 - 126/81)  RR: 16 (22 Mar 2023 08:07) (15 - 16)  SpO2: 96% (22 Mar 2023 08:07) (96% - 97%)  I&O's Summary    PHYSICAL EXAM:  General: NAD, awake, alert   ENT: MMM, no tonsilar exudate  Neck: dressing over anterior neck  Lungs: Clear to auscultation bilaterally, no wheezes. Good air entry bilaterally   Cardio: RRR, S1/S2, No murmurs  Abdomen: Soft, Nontender, Nondistended; Bowel sounds present +PEG  Extremities: No calf tenderness, No pitting edema    LABS:                        13.0   7.90  )-----------( 309      ( 20 Mar 2023 06:22 )             40.5       03-20    142  |  104  |  5   ----------------------------<  124  3.8   |  29  |  0.66    Ca    9.1      20 Mar 2023 06:22    TPro  7.0  /  Alb  3.2  /  TBili  0.3  /  DBili  x   /  AST  10  /  ALT  28  /  AlkPhos  113  03-20     COVID-19 PCR: NotDetec (02-28-23 @ 16:00)    RADIOLOGY & ADDITIONAL TESTS:     Care Discussed with Consultants/Other Providers:

## 2023-03-22 NOTE — PROVIDER CONTACT NOTE (MEDICATION) - ASSESSMENT
patient again became very angry   swearing  kicks when staff approaches bed    Refused meds    Refused seroquel earlier    Threw food lid on floor    Lovenox unable to give at 12 00   safety watch
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
## 2023-03-22 NOTE — PROGRESS NOTE ADULT - ASSESSMENT
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 He is s/p Left decompressive hemicraniectomy and right EVD placement 1/28/23. Hospital course further s/f respiratory failure s/p intubation and extubation, agitation requiring precedex, sepsis s/p ABX, hypotension requiring bolus, dysphagia s/p PEG 2/14 and trach on 2/13, insomnia s/p seroquel, pain s/p oxycodone and fentanyl. Patient now with admitted to [facility] acute inpatient rehab.    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 with mass effect and 8mm midline shift  -s/p intubation for airway protection and extubation  -s/p Decompressive hemicraniectomy and EVD placement 1/28  -Underwent wound revision with NSGY and plastics on 2/17  -Continue comprehensive rehab program - PT/OT/SLP per rehab team  -Pain management, bowel regimen per rehab   -Donepezil     #Depression/anxiety  -Paxil per rehab  -Neuropsych as indicated    #Bilateral lower extremity DVTs  -Lower extremity Doppler from 3/1–right femoral vein DVT and bilateral lower extremity calf DVTs  -Neurosurgeon gave clearance for patient to start therapeutic Lovenox  -Vascular Surgery input noted - Dr. Hodges 3/7 recommended nothing further to do if patient is on therapeutic Lovenox   -Continue therapeutic Lovenox for 3 months    #Hypotension  -Improved on Midodrine, monitor closely     #T2DM, HbA1c 6.0  -diet controlled  -monitor    #Dysphagia s/p PEG 2/14  -Puree with thin liquids  -Educate family on SLP recs    #HLD  -Continue lipitor    #GERD  -PPI    #DVT ppx - lovenox

## 2023-03-22 NOTE — PROGRESS NOTE ADULT - SUBJECTIVE AND OBJECTIVE BOX
SUBJECTIVE/OBJECTIVE: Patient seen and examined at bedside, more agitated this morning, attempting to get out of bed, cursing at PCA. Attempted to give Seroquel via PEG, not given as patient fighting with staff; Zyprexa IM given 1x, patient with fluctuating periods of agitation/restlessness and being calm. Reported by nursing to sleep last night.     REVIEW OF SYSTEMS  +neurological deficits  +R hemiparesis  +Cognitive deficits     VITALS  43y  Vital Signs Last 24 Hrs  T(C): 36.6 (22 Mar 2023 08:07), Max: 37 (21 Mar 2023 21:32)  T(F): 97.9 (22 Mar 2023 08:07), Max: 98.6 (21 Mar 2023 21:32)  HR: 85 (22 Mar 2023 08:07) (85 - 85)  BP: 126/81 (22 Mar 2023 08:07) (117/78 - 126/81)  RR: 16 (22 Mar 2023 08:07) (15 - 16)  SpO2: 96% (22 Mar 2023 08:07) (96% - 97%)    Parameters below as of 22 Mar 2023 08:07  Patient On (Oxygen Delivery Method): room air      PHYSICAL EXAM:  Constitutional -Restless, agitated, frustrated   HEENT - EOMI, left hemicraniectomy, sutures removed 3/7- helmet in place    Neck - s/p trach decannulation  Chest -  NAD, no dyspnea or accessory mm use  Cardio - warm and well perfused   Abdomen -  Soft, NTND, +PEG, Bone flap marsupialized in epigastric region.  Sutures removed, healing well, with no significant erythema and no drainage.   Extremities - No peripheral edema, No calf tenderness   Neurologic Exam:                    Cognitive -             Orientation: Awake, Alert, unable to assess orientation due to aphasia and cognitive deficits            Communication- able to sing "hello" and hum a tune.  Poor command following, Non-fluent, unable to repeat            Attention:  Impaired; makes eye contact but follows commands inconsistently            Memory: Impaired            Thought: Impaired, Impulsive     Speech - non-verbal -s/p trach, impaired comprehension, +aphasia     Cranial Nerves - Limited due to comprehension impairment and aphasia; No facial asymmetry, Tongue midline, EOMI, Pupils dilated but reactive, +dysphagia     Motor -   As of 3/21* (not cooperative during exam today)                    LEFT    UE - at least 3/5 and moving spontaneously; exam limited by command following;  WNL                    RIGHT UE - ShAB 0/5, EF 0/5, EE 0/5, WE 0/5,  0/5-- MAS 1+ finger flexors                     LEFT    LE -  at least 3/5 and moving spontaneously; exam limited by command following                    RIGHT LE - HE 1/5, Hip adductor 1/5, KE 0/5, DF 0/5, PF 0/5   heel with callous     Sensory - decrease sensation to LT-- RUE     Coordination - FTN / HTS impaired on right  Psychiatric - Calm     MEDICATIONS:  MEDICATIONS  (STANDING):  AQUAPHOR (petrolatum Ointment) 1 Application(s) Topical two times a day  AQUAPHOR (petrolatum Ointment) 1 Application(s) Topical two times a day  atorvastatin 80 milliGRAM(s) Oral at bedtime  bacitracin   Ointment 1 Application(s) Topical two times a day  bacitracin   Ointment 1 Application(s) Topical daily  donepezil 5 milliGRAM(s) Oral daily  enoxaparin Injectable 90 milliGRAM(s) SubCutaneous <User Schedule>  escitalopram 10 milliGRAM(s) Oral <User Schedule>  gabapentin 600 milliGRAM(s) Oral at bedtime  hydrocortisone 1% Cream 1 Application(s) Topical two times a day  melatonin 6 milliGRAM(s) Oral at bedtime  midodrine. 10 milliGRAM(s) Oral <User Schedule>  nystatin Powder 1 Application(s) Topical two times a day  pantoprazole   Suspension 40 milliGRAM(s) Oral daily  polyethylene glycol 3350 17 Gram(s) Oral daily  senna Syrup 5 milliLiter(s) Oral at bedtime  traZODone 100 milliGRAM(s) Oral at bedtime    MEDICATIONS  (PRN):  acetaminophen    Suspension .. 650 milliGRAM(s) Oral every 6 hours PRN Temp greater or equal to 38C (100.4F), Mild Pain (1 - 3)     SUBJECTIVE/OBJECTIVE: Patient seen and examined at bedside, more agitated this morning, attempting to get out of bed, cursing at PCA. Told pt. that PT was coming to help put him in the wheelchair as he was indicating he wanted-- when PT arrived 5 minutes later pt. refused to get out of bed. Was frustrated and agitated.  Attempted to give Seroquel via PEG, not given as patient fighting with staff--2 nurses and 1-2 PCA's tried to hold pt. as attempt to give in PEG but unsuccessful; tried later in AM to give Zyprexa IM, but unsuccessful.   In the afternoon patient became very agitated and started throwing silverware and water bottles at staff. Pt. was holding PEG tube and threatening to pull it if staff came near him.  Nurse called to inform me.  Ordered stat Zyprexa 5 mg IM.  Called dian luis for assistance to hold patient to administer IM medication.  Patient responded to Zyprexa IM–agitation improved.  Was mostly calm but had periods of frustration and cursing. Very limited cooperation with therapy today.    Reported by nursing to sleep last night.  No agitation overnight.    REVIEW OF SYSTEMS  +neurological deficits  +R hemiparesis  +Cognitive deficits     VITALS  43y  Vital Signs Last 24 Hrs  T(C): 36.6 (22 Mar 2023 08:07), Max: 37 (21 Mar 2023 21:32)  T(F): 97.9 (22 Mar 2023 08:07), Max: 98.6 (21 Mar 2023 21:32)  HR: 85 (22 Mar 2023 08:07) (85 - 85)  BP: 126/81 (22 Mar 2023 08:07) (117/78 - 126/81)  RR: 16 (22 Mar 2023 08:07) (15 - 16)  SpO2: 96% (22 Mar 2023 08:07) (96% - 97%)    Parameters below as of 22 Mar 2023 08:07  Patient On (Oxygen Delivery Method): room air      PHYSICAL EXAM:  Constitutional -Restless, agitated, frustrated   HEENT - EOMI, left hemicraniectomy, sutures removed 3/7- helmet in place    Neck - s/p trach decannulation  Chest -  NAD, no dyspnea or accessory mm use  Cardio - warm and well perfused   Abdomen -  Soft, NTND, +PEG, Bone flap marsupialized in epigastric region.  Sutures removed, healing well, with no significant erythema and no drainage.   Extremities - No peripheral edema, No calf tenderness   Neurologic Exam:                    Cognitive -             Orientation: Awake, Alert, unable to assess orientation due to aphasia and cognitive deficits            Communication- able to sing "hello" and hum a tune.  Poor command following, Non-fluent, unable to repeat            Attention:  Impaired; makes eye contact but follows commands inconsistently            Memory: Impaired            Thought: Impaired, Impulsive     Speech - non-verbal -s/p trach, impaired comprehension, +aphasia     Cranial Nerves - Limited due to comprehension impairment and aphasia; No facial asymmetry, Tongue midline, EOMI, Pupils dilated but reactive, +dysphagia     Motor -   As of 3/21* (not cooperative during exam today)                    LEFT    UE - at least 3/5 and moving spontaneously; exam limited by command following;  WNL                    RIGHT UE - ShAB 0/5, EF 0/5, EE 0/5, WE 0/5,  0/5-- MAS 1+ finger flexors                     LEFT    LE -  at least 3/5 and moving spontaneously; exam limited by command following                    RIGHT LE - HE 1/5, Hip adductor 1/5, KE 0/5, DF 0/5, PF 0/5   heel with callous     Sensory - decrease sensation to LT-- RUE     Coordination - FTN / HTS impaired on right  Psychiatric - Calm     MEDICATIONS:  MEDICATIONS  (STANDING):  AQUAPHOR (petrolatum Ointment) 1 Application(s) Topical two times a day  AQUAPHOR (petrolatum Ointment) 1 Application(s) Topical two times a day  atorvastatin 80 milliGRAM(s) Oral at bedtime  bacitracin   Ointment 1 Application(s) Topical two times a day  bacitracin   Ointment 1 Application(s) Topical daily  donepezil 5 milliGRAM(s) Oral daily  enoxaparin Injectable 90 milliGRAM(s) SubCutaneous <User Schedule>  escitalopram 10 milliGRAM(s) Oral <User Schedule>  gabapentin 600 milliGRAM(s) Oral at bedtime  hydrocortisone 1% Cream 1 Application(s) Topical two times a day  melatonin 6 milliGRAM(s) Oral at bedtime  midodrine. 10 milliGRAM(s) Oral <User Schedule>  nystatin Powder 1 Application(s) Topical two times a day  pantoprazole   Suspension 40 milliGRAM(s) Oral daily  polyethylene glycol 3350 17 Gram(s) Oral daily  senna Syrup 5 milliLiter(s) Oral at bedtime  traZODone 100 milliGRAM(s) Oral at bedtime    MEDICATIONS  (PRN):  acetaminophen    Suspension .. 650 milliGRAM(s) Oral every 6 hours PRN Temp greater or equal to 38C (100.4F), Mild Pain (1 - 3)

## 2023-03-23 DIAGNOSIS — I61.1 NONTRAUMATIC INTRACEREBRAL HEMORRHAGE IN HEMISPHERE, CORTICAL: ICD-10-CM

## 2023-03-23 LAB
ALBUMIN SERPL ELPH-MCNC: 3.3 G/DL — SIGNIFICANT CHANGE UP (ref 3.3–5)
ALP SERPL-CCNC: 107 U/L — SIGNIFICANT CHANGE UP (ref 40–120)
ALT FLD-CCNC: 25 U/L — SIGNIFICANT CHANGE UP (ref 10–45)
ANION GAP SERPL CALC-SCNC: 10 MMOL/L — SIGNIFICANT CHANGE UP (ref 5–17)
AST SERPL-CCNC: 17 U/L — SIGNIFICANT CHANGE UP (ref 10–40)
BILIRUB SERPL-MCNC: 0.4 MG/DL — SIGNIFICANT CHANGE UP (ref 0.2–1.2)
BUN SERPL-MCNC: 4 MG/DL — LOW (ref 7–23)
CALCIUM SERPL-MCNC: 9.4 MG/DL — SIGNIFICANT CHANGE UP (ref 8.4–10.5)
CHLORIDE SERPL-SCNC: 103 MMOL/L — SIGNIFICANT CHANGE UP (ref 96–108)
CO2 SERPL-SCNC: 30 MMOL/L — SIGNIFICANT CHANGE UP (ref 22–31)
CREAT SERPL-MCNC: 0.63 MG/DL — SIGNIFICANT CHANGE UP (ref 0.5–1.3)
EGFR: 121 ML/MIN/1.73M2 — SIGNIFICANT CHANGE UP
GLUCOSE SERPL-MCNC: 112 MG/DL — HIGH (ref 70–99)
HCT VFR BLD CALC: 42.8 % — SIGNIFICANT CHANGE UP (ref 39–50)
HGB BLD-MCNC: 13.7 G/DL — SIGNIFICANT CHANGE UP (ref 13–17)
MCHC RBC-ENTMCNC: 29.5 PG — SIGNIFICANT CHANGE UP (ref 27–34)
MCHC RBC-ENTMCNC: 32 GM/DL — SIGNIFICANT CHANGE UP (ref 32–36)
MCV RBC AUTO: 92 FL — SIGNIFICANT CHANGE UP (ref 80–100)
NRBC # BLD: 0 /100 WBCS — SIGNIFICANT CHANGE UP (ref 0–0)
PLATELET # BLD AUTO: 316 K/UL — SIGNIFICANT CHANGE UP (ref 150–400)
POTASSIUM SERPL-MCNC: 3.9 MMOL/L — SIGNIFICANT CHANGE UP (ref 3.5–5.3)
POTASSIUM SERPL-SCNC: 3.9 MMOL/L — SIGNIFICANT CHANGE UP (ref 3.5–5.3)
PROT SERPL-MCNC: 7 G/DL — SIGNIFICANT CHANGE UP (ref 6–8.3)
RBC # BLD: 4.65 M/UL — SIGNIFICANT CHANGE UP (ref 4.2–5.8)
RBC # FLD: 13.9 % — SIGNIFICANT CHANGE UP (ref 10.3–14.5)
SODIUM SERPL-SCNC: 143 MMOL/L — SIGNIFICANT CHANGE UP (ref 135–145)
WBC # BLD: 7.43 K/UL — SIGNIFICANT CHANGE UP (ref 3.8–10.5)
WBC # FLD AUTO: 7.43 K/UL — SIGNIFICANT CHANGE UP (ref 3.8–10.5)

## 2023-03-23 PROCEDURE — 99233 SBSQ HOSP IP/OBS HIGH 50: CPT

## 2023-03-23 PROCEDURE — 71045 X-RAY EXAM CHEST 1 VIEW: CPT | Mod: 26

## 2023-03-23 PROCEDURE — 99232 SBSQ HOSP IP/OBS MODERATE 35: CPT

## 2023-03-23 PROCEDURE — 90792 PSYCH DIAG EVAL W/MED SRVCS: CPT

## 2023-03-23 RX ADMIN — GABAPENTIN 600 MILLIGRAM(S): 400 CAPSULE ORAL at 21:30

## 2023-03-23 RX ADMIN — DONEPEZIL HYDROCHLORIDE 5 MILLIGRAM(S): 10 TABLET, FILM COATED ORAL at 11:48

## 2023-03-23 RX ADMIN — Medication 1 APPLICATION(S): at 06:14

## 2023-03-23 RX ADMIN — ATORVASTATIN CALCIUM 80 MILLIGRAM(S): 80 TABLET, FILM COATED ORAL at 21:30

## 2023-03-23 RX ADMIN — PANTOPRAZOLE SODIUM 40 MILLIGRAM(S): 20 TABLET, DELAYED RELEASE ORAL at 11:48

## 2023-03-23 RX ADMIN — NYSTATIN CREAM 1 APPLICATION(S): 100000 CREAM TOPICAL at 06:10

## 2023-03-23 RX ADMIN — Medication 6 MILLIGRAM(S): at 21:30

## 2023-03-23 RX ADMIN — Medication 1 APPLICATION(S): at 11:49

## 2023-03-23 RX ADMIN — MIDODRINE HYDROCHLORIDE 10 MILLIGRAM(S): 2.5 TABLET ORAL at 08:13

## 2023-03-23 RX ADMIN — Medication 1 APPLICATION(S): at 18:43

## 2023-03-23 RX ADMIN — ESCITALOPRAM OXALATE 10 MILLIGRAM(S): 10 TABLET, FILM COATED ORAL at 08:13

## 2023-03-23 RX ADMIN — Medication 100 MILLIGRAM(S): at 21:30

## 2023-03-23 RX ADMIN — Medication 1 APPLICATION(S): at 18:47

## 2023-03-23 RX ADMIN — Medication 1 APPLICATION(S): at 18:46

## 2023-03-23 RX ADMIN — NYSTATIN CREAM 1 APPLICATION(S): 100000 CREAM TOPICAL at 18:44

## 2023-03-23 RX ADMIN — ENOXAPARIN SODIUM 90 MILLIGRAM(S): 100 INJECTION SUBCUTANEOUS at 08:14

## 2023-03-23 RX ADMIN — Medication 1 APPLICATION(S): at 06:09

## 2023-03-23 NOTE — BH CONSULTATION LIAISON ASSESSMENT NOTE - NSBHMSETHTPROC_PSY_A_CORE
[facility]  IP Postpartum    Questions/Answers    Flowsheet Row Responses   Best Method for Contacting Cell   Support Person Present Yes   Does the patient have a car seat at the hospital Yes   Were birth expectations met? Yes   Is there a need for additional support/resources? No   Is additional support needed? No   Any questions or concerns? No   Is the patient going to use Meds to Beds? Yes   Does patient have transportation to appointments? Yes   Any other assistance needed to ensure she is able to attend appointments? No   Does patient have supplies needed at home for  care? Breast Pump, Clothing, Crib, Diapers            Krys Berger RN  Maternity Nurse Navigator    3/8/2023, 09:26 CST         Unable to assess

## 2023-03-23 NOTE — BH CONSULTATION LIAISON ASSESSMENT NOTE - NSBHMSERELATED_PSY_A_CORE
Fair Qbrexza Pregnancy And Lactation Text: There is no available data on Qbrexza use in pregnant women.  There is no available data on Qbrexza use in lactation.

## 2023-03-23 NOTE — BH CONSULTATION LIAISON ASSESSMENT NOTE - SUMMARY
This is 42 year old male with PMH of prior substance use, HTN, anxiety who presented to [facility] on 1/23 with  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 He is  s/p Left decompressive hemicraniectomy and right EVD placement 1/28/23. Hospital course further c/b respiratory failure s/p intubation and extubation,  agitation requiring Precedex, sepsis s/p ABX, hypotension requiring bolus, dysphagia s/p PEG 2/14 and trach on 2/13, insomnia s/p Seroquel, pain s/p oxycodone and fentanyl. Patient now with Right Hemiparesis, dysphagia s/p PEG, Aphasia, Cognitive deficits, gait Instability, ADL impairments and Functional impairments.    Psychiatry called to see patient due to agitation , yesterday, received Olanzapine IM due to profane language, attempting to get out of bed, combativeness.   Today patient calmer. Writer discussed recent medication changes with the attending.

## 2023-03-23 NOTE — BH CONSULTATION LIAISON ASSESSMENT NOTE - NSBHCONSULTPRIMARYDISCUSSYES_PSY_A_CORE FT
case d/w Dr. Talavera, messages left for mother and at [facility] PROS program.

## 2023-03-23 NOTE — BH CONSULTATION LIAISON ASSESSMENT NOTE - HPI (INCLUDE ILLNESS QUALITY, SEVERITY, DURATION, TIMING, CONTEXT, MODIFYING FACTORS, ASSOCIATED SIGNS AND SYMPTOMS)
HPI:  This is 42 year old male with PMH of prior substance use, HTN, anxiety who presented to [facility] on 1/23. On 1/23 morning, he told his family that he was not feeling well and took 2 tabs of Vyvanse (usually takes as needed). Shortly after, he was at a group therapy meeting over the phone when he complained to his father he was having a severe headache. He then became more lethargic, and developed nausea and vomited. He was brought to ED by his father. On arrival, noted to be lethargic, vomiting, with garbled speech. 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 Post CT, more lethargic with dilated right pupil and bilateral reactive but sluggish pupils. He was given mannitol and intubated for airway protection and was taken to neuro ICU.    On 1/25, HCT with enlarging R lateral ventricle and slightly worsening MLS concerning for trapped ventricle. 1/27 acute drop in SpO2 on 1/27, lavaged and suctioned out thick clot. Placed back on ACVC and FiO2 weaned from 70% to 40% overnight. On 1/28/23, he had episode of emesis, & fever of 100.3. He was  given tylenol with improvement in temperature but still little to no movement on the L side (previously would localize arm to suction and spontaneously bend L leg). Pt. Had left decompressive hemicraniectomy and right EVD  placed.    After the procedure, patient opened eyes for the first time and regained LUE/LLE movement. As he became febrile again this was less prominent. Ceftriaxone was switched to Zosyn. 1/29 HCT mild increase extraaxial fluid collection. on 1/30, repeat  HCT unchanged, his MTL was discontinued. Zosyn and vancomycin switched to cefepime. On 1/31,  HCT showed worsening edema +/- extraaxial fluid collection, elevated ICPs, and mannitol was restarted.      MTL was weaned,  EVD adjusted and eventually removed on 2/10.  Hospital course further c/b agitation requiring Precedex, hypotension requiring bolus, dysphagia s/p PEG 2/14  and trach on 2/13, insomnia s/p Seroquel, pain s/p oxycodone and fentanyl. He underwent wound revision with NSGY and plastics on 2/17. Keppra discontinued due to signs of agitation. Tolerated trach collar.     Patient was evaluated by PM&R and therapy for functional deficits, gait/ADL impairments and acute rehabilitation was recommended. Patient was medically optimized for discharge to [facility] IRU on 2/28/23.     (28 Feb 2023 13:10)      Psychiatry C/L Note: Asked to see this patient who is a 43 year old male who  presents with a brain injury. Pt with pre morbid psychiatric history, I stop checked ref # [id number]. Pt prescribed Valium 12/28/2022 10 mg # 120 , and Suboxone 8/2 12/24/2022 # 60, by Dr. Shaunna Huitron. Of note Vyvanse is not on the I stop  registry.  As per [facility] PROS program,  that provider retired and no longer works at the agency, message left for collateral information. Pt yesterday was acutely agitated and profane, throwing things in his room, CODE Lowe initiated, pt received rescue medication. Pt calmer today, writer introduced herself to the patient with the attending Dr. Talavera present, with whom patient has a good rapport. Pt appears frustrated specifically about his verbal aphasia, pt can produce yes and no, but not consistently with questions, communicates with gestures at times, and facial expressions. Rest of psychiatric ROS unable to be obtained verbally, but would need to be noted with observation. Pt does not appear to be delirious, or monitoring internal stimuli, or suffering from hallucinations.

## 2023-03-23 NOTE — BH CONSULTATION LIAISON ASSESSMENT NOTE - NSBHCHARTREVIEWLAB_PSY_A_CORE FT
03-23    143  |  103  |  4<L>  ----------------------------<  112<H>  3.9   |  30  |  0.63    Ca    9.4      23 Mar 2023 05:00    TPro  7.0  /  Alb  3.3  /  TBili  0.4  /  DBili  x   /  AST  17  /  ALT  25  /  AlkPhos  107  03-23                            13.7   7.43  )-----------( 316      ( 23 Mar 2023 05:00 )             42.8

## 2023-03-23 NOTE — BH CONSULTATION LIAISON ASSESSMENT NOTE - OTHER PAST PSYCHIATRIC HISTORY (INCLUDE DETAILS REGARDING ONSET, COURSE OF ILLNESS, INPATIENT/OUTPATIENT TREATMENT)
Per chart review, detox from substances?  Pt on Suboxone at least as of December, is prescribed Valium 10 mg tid.

## 2023-03-23 NOTE — BH CONSULTATION LIAISON ASSESSMENT NOTE - CURRENT MEDICATION
MEDICATIONS  (STANDING):  AQUAPHOR (petrolatum Ointment) 1 Application(s) Topical two times a day  AQUAPHOR (petrolatum Ointment) 1 Application(s) Topical two times a day  atorvastatin 80 milliGRAM(s) Oral at bedtime  bacitracin   Ointment 1 Application(s) Topical two times a day  bacitracin   Ointment 1 Application(s) Topical daily  donepezil 5 milliGRAM(s) Oral daily  enoxaparin Injectable 90 milliGRAM(s) SubCutaneous <User Schedule>  escitalopram 10 milliGRAM(s) Oral <User Schedule>  gabapentin 600 milliGRAM(s) Oral at bedtime  hydrocortisone 1% Cream 1 Application(s) Topical two times a day  melatonin 6 milliGRAM(s) Oral at bedtime  midodrine. 10 milliGRAM(s) Oral <User Schedule>  nystatin Powder 1 Application(s) Topical two times a day  pantoprazole   Suspension 40 milliGRAM(s) Oral daily  polyethylene glycol 3350 17 Gram(s) Oral daily  senna Syrup 5 milliLiter(s) Oral at bedtime  traZODone 100 milliGRAM(s) Oral at bedtime    MEDICATIONS  (PRN):  acetaminophen    Suspension .. 650 milliGRAM(s) Oral every 6 hours PRN Temp greater or equal to 38C (100.4F), Mild Pain (1 - 3)

## 2023-03-23 NOTE — PROGRESS NOTE ADULT - ASSESSMENT
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 He is s/p Left decompressive hemicraniectomy and right EVD placement 1/28/23. Hospital course further s/f respiratory failure s/p intubation and extubation, agitation requiring precedex, sepsis s/p ABX, hypotension requiring bolus, dysphagia s/p PEG 2/14 and trach on 2/13, insomnia s/p seroquel, pain s/p oxycodone and fentanyl. Patient now with admitted to [facility] acute inpatient rehab.    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 with mass effect and 8mm midline shift  -s/p intubation for airway protection and extubation  -s/p Decompressive hemicraniectomy and EVD placement 1/28  -Underwent wound revision with NSGY and plastics on 2/17  -Continue comprehensive rehab program - PT/OT/SLP per rehab team  -Pain management, bowel regimen per rehab   -Donepezil     #Depression/anxiety  -Paxil per rehab  -Neuropsych as indicated    #Bilateral lower extremity DVTs  -Lower extremity Doppler from 3/1–right femoral vein DVT and bilateral lower extremity calf DVTs  -Neurosurgeon gave clearance for patient to start therapeutic Lovenox  -Vascular Surgery input noted - Dr. Hodges 3/7 recommended nothing further to do if patient is on therapeutic Lovenox   -Continue therapeutic Lovenox for 3 months    #Hypotension  -Improved on Midodrine, monitor closely     #T2DM, HbA1c 6.0  -diet controlled  -monitor    #Dysphagia s/p PEG 2/14  -Puree with thin liquids    #HLD  -Continue lipitor    #GERD  -PPI    #DVT ppx - lovenox

## 2023-03-23 NOTE — BH CONSULTATION LIAISON ASSESSMENT NOTE - RISK ASSESSMENT
Pt at risk of other harm due to poor frustration tolerance and poor safety awareness  and impulse control.   Without personal or professional collateral - it is difficult to ascertain role of pre morbid diagnosis and personality disorder that may be contributory.

## 2023-03-23 NOTE — PROGRESS NOTE ADULT - ASSESSMENT
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 He is  s/p Left decompressive hemicraniectomy and right EVD placement 1/28/23. Hospital course further c/b respiratory failure s/p intubation and extubation,  agitation requiring precedex, sepsis s/p ABX, hypotension requiring bolus, dysphagia s/p PEG 2/14 and trach on 2/13, insomnia s/p seroquel, pain s/p oxycodone and fentanyl. Patient now with Right Hemiparesis, dysphagia s/p PEG, Aphasia, Cognitive deficits, gait Instability, ADL impairments and Functional impairments.    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    - ST: to diagnose and treat deficits in swallowing, cognition and communication.  - Helmet when OOB  - follow up CT head 3/19 reviewed-  Postoperative changes compatible with a left temporal frontal parietal craniectomy is again seen. left frontoparietal hematoma with some improvement.  improved vasogenic edema is seen. Mass effect on the left lateral ventricle and Left-to-right shift (3.1 mm)has now resolved.   - OT– right resting hand splint  - Called Dr Cueva's office--patient has a follow up office visit on 3/28 at 2pm-- they are unsure when his cranioplasty will be, have to submit for authorization next tues.   --will obtain approval And social work to coordinate transportation for outpatient neurosurgery appointment    Sleep/Restlessness  --c/w Trazodone to 100mg qhs 3/8-- helping with sleep and restlessness. Pt's mother reports he was on 50-100mg qhs and 50mg daytime home meds.   --cont. melatonin  --monitor  --Attending discussed with nursing that patient needs supervision for safety monitoring as pt.  is impulsive, restless and high fall risk     # Agitation - improved from yesterday but still frustrated and restless  - R/o infection - CXR unremarkable. Unable to collect UA due to irritability. Labs WNL.  - Zyprexa 5 mg IM given 1x 3/22  - Continue to monitor  - c/w gabapentin to 600 mg qhs ( was previously decreased to 400hs last night on 3/20)  --Psychiatry consult, Dr. Acharya examined patient, note pending.       Depression/anxiety  --Pt was on Paxil 30mg at home as per mother-- Paxil 10mg started 3/10 but d/c in favor of lexapro  -- cont. Lexapro 10mg at equivalent dose to paxil --has been more effective for pt's anxiety/depression  --Continue to monitor    Aphasia  -- c/w Donepezil 5 mg. Held 3/22 for agitation.       Bilateral lower extremity DVTs–  – Lower extremity Doppler from 3/1–right femoral vein DVT and bilateral lower extremity calf DVTs  – spoke with patient's neurosurgery PA–who was in contact with Dr. Cueva patient's neurosurgeon–gave clearance for patient to start therapeutic Lovenox.  --Vascular Surgery consult -- reaached out to Dr. Hodges 3/7 - nothing further to do if patient is on therapeutic lovenox.   -- C/w therapeutic lovenox for 3 months.    Orthostatic hypotension–  -Stopped doxazosin 3/2-- monitor PVRs, low PVR's per nursing  -no report of OH since midodrine 10mg   -Cont to monitor    #Acute Respiratory Failure  - Intubation and extubation  - s/p Trach 2/13  - Decannulated 3/5, Pulm monitoring, tolerating well    #GI PPX  - Nexium 40mg daily    #DM II  - A1c 6.0  -management as per hospitalist--off insulin     #Pain management  - Tylenol PRN  - Neurontin cw 600 hs -- previously decreased to 400 hs due to lethargy, but helping with mood and sleep  - Continue to monitor     #Bowel Regimen  - Senna, miralax PRN    #Bladder management  -voiding well with low PVRs,   -- off doxazosin 3/2    #Dysphagia    - s/p PEG 2/14  - Puree with thin liquids-- 1:1 assist with meals  - SLP: evaluation and treatment  - Diet: tolerating bolus TF     #Skin:  - Skin on admission: IAD to bilateral groin and sacrum; RUQ incision with sutures with palpable skull flap; Left hemicraniectomy with sutures MILLER; psoriasis BL shin  - Pressure injury/Skin: Turn Q2hrs while in bed, OOB to Chair, PT/OT     #Precaution  - Fall, Aspiration, seizure    #home meds  - per list provided by mother, [place]: Rousuvastatin 40mg daily and Paroxetine 30mg daily  -  cw paroxetine  - cw high intensity atorvastatin to home med dose    – IDT 3/9:  Social work:  patient on discharge can live with his mother who has a first-floor set up.  Nursing–incontinent of bowel and bladder, total assist with toileting  Speech: Puree/thin diet, severe language deficits–poor command following, poor orientation, Some spontaneous naming, & gestures, Speech jargon, Aphasia, severe cognitive deficits  OT's: Total assist with ADLs and transfers, restless  PT: Sitting –max assist,, Supine to sit--Max A x1 (3/20); Improved trunk control; Tot A transfers   goals-- mod assist with transfers, ambulation 10 feet max assist with hemiwalker, wheelchair mobility 50 feet min assist.  ELOS: 3/28 ZOHAIB  Goals:  1.  Improved secretion management and tolerate PMV  2.  transfers with 1 person assist  3.  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 He is  s/p Left decompressive hemicraniectomy and right EVD placement 1/28/23. Hospital course further c/b respiratory failure s/p intubation and extubation,  agitation requiring precedex, sepsis s/p ABX, hypotension requiring bolus, dysphagia s/p PEG 2/14 and trach on 2/13, insomnia s/p seroquel, pain s/p oxycodone and fentanyl. Patient now with Right Hemiparesis, dysphagia s/p PEG, Aphasia, Cognitive deficits, gait Instability, ADL impairments and Functional impairments.    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    - ST: to diagnose and treat deficits in swallowing, cognition and communication.  - Helmet when OOB  - follow up CT head 3/19 reviewed-  Postoperative changes compatible with a left temporal frontal parietal craniectomy is again seen. left frontoparietal hematoma with some improvement.  improved vasogenic edema is seen. Mass effect on the left lateral ventricle and Left-to-right shift (3.1 mm)has now resolved.   - OT– right resting hand splint  - Called Dr Cueva's office--patient has a follow up office visit on 3/28 at 2pm-- they are unsure when his cranioplasty will be, have to submit for authorization next tues.   --will obtain approval And social work to coordinate transportation for outpatient neurosurgery appointment    Sleep/Restlessness  --c/w Trazodone  100mg qhs 3/8-- for with sleep and restlessness.   Pt's mother reports he was on 50-100mg qhs and 50mg daytime home meds.   --cont. melatonin  --monitor  --Attending discussed with nursing that patient needs supervision for safety monitoring as pt.  is impulsive, restless and high fall risk     # Agitation - improved from yesterday but still frustrated and restless  - R/o infection - CXR unremarkable. Unable to collect UA due to irritability. Labs WNL.  - Zyprexa 5 mg IM given 1x 3/22  - Continue to monitor  - c/w gabapentin to 600 mg qhs ( was previously decreased to 400hs last night on 3/20)  --Psychiatry consult, Dr. Acharya examined patient, note pending.       Depression/anxiety  --Pt was on Paxil 30mg at home as per mother-- Paxil 10mg started 3/10 but d/c in favor of lexapro  -- cont. Lexapro 10mg at equivalent dose to paxil --has been more effective for pt's anxiety/depression  --Continue to monitor    Aphasia  -- c/w Donepezil 5 mg. Held 3/22 for agitation. resumed today       Bilateral lower extremity DVTs–  – Lower extremity Doppler from 3/1–right femoral vein DVT and bilateral lower extremity calf DVTs  – spoke with patient's neurosurgery PA–who was in contact with Dr. Cueva patient's neurosurgeon–gave clearance for patient to start therapeutic Lovenox.  --Vascular Surgery consult -- reaached out to Dr. Hodges 3/7 - nothing further to do if patient is on therapeutic lovenox.   -- C/w therapeutic lovenox for 3 months.    Orthostatic hypotension–  -Stopped doxazosin 3/2-- monitor PVRs, low PVR's per nursing  -no report of OH since midodrine 10mg   -Cont to monitor    #Acute Respiratory Failure  - Intubation and extubation  - s/p Trach 2/13  - Decannulated 3/5, Pulm monitoring, tolerating well    #GI PPX  - Nexium 40mg daily    #DM II  - A1c 6.0  -management as per hospitalist--off insulin     #Pain management  - Tylenol PRN  - Neurontin cw 600 hs -- previously decreased to 400 hs due to lethargy, but resumed as pt. became agitated on lower dose  - Continue to monitor     #Bowel Regimen  - Senna, miralax PRN    #Bladder management  -voiding well with low PVRs,   -- off doxazosin 3/2    #Dysphagia    - s/p PEG 2/14  - Puree with thin liquids-- 1:1 assist with meals  - SLP: evaluation and treatment  - Diet: tolerating bolus TF     #Skin:  - Skin on admission: IAD to bilateral groin and sacrum; RUQ incision with sutures with palpable skull flap; Left hemicraniectomy with sutures MILLER; psoriasis BL shin  - Pressure injury/Skin: Turn Q2hrs while in bed, OOB to Chair, PT/OT     #Precaution  - Fall, Aspiration, seizure    #home meds  - per list provided by mother, [place]: Rousuvastatin 40mg daily and Paroxetine 30mg daily  -  cw paroxetine  - cw high intensity atorvastatin to home med dose    – IDT 3/23:  Social work:  patient on discharge can live with his mother who has a first-floor set up.  Nursing–incontinent of bowel and bladder, total assist with toileting  Speech: Puree/thin diet,  severe language deficits–poor command following, poor orientation, Some spontaneous naming, & gestures, Speech jargon, Aphasia, severe cognitive deficits  OT's: Supervision eating; Mod-Max A UBD, Max A LBD, Total assist with toileting and transfers, restless  PT: standing with Mod A x 2 for 5min--inconsistent; Improved trunk control; Tot A transfers, limited by behavior   goals-- mod assist with transfers, ambulation 10 feet max assist with hemiwalker, wheelchair mobility 50 feet min assist.  ELOS: 4/6 ZOHAIB  Goals:  1.  Improved secretion management and tolerate PMV  2.  transfers with 1 person assist  3.  Communicate basic wants and needs with yes no answers

## 2023-03-23 NOTE — BH CONSULTATION LIAISON ASSESSMENT NOTE - NSBHCONSULTRECOMMENDOTHER_PSY_A_CORE FT
Continue Aricept 5 mg  Gabapentin 600 mg   Lexapro 10 mg ( Paxil d/c)  Melatonin 6 mg   Trazodone 100 mg.

## 2023-03-23 NOTE — PROGRESS NOTE ADULT - ATTENDING COMMENTS
Pt. seen with resident.  Agree with documentation above as per resident with amendments made as appropriate. Patient medically stable. Making progress towards rehab goals.     Left frontal and BG IPH--   Agitation - improved from yesterday but still frustrated and restless  - R/o infection - CXR unremarkable. Unable to collect UA due to irritability. Labs WNL.  - Zyprexa 5 mg IM given 1x 3/22  - c/w gabapentin to 600 mg qhs--has helped with agitation and anxiety-- continue to monitor-- should be better tomorrow  --Psychiatry consult, --pt. seen with and case d/w Dr. Acharya--recommendations appreciated-- Agrees with increase in Gabapentin to 600mg.  Recommended Zyprexa 2.5mg PO/IM q.12h for severe agitation    Progress in therapy, rehab plan of care and discharge planning discussed in Team meeting with SW and Therapists.  LOS extended to 4/6

## 2023-03-23 NOTE — PROGRESS NOTE ADULT - SUBJECTIVE AND OBJECTIVE BOX
Patient is a 43y old  Male who presents with a chief complaint of left IPH/ICH (22 Mar 2023 12:09)    Patient seen and examined at bedside. No acute overnight events.   States he did not sleep well overnight, will have to get report from staff  Not as communicative today    ALLERGIES:  No Known Allergies    MEDICATIONS  (STANDING):  AQUAPHOR (petrolatum Ointment) 1 Application(s) Topical two times a day  AQUAPHOR (petrolatum Ointment) 1 Application(s) Topical two times a day  atorvastatin 80 milliGRAM(s) Oral at bedtime  bacitracin   Ointment 1 Application(s) Topical two times a day  bacitracin   Ointment 1 Application(s) Topical daily  donepezil 5 milliGRAM(s) Oral daily  enoxaparin Injectable 90 milliGRAM(s) SubCutaneous <User Schedule>  escitalopram 10 milliGRAM(s) Oral <User Schedule>  gabapentin 600 milliGRAM(s) Oral at bedtime  hydrocortisone 1% Cream 1 Application(s) Topical two times a day  melatonin 6 milliGRAM(s) Oral at bedtime  midodrine. 10 milliGRAM(s) Oral <User Schedule>  nystatin Powder 1 Application(s) Topical two times a day  pantoprazole   Suspension 40 milliGRAM(s) Oral daily  polyethylene glycol 3350 17 Gram(s) Oral daily  senna Syrup 5 milliLiter(s) Oral at bedtime  traZODone 100 milliGRAM(s) Oral at bedtime    MEDICATIONS  (PRN):  acetaminophen    Suspension .. 650 milliGRAM(s) Oral every 6 hours PRN Temp greater or equal to 38C (100.4F), Mild Pain (1 - 3)    Vital Signs Last 24 Hrs  T(F): 98.1 (23 Mar 2023 08:19), Max: 98.1 (23 Mar 2023 08:19)  HR: 79 (23 Mar 2023 08:19) (79 - 79)  BP: 134/84 (23 Mar 2023 08:19) (134/84 - 134/84)  RR: 17 (23 Mar 2023 08:19) (17 - 17)  SpO2: 96% (23 Mar 2023 08:19) (96% - 96%)  I&O's Summary    PHYSICAL EXAM:  General: NAD, awake, alert   ENT: MMM, no tonsilar exudate  Neck: dressing over anterior neck; no jvd  Lungs: Clear to auscultation bilaterally, no wheezes. Good air entry bilaterally   Cardio: RRR, S1/S2, No murmurs  Abdomen: Soft, Nontender, Nondistended; Bowel sounds present +PEG  Extremities: No calf tenderness, No pitting edema    LABS:                        13.7   7.43  )-----------( 316      ( 23 Mar 2023 05:00 )             42.8       03-23    143  |  103  |  4   ----------------------------<  112  3.9   |  30  |  0.63    Ca    9.4      23 Mar 2023 05:00    TPro  7.0  /  Alb  3.3  /  TBili  0.4  /  DBili  x   /  AST  17  /  ALT  25  /  AlkPhos  107  03-23     COVID-19 PCR: NotDetec (02-28-23 @ 16:00)    RADIOLOGY & ADDITIONAL TESTS:     Care Discussed with Consultants/Other Providers:

## 2023-03-23 NOTE — PROGRESS NOTE ADULT - SUBJECTIVE AND OBJECTIVE BOX
SUBJECTIVE/OBJECTIVE: Patient seen and examined in bed. Mildly interactive with encouragement. No agitation or outbursts reported today. Denies pain. Allows heart/lung exam. Staff reports he ate well this morning.      REVIEW OF SYSTEMS  +neurological deficits  +R hemiparesis  +Cognitive deficits     RECENT LABS    Vital Signs Last 24 Hrs  T(C): 36.7 (23 Mar 2023 08:19), Max: 36.7 (23 Mar 2023 08:19)  T(F): 98.1 (23 Mar 2023 08:19), Max: 98.1 (23 Mar 2023 08:19)  HR: 79 (23 Mar 2023 08:19) (79 - 79)  BP: 134/84 (23 Mar 2023 08:19) (134/84 - 134/84)  RR: 17 (23 Mar 2023 08:19) (17 - 17)  SpO2: 96% (23 Mar 2023 08:19) (96% - 96%)    Parameters below as of 23 Mar 2023 08:19  Patient On (Oxygen Delivery Method): room air                        13.7   7.43  )-----------( 316      ( 23 Mar 2023 05:00 )             42.8     03-23    143  |  103  |  4<L>  ----------------------------<  112<H>  3.9   |  30  |  0.63    Ca    9.4      23 Mar 2023 05:00    TPro  7.0  /  Alb  3.3  /  TBili  0.4  /  DBili  x   /  AST  17  /  ALT  25  /  AlkPhos  107  03-23    CAPILLARY BLOOD GLUCOSE    PHYSICAL EXAM:  Constitutional -Restless, frustrated, significantly less agitated than yesterday morning  HEENT - EOMI, left hemicraniectomy, sutures removed 3/7- helmet in place    Neck - s/p trach decannulation  Chest -  CTA b/l NAD, no dyspnea or accessory mm use  Cardio - warm and well perfused, S1/S2  Abdomen -  Soft, NTND, +PEG, Bone flap marsupialized in epigastric region.  Sutures removed, healing well, with no significant erythema and no drainage.   Extremities - No peripheral edema, No calf tenderness   Neurologic Exam:                    Cognitive -             Orientation: Awake, Alert, unable to assess orientation due to aphasia and cognitive deficits            Communication- able to sing "hello" and hum a tune.  Poor command following, Non-fluent, unable to repeat            Attention:  Impaired; makes eye contact but follows commands inconsistently            Memory: Impaired            Thought: Impaired, Impulsive     Speech - non-verbal -s/p trach, impaired comprehension, +aphasia     Cranial Nerves - Limited due to comprehension impairment and aphasia; No facial asymmetry, Tongue midline, EOMI, Pupils dilated but reactive, +dysphagia     Motor -   wearing RUE brace.                    LEFT    UE - at least 3/5 and moving spontaneously; exam limited by command following;  WNL                    RIGHT UE - ShAB 0/5, EF 0/5, EE 0/5, WE 0/5,  0/5-- MAS 1+ finger flexors                     LEFT    LE -  at least 3/5 and moving spontaneously; exam limited by command following                    RIGHT LE - HE 1/5, Hip adductor 1/5, KE 0/5, DF 0/5, PF 0/5   heel with callous     Sensory - decrease sensation to LT-- RUE     Coordination - FTN / HTS impaired on right  Psychiatric - Calm     MEDICATIONS  (STANDING):  AQUAPHOR (petrolatum Ointment) 1 Application(s) Topical two times a day  AQUAPHOR (petrolatum Ointment) 1 Application(s) Topical two times a day  atorvastatin 80 milliGRAM(s) Oral at bedtime  bacitracin   Ointment 1 Application(s) Topical two times a day  bacitracin   Ointment 1 Application(s) Topical daily  donepezil 5 milliGRAM(s) Oral daily  enoxaparin Injectable 90 milliGRAM(s) SubCutaneous <User Schedule>  escitalopram 10 milliGRAM(s) Oral <User Schedule>  gabapentin 600 milliGRAM(s) Oral at bedtime  hydrocortisone 1% Cream 1 Application(s) Topical two times a day  melatonin 6 milliGRAM(s) Oral at bedtime  midodrine. 10 milliGRAM(s) Oral <User Schedule>  nystatin Powder 1 Application(s) Topical two times a day  pantoprazole   Suspension 40 milliGRAM(s) Oral daily  polyethylene glycol 3350 17 Gram(s) Oral daily  senna Syrup 5 milliLiter(s) Oral at bedtime  traZODone 100 milliGRAM(s) Oral at bedtime    MEDICATIONS  (PRN):  acetaminophen    Suspension .. 650 milliGRAM(s) Oral every 6 hours PRN Temp greater or equal to 38C (100.4F), Mild Pain (1 - 3)     SUBJECTIVE/OBJECTIVE: Patient seen and examined in bed. awake, alert --Mildly interactive with encouragement. Pt. frustrated.  No agitation or outbursts reported today. Denies pain. Allows heart/lung exam. Staff reports he ate well this morning.  Participated a little better in OT and speech today, but still significantly limited by frustrated behavior and irritability.  pt. refused PT this AM.     REVIEW OF SYSTEMS  +neurological deficits  +R hemiparesis  +Cognitive deficits     RECENT LABS    Vital Signs Last 24 Hrs  T(C): 36.7 (23 Mar 2023 08:19), Max: 36.7 (23 Mar 2023 08:19)  T(F): 98.1 (23 Mar 2023 08:19), Max: 98.1 (23 Mar 2023 08:19)  HR: 79 (23 Mar 2023 08:19) (79 - 79)  BP: 134/84 (23 Mar 2023 08:19) (134/84 - 134/84)  RR: 17 (23 Mar 2023 08:19) (17 - 17)  SpO2: 96% (23 Mar 2023 08:19) (96% - 96%)    Parameters below as of 23 Mar 2023 08:19  Patient On (Oxygen Delivery Method): room air                        13.7   7.43  )-----------( 316      ( 23 Mar 2023 05:00 )             42.8     03-23    143  |  103  |  4<L>  ----------------------------<  112<H>  3.9   |  30  |  0.63    Ca    9.4      23 Mar 2023 05:00    TPro  7.0  /  Alb  3.3  /  TBili  0.4  /  DBili  x   /  AST  17  /  ALT  25  /  AlkPhos  107  03-23    CAPILLARY BLOOD GLUCOSE    PHYSICAL EXAM:  Constitutional -Restless, frustrated, significantly less agitated than yesterday morning  HEENT - EOMI, left hemicraniectomy, sutures removed 3/7- helmet in place    Neck - s/p trach decannulation  Chest -  CTA b/l NAD, no dyspnea or accessory mm use  Cardio - warm and well perfused, S1/S2  Abdomen -  Soft, NTND, +PEG, Bone flap marsupialized in epigastric region.  Sutures removed, healing well, with no significant erythema and no drainage.   Extremities - No peripheral edema, No calf tenderness   Neurologic Exam:                    Cognitive -             Orientation: Awake, Alert, unable to assess orientation due to aphasia and cognitive deficits            Communication- Limited command following, Non-fluent, unable to repeat.  appears to have more awareness and frustration            Attention:  Impaired; makes eye contact but follows commands inconsistently            Memory: Impaired            Thought: Impaired, Impulsive     Speech - non-verbal -s/p trach, impaired comprehension, +aphasia     Cranial Nerves - Limited due to comprehension impairment and aphasia; No facial asymmetry, Tongue midline, EOMI, Pupils dilated but reactive, +dysphagia     Motor -   wearing RUE brace.                    LEFT    UE - at least 3/5 and moving spontaneously; exam limited by command following;  WNL                    RIGHT UE - ShAB 0/5, EF 0/5, EE 0/5, WE 0/5,  0/5-- MAS 1+ finger flexors                     LEFT    LE -  at least 3/5 and moving spontaneously; exam limited by command following                    RIGHT LE - HE 1/5, Hip adductor 1/5, KE 0/5, DF 0/5, PF 0/5   heel with callous     Sensory - decrease sensation to LT-- RUE     Coordination - FTN / HTS impaired on right  Psychiatric - depressed, frustrated.  Restless at times.    MEDICATIONS  (STANDING):  AQUAPHOR (petrolatum Ointment) 1 Application(s) Topical two times a day  AQUAPHOR (petrolatum Ointment) 1 Application(s) Topical two times a day  atorvastatin 80 milliGRAM(s) Oral at bedtime  bacitracin   Ointment 1 Application(s) Topical two times a day  bacitracin   Ointment 1 Application(s) Topical daily  donepezil 5 milliGRAM(s) Oral daily  enoxaparin Injectable 90 milliGRAM(s) SubCutaneous <User Schedule>  escitalopram 10 milliGRAM(s) Oral <User Schedule>  gabapentin 600 milliGRAM(s) Oral at bedtime  hydrocortisone 1% Cream 1 Application(s) Topical two times a day  melatonin 6 milliGRAM(s) Oral at bedtime  midodrine. 10 milliGRAM(s) Oral <User Schedule>  nystatin Powder 1 Application(s) Topical two times a day  pantoprazole   Suspension 40 milliGRAM(s) Oral daily  polyethylene glycol 3350 17 Gram(s) Oral daily  senna Syrup 5 milliLiter(s) Oral at bedtime  traZODone 100 milliGRAM(s) Oral at bedtime    MEDICATIONS  (PRN):  acetaminophen    Suspension .. 650 milliGRAM(s) Oral every 6 hours PRN Temp greater or equal to 38C (100.4F), Mild Pain (1 - 3)

## 2023-03-23 NOTE — BH CONSULTATION LIAISON ASSESSMENT NOTE - NSBHCHARTREVIEWVS_PSY_A_CORE FT
Vital Signs Last 24 Hrs  T(C): 36.7 (23 Mar 2023 08:19), Max: 36.7 (23 Mar 2023 08:19)  T(F): 98.1 (23 Mar 2023 08:19), Max: 98.1 (23 Mar 2023 08:19)  HR: 79 (23 Mar 2023 08:19) (79 - 79)  BP: 134/84 (23 Mar 2023 08:19) (134/84 - 134/84)  BP(mean): --  RR: 17 (23 Mar 2023 08:19) (17 - 17)  SpO2: 96% (23 Mar 2023 08:19) (96% - 96%)    Parameters below as of 23 Mar 2023 08:19  Patient On (Oxygen Delivery Method): room air

## 2023-03-24 LAB
APPEARANCE UR: CLEAR — SIGNIFICANT CHANGE UP
BILIRUB UR-MCNC: NEGATIVE — SIGNIFICANT CHANGE UP
COLOR SPEC: YELLOW — SIGNIFICANT CHANGE UP
DIFF PNL FLD: NEGATIVE — SIGNIFICANT CHANGE UP
GLUCOSE UR QL: NEGATIVE — SIGNIFICANT CHANGE UP
KETONES UR-MCNC: NEGATIVE — SIGNIFICANT CHANGE UP
LEUKOCYTE ESTERASE UR-ACNC: NEGATIVE — SIGNIFICANT CHANGE UP
NITRITE UR-MCNC: NEGATIVE — SIGNIFICANT CHANGE UP
PH UR: 7 — SIGNIFICANT CHANGE UP (ref 5–8)
PROT UR-MCNC: 15
SP GR SPEC: 1.01 — SIGNIFICANT CHANGE UP (ref 1.01–1.02)
UROBILINOGEN FLD QL: NEGATIVE — SIGNIFICANT CHANGE UP

## 2023-03-24 PROCEDURE — 99232 SBSQ HOSP IP/OBS MODERATE 35: CPT

## 2023-03-24 PROCEDURE — 93010 ELECTROCARDIOGRAM REPORT: CPT

## 2023-03-24 RX ORDER — OLANZAPINE 15 MG/1
2.5 TABLET, FILM COATED ORAL DAILY
Refills: 0 | Status: DISCONTINUED | OUTPATIENT
Start: 2023-03-24 | End: 2023-03-24

## 2023-03-24 RX ORDER — OLANZAPINE 15 MG/1
2.5 TABLET, FILM COATED ORAL DAILY
Refills: 0 | Status: DISCONTINUED | OUTPATIENT
Start: 2023-03-24 | End: 2023-04-06

## 2023-03-24 RX ORDER — VALPROIC ACID (AS SODIUM SALT) 250 MG/5ML
250 SOLUTION, ORAL ORAL
Refills: 0 | Status: DISCONTINUED | OUTPATIENT
Start: 2023-03-24 | End: 2023-03-24

## 2023-03-24 RX ORDER — DIVALPROEX SODIUM 500 MG/1
250 TABLET, DELAYED RELEASE ORAL
Refills: 0 | Status: DISCONTINUED | OUTPATIENT
Start: 2023-03-24 | End: 2023-04-06

## 2023-03-24 RX ORDER — OLANZAPINE 15 MG/1
2.5 TABLET, FILM COATED ORAL ONCE
Refills: 0 | Status: DISCONTINUED | OUTPATIENT
Start: 2023-03-24 | End: 2023-03-24

## 2023-03-24 RX ADMIN — Medication 1 APPLICATION(S): at 17:50

## 2023-03-24 RX ADMIN — Medication 1 APPLICATION(S): at 05:01

## 2023-03-24 RX ADMIN — NYSTATIN CREAM 1 APPLICATION(S): 100000 CREAM TOPICAL at 05:01

## 2023-03-24 RX ADMIN — GABAPENTIN 600 MILLIGRAM(S): 400 CAPSULE ORAL at 21:03

## 2023-03-24 RX ADMIN — POLYETHYLENE GLYCOL 3350 17 GRAM(S): 17 POWDER, FOR SOLUTION ORAL at 11:32

## 2023-03-24 RX ADMIN — Medication 1 APPLICATION(S): at 05:18

## 2023-03-24 RX ADMIN — ATORVASTATIN CALCIUM 80 MILLIGRAM(S): 80 TABLET, FILM COATED ORAL at 21:03

## 2023-03-24 RX ADMIN — Medication 1 APPLICATION(S): at 17:52

## 2023-03-24 RX ADMIN — PANTOPRAZOLE SODIUM 40 MILLIGRAM(S): 20 TABLET, DELAYED RELEASE ORAL at 11:31

## 2023-03-24 RX ADMIN — Medication 1 APPLICATION(S): at 11:43

## 2023-03-24 RX ADMIN — ENOXAPARIN SODIUM 90 MILLIGRAM(S): 100 INJECTION SUBCUTANEOUS at 21:04

## 2023-03-24 RX ADMIN — ENOXAPARIN SODIUM 90 MILLIGRAM(S): 100 INJECTION SUBCUTANEOUS at 08:10

## 2023-03-24 RX ADMIN — DIVALPROEX SODIUM 250 MILLIGRAM(S): 500 TABLET, DELAYED RELEASE ORAL at 21:03

## 2023-03-24 RX ADMIN — DONEPEZIL HYDROCHLORIDE 5 MILLIGRAM(S): 10 TABLET, FILM COATED ORAL at 11:31

## 2023-03-24 RX ADMIN — NYSTATIN CREAM 1 APPLICATION(S): 100000 CREAM TOPICAL at 17:52

## 2023-03-24 RX ADMIN — Medication 1 APPLICATION(S): at 05:03

## 2023-03-24 RX ADMIN — ESCITALOPRAM OXALATE 10 MILLIGRAM(S): 10 TABLET, FILM COATED ORAL at 08:10

## 2023-03-24 RX ADMIN — MIDODRINE HYDROCHLORIDE 10 MILLIGRAM(S): 2.5 TABLET ORAL at 08:09

## 2023-03-24 RX ADMIN — Medication 6 MILLIGRAM(S): at 21:04

## 2023-03-24 RX ADMIN — SENNA PLUS 5 MILLILITER(S): 8.6 TABLET ORAL at 21:04

## 2023-03-24 RX ADMIN — Medication 100 MILLIGRAM(S): at 21:03

## 2023-03-24 NOTE — BH CONSULTATION LIAISON PROGRESS NOTE - CURRENT MEDICATION
MEDICATIONS  (STANDING):  AQUAPHOR (petrolatum Ointment) 1 Application(s) Topical two times a day  AQUAPHOR (petrolatum Ointment) 1 Application(s) Topical two times a day  atorvastatin 80 milliGRAM(s) Oral at bedtime  bacitracin   Ointment 1 Application(s) Topical two times a day  bacitracin   Ointment 1 Application(s) Topical daily  divalproex Sprinkle 250 milliGRAM(s) Oral two times a day  donepezil 5 milliGRAM(s) Oral daily  enoxaparin Injectable 90 milliGRAM(s) SubCutaneous <User Schedule>  escitalopram 10 milliGRAM(s) Oral <User Schedule>  gabapentin 600 milliGRAM(s) Oral at bedtime  hydrocortisone 1% Cream 1 Application(s) Topical two times a day  melatonin 6 milliGRAM(s) Oral at bedtime  midodrine. 10 milliGRAM(s) Oral <User Schedule>  nystatin Powder 1 Application(s) Topical two times a day  pantoprazole   Suspension 40 milliGRAM(s) Oral daily  polyethylene glycol 3350 17 Gram(s) Oral daily  senna Syrup 5 milliLiter(s) Oral at bedtime  traZODone 100 milliGRAM(s) Oral at bedtime  valproic acid 250 milliGRAM(s) Oral two times a day    MEDICATIONS  (PRN):  acetaminophen    Suspension .. 650 milliGRAM(s) Oral every 6 hours PRN Temp greater or equal to 38C (100.4F), Mild Pain (1 - 3)  OLANZapine Injectable 2.5 milliGRAM(s) IntraMuscular daily PRN severe agitation

## 2023-03-24 NOTE — PROGRESS NOTE ADULT - NS ATTEND BILL GEN_ALL_CORE
Chief Complaint  This information was obtained from the patient  Patient is here for a follow of right buttocks wound.  Pt's wife states they did not  the gent on Wednesday due to the cost because they had picked up medication on Saturday (antonioma (Central/Peripheral)  Gastrointestinal (GI)  Genitourinary ()  Endocrine  Hematologic/Lymphatic  Allergic/Immunologic  Psychiatric    General Notes:  neg except HPI    Additional Information  Medication reconciliation completed at today's visit. : Yes mild soap and water.   Antibiotic Ointment/Cream. - wound base  Antifungal cream/powder - periwound  Other: - maxipad  Change Dressing BID    Off-Loading:  EHOB cushion  Turn Q 2 hrs - change positions every 2 hours  Other: - pressure relieving mattress or Attending to bill

## 2023-03-24 NOTE — CHART NOTE - NSCHARTNOTEFT_GEN_A_CORE
Met briefly with Pt, who asked this writer to come closer to him after finishing meeting with one of his roommates. Pt reported feeling well. He appeared frustrated due to his speech which is difficulty to understand. He apparently wanted to know when he would be leaving this facility. He was told that this writer was not aware of plans for d/c yet. Agreed to find out and touch base on Monday. Pt remained calm during the meeting. Will follow up.

## 2023-03-24 NOTE — PROGRESS NOTE ADULT - NS ATTEND AMEND GEN_ALL_CORE FT
Pt. seen this afternoon-- Psychiatry present.  Agree with documentation above as per NP with amendments made as appropriate. Patient medically stable. Making progress towards rehab goals.     Left [facility]  Participated better in therapy today and making progress as per therapists.  Pt. brought back from PT early due to tachycardia.  EKG seen by hospitalist.  Pt. cooperative with staff.  Noted to get agitated when speaking to sister on phone-- was able to be redirected and calm down.  did not need Zyprexa.    Seen with Psych-- Dr. Dony jasmine-- agitation significantly improve with increase in gabapentin but pt. still with Irritability.  added depakote 250mg bid

## 2023-03-24 NOTE — PROGRESS NOTE ADULT - ASSESSMENT
ASSESSMENT/PLAN  This is 42 year old male with PMH of prior substance use, HTN, anxiety who presented to 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 He is  s/p Left decompressive hemicraniectomy and right EVD placement 1/28/23. Hospital course further c/b respiratory failure s/p intubation and extubation,  agitation requiring precedex, sepsis s/p ABX, hypotension requiring bolus, dysphagia s/p PEG 2/14 and trach on 2/13, insomnia s/p seroquel, pain s/p oxycodone and fentanyl. Patient now with Right Hemiparesis, dysphagia s/p PEG, Aphasia, Cognitive deficits, gait Instability, ADL impairments and Functional impairments.    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    - ST: to diagnose and treat deficits in swallowing, cognition and communication.  - Helmet when OOB  - follow up CT head 3/19 reviewed-  Postoperative changes compatible with a left temporal frontal parietal craniectomy is again seen. left frontoparietal hematoma with some improvement.  improved vasogenic edema is seen. Mass effect on the left lateral ventricle and Left-to-right shift (3.1 mm)has now resolved.   - OT– right resting hand splint  - Called Dr Cueva's office--patient has a follow up office visit on 3/28 at 2pm-- they are unsure when his cranioplasty will be, have to submit for authorization next tues.   --will obtain approval And social work to coordinate transportation for outpatient neurosurgery appointment    Sleep/Restlessness  --c/w Trazodone  100mg qhs 3/8-- for with sleep and restlessness.   Pt's mother reports he was on 50-100mg qhs and 50mg daytime home meds.   --cont. melatonin  --monitor  --Attending discussed with nursing that patient needs supervision for safety monitoring as pt.  is impulsive, restless and high fall risk     # Agitation - improved from yesterday but still frustrated and restless  - R/o infection - CXR unremarkable. Unable to collect UA due to irritability. Labs WNL.  - Zyprexa 5 mg IM given 1x 3/22  - Continue to monitor  - c/w gabapentin to 600 mg qhs ( was previously decreased to 400hs last night on 3/20)  --Psychiatry consult, Dr. Acharya examined patient, note pending.       Depression/anxiety  --Pt was on Paxil 30mg at home as per mother-- Paxil 10mg started 3/10 but d/c in favor of lexapro  -- cont. Lexapro 10mg at equivalent dose to paxil --has been more effective for pt's anxiety/depression  --Continue to monitor    Aphasia  -- c/w Donepezil 5 mg. Held 3/22 for agitation. resumed today       Bilateral lower extremity DVTs–  – Lower extremity Doppler from 3/1–right femoral vein DVT and bilateral lower extremity calf DVTs  – spoke with patient's neurosurgery PA–who was in contact with Dr. Cueva patient's neurosurgeon–gave clearance for patient to start therapeutic Lovenox.  --Vascular Surgery consult -- reaached out to Dr. Hodges 3/7 - nothing further to do if patient is on therapeutic lovenox.   -- C/w therapeutic lovenox for 3 months.    Orthostatic hypotension–  -Stopped doxazosin 3/2-- monitor PVRs, low PVR's per nursing  -no report of OH since midodrine 10mg   -Cont to monitor    #Acute Respiratory Failure  - Intubation and extubation  - s/p Trach 2/13  - Decannulated 3/5, Pulm monitoring, tolerating well    #GI PPX  - Nexium 40mg daily    #DM II  - A1c 6.0  -management as per hospitalist--off insulin     #Pain management  - Tylenol PRN  - Neurontin cw 600 hs -- previously decreased to 400 hs due to lethargy, but resumed as pt. became agitated on lower dose  - Continue to monitor     #Bowel Regimen  - Senna, miralax PRN    #Bladder management  -voiding well with low PVRs,   -- off doxazosin 3/2    #Dysphagia    - s/p PEG 2/14  - Puree with thin liquids-- 1:1 assist with meals  - SLP: evaluation and treatment  - Diet: tolerating bolus TF     #Skin:  - Skin on admission: IAD to bilateral groin and sacrum; RUQ incision with sutures with palpable skull flap; Left hemicraniectomy with sutures MILLER; psoriasis BL shin  - Pressure injury/Skin: Turn Q2hrs while in bed, OOB to Chair, PT/OT     #Precaution  - Fall, Aspiration, seizure    #home meds  - per list provided by mother, [place]: Rousuvastatin 40mg daily and Paroxetine 30mg daily  -  cw paroxetine  - cw high intensity atorvastatin to home med dose    – IDT 3/23:  Social work:  patient on discharge can live with his mother who has a first-floor set up.  Nursing–incontinent of bowel and bladder, total assist with toileting  Speech: Puree/thin diet,  severe language deficits–poor command following, poor orientation, Some spontaneous naming, & gestures, Speech jargon, Aphasia, severe cognitive deficits  OT's: Supervision eating; Mod-Max A UBD, Max A LBD, Total assist with toileting and transfers, restless  PT: standing with Mod A x 2 for 5min--inconsistent; Improved trunk control; Tot A transfers, limited by behavior   goals-- mod assist with transfers, ambulation 10 feet max assist with hemiwalker, wheelchair mobility 50 feet min assist.  ELOS: 4/6 ZOHAIB  Goals:  1.  Improved secretion management and tolerate PMV  2.  transfers with 1 person assist  3.  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 He is  s/p Left decompressive hemicraniectomy and right EVD placement 1/28/23. Hospital course further c/b respiratory failure s/p intubation and extubation,  agitation requiring precedex, sepsis s/p ABX, hypotension requiring bolus, dysphagia s/p PEG 2/14 and trach on 2/13, insomnia s/p seroquel, pain s/p oxycodone and fentanyl. Patient now with Right Hemiparesis, dysphagia s/p PEG, Aphasia, Cognitive deficits, gait Instability, ADL impairments and Functional impairments.    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    - ST: to diagnose and treat deficits in swallowing, cognition and communication.  - Helmet when OOB  - follow up CT head 3/19 reviewed-  Postoperative changes compatible with a left temporal frontal parietal craniectomy is again seen. left frontoparietal hematoma with some improvement.  improved vasogenic edema is seen. Mass effect on the left lateral ventricle and Left-to-right shift (3.1 mm)has now resolved.   - OT– right resting hand splint  - Called Dr Cueva's office--patient has a follow up office visit on 3/28 at 2pm-- they are unsure when his cranioplasty will be, have to submit for authorization next tues.   --will obtain approval And social work to coordinate transportation for outpatient neurosurgery appointment    Sleep/Restlessness  --c/w Trazodone  100mg qhs 3/8-- for with sleep and restlessness.   Pt's mother reports he was on 50-100mg qhs and 50mg daytime home meds.   --cont. melatonin  --monitor  --Attending discussed with nursing that patient needs supervision for safety monitoring as pt.  is impulsive, restless and high fall risk     # Agitation - improved from yesterday but still frustrated and restless  - R/o infection - CXR unremarkable. Unable to collect UA due to irritability. Labs WNL.  - Zyprexa 5 mg IM given 1x 3/22  - Continue to monitor  - c/w gabapentin to 600 mg qhs ( was previously decreased to 400hs last night on 3/20)  --Psychiatry consult, Dr. Acharya examined patient, note pending.       Depression/anxiety  --Pt was on Paxil 30mg at home as per mother-- Paxil 10mg started 3/10 but d/c in favor of lexapro  -- cont. Lexapro 10mg at equivalent dose to paxil --has been more effective for pt's anxiety/depression  --Continue to monitor    Aphasia  -- c/w Donepezil 5 mg. Held 3/22 for agitation. resumed today       Bilateral lower extremity DVTs–  – Lower extremity Doppler from 3/1–right femoral vein DVT and bilateral lower extremity calf DVTs  – spoke with patient's neurosurgery PA–who was in contact with Dr. Cueva patient's neurosurgeon–gave clearance for patient to start therapeutic Lovenox.  --Vascular Surgery consult -- reaached out to Dr. Hodges 3/7 - nothing further to do if patient is on therapeutic lovenox.   -- C/w therapeutic lovenox for 3 months.    Orthostatic hypotension–  -Stopped doxazosin 3/2-- monitor PVRs, low PVR's per nursing  -no report of OH since midodrine 10mg   -Cont to monitor    #Acute Respiratory Failure  - Intubation and extubation  - s/p Trach 2/13  - Decannulated 3/5, Pulm monitoring, tolerating well    #GI PPX  - Nexium 40mg daily    #DM II  - A1c 6.0  -management as per hospitalist--off insulin     #Pain management  - Tylenol PRN  - Neurontin cw 600 hs -- previously decreased to 400 hs due to lethargy, but resumed as pt. became agitated on lower dose  - Continue to monitor     #Bowel Regimen  - Senna, miralax PRN    #Bladder management  -voiding well with low PVRs,   -- off doxazosin 3/2    #Dysphagia    - s/p PEG 2/14  - Puree with thin liquids-- 1:1 assist with meals  - SLP: evaluation and treatment  - Diet: tolerating bolus TF     #Skin:  - Skin on admission: IAD to bilateral groin and sacrum; RUQ incision with sutures with palpable skull flap; Left hemicraniectomy with sutures MILLER; psoriasis BL shin  - Pressure injury/Skin: Turn Q2hrs while in bed, OOB to Chair, PT/OT     #Precaution  - Fall, Aspiration, seizure    #home meds  - per list provided by mother, [place]: Rousuvastatin 40mg daily and Paroxetine 30mg daily  -  cw paroxetine  - cw high intensity atorvastatin to home med dose    – IDT 3/23:  Social work:  patient on discharge can live with his mother who has a first-floor set up.  Nursing–incontinent of bowel and bladder, total assist with toileting  Speech: Puree/thin diet,  severe language deficits–poor command following, poor orientation, Some spontaneous naming, & gestures, Speech jargon, Aphasia, severe cognitive deficits  OT's: Supervision eating; Mod-Max A UBD, Max A LBD, Total assist with toileting and transfers, restless  PT: standing with Mod A x 2 for 5min--inconsistent; Improved trunk control; Tot A transfers, limited by behavior   goals-- mod assist with transfers, ambulation 10 feet max assist with hemiwalker, wheelchair mobility 50 feet min assist.  ELOS: 4/6 ZOHAIB  Goals:  1.  Improved secretion management and tolerate PMV  2.  transfers with 1 person assist  3.  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 He is  s/p Left decompressive hemicraniectomy and right EVD placement 1/28/23. Hospital course further c/b respiratory failure s/p intubation and extubation,  agitation requiring precedex, sepsis s/p ABX, hypotension requiring bolus, dysphagia s/p PEG 2/14 and trach on 2/13, insomnia s/p seroquel, pain s/p oxycodone and fentanyl. Patient now with Right Hemiparesis, dysphagia s/p PEG, Aphasia, Cognitive deficits, gait Instability, ADL impairments and Functional impairments.    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    - ST: to diagnose and treat deficits in swallowing, cognition and communication.  - Helmet when OOB  - follow up CT head 3/19 reviewed-  Postoperative changes compatible with a left temporal frontal parietal craniectomy is again seen. left frontoparietal hematoma with some improvement.  improved vasogenic edema is seen. Mass effect on the left lateral ventricle and Left-to-right shift (3.1 mm)has now resolved.   - OT– right resting hand splint  - Called Dr Cueva's office--patient has a follow up office visit on 3/28 at 2pm-- they are unsure when his cranioplasty will be, have to submit for authorization next tues.   --will obtain approval And social work to coordinate transportation for outpatient neurosurgery appointment    Sleep/Restlessness  --c/w Trazodone  100mg qhs 3/8-- for with sleep and restlessness.   Pt's mother reports he was on 50-100mg qhs and 50mg daytime home meds.   --cont. melatonin  --monitor  --Attending discussed with nursing that patient needs supervision for safety monitoring as pt.  is impulsive, restless and high fall risk   -- Tachycardia with therapy to 150.  At rest 130s.  EKG show sinus tach.     # Agitation - improved from yesterday but still frustrated and restless  - R/o infection - CXR unremarkable. Unable to collect UA due to irritability. Labs WNL.  - Zyprexa 5 mg IM given 1x 3/22  - Continue to monitor  - c/w gabapentin to 600 mg qhs ( was previously decreased to 400hs last night on 3/20)  --Psychiatry consult, Dr. Acharya examined patient, note pending.       Depression/anxiety  --Pt was on Paxil 30mg at home as per mother-- Paxil 10mg started 3/10 but d/c in favor of lexapro  -- cont. Lexapro 10mg at equivalent dose to paxil --has been more effective for pt's anxiety/depression  --Continue to monitor    Aphasia  -- c/w Donepezil 5 mg. Held 3/22 for agitation. resumed today       Bilateral lower extremity DVTs–  – Lower extremity Doppler from 3/1–right femoral vein DVT and bilateral lower extremity calf DVTs  – spoke with patient's neurosurgery PA–who was in contact with Dr. Cueva patient's neurosurgeon–gave clearance for patient to start therapeutic Lovenox.  --Vascular Surgery consult -- reaached out to Dr. Hodges 3/7 - nothing further to do if patient is on therapeutic lovenox.   -- C/w therapeutic lovenox for 3 months.    Orthostatic hypotension–  -Stopped doxazosin 3/2-- monitor PVRs, low PVR's per nursing  -no report of OH since midodrine 10mg   -Cont to monitor    #Acute Respiratory Failure  - Intubation and extubation  - s/p Trach 2/13  - Decannulated 3/5, Pulm monitoring, tolerating well    #GI PPX  - Nexium 40mg daily    #DM II  - A1c 6.0  -management as per hospitalist--off insulin     #Pain management  - Tylenol PRN  - Neurontin cw 600 hs -- previously decreased to 400 hs due to lethargy, but resumed as pt. became agitated on lower dose  - Continue to monitor     #Bowel Regimen  - Senna, miralax PRN    #Bladder management  -voiding well with low PVRs,   -- off doxazosin 3/2    #Dysphagia    - s/p PEG 2/14  - Puree with thin liquids-- 1:1 assist with meals  - SLP: evaluation and treatment  - Diet: tolerating bolus TF     #Skin:  - Skin on admission: IAD to bilateral groin and sacrum; RUQ incision with sutures with palpable skull flap; Left hemicraniectomy with sutures MILLER; psoriasis BL shin  - Pressure injury/Skin: Turn Q2hrs while in bed, OOB to Chair, PT/OT     #Precaution  - Fall, Aspiration, seizure    #home meds  - per list provided by mother, [place]: Rousuvastatin 40mg daily and Paroxetine 30mg daily  -  cw paroxetine  - cw high intensity atorvastatin to home med dose    – IDT 3/23:  Social work:  patient on discharge can live with his mother who has a first-floor set up.  Nursing–incontinent of bowel and bladder, total assist with toileting  Speech: Puree/thin diet,  severe language deficits–poor command following, poor orientation, Some spontaneous naming, & gestures, Speech jargon, Aphasia, severe cognitive deficits  OT's: Supervision eating; Mod-Max A UBD, Max A LBD, Total assist with toileting and transfers, restless  PT: standing with Mod A x 2 for 5min--inconsistent; Improved trunk control; Tot A transfers, limited by behavior   goals-- mod assist with transfers, ambulation 10 feet max assist with hemiwalker, wheelchair mobility 50 feet min assist.  ELOS: 4/6 ZOHAIB  Goals:  1.  Improved secretion management and tolerate PMV  2.  transfers with 1 person assist  3.  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 He is  s/p Left decompressive hemicraniectomy and right EVD placement 1/28/23. Hospital course further c/b respiratory failure s/p intubation and extubation,  agitation requiring precedex, sepsis s/p ABX, hypotension requiring bolus, dysphagia s/p PEG 2/14 and trach on 2/13, insomnia s/p seroquel, pain s/p oxycodone and fentanyl. Patient now with Right Hemiparesis, dysphagia s/p PEG, Aphasia, Cognitive deficits, gait Instability, ADL impairments and Functional impairments.    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    - ST: to diagnose and treat deficits in swallowing, cognition and communication.  - Helmet when OOB  - follow up CT head 3/19 reviewed-  Postoperative changes compatible with a left temporal frontal parietal craniectomy is again seen. left frontoparietal hematoma with some improvement.  improved vasogenic edema is seen. Mass effect on the left lateral ventricle and Left-to-right shift (3.1 mm)has now resolved.   - OT– right resting hand splint  - Called Dr Cueva's office--patient has a follow up office visit on 3/28 at 2pm-- they are unsure when his cranioplasty will be, have to submit for authorization next tues.   --obtained administrative approval And social work to coordinate transportation for outpatient neurosurgery appointment    Sleep/Restlessness  --c/w Trazodone  100mg qhs 3/8-- for with sleep and restlessness.   Pt's mother reports he was on 50-100mg qhs and 50mg daytime home meds.   --cont. melatonin  --monitor  --Attending discussed with nursing that patient needs supervision for safety monitoring as pt.  is impulsive, restless and high fall risk   -- Tachycardia with therapy to 150.  At rest 130s.  EKG show sinus tach.     # Agitation - improved from yesterday but still frustrated and restless  - R/o infection - CXR unremarkable. Unable to collect UA due to irritability. Labs WNL.  - Zyprexa 5 mg IM given 1x 3/22  - Continue to monitor  - c/w gabapentin to 600 mg qhs ( was previously decreased to 400hs last night on 3/20)  --Psychiatry consult, Dr. Acharya following  --added depakote 250mg bid       Depression/anxiety  --Pt was on Paxil 30mg at home as per mother-- Paxil 10mg started 3/10 but d/c in favor of lexapro  -- cont. Lexapro 10mg at equivalent dose to paxil --has been more effective for pt's anxiety/depression  --Continue to monitor    Aphasia  -- c/w Donepezil 5 mg. Held 3/22 for agitation. resumed today       Bilateral lower extremity DVTs–  – Lower extremity Doppler from 3/1–right femoral vein DVT and bilateral lower extremity calf DVTs  – spoke with patient's neurosurgery PA–who was in contact with Dr. Cueva patient's neurosurgeon–gave clearance for patient to start therapeutic Lovenox.  --Vascular Surgery consult -- reaached out to Dr. Hodges 3/7 - nothing further to do if patient is on therapeutic lovenox.   -- C/w therapeutic lovenox for 3 months.    Orthostatic hypotension–  -Stopped doxazosin 3/2-- monitor PVRs, low PVR's per nursing  -no report of OH since midodrine 10mg   -Cont to monitor    #Acute Respiratory Failure  - Intubation and extubation  - s/p Trach 2/13  - Decannulated 3/5, Pulm monitoring, tolerating well    #GI PPX  - Nexium 40mg daily    #DM II  - A1c 6.0  -management as per hospitalist--off insulin     #Pain management  - Tylenol PRN  - Neurontin cw 600 hs -- previously decreased to 400 hs due to lethargy, but resumed as pt. became agitated on lower dose  - Continue to monitor     #Bowel Regimen  - Senna, miralax PRN    #Bladder management  -voiding well with low PVRs,   -- off doxazosin 3/2    #Dysphagia    - s/p PEG 2/14  - Puree with thin liquids-- 1:1 assist with meals  - SLP: evaluation and treatment  - Diet: tolerating bolus TF     #Skin:  - Skin on admission: IAD to bilateral groin and sacrum; RUQ incision with sutures with palpable skull flap; Left hemicraniectomy with sutures MILLER; psoriasis BL shin  - Pressure injury/Skin: Turn Q2hrs while in bed, OOB to Chair, PT/OT     #Precaution  - Fall, Aspiration, seizure    #home meds  - per list provided by mother, [place]: Rousuvastatin 40mg daily and Paroxetine 30mg daily  -  cw paroxetine  - cw high intensity atorvastatin to home med dose    – IDT 3/23:  Social work:  patient on discharge can live with his mother who has a first-floor set up.  Nursing–incontinent of bowel and bladder, total assist with toileting  Speech: Puree/thin diet,  severe language deficits–poor command following, poor orientation, Some spontaneous naming, & gestures, Speech jargon, Aphasia, severe cognitive deficits  OT's: Supervision eating; Mod-Max A UBD, Max A LBD, Total assist with toileting and transfers, restless  PT: standing with Mod A x 2 for 5min--inconsistent; Improved trunk control; Tot A transfers, limited by behavior   goals-- mod assist with transfers, ambulation 10 feet max assist with hemiwalker, wheelchair mobility 50 feet min assist.  ELOS: 4/6 ZOHAIB  Goals:  1.  Improved secretion management and tolerate PMV  2.  transfers with 1 person assist  3.  Communicate basic wants and needs with yes no answers

## 2023-03-25 PROCEDURE — 99232 SBSQ HOSP IP/OBS MODERATE 35: CPT

## 2023-03-25 RX ADMIN — Medication 1 APPLICATION(S): at 06:00

## 2023-03-25 RX ADMIN — ESCITALOPRAM OXALATE 10 MILLIGRAM(S): 10 TABLET, FILM COATED ORAL at 08:00

## 2023-03-25 RX ADMIN — Medication 1 APPLICATION(S): at 12:58

## 2023-03-25 RX ADMIN — Medication 1 APPLICATION(S): at 17:29

## 2023-03-25 RX ADMIN — DIVALPROEX SODIUM 250 MILLIGRAM(S): 500 TABLET, DELAYED RELEASE ORAL at 06:00

## 2023-03-25 RX ADMIN — DIVALPROEX SODIUM 250 MILLIGRAM(S): 500 TABLET, DELAYED RELEASE ORAL at 18:17

## 2023-03-25 RX ADMIN — ENOXAPARIN SODIUM 90 MILLIGRAM(S): 100 INJECTION SUBCUTANEOUS at 21:42

## 2023-03-25 RX ADMIN — GABAPENTIN 600 MILLIGRAM(S): 400 CAPSULE ORAL at 21:42

## 2023-03-25 RX ADMIN — Medication 6 MILLIGRAM(S): at 21:42

## 2023-03-25 RX ADMIN — ENOXAPARIN SODIUM 90 MILLIGRAM(S): 100 INJECTION SUBCUTANEOUS at 09:00

## 2023-03-25 RX ADMIN — NYSTATIN CREAM 1 APPLICATION(S): 100000 CREAM TOPICAL at 06:00

## 2023-03-25 RX ADMIN — POLYETHYLENE GLYCOL 3350 17 GRAM(S): 17 POWDER, FOR SOLUTION ORAL at 12:59

## 2023-03-25 RX ADMIN — Medication 1 APPLICATION(S): at 17:28

## 2023-03-25 RX ADMIN — PANTOPRAZOLE SODIUM 40 MILLIGRAM(S): 20 TABLET, DELAYED RELEASE ORAL at 12:59

## 2023-03-25 RX ADMIN — NYSTATIN CREAM 1 APPLICATION(S): 100000 CREAM TOPICAL at 17:30

## 2023-03-25 RX ADMIN — DONEPEZIL HYDROCHLORIDE 5 MILLIGRAM(S): 10 TABLET, FILM COATED ORAL at 12:58

## 2023-03-25 RX ADMIN — ATORVASTATIN CALCIUM 80 MILLIGRAM(S): 80 TABLET, FILM COATED ORAL at 21:42

## 2023-03-25 RX ADMIN — Medication 100 MILLIGRAM(S): at 21:42

## 2023-03-25 RX ADMIN — MIDODRINE HYDROCHLORIDE 10 MILLIGRAM(S): 2.5 TABLET ORAL at 08:00

## 2023-03-25 NOTE — PROGRESS NOTE ADULT - ASSESSMENT
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 He is s/p Left decompressive hemicraniectomy and right EVD placement 1/28/23. Hospital course further s/f respiratory failure s/p intubation and extubation, agitation requiring precedex, sepsis s/p ABX, hypotension requiring bolus, dysphagia s/p PEG 2/14 and trach on 2/13, insomnia s/p seroquel, pain s/p oxycodone and fentanyl. Patient now with admitted to [facility] acute inpatient rehab.    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 with mass effect and 8mm midline shift  -s/p intubation for airway protection and extubation  -s/p Decompressive hemicraniectomy and EVD placement 1/28  -Underwent wound revision with NSGY and plastics on 2/17  -Continue comprehensive rehab program - PT/OT/SLP per rehab team  -Pain management, bowel regimen per rehab   -Donepezil     #Depression/anxiety  -Paxil per rehab  -Neuropsych as indicated    #Bilateral lower extremity DVTs  -Lower extremity Doppler from 3/1–right femoral vein DVT and bilateral lower extremity calf DVTs  -Neurosurgeon gave clearance for patient to start therapeutic Lovenox  -Vascular Surgery input noted - Dr. Hodges 3/7 recommended nothing further to do if patient is on therapeutic Lovenox   -Continue therapeutic Lovenox for 3 months    #Hypotension  -Improved on Midodrine, monitor closely     #T2DM, HbA1c 6.0  -diet controlled  -monitor    #Dysphagia s/p PEG 2/14  -Puree with thin liquids    #HLD  -Continue lipitor    #GERD  -PPI    #DVT ppx - lovenox

## 2023-03-25 NOTE — PROGRESS NOTE ADULT - SUBJECTIVE AND OBJECTIVE BOX
Patient is a 43y old  Male who presents with a chief complaint of left IPH/ICH (25 Mar 2023 11:44)    Patient seen and examined at bedside. No acute overnight events. Cooperative     ALLERGIES:  No Known Allergies    MEDICATIONS  (STANDING):  AQUAPHOR (petrolatum Ointment) 1 Application(s) Topical two times a day  AQUAPHOR (petrolatum Ointment) 1 Application(s) Topical two times a day  atorvastatin 80 milliGRAM(s) Oral at bedtime  bacitracin   Ointment 1 Application(s) Topical daily  bacitracin   Ointment 1 Application(s) Topical two times a day  divalproex Sprinkle 250 milliGRAM(s) Oral two times a day  donepezil 5 milliGRAM(s) Oral daily  enoxaparin Injectable 90 milliGRAM(s) SubCutaneous <User Schedule>  escitalopram 10 milliGRAM(s) Oral <User Schedule>  gabapentin 600 milliGRAM(s) Oral at bedtime  hydrocortisone 1% Cream 1 Application(s) Topical two times a day  melatonin 6 milliGRAM(s) Oral at bedtime  midodrine. 10 milliGRAM(s) Oral <User Schedule>  nystatin Powder 1 Application(s) Topical two times a day  pantoprazole   Suspension 40 milliGRAM(s) Oral daily  polyethylene glycol 3350 17 Gram(s) Oral daily  senna Syrup 5 milliLiter(s) Oral at bedtime  traZODone 100 milliGRAM(s) Oral at bedtime    MEDICATIONS  (PRN):  acetaminophen    Suspension .. 650 milliGRAM(s) Oral every 6 hours PRN Temp greater or equal to 38C (100.4F), Mild Pain (1 - 3)  OLANZapine Injectable 2.5 milliGRAM(s) IntraMuscular daily PRN severe agitation    Vital Signs Last 24 Hrs  T(F): 97.9 (24 Mar 2023 19:35), Max: 97.9 (24 Mar 2023 19:35)  HR: 96 (24 Mar 2023 19:35) (96 - 96)  BP: 109/75 (24 Mar 2023 19:35) (109/75 - 109/75)  RR: 16 (24 Mar 2023 19:35) (16 - 16)  SpO2: 95% (24 Mar 2023 19:35) (95% - 95%)  I&O's Summary    BMI (kg/m2): 24.2 (23 @ 12:24)    PHYSICAL EXAM:  General: NAD, awake, alert   ENT: MMM, no tonsilar exudate  Neck: dressing over anterior neck; no jvd  Lungs: Clear to auscultation bilaterally, no wheezes. Good air entry bilaterally   Cardio: RRR, S1/S2, No murmurs  Abdomen: Soft, Nontender, Nondistended; Bowel sounds present +PEG  Extremities: No calf tenderness, No pitting edema    LABS:                        13.7   7.43  )-----------( 316      ( 23 Mar 2023 05:00 )             42.8           143  |  103  |  4   ----------------------------<  112  3.9   |  30  |  0.63    Ca    9.4      23 Mar 2023 05:00    TPro  7.0  /  Alb  3.3  /  TBili  0.4  /  DBili  x   /  AST  17  /  ALT  25  /  AlkPhos  107       Urinalysis Basic - ( 24 Mar 2023 12:50 )    Color: Yellow / Appearance: Clear / S.010 / pH: x  Gluc: x / Ketone: Negative  / Bili: Negative / Urobili: Negative   Blood: x / Protein: 15 / Nitrite: Negative   Leuk Esterase: Negative / RBC: Negative /HPF / WBC Negative /HPF   Sq Epi: x / Non Sq Epi: Neg.-Few / Bacteria: Negative /HPF    COVID-19 PCR: NotDetec (23 @ 16:00)      RADIOLOGY & ADDITIONAL TESTS:     Care Discussed with Consultants/Other Providers:

## 2023-03-25 NOTE — PROGRESS NOTE ADULT - ASSESSMENT
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 He is  s/p Left decompressive hemicraniectomy and right EVD placement 1/28/23. Hospital course further c/b respiratory failure s/p intubation and extubation,  agitation requiring precedex, sepsis s/p ABX, hypotension requiring bolus, dysphagia s/p PEG 2/14 and trach on 2/13, insomnia s/p seroquel, pain s/p oxycodone and fentanyl. Patient now with Right Hemiparesis, dysphagia s/p PEG, Aphasia, Cognitive deficits, gait Instability, ADL impairments and Functional impairments.    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    - ST: to diagnose and treat deficits in swallowing, cognition and communication.  - Helmet when OOB  - follow up CT head 3/19 reviewed-  Postoperative changes compatible with a left temporal frontal parietal craniectomy is again seen. left frontoparietal hematoma with some improvement.  improved vasogenic edema is seen. Mass effect on the left lateral ventricle and Left-to-right shift (3.1 mm)has now resolved.   - OT– right resting hand splint  - Called Dr Cueva's office--patient has a follow up office visit on 3/28 at 2pm-- they are unsure when his cranioplasty will be, have to submit for authorization next tues.   --obtained administrative approval And social work set up transportation for outpatient neurosurgery appointment-- 12:15p on 3/28    Sleep/Restlessness  --c/w Trazodone  100mg qhs 3/8-- for with sleep and restlessness.   Pt's mother reports he was on 50-100mg qhs and 50mg daytime home meds.   --cont. melatonin  --monitor  --Attending discussed with nursing that patient needs supervision for safety monitoring as pt.  is impulsive, restless and high fall risk       # Agitation - improving  - R/o infection - CXR unremarkable.  UA Neg.  Labs WNL.  -Psychiatry consult, Dr. Acharya following  --added depakote 250mg bid  3/24  -- c/w gabapentin to 600 mg qhs  - Zyprexa 2.5 mg IM daily PRN severe agitation.   - Continue to monitor      Depression/anxiety  --Pt was on Paxil 30mg at home as per mother-- Paxil 10mg started 3/10 but d/c in favor of lexapro  -- cont. Lexapro 10mg  --has been more effective for pt's anxiety/depression  --Continue to monitor    Aphasia  -- c/w Donepezil 5 mg.        Bilateral lower extremity DVTs–  – Lower extremity Doppler from 3/1–right femoral vein DVT and bilateral lower extremity calf DVTs  – spoke with patient's neurosurgery PA–who was in contact with Dr. Cueva patient's neurosurgeon–gave clearance for patient to start therapeutic Lovenox.  --Vascular Surgery consult -- reaached out to Dr. Hodges 3/7 - nothing further to do if patient is on therapeutic lovenox.   -- C/w therapeutic lovenox for 3 months.    Orthostatic hypotension–  -Stopped doxazosin 3/2-- monitor PVRs, low PVR's per nursing  -no report of OH since midodrine 10mg   -Cont to monitor    #Acute Respiratory Failure  - Intubation and extubation  - s/p Trach 2/13  - Decannulated 3/5, Pulm monitoring, tolerating well    #GI PPX  - Nexium 40mg daily    #DM II  - A1c 6.0  -management as per hospitalist--off insulin     #Pain management  - Tylenol PRN  - Neurontin cw 600 hs -- previously decreased to 400 hs due to lethargy, but resumed as pt. became agitated on lower dose  - Continue to monitor     #Bowel Regimen  - Senna, miralax PRN    #Bladder management  -voiding well with low PVRs,   -- off doxazosin 3/2    #Dysphagia    - s/p PEG 2/14  - Puree with thin liquids-- 1:1 assist with meals  - SLP: evaluation and treatment  - Diet: tolerating bolus TF     #Skin:  - Skin on admission: IAD to bilateral groin and sacrum; RUQ incision with sutures with palpable skull flap; Left hemicraniectomy with sutures MILLER; psoriasis BL shin  - Pressure injury/Skin: Turn Q2hrs while in bed, OOB to Chair, PT/OT     #Precaution  - Fall, Aspiration, seizure    #home meds  - per list provided by  [place]: Rousuvastatin 40mg daily and Paroxetine 30mg daily  -  cw paroxetine  - cw high intensity atorvastatin to home med dose    – IDT 3/23:  Social work:  patient on discharge can live with his mother who has a first-floor set up.  Nursing–incontinent of bowel and bladder, total assist with toileting  Speech: Puree/thin diet,  severe language deficits–poor command following, poor orientation, Some spontaneous naming, & gestures, Speech jargon, Aphasia, severe cognitive deficits  OT's: Supervision eating; Mod-Max A UBD, Max A LBD, Total assist with toileting and transfers, restless  PT: standing with Mod A x 2 for 5min--inconsistent; Improved trunk control; Tot A transfers, limited by behavior   goals-- mod assist with transfers, ambulation 10 feet max assist with hemiwalker, wheelchair mobility 50 feet min assist.  ELOS: 4/6 ZOHAIB  Goals:  1.  Improved secretion management and tolerate PMV  2.  transfers with 1 person assist  3.  Communicate basic wants and needs with yes no answers

## 2023-03-25 NOTE — PROGRESS NOTE ADULT - SUBJECTIVE AND OBJECTIVE BOX
HPI:  This is 42 year old male with PMH of prior substance use, HTN, anxiety who presented to [facility] on . On  morning, he told his family that he was not feeling well and took 2 tabs of Vyvanse (usually takes as needed). Shortly after, he was at a group therapy meeting over the phone when he complained to his father he was having a severe headache. He then became more lethargic, and developed nausea and vomited. He was brought to ED by his father. On arrival, noted to be lethargic, vomiting, with garbled speech. 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 Post CT, more lethargic with dilated right pupil and bilateral reactive but sluggish pupils. He was given mannitol and intubated for airway protection and was taken to neuro ICU.    On , HCT with enlarging R lateral ventricle and slightly worsening MLS concerning for trapped ventricle.  acute drop in SpO2 on , lavaged and suctioned out thick clot. Placed back on ACVC and FiO2 weaned from 70% to 40% overnight. On 23, he had episode of emesis, & fever of 100.3. He was  given tylenol with improvement in temperature but still little to no movement on the L side (previously would localize arm to suction and spontaneously bend L leg). Pt. Had left decompressive hemicraniectomy and right EVD  placed.    After the procedure, patient opened eyes for the first time and regained LUE/LLE movement. As he became febrile again this was less prominent. Ceftriaxone was switched to Zosyn.  HCT mild increase extraaxial fluid collection. on , repeat  HCT unchanged, his MTL was discontinued. Zosyn and vancomycin switched to cefepime. On ,  HCT shoeds worsening edema +/- extraaxial fluid collection, elevated ICPs, and mannitol was restarted.      MTL was weaned,  EVD adjusted and eventually removed on 2/10.  Hospital course further c/b agitation requiring precedex, hypotension requiring bolus, dysphagia s/p PEG   and trach on , insomnia s/p seroquel, pain s/p oxycodone and fentanyl. He underwent wound revision with NSGY and plastics on . Keppra discontinued due to signs of agitation. Tolerated trach collar.     Patient was evaluated by PM&R and therapy for functional deficits, gait/ADL impairments and acute rehabilitation was recommended. Patient was medically optimized for discharge to [facility] IRU on 23.          Subjective:  Slept during the night.  pt. calm and cheerful-- was humming when seen.  Was able to use melodic intonation therapy to say "hello" and "My Name is Raymond"  with prompts.  was able today say his name "Raymond" on command.  No agitation last night or this AM. Frustrated with deficits.       VITALS  Vital Signs Last 24 Hrs  T(C): 36.6 (24 Mar 2023 19:35), Max: 36.6 (24 Mar 2023 19:35)  T(F): 97.9 (24 Mar 2023 19:35), Max: 97.9 (24 Mar 2023 19:35)  HR: 96 (24 Mar 2023 19:35) (96 - 96)  BP: 109/75 (24 Mar 2023 19:35) (109/75 - 109/75)  BP(mean): --  RR: 16 (24 Mar 2023 19:35) (16 - 16)  SpO2: 95% (24 Mar 2023 19:35) (95% - 95%)    Parameters below as of 24 Mar 2023 19:35  Patient On (Oxygen Delivery Method): room air        REVIEW OF SYMPTOMS  Neurological deficits      PHYSICAL EXAM  Constitutional -Restless at times, inconsistent with yes/no, follows some commands-  HEENT - EOMI, left hemicraniectomy, sutures removed 3/7- helmet in place    Neck - s/p trach decannulation (guaze over stoma)  Chest -  CTA,   Cardio -RRR  Abdomen -  Soft, NTND, +PEG, Bone flap marsupialized in epigastric region.    Extremities - No peripheral edema, No calf tenderness   Neurologic Exam:                    Cognitive -             Orientation: Awake, Alert; aphasia and cognitive deficits - inconsistent yes/no            Communication-  command following improving but inconsistent, Non-fluent, able to repeat- but impaired.            Attention:  Impaired;        Cranial Nerves - Limited due to  aphasia; No facial asymmetry, Tongue midline, EOMI, Pupils dilated but reactive, +dysphagia     Motor -   wearing R, palm protector                    LEFT    UE - 5/5                    RIGHT UE - ShAB 0/5, EF 0/5, EE 0/5, WE 0/5,  0/5--                     LEFT    LE - 5/5                    RIGHT LE - HE 2/5, Hip adductor 1-2/5, KE 2/5, DF 0/5, PF 0/5     Tone: MAS 2/4 finger flexors and wrist right,      Sensory - decrease sensation to LT-- RUE     Coordination - FTN / HTS impaired on right  Psychiatric - depressed, frustrated.  Restless at times.      RECENT LABS            Urinalysis Basic - ( 24 Mar 2023 12:50 )    Color: Yellow / Appearance: Clear / S.010 / pH: x  Gluc: x / Ketone: Negative  / Bili: Negative / Urobili: Negative   Blood: x / Protein: 15 / Nitrite: Negative   Leuk Esterase: Negative / RBC: Negative /HPF / WBC Negative /HPF   Sq Epi: x / Non Sq Epi: Neg.-Few / Bacteria: Negative /HPF          RADIOLOGY/OTHER RESULTS      MEDICATIONS  (STANDING):  AQUAPHOR (petrolatum Ointment) 1 Application(s) Topical two times a day  AQUAPHOR (petrolatum Ointment) 1 Application(s) Topical two times a day  atorvastatin 80 milliGRAM(s) Oral at bedtime  bacitracin   Ointment 1 Application(s) Topical two times a day  bacitracin   Ointment 1 Application(s) Topical daily  divalproex Sprinkle 250 milliGRAM(s) Oral two times a day  donepezil 5 milliGRAM(s) Oral daily  enoxaparin Injectable 90 milliGRAM(s) SubCutaneous <User Schedule>  escitalopram 10 milliGRAM(s) Oral <User Schedule>  gabapentin 600 milliGRAM(s) Oral at bedtime  hydrocortisone 1% Cream 1 Application(s) Topical two times a day  melatonin 6 milliGRAM(s) Oral at bedtime  midodrine. 10 milliGRAM(s) Oral <User Schedule>  nystatin Powder 1 Application(s) Topical two times a day  pantoprazole   Suspension 40 milliGRAM(s) Oral daily  polyethylene glycol 3350 17 Gram(s) Oral daily  senna Syrup 5 milliLiter(s) Oral at bedtime  traZODone 100 milliGRAM(s) Oral at bedtime    MEDICATIONS  (PRN):  acetaminophen    Suspension .. 650 milliGRAM(s) Oral every 6 hours PRN Temp greater or equal to 38C (100.4F), Mild Pain (1 - 3)  OLANZapine Injectable 2.5 milliGRAM(s) IntraMuscular daily PRN severe agitation

## 2023-03-26 PROCEDURE — 99232 SBSQ HOSP IP/OBS MODERATE 35: CPT

## 2023-03-26 RX ADMIN — DIVALPROEX SODIUM 250 MILLIGRAM(S): 500 TABLET, DELAYED RELEASE ORAL at 05:35

## 2023-03-26 RX ADMIN — NYSTATIN CREAM 1 APPLICATION(S): 100000 CREAM TOPICAL at 17:39

## 2023-03-26 RX ADMIN — Medication 6 MILLIGRAM(S): at 21:08

## 2023-03-26 RX ADMIN — Medication 1 APPLICATION(S): at 12:07

## 2023-03-26 RX ADMIN — Medication 1 APPLICATION(S): at 05:34

## 2023-03-26 RX ADMIN — ATORVASTATIN CALCIUM 80 MILLIGRAM(S): 80 TABLET, FILM COATED ORAL at 21:08

## 2023-03-26 RX ADMIN — ENOXAPARIN SODIUM 90 MILLIGRAM(S): 100 INJECTION SUBCUTANEOUS at 21:08

## 2023-03-26 RX ADMIN — MIDODRINE HYDROCHLORIDE 10 MILLIGRAM(S): 2.5 TABLET ORAL at 08:42

## 2023-03-26 RX ADMIN — GABAPENTIN 600 MILLIGRAM(S): 400 CAPSULE ORAL at 21:08

## 2023-03-26 RX ADMIN — Medication 1 APPLICATION(S): at 05:35

## 2023-03-26 RX ADMIN — Medication 1 APPLICATION(S): at 17:42

## 2023-03-26 RX ADMIN — DIVALPROEX SODIUM 250 MILLIGRAM(S): 500 TABLET, DELAYED RELEASE ORAL at 17:43

## 2023-03-26 RX ADMIN — Medication 1 APPLICATION(S): at 17:40

## 2023-03-26 RX ADMIN — DONEPEZIL HYDROCHLORIDE 5 MILLIGRAM(S): 10 TABLET, FILM COATED ORAL at 12:10

## 2023-03-26 RX ADMIN — ESCITALOPRAM OXALATE 10 MILLIGRAM(S): 10 TABLET, FILM COATED ORAL at 08:42

## 2023-03-26 RX ADMIN — ENOXAPARIN SODIUM 90 MILLIGRAM(S): 100 INJECTION SUBCUTANEOUS at 08:46

## 2023-03-26 RX ADMIN — Medication 100 MILLIGRAM(S): at 21:08

## 2023-03-26 RX ADMIN — PANTOPRAZOLE SODIUM 40 MILLIGRAM(S): 20 TABLET, DELAYED RELEASE ORAL at 12:10

## 2023-03-26 RX ADMIN — NYSTATIN CREAM 1 APPLICATION(S): 100000 CREAM TOPICAL at 05:35

## 2023-03-26 NOTE — PROGRESS NOTE ADULT - ASSESSMENT
43 y/o M with PMH of prior substance use, HTN, anxiety who presented to [facility] 1/23 with 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 He is s/p Left decompressive hemicraniectomy and right EVD placement 1/28/23. Hospital course further s/f respiratory failure s/p intubation and extubation, agitation requiring precedex, sepsis s/p ABX, hypotension requiring bolus, dysphagia s/p PEG 2/14 and trach on 2/13, insomnia s/p seroquel, pain s/p oxycodone and fentanyl. Patient now with admitted to [facility] acute inpatient rehab.    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 with mass effect and 8mm midline shift  -s/p intubation for airway protection and extubation  -s/p Decompressive hemicraniectomy and EVD placement 1/28  -Underwent wound revision with NSGY and plastics on 2/17  -Continue comprehensive rehab program - PT/OT/SLP per rehab team  -Pain management, bowel regimen per rehab   -Donepezil     #Depression/anxiety  -Paxil per rehab  -Neuropsych as indicated    #Bilateral lower extremity DVTs  -Lower extremity Doppler from 3/1–right femoral vein DVT and bilateral lower extremity calf DVTs  -Neurosurgeon gave clearance for patient to start therapeutic Lovenox  -Vascular Surgery input noted - Dr. Hodges 3/7 recommended nothing further to do if patient is on therapeutic Lovenox   -Continue therapeutic Lovenox for 3 months    #Hypotension  -Improved on Midodrine, monitor closely     #T2DM, HbA1c 6.0  -diet controlled  -monitor    #Dysphagia s/p PEG 2/14  -Puree with thin liquids    #HLD  -Continue lipitor    #GERD  -PPI    #DVT ppx - lovenox

## 2023-03-26 NOTE — PROGRESS NOTE ADULT - SUBJECTIVE AND OBJECTIVE BOX
Patient is a 43y old  Male who presents with a chief complaint of left IPH/ICH (25 Mar 2023 12:41)    Patient seen and examined at bedside. No acute overnight events. Finished all her breakfast     ALLERGIES:  No Known Allergies    MEDICATIONS  (STANDING):  AQUAPHOR (petrolatum Ointment) 1 Application(s) Topical two times a day  AQUAPHOR (petrolatum Ointment) 1 Application(s) Topical two times a day  atorvastatin 80 milliGRAM(s) Oral at bedtime  bacitracin   Ointment 1 Application(s) Topical two times a day  bacitracin   Ointment 1 Application(s) Topical daily  divalproex Sprinkle 250 milliGRAM(s) Oral two times a day  donepezil 5 milliGRAM(s) Oral daily  enoxaparin Injectable 90 milliGRAM(s) SubCutaneous <User Schedule>  escitalopram 10 milliGRAM(s) Oral <User Schedule>  gabapentin 600 milliGRAM(s) Oral at bedtime  hydrocortisone 1% Cream 1 Application(s) Topical two times a day  melatonin 6 milliGRAM(s) Oral at bedtime  midodrine. 10 milliGRAM(s) Oral <User Schedule>  nystatin Powder 1 Application(s) Topical two times a day  pantoprazole   Suspension 40 milliGRAM(s) Oral daily  polyethylene glycol 3350 17 Gram(s) Oral daily  senna Syrup 5 milliLiter(s) Oral at bedtime  traZODone 100 milliGRAM(s) Oral at bedtime    MEDICATIONS  (PRN):  acetaminophen    Suspension .. 650 milliGRAM(s) Oral every 6 hours PRN Temp greater or equal to 38C (100.4F), Mild Pain (1 - 3)  OLANZapine Injectable 2.5 milliGRAM(s) IntraMuscular daily PRN severe agitation    Vital Signs Last 24 Hrs  T(F): 98.6 (26 Mar 2023 08:30), Max: 98.6 (26 Mar 2023 08:30)  HR: 100 (26 Mar 2023 08:30) (68 - 100)  BP: 113/75 (26 Mar 2023 08:30) (113/63 - 113/75)  RR: 16 (26 Mar 2023 08:30) (16 - 16)  SpO2: 97% (26 Mar 2023 08:30) (97% - 98%)  I&O's Summary    26 Mar 2023 07:01  -  26 Mar 2023 11:46  --------------------------------------------------------  IN: 400 mL / OUT: 0 mL / NET: 400 mL    BMI (kg/m2): 24.2 (23 @ 12:24)    PHYSICAL EXAM:  General: NAD, awake, alert   ENT: MMM, no tonsilar exudate  Neck: Supple, No JVD  Lungs: Clear to auscultation bilaterally, no wheezes. Good air entry bilaterally   Cardio: RRR, S1/S2, No murmurs  Abdomen: Soft, Nontender, Nondistended; Bowel sounds present  Extremities: No calf tenderness, No pitting edema    LABS:      Urinalysis Basic - ( 24 Mar 2023 12:50 )    Color: Yellow / Appearance: Clear / S.010 / pH: x  Gluc: x / Ketone: Negative  / Bili: Negative / Urobili: Negative   Blood: x / Protein: 15 / Nitrite: Negative   Leuk Esterase: Negative / RBC: Negative /HPF / WBC Negative /HPF   Sq Epi: x / Non Sq Epi: Neg.-Few / Bacteria: Negative /HPF    COVID-19 PCR: NotDetec (23 @ 16:00)    RADIOLOGY & ADDITIONAL TESTS:     Care Discussed with Consultants/Other Providers:

## 2023-03-26 NOTE — PROGRESS NOTE ADULT - SUBJECTIVE AND OBJECTIVE BOX
HPI:  This is 42 year old male with PMH of prior substance use, HTN, anxiety who presented to [facility] on 1/23. On 1/23 morning, he told his family that he was not feeling well and took 2 tabs of Vyvanse (usually takes as needed). Shortly after, he was at a group therapy meeting over the phone when he complained to his father he was having a severe headache. He then became more lethargic, and developed nausea and vomited. He was brought to ED by his father. On arrival, noted to be lethargic, vomiting, with garbled speech. 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 Post CT, more lethargic with dilated right pupil and bilateral reactive but sluggish pupils. He was given mannitol and intubated for airway protection and was taken to neuro ICU.    On 1/25, HCT with enlarging R lateral ventricle and slightly worsening MLS concerning for trapped ventricle. 1/27 acute drop in SpO2 on 1/27, lavaged and suctioned out thick clot. Placed back on ACVC and FiO2 weaned from 70% to 40% overnight. On 1/28/23, he had episode of emesis, & fever of 100.3. He was  given tylenol with improvement in temperature but still little to no movement on the L side (previously would localize arm to suction and spontaneously bend L leg). Pt. Had left decompressive hemicraniectomy and right EVD  placed.    After the procedure, patient opened eyes for the first time and regained LUE/LLE movement. As he became febrile again this was less prominent. Ceftriaxone was switched to Zosyn. 1/29 HCT mild increase extraaxial fluid collection. on 1/30, repeat  HCT unchanged, his MTL was discontinued. Zosyn and vancomycin switched to cefepime. On 1/31,  HCT shoeds worsening edema +/- extraaxial fluid collection, elevated ICPs, and mannitol was restarted.      MTL was weaned,  EVD adjusted and eventually removed on 2/10.  Hospital course further c/b agitation requiring precedex, hypotension requiring bolus, dysphagia s/p PEG 2/14  and trach on 2/13, insomnia s/p seroquel, pain s/p oxycodone and fentanyl. He underwent wound revision with NSGY and plastics on 2/17. Keppra discontinued due to signs of agitation. Tolerated trach collar.     Patient was evaluated by PM&R and therapy for functional deficits, gait/ADL impairments and acute rehabilitation was recommended. Patient was medically optimized for discharge to [facility] IRU on 2/28/23.     (28 Feb 2023 13:10)          Subjective:  Seen this a.m. on rounds.  No issues overnight.  Patient calm and cooperative this morning.  Able to use melena melodic intonation therapy to answer some questions.  Comprehension impaired.  Limited by aphasia.      VITALS  Vital Signs Last 24 Hrs  T(C): 37 (26 Mar 2023 08:30), Max: 37 (26 Mar 2023 08:30)  T(F): 98.6 (26 Mar 2023 08:30), Max: 98.6 (26 Mar 2023 08:30)  HR: 100 (26 Mar 2023 08:30) (68 - 100)  BP: 113/75 (26 Mar 2023 08:30) (113/63 - 113/75)  BP(mean): --  RR: 16 (26 Mar 2023 08:30) (16 - 16)  SpO2: 97% (26 Mar 2023 08:30) (97% - 98%)    Parameters below as of 26 Mar 2023 08:30  Patient On (Oxygen Delivery Method): room air        REVIEW OF SYMPTOMS  Neurological deficits      PHYSICAL EXAM  Constitutional -Restless at times, inconsistent with yes/no, follows some commands-  HEENT - EOMI, left hemicraniectomy, sutures removed 3/7- helmet in place    Neck - s/p trach decannulation (guaze over stoma)  Chest - breathing comfortably on RA  Cardio -warm, well perfused  Abdomen -  Soft, NTND, +PEG, Bone flap marsupialized in epigastric region.    Extremities - No peripheral edema, No calf tenderness   Neurologic Exam:                    Cognitive -             Orientation: Awake, Alert; aphasia and cognitive deficits - inconsistent yes/no            Communication-  command following improving but inconsistent, Non-fluent, able to repeat- but impaired.            Attention:  Impaired;        Cranial Nerves - Limited due to  aphasia; No facial asymmetry, Tongue midline, EOMI, Pupils dilated but reactive, +dysphagia     Motor -   wearing R, palm protector                    LEFT    UE - 5/5                    RIGHT UE - ShAB 0/5, EF 0/5, EE 0/5, WE 0/5,  0/5--                     LEFT    LE - 5/5                    RIGHT LE - HE 2/5, Hip adductor 1-2/5, KE 2/5, DF 0/5, PF 0/5     Tone: MAS 2/4 finger flexors and wrist right,      Sensory - decrease sensation to LT-- RUE     Coordination - FTN / HTS impaired on right  Psychiatric - depressed, frustrated.  Restless at times.    RECENT LABS                  RADIOLOGY/OTHER RESULTS      MEDICATIONS  (STANDING):  AQUAPHOR (petrolatum Ointment) 1 Application(s) Topical two times a day  AQUAPHOR (petrolatum Ointment) 1 Application(s) Topical two times a day  atorvastatin 80 milliGRAM(s) Oral at bedtime  bacitracin   Ointment 1 Application(s) Topical two times a day  bacitracin   Ointment 1 Application(s) Topical daily  divalproex Sprinkle 250 milliGRAM(s) Oral two times a day  donepezil 5 milliGRAM(s) Oral daily  enoxaparin Injectable 90 milliGRAM(s) SubCutaneous <User Schedule>  escitalopram 10 milliGRAM(s) Oral <User Schedule>  gabapentin 600 milliGRAM(s) Oral at bedtime  hydrocortisone 1% Cream 1 Application(s) Topical two times a day  melatonin 6 milliGRAM(s) Oral at bedtime  midodrine. 10 milliGRAM(s) Oral <User Schedule>  nystatin Powder 1 Application(s) Topical two times a day  pantoprazole   Suspension 40 milliGRAM(s) Oral daily  polyethylene glycol 3350 17 Gram(s) Oral daily  senna Syrup 5 milliLiter(s) Oral at bedtime  traZODone 100 milliGRAM(s) Oral at bedtime    MEDICATIONS  (PRN):  acetaminophen    Suspension .. 650 milliGRAM(s) Oral every 6 hours PRN Temp greater or equal to 38C (100.4F), Mild Pain (1 - 3)  OLANZapine Injectable 2.5 milliGRAM(s) IntraMuscular daily PRN severe agitation

## 2023-03-26 NOTE — PROGRESS NOTE ADULT - ASSESSMENT
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 He is  s/p Left decompressive hemicraniectomy and right EVD placement 1/28/23. Hospital course further c/b respiratory failure s/p intubation and extubation,  agitation requiring precedex, sepsis s/p ABX, hypotension requiring bolus, dysphagia s/p PEG 2/14 and trach on 2/13, insomnia s/p seroquel, pain s/p oxycodone and fentanyl. Patient now with Right Hemiparesis, dysphagia s/p PEG, Aphasia, Cognitive deficits, gait Instability, ADL impairments and Functional impairments.    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    - ST: to diagnose and treat deficits in swallowing, cognition and communication.  - Helmet when OOB  - follow up CT head 3/19 reviewed-  Postoperative changes compatible with a left temporal frontal parietal craniectomy is again seen. left frontoparietal hematoma with some improvement.  improved vasogenic edema is seen. Mass effect on the left lateral ventricle and Left-to-right shift (3.1 mm)has now resolved.   - OT– right resting hand splint  - Called Dr Cueva's office--patient has a follow up office visit on 3/28 at 2pm-- they are unsure when his cranioplasty will be, have to submit for authorization next tues.   --obtained administrative approval And social work set up transportation for outpatient neurosurgery appointment-- 12:15p on 3/28    Sleep/Restlessness  --c/w Trazodone  100mg qhs 3/8-- for with sleep and restlessness.   Pt's mother reports he was on 50-100mg qhs and 50mg daytime home meds.   --cont. melatonin  --monitor  --Attending discussed with nursing that patient needs supervision for safety monitoring as pt.  is impulsive, restless and high fall risk       # Agitation - improving  - R/o infection - CXR unremarkable.  UA Neg.  Labs WNL.  -Psychiatry consult, Dr. Acharya following  --cont. with  depakote 250mg bid  3/24  --check depakote and ammonia level tomorrow AM  -- c/w gabapentin to 600 mg qhs  - Zyprexa 2.5 mg IM daily PRN severe agitation.   - Continue to monitor      Depression/anxiety  --Pt was on Paxil 30mg at home as per mother-- Paxil 10mg started 3/10 but d/c in favor of lexapro  -- cont. Lexapro 10mg  --has been more effective for pt's anxiety/depression  --Continue to monitor  - c/w gabapentin to 600 mg qhs for anxiety    Aphasia  -- c/w Donepezil 5 mg.        Bilateral lower extremity DVTs–  – Lower extremity Doppler from 3/1–right femoral vein DVT and bilateral lower extremity calf DVTs  – spoke with patient's neurosurgery PA–who was in contact with Dr. Cueva patient's neurosurgeon–gave clearance for patient to start therapeutic Lovenox.  --Vascular Surgery consult -- reaached out to Dr. Hodges 3/7 - nothing further to do if patient is on therapeutic lovenox.   -- C/w therapeutic lovenox for 3 months.    Orthostatic hypotension–  -cont. midodrine 10mg   -    #Acute Respiratory Failure  - Intubation and extubation  - s/p Trach 2/13  - Decannulated 3/5, Pulm monitoring, tolerating well    #GI PPX  - Nexium 40mg daily    #DM II  - A1c 6.0  -management as per hospitalist--off insulin     #Pain management  - Tylenol PRN  - Neurontin cw 600 hs --   - Continue to monitor     #Bowel Regimen  - Senna, miralax PRN    #Bladder management  -voiding well with low PVRs,   -- off doxazosin 3/2    #Dysphagia    - s/p PEG 2/14  - Puree with thin liquids-- 1:1 assist with meals  - SLP: evaluation and treatment  - Diet: tolerating bolus TF     #Skin:  - Skin on admission: IAD to bilateral groin and sacrum; RUQ incision with sutures with palpable skull flap; Left hemicraniectomy with sutures MILLER; psoriasis BL shin  - Pressure injury/Skin: Turn Q2hrs while in bed, OOB to Chair, PT/OT     #Precaution  - Fall, Aspiration, seizure    #home meds  - per list provided by mother, [place]: Rousuvastatin 40mg daily and Paroxetine 30mg daily  -  cw paroxetine  - cw high intensity atorvastatin to home med dose    – IDT 3/23:  Social work:  patient on discharge can live with his mother who has a first-floor set up.  Nursing–incontinent of bowel and bladder, total assist with toileting  Speech: Puree/thin diet,  severe language deficits–poor command following, poor orientation, Some spontaneous naming, & gestures, Speech jargon, Aphasia, severe cognitive deficits  OT's: Supervision eating; Mod-Max A UBD, Max A LBD, Total assist with toileting and transfers, restless  PT: standing with Mod A x 2 for 5min--inconsistent; Improved trunk control; Tot A transfers, limited by behavior   goals-- mod assist with transfers, ambulation 10 feet max assist with hemiwalker, wheelchair mobility 50 feet min assist.  ELOS: 4/6 ZOHAIB  Goals:  1.  Improved secretion management and tolerate PMV  2.  transfers with 1 person assist  3.  Communicate basic wants and needs with yes no answers

## 2023-03-27 LAB
ALBUMIN SERPL ELPH-MCNC: 3.3 G/DL — SIGNIFICANT CHANGE UP (ref 3.3–5)
ALP SERPL-CCNC: 118 U/L — SIGNIFICANT CHANGE UP (ref 40–120)
ALT FLD-CCNC: 23 U/L — SIGNIFICANT CHANGE UP (ref 10–45)
AMMONIA BLD-MCNC: 21 UMOL/L — SIGNIFICANT CHANGE UP (ref 11–55)
ANION GAP SERPL CALC-SCNC: 7 MMOL/L — SIGNIFICANT CHANGE UP (ref 5–17)
AST SERPL-CCNC: 8 U/L — LOW (ref 10–40)
BILIRUB SERPL-MCNC: 0.4 MG/DL — SIGNIFICANT CHANGE UP (ref 0.2–1.2)
BUN SERPL-MCNC: 4 MG/DL — LOW (ref 7–23)
CALCIUM SERPL-MCNC: 9.3 MG/DL — SIGNIFICANT CHANGE UP (ref 8.4–10.5)
CHLORIDE SERPL-SCNC: 103 MMOL/L — SIGNIFICANT CHANGE UP (ref 96–108)
CO2 SERPL-SCNC: 29 MMOL/L — SIGNIFICANT CHANGE UP (ref 22–31)
CREAT SERPL-MCNC: 0.63 MG/DL — SIGNIFICANT CHANGE UP (ref 0.5–1.3)
EGFR: 121 ML/MIN/1.73M2 — SIGNIFICANT CHANGE UP
GLUCOSE SERPL-MCNC: 158 MG/DL — HIGH (ref 70–99)
HCT VFR BLD CALC: 43.2 % — SIGNIFICANT CHANGE UP (ref 39–50)
HGB BLD-MCNC: 13.9 G/DL — SIGNIFICANT CHANGE UP (ref 13–17)
MCHC RBC-ENTMCNC: 29.6 PG — SIGNIFICANT CHANGE UP (ref 27–34)
MCHC RBC-ENTMCNC: 32.2 GM/DL — SIGNIFICANT CHANGE UP (ref 32–36)
MCV RBC AUTO: 91.9 FL — SIGNIFICANT CHANGE UP (ref 80–100)
NRBC # BLD: 0 /100 WBCS — SIGNIFICANT CHANGE UP (ref 0–0)
PLATELET # BLD AUTO: 287 K/UL — SIGNIFICANT CHANGE UP (ref 150–400)
POTASSIUM SERPL-MCNC: 3.9 MMOL/L — SIGNIFICANT CHANGE UP (ref 3.5–5.3)
POTASSIUM SERPL-SCNC: 3.9 MMOL/L — SIGNIFICANT CHANGE UP (ref 3.5–5.3)
PROT SERPL-MCNC: 7.3 G/DL — SIGNIFICANT CHANGE UP (ref 6–8.3)
RBC # BLD: 4.7 M/UL — SIGNIFICANT CHANGE UP (ref 4.2–5.8)
RBC # FLD: 13.4 % — SIGNIFICANT CHANGE UP (ref 10.3–14.5)
SODIUM SERPL-SCNC: 139 MMOL/L — SIGNIFICANT CHANGE UP (ref 135–145)
VALPROATE SERPL-MCNC: 24 UG/ML — LOW (ref 50–100)
WBC # BLD: 7.68 K/UL — SIGNIFICANT CHANGE UP (ref 3.8–10.5)
WBC # FLD AUTO: 7.68 K/UL — SIGNIFICANT CHANGE UP (ref 3.8–10.5)

## 2023-03-27 PROCEDURE — 99232 SBSQ HOSP IP/OBS MODERATE 35: CPT

## 2023-03-27 RX ADMIN — Medication 1 APPLICATION(S): at 18:09

## 2023-03-27 RX ADMIN — POLYETHYLENE GLYCOL 3350 17 GRAM(S): 17 POWDER, FOR SOLUTION ORAL at 12:15

## 2023-03-27 RX ADMIN — Medication 1 APPLICATION(S): at 05:10

## 2023-03-27 RX ADMIN — Medication 100 MILLIGRAM(S): at 21:47

## 2023-03-27 RX ADMIN — ESCITALOPRAM OXALATE 10 MILLIGRAM(S): 10 TABLET, FILM COATED ORAL at 08:30

## 2023-03-27 RX ADMIN — Medication 6 MILLIGRAM(S): at 21:47

## 2023-03-27 RX ADMIN — PANTOPRAZOLE SODIUM 40 MILLIGRAM(S): 20 TABLET, DELAYED RELEASE ORAL at 12:12

## 2023-03-27 RX ADMIN — ENOXAPARIN SODIUM 90 MILLIGRAM(S): 100 INJECTION SUBCUTANEOUS at 08:31

## 2023-03-27 RX ADMIN — DONEPEZIL HYDROCHLORIDE 5 MILLIGRAM(S): 10 TABLET, FILM COATED ORAL at 12:12

## 2023-03-27 RX ADMIN — NYSTATIN CREAM 1 APPLICATION(S): 100000 CREAM TOPICAL at 05:10

## 2023-03-27 RX ADMIN — GABAPENTIN 600 MILLIGRAM(S): 400 CAPSULE ORAL at 21:47

## 2023-03-27 RX ADMIN — Medication 1 APPLICATION(S): at 05:18

## 2023-03-27 RX ADMIN — NYSTATIN CREAM 1 APPLICATION(S): 100000 CREAM TOPICAL at 18:09

## 2023-03-27 RX ADMIN — ATORVASTATIN CALCIUM 80 MILLIGRAM(S): 80 TABLET, FILM COATED ORAL at 21:47

## 2023-03-27 RX ADMIN — SENNA PLUS 5 MILLILITER(S): 8.6 TABLET ORAL at 21:47

## 2023-03-27 RX ADMIN — DIVALPROEX SODIUM 250 MILLIGRAM(S): 500 TABLET, DELAYED RELEASE ORAL at 05:09

## 2023-03-27 RX ADMIN — Medication 1 APPLICATION(S): at 05:11

## 2023-03-27 RX ADMIN — Medication 1 APPLICATION(S): at 12:13

## 2023-03-27 RX ADMIN — ENOXAPARIN SODIUM 90 MILLIGRAM(S): 100 INJECTION SUBCUTANEOUS at 21:47

## 2023-03-27 RX ADMIN — MIDODRINE HYDROCHLORIDE 10 MILLIGRAM(S): 2.5 TABLET ORAL at 08:30

## 2023-03-27 RX ADMIN — DIVALPROEX SODIUM 250 MILLIGRAM(S): 500 TABLET, DELAYED RELEASE ORAL at 18:09

## 2023-03-27 NOTE — PROGRESS NOTE ADULT - ASSESSMENT
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 He is  s/p Left decompressive hemicraniectomy and right EVD placement 1/28/23. Hospital course further c/b respiratory failure s/p intubation and extubation,  agitation requiring precedex, sepsis s/p ABX, hypotension requiring bolus, dysphagia s/p PEG 2/14 and trach on 2/13, insomnia s/p seroquel, pain s/p oxycodone and fentanyl. Patient now with Right Hemiparesis, dysphagia s/p PEG, Aphasia, Cognitive deficits, gait Instability, ADL impairments and Functional impairments.    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    - ST: to diagnose and treat deficits in swallowing, cognition and communication.  - Helmet when OOB  - follow up CT head 3/19 reviewed-  Postoperative changes compatible with a left temporal frontal parietal craniectomy is again seen. left frontoparietal hematoma with some improvement.  improved vasogenic edema is seen. Mass effect on the left lateral ventricle and Left-to-right shift (3.1 mm)has now resolved.   - OT– right resting hand splint  - Called Dr Cueva's office--patient has a follow up office visit on 3/28 at 2pm-- they are unsure when his cranioplasty will be, have to submit for authorization next tues.   --obtained administrative approval And social work set up transportation for outpatient neurosurgery appointment-- 12:15p on 3/28    Sleep/Restlessness  --c/w Trazodone  100mg qhs 3/8-- for with sleep and restlessness.   Pt's mother reports he was on 50-100mg qhs and 50mg daytime home meds.   --cont. melatonin  --monitor  --Attending discussed with nursing that patient needs supervision for safety monitoring as pt.  is impulsive, restless and high fall risk       # Agitation - improved  -Psychiatry consult, Dr. Acharya following  --cont. with  depakote 250mg bid  3/24  -- depakote level =24 and ammonia level=21 3/27--nontoxic  -- c/w gabapentin to 600 mg qhs  - Zyprexa 2.5 mg IM daily PRN severe agitation.   - Continue to monitor      Depression/anxiety  --Pt was on Paxil 30mg at home as per mother-- Paxil 10mg started 3/10 but d/c in favor of lexapro  -- cont. Lexapro 10mg  --has been more effective for pt's anxiety/depression  --Continue to monitor  - c/w gabapentin to 600 mg qhs for anxiety    Aphasia  -- c/w Donepezil 5 mg.    --consider trial of mematine in next couple days if pt. stable.     Bilateral lower extremity DVTs–  – Lower extremity Doppler from 3/1–right femoral vein DVT and bilateral lower extremity calf DVTs  – spoke with patient's neurosurgery PA–who was in contact with Dr. Cueva patient's neurosurgeon–gave clearance for patient to start therapeutic Lovenox.  --Vascular Surgery consult -- reaached out to Dr. Hodges 3/7 - nothing further to do if patient is on therapeutic lovenox.   -- C/w therapeutic lovenox for 3 months.    Orthostatic hypotension–  -cont. midodrine 10mg   -    #Acute Respiratory Failure  - Intubation and extubation  - s/p Trach 2/13  - Decannulated 3/5, Pulm monitoring, tolerating well    #GI PPX  - Nexium 40mg daily    #DM II  - A1c 6.0  -management as per hospitalist--off insulin     #Pain management  - Tylenol PRN  - Neurontin cw 600 hs --   - Continue to monitor     #Bowel Regimen  - Senna, miralax PRN    #Bladder management  -voiding well with low PVRs,   -- off doxazosin 3/2    #Dysphagia    - s/p PEG 2/14  - Puree with thin liquids-- 1:1 assist with meals  - SLP: evaluation and treatment  - Diet: tolerating bolus TF     #Skin:  - Skin on admission: IAD to bilateral groin and sacrum; RUQ incision with sutures with palpable skull flap; Left hemicraniectomy with sutures MILLER; psoriasis BL shin  - Pressure injury/Skin: Turn Q2hrs while in bed, OOB to Chair, PT/OT     #Precaution  - Fall, Aspiration, seizure    #home meds  - per list provided by mother, [place]: Rousuvastatin 40mg daily and Paroxetine 30mg daily  -  cw paroxetine  - cw high intensity atorvastatin to home med dose    – IDT 3/23:  Social work:  patient on discharge can live with his mother who has a first-floor set up.  Nursing–incontinent of bowel and bladder, total assist with toileting  Speech: Puree/thin diet,  severe language deficits–poor command following, poor orientation, Some spontaneous naming, & gestures, Speech jargon, Aphasia, severe cognitive deficits  OT's: Supervision eating; Mod-Max A UBD, Max A LBD, Total assist with toileting and transfers, restless  PT: standing with Mod A x 2 for 5min--inconsistent; Improved trunk control; Tot A transfers, limited by behavior   goals-- mod assist with transfers, ambulation 10 feet max assist with hemiwalker, wheelchair mobility 50 feet min assist.  ELOS: 4/6 ZOHAIB  Goals:  1.  Improved secretion management and tolerate PMV  2.  transfers with 1 person assist  3.  Communicate basic wants and needs with yes no answers

## 2023-03-27 NOTE — PROGRESS NOTE ADULT - SUBJECTIVE AND OBJECTIVE BOX
Patient is a 43y old  Male who presents with a chief complaint of left IPH/ICH (26 Mar 2023 13:55)    Patient seen and examined at bedside. No acute overnight events. Participating with therapy    ALLERGIES:  No Known Allergies    MEDICATIONS  (STANDING):  AQUAPHOR (petrolatum Ointment) 1 Application(s) Topical two times a day  AQUAPHOR (petrolatum Ointment) 1 Application(s) Topical two times a day  atorvastatin 80 milliGRAM(s) Oral at bedtime  bacitracin   Ointment 1 Application(s) Topical two times a day  bacitracin   Ointment 1 Application(s) Topical daily  divalproex Sprinkle 250 milliGRAM(s) Oral two times a day  donepezil 5 milliGRAM(s) Oral daily  enoxaparin Injectable 90 milliGRAM(s) SubCutaneous <User Schedule>  escitalopram 10 milliGRAM(s) Oral <User Schedule>  gabapentin 600 milliGRAM(s) Oral at bedtime  hydrocortisone 1% Cream 1 Application(s) Topical two times a day  melatonin 6 milliGRAM(s) Oral at bedtime  midodrine. 10 milliGRAM(s) Oral <User Schedule>  nystatin Powder 1 Application(s) Topical two times a day  pantoprazole   Suspension 40 milliGRAM(s) Oral daily  polyethylene glycol 3350 17 Gram(s) Oral daily  senna Syrup 5 milliLiter(s) Oral at bedtime  traZODone 100 milliGRAM(s) Oral at bedtime    MEDICATIONS  (PRN):  acetaminophen    Suspension .. 650 milliGRAM(s) Oral every 6 hours PRN Temp greater or equal to 38C (100.4F), Mild Pain (1 - 3)  OLANZapine Injectable 2.5 milliGRAM(s) IntraMuscular daily PRN severe agitation    Vital Signs Last 24 Hrs  T(F): 98.8 (27 Mar 2023 08:30), Max: 98.8 (27 Mar 2023 08:30)  HR: 98 (27 Mar 2023 08:30) (84 - 98)  BP: 108/71 (27 Mar 2023 08:30) (108/71 - 110/76)  RR: 16 (27 Mar 2023 08:30) (16 - 16)  SpO2: 95% (27 Mar 2023 08:30) (95% - 98%)  I&O's Summary    26 Mar 2023 07:01  -  27 Mar 2023 07:00  --------------------------------------------------------  IN: 400 mL / OUT: 0 mL / NET: 400 mL    BMI (kg/m2): 24.2 (03-25-23 @ 12:24)    PHYSICAL EXAM:  General: NAD, awake, alert   ENT: MMM, no tonsilar exudate  Neck: Supple, No JVD  Lungs: Clear to auscultation bilaterally, no wheezes. Good air entry bilaterally   Cardio: RRR, S1/S2, No murmurs  Abdomen: Soft, Nontender, Nondistended; Bowel sounds present  Extremities: No calf tenderness, No pitting edema    LABS:                        13.9   7.68  )-----------( 287      ( 27 Mar 2023 06:10 )             43.2       03-27    139  |  103  |  4   ----------------------------<  158  3.9   |  29  |  0.63    Ca    9.3      27 Mar 2023 06:10    TPro  7.3  /  Alb  3.3  /  TBili  0.4  /  DBili  x   /  AST  8   /  ALT  23  /  AlkPhos  118  03-27     COVID-19 PCR: NotDetec (02-28-23 @ 16:00)    RADIOLOGY & ADDITIONAL TESTS:     Care Discussed with Consultants/Other Providers:

## 2023-03-27 NOTE — CHART NOTE - NSCHARTNOTEFT_GEN_A_CORE
Nutrition Follow Up Note  Hospital Course   (Per Electronic Medical Record)    Source:  Patient [X]  Medical Record [X]      Diet: Diet, Minced and Moist (03-27-23 @ 11:25) [Active]    Patient w/ aphasia, can answer y/n questions at this time. W/ 1:1 assistance at meals. At this time patient w/ adequate appetite/intake consuming % of meals. Patient agreeable to double protein at meals to augment PO intakes for increased nutritional needs. No issues w/ chewing and swallowing pureed diet texture, diet up graded to Minced and Moist 3/27 continue to monitor. Denies N/V/C/D, last BM 3/25.     Enteral/Parenteral Nutrition: Not Applicable    Current Weight: 193.7lb on 3/25    Pertinent Medications: MEDICATIONS  (STANDING):  AQUAPHOR (petrolatum Ointment) 1 Application(s) Topical two times a day  AQUAPHOR (petrolatum Ointment) 1 Application(s) Topical two times a day  atorvastatin 80 milliGRAM(s) Oral at bedtime  bacitracin   Ointment 1 Application(s) Topical two times a day  bacitracin   Ointment 1 Application(s) Topical daily  divalproex Sprinkle 250 milliGRAM(s) Oral two times a day  donepezil 5 milliGRAM(s) Oral daily  enoxaparin Injectable 90 milliGRAM(s) SubCutaneous <User Schedule>  escitalopram 10 milliGRAM(s) Oral <User Schedule>  gabapentin 600 milliGRAM(s) Oral at bedtime  hydrocortisone 1% Cream 1 Application(s) Topical two times a day  melatonin 6 milliGRAM(s) Oral at bedtime  midodrine. 10 milliGRAM(s) Oral <User Schedule>  nystatin Powder 1 Application(s) Topical two times a day  pantoprazole   Suspension 40 milliGRAM(s) Oral daily  polyethylene glycol 3350 17 Gram(s) Oral daily  senna Syrup 5 milliLiter(s) Oral at bedtime  traZODone 100 milliGRAM(s) Oral at bedtime    MEDICATIONS  (PRN):  acetaminophen    Suspension .. 650 milliGRAM(s) Oral every 6 hours PRN Temp greater or equal to 38C (100.4F), Mild Pain (1 - 3)  OLANZapine Injectable 2.5 milliGRAM(s) IntraMuscular daily PRN severe agitation      Pertinent Labs:  03-27 Na139 mmol/L Glu 158 mg/dL<H> K+ 3.9 mmol/L Cr  0.63 mg/dL BUN 4 mg/dL<L> 03-27 Alb 3.3 g/dL    Skin:SI Left head, Rigt upper abdomen    Edema: No edema noted per nursing flowsheet    Last Bowel Movement: on 3/25 Per nursing flowsheets     Estimated Needs:   [X] No Change Since Previous Assessment    Previous Nutrition Diagnosis:   [X] Chewing Difficulties    [X] Swallowing Difficulties    related to dysphagia as evidenced by modified IDDSI pureed diet texture       Nutrition Diagnosis is [X] Ongoing - addressed w/ Minced and Moist  diet.       New Nutrition Diagnosis: [X] Not Applicable    Interventions:   1. Recommend double protein  2. Recommend Continue Nutrition Plan of Care.    Monitoring & Evaluation:   [X] Weights   [X] PO Intake   [X] Skin Integrity   [X] Follow Up (Per Protocol)  [X] Tolerance to Diet Prescription   [X] Other: Labs     Registered Dietitian/Nutritionist Remains Available.  Zahra Rain RD, MS, CDN    Phone# [phone] Nutrition Follow Up Note  Hospital Course   (Per Electronic Medical Record)    Source:  Patient [X]  Medical Record [X]      Diet: Diet, Minced and Moist (03-27-23 @ 11:25) [Active]    Patient w/ aphasia, can answer y/n questions at this time. W/ 1:1 assistance at meals. At this time patient w/ adequate appetite/intake consuming % of meals. Patient agreeable to double protein at meals to augment PO intakes for increased nutritional needs. No issues w/ chewing and swallowing pureed diet texture, diet up graded to Minced and Moist 3/27 continue to monitor. Denies N/V/C/D, last BM 3/25.     Enteral/Parenteral Nutrition: Not Applicable    Current Weight: 193.7lb on 3/25    Pertinent Medications: MEDICATIONS  (STANDING):  AQUAPHOR (petrolatum Ointment) 1 Application(s) Topical two times a day  AQUAPHOR (petrolatum Ointment) 1 Application(s) Topical two times a day  atorvastatin 80 milliGRAM(s) Oral at bedtime  bacitracin   Ointment 1 Application(s) Topical two times a day  bacitracin   Ointment 1 Application(s) Topical daily  divalproex Sprinkle 250 milliGRAM(s) Oral two times a day  donepezil 5 milliGRAM(s) Oral daily  enoxaparin Injectable 90 milliGRAM(s) SubCutaneous <User Schedule>  escitalopram 10 milliGRAM(s) Oral <User Schedule>  gabapentin 600 milliGRAM(s) Oral at bedtime  hydrocortisone 1% Cream 1 Application(s) Topical two times a day  melatonin 6 milliGRAM(s) Oral at bedtime  midodrine. 10 milliGRAM(s) Oral <User Schedule>  nystatin Powder 1 Application(s) Topical two times a day  pantoprazole   Suspension 40 milliGRAM(s) Oral daily  polyethylene glycol 3350 17 Gram(s) Oral daily  senna Syrup 5 milliLiter(s) Oral at bedtime  traZODone 100 milliGRAM(s) Oral at bedtime    MEDICATIONS  (PRN):  acetaminophen    Suspension .. 650 milliGRAM(s) Oral every 6 hours PRN Temp greater or equal to 38C (100.4F), Mild Pain (1 - 3)  OLANZapine Injectable 2.5 milliGRAM(s) IntraMuscular daily PRN severe agitation      Pertinent Labs:  03-27 Na139 mmol/L Glu 158 mg/dL<H> K+ 3.9 mmol/L Cr  0.63 mg/dL BUN 4 mg/dL<L> 03-27 Alb 3.3 g/dL    Skin: SI Left head, Right upper abdomen    Edema: No edema noted per nursing flowsheet    Last Bowel Movement: on 3/25 Per nursing flowsheets     Estimated Needs:   [X] No Change Since Previous Assessment    Previous Nutrition Diagnosis:   [X] Chewing Difficulties    [X] Swallowing Difficulties    related to dysphagia as evidenced by modified IDDSI pureed diet texture       Nutrition Diagnosis is [X] Ongoing - addressed w/ Minced and Moist  diet.       New Nutrition Diagnosis: [X] Not Applicable    Interventions:   1. Recommend double protein  2. Recommend Continue Nutrition Plan of Care.    Monitoring & Evaluation:   [X] Weights   [X] PO Intake   [X] Skin Integrity   [X] Follow Up (Per Protocol)  [X] Tolerance to Diet Prescription   [X] Other: Labs     Registered Dietitian/Nutritionist Remains Available.  Zahra Rain RD, MS, CDN    Phone# [phone]

## 2023-03-27 NOTE — PROGRESS NOTE ADULT - SUBJECTIVE AND OBJECTIVE BOX
HPI:  This is 42 year old male with PMH of prior substance use, HTN, anxiety who presented to [facility] on 1/23. On 1/23 morning, he told his family that he was not feeling well and took 2 tabs of Vyvanse (usually takes as needed). Shortly after, he was at a group therapy meeting over the phone when he complained to his father he was having a severe headache. He then became more lethargic, and developed nausea and vomited. He was brought to ED by his father. On arrival, noted to be lethargic, vomiting, with garbled speech. 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 Post CT, more lethargic with dilated right pupil and bilateral reactive but sluggish pupils. He was given mannitol and intubated for airway protection and was taken to neuro ICU.    On 1/25, HCT with enlarging R lateral ventricle and slightly worsening MLS concerning for trapped ventricle. 1/27 acute drop in SpO2 on 1/27, lavaged and suctioned out thick clot. Placed back on ACVC and FiO2 weaned from 70% to 40% overnight. On 1/28/23, he had episode of emesis, & fever of 100.3. He was  given tylenol with improvement in temperature but still little to no movement on the L side (previously would localize arm to suction and spontaneously bend L leg). Pt. Had left decompressive hemicraniectomy and right EVD  placed.    After the procedure, patient opened eyes for the first time and regained LUE/LLE movement. As he became febrile again this was less prominent. Ceftriaxone was switched to Zosyn. 1/29 HCT mild increase extraaxial fluid collection. on 1/30, repeat  HCT unchanged, his MTL was discontinued. Zosyn and vancomycin switched to cefepime. On 1/31,  HCT shoeds worsening edema +/- extraaxial fluid collection, elevated ICPs, and mannitol was restarted.      MTL was weaned,  EVD adjusted and eventually removed on 2/10.  Hospital course further c/b agitation requiring precedex, hypotension requiring bolus, dysphagia s/p PEG 2/14  and trach on 2/13, insomnia s/p seroquel, pain s/p oxycodone and fentanyl. He underwent wound revision with NSGY and plastics on 2/17. Keppra discontinued due to signs of agitation. Tolerated trach collar.     Patient was evaluated by PM&R and therapy for functional deficits, gait/ADL impairments and acute rehabilitation was recommended. Patient was medically optimized for discharge to [facility] IRU on 2/28/23.     (28 Feb 2023 13:10)          Subjective:  Slept during the night.  Seen in physical therapy this morning.  Patient stood up and ambulated 4 steps with max assist of 1.  Patient limited by nonfluent aphasia.  Anticipating in PT and OT this morning, gets frustrated at times but no agitation.  Patient did not participate in speech therapy as was frustrated and upset.      VITALS  Vital Signs Last 24 Hrs  T(C): 37.1 (27 Mar 2023 08:30), Max: 37.1 (27 Mar 2023 08:30)  T(F): 98.8 (27 Mar 2023 08:30), Max: 98.8 (27 Mar 2023 08:30)  HR: 98 (27 Mar 2023 08:30) (84 - 98)  BP: 108/71 (27 Mar 2023 08:30) (108/71 - 110/76)  BP(mean): --  RR: 16 (27 Mar 2023 08:30) (16 - 16)  SpO2: 95% (27 Mar 2023 08:30) (95% - 98%)    Parameters below as of 27 Mar 2023 08:30  Patient On (Oxygen Delivery Method): room air        REVIEW OF SYMPTOMS  Neurological deficits      PHYSICAL EXAM  Constitutional -Restless at times, inconsistent with yes/no, follows some commands-  HEENT - EOMI, left hemicraniectomy, sutures removed 3/7- helmet in place    Neck - s/p trach decannulation (guaze over stoma)  Chest - breathing comfortably on RA  Cardio -warm, well perfused  Abdomen -  Soft, NTND, +PEG, Bone flap marsupialized in epigastric region.    Extremities - No peripheral edema, No calf tenderness   Neurologic Exam:                    Cognitive -             Orientation: Awake, Alert; aphasia and cognitive deficits - inconsistent yes/no, Ox2--states "hospital" and year when given choices            Communication-  command following improving but inconsistent, Non-fluent, able to repeat- but impaired.            Attention:  Impaired;        Cranial Nerves - Limited due to  aphasia; No facial asymmetry, Tongue midline, EOMI, Pupils dilated but reactive, +dysphagia     Motor -   wearing R, palm protector                    LEFT    UE - 5/5                    RIGHT UE - ShAB 0/5, EF 0/5, EE 0/5, WE 0/5,  0/5--                     LEFT    LE - 5/5                    RIGHT LE - HE 2/5, Hip adductor 1-2/5, KE 2/5, DF 0/5, PF 0/5     Tone: MAS 2/4 finger flexors and wrist right,      Sensory - decrease sensation to LT-- RUE     Coordination - FTN / HTS impaired on right  Psychiatric - depressed, frustrated.  Restless at times.      RECENT LABS                        13.9   7.68  )-----------( 287      ( 27 Mar 2023 06:10 )             43.2     03-27    139  |  103  |  4<L>  ----------------------------<  158<H>  3.9   |  29  |  0.63    Ca    9.3      27 Mar 2023 06:10    TPro  7.3  /  Alb  3.3  /  TBili  0.4  /  DBili  x   /  AST  8<L>  /  ALT  23  /  AlkPhos  118  03-27            RADIOLOGY/OTHER RESULTS      MEDICATIONS  (STANDING):  AQUAPHOR (petrolatum Ointment) 1 Application(s) Topical two times a day  AQUAPHOR (petrolatum Ointment) 1 Application(s) Topical two times a day  atorvastatin 80 milliGRAM(s) Oral at bedtime  bacitracin   Ointment 1 Application(s) Topical two times a day  bacitracin   Ointment 1 Application(s) Topical daily  divalproex Sprinkle 250 milliGRAM(s) Oral two times a day  donepezil 5 milliGRAM(s) Oral daily  enoxaparin Injectable 90 milliGRAM(s) SubCutaneous <User Schedule>  escitalopram 10 milliGRAM(s) Oral <User Schedule>  gabapentin 600 milliGRAM(s) Oral at bedtime  hydrocortisone 1% Cream 1 Application(s) Topical two times a day  melatonin 6 milliGRAM(s) Oral at bedtime  midodrine. 10 milliGRAM(s) Oral <User Schedule>  nystatin Powder 1 Application(s) Topical two times a day  pantoprazole   Suspension 40 milliGRAM(s) Oral daily  polyethylene glycol 3350 17 Gram(s) Oral daily  senna Syrup 5 milliLiter(s) Oral at bedtime  traZODone 100 milliGRAM(s) Oral at bedtime    MEDICATIONS  (PRN):  acetaminophen    Suspension .. 650 milliGRAM(s) Oral every 6 hours PRN Temp greater or equal to 38C (100.4F), Mild Pain (1 - 3)  OLANZapine Injectable 2.5 milliGRAM(s) IntraMuscular daily PRN severe agitation

## 2023-03-27 NOTE — PROGRESS NOTE ADULT - ASSESSMENT
43 y/o M with PMH of prior substance use, HTN, anxiety who presented to 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 He is s/p Left decompressive hemicraniectomy and right EVD placement 1/28/23. Hospital course further s/f respiratory failure s/p intubation and extubation, agitation requiring precedex, sepsis s/p ABX, hypotension requiring bolus, dysphagia s/p PEG 2/14 and trach on 2/13, insomnia s/p seroquel, pain s/p oxycodone and fentanyl. Patient now with admitted to [facility] acute inpatient rehab.    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 with mass effect and 8mm midline shift  -s/p intubation for airway protection and extubation  -s/p Decompressive hemicraniectomy and EVD placement 1/28  -Underwent wound revision with NSGY and plastics on 2/17  -Continue comprehensive rehab program - PT/OT/SLP per rehab team  -Pain management, bowel regimen per rehab   -Donepezil     #Depression/anxiety  -Paxil per rehab  -Neuropsych as indicated    #Bilateral lower extremity DVTs  -Lower extremity Doppler from 3/1–right femoral vein DVT and bilateral lower extremity calf DVTs  -Neurosurgeon gave clearance for patient to start therapeutic Lovenox  -Vascular Surgery input noted - Dr. Hodges 3/7 recommended nothing further to do if patient is on therapeutic Lovenox   -Continue therapeutic Lovenox for 3 months    #Hypotension  -Improved on Midodrine, monitor closely     #T2DM, HbA1c 6.0  -diet controlled  -monitor    #Dysphagia s/p PEG 2/14  -Puree with thin liquids    #HLD  -Continue lipitor    #GERD  -PPI    #DVT ppx - lovenox

## 2023-03-28 PROCEDURE — 99232 SBSQ HOSP IP/OBS MODERATE 35: CPT

## 2023-03-28 RX ADMIN — DIVALPROEX SODIUM 250 MILLIGRAM(S): 500 TABLET, DELAYED RELEASE ORAL at 17:24

## 2023-03-28 RX ADMIN — PANTOPRAZOLE SODIUM 40 MILLIGRAM(S): 20 TABLET, DELAYED RELEASE ORAL at 14:47

## 2023-03-28 RX ADMIN — MIDODRINE HYDROCHLORIDE 10 MILLIGRAM(S): 2.5 TABLET ORAL at 08:08

## 2023-03-28 RX ADMIN — DONEPEZIL HYDROCHLORIDE 5 MILLIGRAM(S): 10 TABLET, FILM COATED ORAL at 14:47

## 2023-03-28 RX ADMIN — Medication 1 APPLICATION(S): at 17:24

## 2023-03-28 RX ADMIN — Medication 1 APPLICATION(S): at 17:25

## 2023-03-28 RX ADMIN — ESCITALOPRAM OXALATE 10 MILLIGRAM(S): 10 TABLET, FILM COATED ORAL at 08:08

## 2023-03-28 RX ADMIN — Medication 6 MILLIGRAM(S): at 21:21

## 2023-03-28 RX ADMIN — GABAPENTIN 600 MILLIGRAM(S): 400 CAPSULE ORAL at 21:21

## 2023-03-28 RX ADMIN — Medication 1 APPLICATION(S): at 05:12

## 2023-03-28 RX ADMIN — ENOXAPARIN SODIUM 90 MILLIGRAM(S): 100 INJECTION SUBCUTANEOUS at 08:08

## 2023-03-28 RX ADMIN — POLYETHYLENE GLYCOL 3350 17 GRAM(S): 17 POWDER, FOR SOLUTION ORAL at 14:48

## 2023-03-28 RX ADMIN — DIVALPROEX SODIUM 250 MILLIGRAM(S): 500 TABLET, DELAYED RELEASE ORAL at 05:13

## 2023-03-28 RX ADMIN — ENOXAPARIN SODIUM 90 MILLIGRAM(S): 100 INJECTION SUBCUTANEOUS at 21:21

## 2023-03-28 RX ADMIN — ATORVASTATIN CALCIUM 80 MILLIGRAM(S): 80 TABLET, FILM COATED ORAL at 21:21

## 2023-03-28 RX ADMIN — Medication 100 MILLIGRAM(S): at 21:21

## 2023-03-28 RX ADMIN — NYSTATIN CREAM 1 APPLICATION(S): 100000 CREAM TOPICAL at 17:24

## 2023-03-28 RX ADMIN — NYSTATIN CREAM 1 APPLICATION(S): 100000 CREAM TOPICAL at 05:13

## 2023-03-28 NOTE — PROGRESS NOTE ADULT - ASSESSMENT
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 He is  s/p Left decompressive hemicraniectomy and right EVD placement 1/28/23. Hospital course further c/b respiratory failure s/p intubation and extubation,  agitation requiring precedex, sepsis s/p ABX, hypotension requiring bolus, dysphagia s/p PEG 2/14 and trach on 2/13, insomnia s/p seroquel, pain s/p oxycodone and fentanyl. Patient now with Right Hemiparesis, dysphagia s/p PEG, Aphasia, Cognitive deficits, gait Instability, ADL impairments and Functional impairments.    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    - ST: to diagnose and treat deficits in swallowing, cognition and communication.  - Helmet when OOB  - follow up CT head 3/19 reviewed-  Postoperative changes compatible with a left temporal frontal parietal craniectomy is again seen. left frontoparietal hematoma with some improvement.  improved vasogenic edema is seen. Mass effect on the left lateral ventricle and Left-to-right shift (3.1 mm)has now resolved.   - OT– right resting hand splint  - Called Dr Cueva's office--patient has a follow up office visit on 3/28 at 2pm-- they are unsure when his cranioplasty will be, have to submit for authorization next tues.   --obtained administrative approval And social work set up transportation for outpatient neurosurgery appointment-- 12:15p on 3/28    Sleep/Restlessness  --c/w Trazodone  100mg qhs 3/8-- for with sleep and restlessness.   Pt's mother reports he was on 50-100mg qhs and 50mg daytime home meds.   --cont. melatonin  --monitor  --Attending discussed with nursing that patient needs supervision for safety monitoring as pt.  is impulsive, restless and high fall risk       # Agitation - improving  - R/o infection - CXR unremarkable.  UA negative, Labs WNL.  -Psychiatry consult, Dr. Acharya following  --cont. with  depakote 250mg bid  3/24  --check depakote and ammonia level tomorrow AM  -- c/w gabapentin to 600 mg qhs  - Zyprexa 2.5 mg IM daily PRN severe agitation.   - Continue to monitor      Depression/anxiety  --Pt was on Paxil 30mg at home as per mother-- Paxil 10mg started 3/10 but d/c in favor of lexapro  -- cont. Lexapro 10mg  --has been more effective for pt's anxiety/depression  --Continue to monitor  - c/w gabapentin to 600 mg qhs for anxiety    Aphasia  -- c/w Donepezil 5 mg.        Bilateral lower extremity DVTs–  – Lower extremity Doppler from 3/1–right femoral vein DVT and bilateral lower extremity calf DVTs  – spoke with patient's neurosurgery PA–who was in contact with Dr. Cueva patient's neurosurgeon–gave clearance for patient to start therapeutic Lovenox.  --Vascular Surgery consult -- reaached out to Dr. Hodges 3/7 - nothing further to do if patient is on therapeutic lovenox.   -- C/w therapeutic lovenox for 3 months.    Orthostatic hypotension–  -cont. midodrine 10mg   -    #Acute Respiratory Failure  - Intubation and extubation  - s/p Trach 2/13  - Decannulated 3/5, Pulm monitoring, tolerating well    #GI PPX  - Nexium 40mg daily    #DM II  - A1c 6.0  -management as per hospitalist--off insulin     #Pain management  - Tylenol PRN  - Neurontin cw 600 hs --   - Continue to monitor     #Bowel Regimen  - Senna, miralax PRN    #Bladder management  -voiding well with low PVRs,   -- off doxazosin 3/2    #Dysphagia    - s/p PEG 2/14  - Puree with thin liquids-- 1:1 assist with meals  - SLP: evaluation and treatment  - Diet: tolerating bolus TF     #Skin:  - Skin on admission: IAD to bilateral groin and sacrum; RUQ incision with sutures with palpable skull flap; Left hemicraniectomy with sutures MILLER; psoriasis BL shin  - Pressure injury/Skin: Turn Q2hrs while in bed, OOB to Chair, PT/OT     #Precaution  - Fall, Aspiration, seizure    #home meds  - per list provided by mother, [place]: Rousuvastatin 40mg daily and Paroxetine 30mg daily  -  cw paroxetine  - cw high intensity atorvastatin to home med dose    – IDT 3/23:  Social work:  patient on discharge can live with his mother who has a first-floor set up.  Nursing–incontinent of bowel and bladder, total assist with toileting  Speech: Puree/thin diet,  severe language deficits–poor command following, poor orientation, Some spontaneous naming, & gestures, Speech jargon, Aphasia, severe cognitive deficits  OT's: Supervision eating; Mod-Max A UBD, Max A LBD, Total assist with toileting and transfers, restless  PT: standing with Mod A x 2 for 5min--inconsistent; Improved trunk control; Tot A transfers, limited by behavior   goals-- mod assist with transfers, ambulation 10 feet max assist with hemiwalker, wheelchair mobility 50 feet min assist.  ELOS: 4/6 ZOHAIB  Goals:  1.  Improved secretion management and tolerate PMV  2.  transfers with 1 person assist  3.  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 He is  s/p Left decompressive hemicraniectomy and right EVD placement 1/28/23. Hospital course further c/b respiratory failure s/p intubation and extubation,  agitation requiring precedex, sepsis s/p ABX, hypotension requiring bolus, dysphagia s/p PEG 2/14 and trach on 2/13, insomnia s/p seroquel, pain s/p oxycodone and fentanyl. Patient now with Right Hemiparesis, dysphagia s/p PEG, Aphasia, Cognitive deficits, gait Instability, ADL impairments and Functional impairments.    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    - ST: to diagnose and treat deficits in swallowing, cognition and communication.  - Helmet when OOB  - follow up CT head 3/19 reviewed-  Postoperative changes compatible with a left temporal frontal parietal craniectomy is again seen. left frontoparietal hematoma with some improvement.  improved vasogenic edema is seen. Mass effect on the left lateral ventricle and Left-to-right shift (3.1 mm)has now resolved.   - OT– right resting hand splint  - Called Dr Cueva's office--patient has a follow up office visit on 3/28 at 2pm-- they are unsure when his cranioplasty will be, have to submit for authorization next tues.   --obtained administrative approval And social work set up transportation for outpatient neurosurgery appointment-- 12:15p on 3/28    Sleep/Restlessness  --c/w Trazodone  100mg qhs 3/8-- for with sleep and restlessness.   Pt's mother reports he was on 50-100mg qhs and 50mg daytime home meds.   --cont. melatonin  --monitor  --Attending discussed with nursing that patient needs supervision for safety monitoring as pt.  is impulsive, restless and high fall risk       # Agitation - improved  - R/o infection - CXR unremarkable.  UA negative, Labs WNL.  -Psychiatry consult, Dr. Acharya following  --cont. with  depakote 250mg bid  3/24  - depakote level =24 and ammonia level=21 3/27--nontoxic  -- c/w gabapentin to 600 mg qhs  - Zyprexa 2.5 mg IM daily PRN severe agitation.   - Continue to monitor      Depression/anxiety  --Pt was on Paxil 30mg at home as per mother-- Paxil 10mg started 3/10 but d/c in favor of lexapro  -- cont. Lexapro 10mg  --has been more effective for pt's anxiety/depression  --Continue to monitor  - c/w gabapentin to 600 mg qhs for anxiety    Aphasia  -- c/w Donepezil 5 mg.    -trial of mematine 5mg BID-- start 3/28--Monitor    Bilateral lower extremity DVTs–  – Lower extremity Doppler from 3/1–right femoral vein DVT and bilateral lower extremity calf DVTs  – spoke with patient's neurosurgery PA–who was in contact with Dr. Cueva patient's neurosurgeon–gave clearance for patient to start therapeutic Lovenox.  --Vascular Surgery consult -- reaached out to Dr. Hodges 3/7 - nothing further to do if patient is on therapeutic lovenox.   -- C/w therapeutic lovenox for 3 months.    Orthostatic hypotension–  -cont. midodrine 10mg     #Acute Respiratory Failure  - Intubation and extubation  - s/p Trach 2/13  - Decannulated 3/5, Pulm monitoring, tolerating well    #GI PPX  - Nexium 40mg daily    #DM II  - A1c 6.0  -management as per hospitalist--off insulin     #Pain management  - Tylenol PRN  - Neurontin cw 600 hs --   - Continue to monitor     #Bowel Regimen  - Senna, miralax PRN    #Bladder management  -voiding well with low PVRs,   -- off doxazosin 3/2    #Dysphagia    - s/p PEG 2/14  - Puree with thin liquids-- 1:1 assist with meals  - SLP: evaluation and treatment  - Diet: tolerating bolus TF     #Skin:  - Skin on admission: IAD to bilateral groin and sacrum; RUQ incision with sutures with palpable skull flap; Left hemicraniectomy with sutures MILLER; psoriasis BL shin  - Pressure injury/Skin: Turn Q2hrs while in bed, OOB to Chair, PT/OT     #Precaution  - Fall, Aspiration, seizure    #home meds  - per list provided by  [place]: Rousuvastatin 40mg daily and Paroxetine 30mg daily  -  cw paroxetine  - cw high intensity atorvastatin to home med dose    – IDT 3/23:  Social work:  patient on discharge can live with his mother who has a first-floor set up.  Nursing–incontinent of bowel and bladder, total assist with toileting  Speech: Puree/thin diet,  severe language deficits–poor command following, poor orientation, Some spontaneous naming, & gestures, Speech jargon, Aphasia, severe cognitive deficits  OT's: Supervision eating; Mod-Max A UBD, Max A LBD, Total assist with toileting and transfers, restless  PT: standing with Mod A x 2 for 5min--inconsistent; Improved trunk control; Tot A transfers, limited by behavior   goals-- mod assist with transfers, ambulation 10 feet max assist with hemiwalker, wheelchair mobility 50 feet min assist.  ELOS: 4/6 ZOHAIB  Goals:  1.  Improved secretion management and tolerate PMV  2.  transfers with 1 person assist  3.  Communicate basic wants and needs with yes no answers

## 2023-03-28 NOTE — BH CONSULTATION LIAISON PROGRESS NOTE - NSBHCHARTREVIEWVS_PSY_A_CORE FT
Vital Signs Last 24 Hrs  T(C): 36.7 (28 Mar 2023 08:14), Max: 36.7 (27 Mar 2023 20:34)  T(F): 98.1 (28 Mar 2023 08:14), Max: 98.1 (28 Mar 2023 08:14)  HR: 90 (28 Mar 2023 08:14) (78 - 90)  BP: 107/64 (28 Mar 2023 08:14) (100/62 - 107/64)  BP(mean): --  RR: 16 (28 Mar 2023 08:14) (16 - 17)  SpO2: 96% (28 Mar 2023 08:14) (96% - 96%)    Parameters below as of 28 Mar 2023 08:14  Patient On (Oxygen Delivery Method): room air    
Vital Signs Last 24 Hrs  T(C): 36.6 (24 Mar 2023 08:19), Max: 36.6 (24 Mar 2023 08:19)  T(F): 97.8 (24 Mar 2023 08:19), Max: 97.8 (24 Mar 2023 08:19)  HR: 96 (24 Mar 2023 11:43) (88 - 134)  BP: 116/73 (24 Mar 2023 11:43) (103/72 - 164/90)  BP(mean): --  RR: 15 (24 Mar 2023 11:43) (15 - 17)  SpO2: 97% (24 Mar 2023 11:43) (94% - 97%)    Parameters below as of 24 Mar 2023 11:43  Patient On (Oxygen Delivery Method): room air

## 2023-03-28 NOTE — BH CONSULTATION LIAISON PROGRESS NOTE - NSICDXBHPRIMARYDX_PSY_ALL_CORE
Major neurocognitive disorder due to multiple etiologies, with agitation   F02.811  
Major neurocognitive disorder due to multiple etiologies, with agitation   F02.811

## 2023-03-28 NOTE — BH CONSULTATION LIAISON PROGRESS NOTE - NSICDXBHSECONDARYDX_PSY_ALL_CORE
Nontraumatic cortical hemorrhage of left cerebral hemisphere   I61.1  
Nontraumatic cortical hemorrhage of left cerebral hemisphere   I61.1

## 2023-03-28 NOTE — BH CONSULTATION LIAISON PROGRESS NOTE - NSBHFUPINTERVALHXFT_PSY_A_CORE
HPI:   This is 42 year old male with PMH of prior substance use, HTN, anxiety who presented to [facility] on 1/23. On 1/23 morning, he told his family that he was not feeling well and took 2 tabs of Vyvanse (usually takes as needed). Shortly after, he was at a group therapy meeting over the phone when he complained to his father he was having a severe headache. He then became more lethargic, and developed nausea and vomited. He was brought to ED by his father. On arrival, noted to be lethargic, vomiting, with garbled speech. 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 Post CT, more lethargic with dilated right pupil and bilateral reactive but sluggish pupils. He was given mannitol and intubated for airway protection and was taken to neuro ICU.    On 1/25, HCT with enlarging R lateral ventricle and slightly worsening MLS concerning for trapped ventricle. 1/27 acute drop in SpO2 on 1/27, lavaged and suctioned out thick clot. Placed back on ACVC and FiO2 weaned from 70% to 40% overnight. On 1/28/23, he had episode of emesis, & fever of 100.3. He was  given tylenol with improvement in temperature but still little to no movement on the L side (previously would localize arm to suction and spontaneously bend L leg). Pt. Had left decompressive hemicraniectomy and right EVD  placed.    After the procedure, patient opened eyes for the first time and regained LUE/LLE movement. As he became febrile again this was less prominent. Ceftriaxone was switched to Zosyn. 1/29 HCT mild increase extraaxial fluid collection. on 1/30, repeat  HCT unchanged, his MTL was discontinued. Zosyn and vancomycin switched to cefepime. On 1/31,  HCT showed worsening edema +/- extraaxial fluid collection, elevated ICPs, and mannitol was restarted.      MTL was weaned,  EVD adjusted and eventually removed on 2/10.  Hospital course further c/b agitation requiring Precedex, hypotension requiring bolus, dysphagia s/p PEG 2/14  and trach on 2/13, insomnia s/p Seroquel, pain s/p oxycodone and fentanyl. He underwent wound revision with NSGY and plastics on 2/17. Keppra discontinued due to signs of agitation. Tolerated trach collar.     Patient was evaluated by PM&R and therapy for functional deficits, gait/ADL impairments and acute rehabilitation was recommended. Patient was medically optimized for discharge to [facility] IRU on 2/28/23.     (28 Feb 2023 13:10)      Psychiatry C/L Note: Asked to see this patient who is a 43 year old male who  presents with a brain injury. Pt with pre morbid psychiatric history, I stop checked ref # [id number]. Pt prescribed Valium 12/28/2022 10 mg # 120 , and Suboxone 8/2 12/24/2022 # 60, by Dr. Shaunna Huitron. Of note Vyvanse is not on the I stop  registry.  As per [facility] PROS program,  that provider retired and no longer works at the agency, message left for collateral information. Pt yesterday was acutely agitated and profane, throwing things in his room, CODE Lowe initiated, pt received rescue medication. Pt calmer today, writer introduced herself to the patient with the attending Dr. Talavera present, with whom patient has a good rapport. Pt appears frustrated specifically about his verbal aphasia, pt can produce yes and no, but not consistently with questions, communicates with gestures at times, and facial expressions. Rest of psychiatric ROS unable to be obtained verbally, but would need to be noted with observation. Pt does not appear to be delirious, or monitoring internal stimuli, or suffering from hallucinations.  (23 Mar 2023 12:55)    Psychiatry Interval Note: Pt seen and evaluated today, no new changes overnight. Pt has been less agitated, still demonstrates poor frustration tolerance.   As per staff, did ok in therapies today, but was profane with his sister when visiting and his mother on the phone. Pt may have these words present as a form of disinhibition and "automatic" production when frustrated with his aphasia. Pt ordered Olanzapine, but at the time of this note was able to be redirected and did not require rescue medication. Case d/w both neuropsychologist and  attending Dr. Talavera.   
This is 42 year old male with PMH of prior substance use, HTN, anxiety who presented to [facility] on 1/23. On 1/23 morning, he told his family that he was not feeling well and took 2 tabs of Vyvanse (usually takes as needed). Shortly after, he was at a group therapy meeting over the phone when he complained to his father he was having a severe headache. He then became more lethargic, and developed nausea and vomited. He was brought to ED by his father. On arrival, noted to be lethargic, vomiting, with garbled speech. 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 Post CT, more lethargic with dilated right pupil and bilateral reactive but sluggish pupils. He was given mannitol and intubated for airway protection and was taken to neuro ICU.    On 1/25, HCT with enlarging R lateral ventricle and slightly worsening MLS concerning for trapped ventricle. 1/27 acute drop in SpO2 on 1/27, lavaged and suctioned out thick clot. Placed back on ACVC and FiO2 weaned from 70% to 40% overnight. On 1/28/23, he had episode of emesis, & fever of 100.3. He was  given tylenol with improvement in temperature but still little to no movement on the L side (previously would localize arm to suction and spontaneously bend L leg). Pt. Had left decompressive hemicraniectomy and right EVD  placed.    After the procedure, patient opened eyes for the first time and regained LUE/LLE movement. As he became febrile again this was less prominent. Ceftriaxone was switched to Zosyn. 1/29 HCT mild increase extraaxial fluid collection. on 1/30, repeat  HCT unchanged, his MTL was discontinued. Zosyn and vancomycin switched to cefepime. On 1/31,  HCT showed worsening edema +/- extraaxial fluid collection, elevated ICPs, and mannitol was restarted.      MTL was weaned,  EVD adjusted and eventually removed on 2/10.  Hospital course further c/b agitation requiring Precedex, hypotension requiring bolus, dysphagia s/p PEG 2/14  and trach on 2/13, insomnia s/p Seroquel, pain s/p oxycodone and fentanyl. He underwent wound revision with NSGY and plastics on 2/17. Keppra discontinued due to signs of agitation. Tolerated trach collar.     Patient was evaluated by PM&R and therapy for functional deficits, gait/ADL impairments and acute rehabilitation was recommended. Patient was medically optimized for discharge to [facility] IRU on 2/28/23.     (28 Feb 2023 13:10)      Psychiatry C/L Note: Asked to see this patient who is a 43 year old male who  presents with a brain injury. Pt with pre morbid psychiatric history, I stop checked ref # [id number]. Pt prescribed Valium 12/28/2022 10 mg # 120 , and Suboxone 8/2 12/24/2022 # 60, by Dr. Shaunna Huitron. Of note Vyvanse is not on the I stop  registry.  As per [facility] PROS program,  that provider retired and no longer works at the agency, message left for collateral information. Pt yesterday was acutely agitated and profane, throwing things in his room, CODE Lowe initiated, pt received rescue medication. Pt calmer today, writer introduced herself to the patient with the attending Dr. Talavera present, with whom patient has a good rapport. Pt appears frustrated specifically about his verbal aphasia, pt can produce yes and no, but not consistently with questions, communicates with gestures at times, and facial expressions. Rest of psychiatric ROS unable to be obtained verbally, but would need to be noted with observation. Pt does not appear to be delirious, or monitoring internal stimuli, or suffering from hallucinations.  (23 Mar 2023 12:55)      Psychiatry C/L Note: Pt seen and evaluated today, appears frustrated with his aphasia, however is indicating more with his glances and intonation of speech.   He wanted writer to look at the pictures of his family members , and stated " I want to get out of here", was not irate, but matter of fact , as if family were motivation to recover. He has been less agitated , no recent episodes of combativeness, psych medications adjusted as per recommendations/discussions with attending of record. Rest of psychiatric ROS as documented below. Message left again at [facility] for clarification of psychiatric diagnosis. Writer spoke with Ms. Erickson Gong of [facility] - who has been collaborating with case management here.

## 2023-03-28 NOTE — PROGRESS NOTE ADULT - SUBJECTIVE AND OBJECTIVE BOX
SUBJECTIVE/OBJECTIVE- Patient seen and examined. No acute overnight events, slept well. Mood and behavior improved.    REVIEW OF SYSTEMS  + neurological deficits  +cognitive deficits     VITALS  43y  Vital Signs Last 24 Hrs  T(C): 36.7 (28 Mar 2023 08:14), Max: 36.7 (27 Mar 2023 20:34)  T(F): 98.1 (28 Mar 2023 08:14), Max: 98.1 (28 Mar 2023 08:14)  HR: 90 (28 Mar 2023 08:14) (78 - 90)  BP: 107/64 (28 Mar 2023 08:14) (100/62 - 107/64)  RR: 16 (28 Mar 2023 08:14) (16 - 17)  SpO2: 96% (28 Mar 2023 08:14) (96% - 96%)    Parameters below as of 28 Mar 2023 08:14  Patient On (Oxygen Delivery Method): room air      Daily     Daily     PHYSICAL EXAM:   Constitutional -intermittently restless   HEENT - EOMI, left hemicraniectomy, sutures removed 3/7- helmet in place    Neck - s/p trach decannulation (guaze over stoma)  Chest - breathing comfortably on RA  Cardio -warm, well perfused  Abdomen -  Soft, NTND, +PEG, Bone flap marsupialized in epigastric region.    Extremities - No peripheral edema, No calf tenderness   Neurologic Exam:                    Cognitive -             Orientation: Awake, Alert; aphasia and cognitive deficits - inconsistent yes/no            Communication-  command following improving but inconsistent, Non-fluent, able to repeat- but impaired.            Attention:  Impaired;        Cranial Nerves - Limited due to  aphasia; No facial asymmetry, Tongue midline, EOMI, Pupils dilated but reactive, +dysphagia     Motor -   wearing R, palm protector                    LEFT    UE - 5/5                    RIGHT UE - ShAB 0/5, EF 0/5, EE 0/5, WE 0/5,  0/5--                     LEFT    LE - 5/5                    RIGHT LE - HE 2/5, Hip adductor 1-2/5, KE 2/5, DF 0/5, PF 0/5     Tone: MAS 2/4 finger flexors and wrist right,      Sensory - decrease sensation to LT-- RUE     Coordination - FTN / HTS impaired on right  Psychiatric - depressed, frustrated.  Restless at times    RECENT LABS:                        13.9   7.68  )-----------( 287      ( 27 Mar 2023 06:10 )             43.2     03-27    139  |  103  |  4<L>  ----------------------------<  158<H>  3.9   |  29  |  0.63    Ca    9.3      27 Mar 2023 06:10    TPro  7.3  /  Alb  3.3  /  TBili  0.4  /  DBili  x   /  AST  8<L>  /  ALT  23  /  AlkPhos  118  03-27    LIVER FUNCTIONS - ( 27 Mar 2023 06:10 )  Alb: 3.3 g/dL / Pro: 7.3 g/dL / ALK PHOS: 118 U/L / ALT: 23 U/L / AST: 8 U/L / GGT: x               MEDICATIONS:  MEDICATIONS  (STANDING):  AQUAPHOR (petrolatum Ointment) 1 Application(s) Topical two times a day  AQUAPHOR (petrolatum Ointment) 1 Application(s) Topical two times a day  atorvastatin 80 milliGRAM(s) Oral at bedtime  bacitracin   Ointment 1 Application(s) Topical two times a day  bacitracin   Ointment 1 Application(s) Topical daily  divalproex Sprinkle 250 milliGRAM(s) Oral two times a day  donepezil 5 milliGRAM(s) Oral daily  enoxaparin Injectable 90 milliGRAM(s) SubCutaneous <User Schedule>  escitalopram 10 milliGRAM(s) Oral <User Schedule>  gabapentin 600 milliGRAM(s) Oral at bedtime  hydrocortisone 1% Cream 1 Application(s) Topical two times a day  melatonin 6 milliGRAM(s) Oral at bedtime  midodrine. 10 milliGRAM(s) Oral <User Schedule>  nystatin Powder 1 Application(s) Topical two times a day  pantoprazole   Suspension 40 milliGRAM(s) Oral daily  polyethylene glycol 3350 17 Gram(s) Oral daily  senna Syrup 5 milliLiter(s) Oral at bedtime  traZODone 100 milliGRAM(s) Oral at bedtime    MEDICATIONS  (PRN):  acetaminophen    Suspension .. 650 milliGRAM(s) Oral every 6 hours PRN Temp greater or equal to 38C (100.4F), Mild Pain (1 - 3)  OLANZapine Injectable 2.5 milliGRAM(s) IntraMuscular daily PRN severe agitation

## 2023-03-28 NOTE — BH CONSULTATION LIAISON PROGRESS NOTE - CURRENT MEDICATION
MEDICATIONS  (STANDING):  AQUAPHOR (petrolatum Ointment) 1 Application(s) Topical two times a day  AQUAPHOR (petrolatum Ointment) 1 Application(s) Topical two times a day  atorvastatin 80 milliGRAM(s) Oral at bedtime  bacitracin   Ointment 1 Application(s) Topical two times a day  bacitracin   Ointment 1 Application(s) Topical daily  divalproex Sprinkle 250 milliGRAM(s) Oral two times a day  donepezil 5 milliGRAM(s) Oral daily  enoxaparin Injectable 90 milliGRAM(s) SubCutaneous <User Schedule>  escitalopram 10 milliGRAM(s) Oral <User Schedule>  gabapentin 600 milliGRAM(s) Oral at bedtime  hydrocortisone 1% Cream 1 Application(s) Topical two times a day  melatonin 6 milliGRAM(s) Oral at bedtime  midodrine. 10 milliGRAM(s) Oral <User Schedule>  nystatin Powder 1 Application(s) Topical two times a day  pantoprazole   Suspension 40 milliGRAM(s) Oral daily  polyethylene glycol 3350 17 Gram(s) Oral daily  senna Syrup 5 milliLiter(s) Oral at bedtime  traZODone 100 milliGRAM(s) Oral at bedtime    MEDICATIONS  (PRN):  acetaminophen    Suspension .. 650 milliGRAM(s) Oral every 6 hours PRN Temp greater or equal to 38C (100.4F), Mild Pain (1 - 3)  OLANZapine Injectable 2.5 milliGRAM(s) IntraMuscular daily PRN severe agitation

## 2023-03-28 NOTE — BH CONSULTATION LIAISON PROGRESS NOTE - OTHER
relatedness improving , seems to do better with male PCA staff.  neutral when seen, to attempting to smile and engage.  estimation pre-morbidly.

## 2023-03-28 NOTE — BH CONSULTATION LIAISON PROGRESS NOTE - NSBHATTESTBILLING_PSY_A_CORE
39102-[id number] - moderate complexity OR 30-39 mins
28441-[id number] OBS or IP - moderate complexity OR 35-49 mins

## 2023-03-28 NOTE — PROGRESS NOTE ADULT - ATTENDING COMMENTS
Pt. seen with resident.  Agree with documentation above as per resident with amendments made as appropriate. Patient medically stable. Making progress towards rehab goals.     Left ICH s/p hemicraniectomy  --NSG appt. today.   -trial of mematine 5mg BID-- start 3/28--Monitor

## 2023-03-28 NOTE — BH CONSULTATION LIAISON PROGRESS NOTE - NSBHASSESSMENTFT_PSY_ALL_CORE
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 He is  s/p Left decompressive hemicraniectomy and right EVD placement 1/28/23. Hospital course further c/b respiratory failure s/p intubation and extubation,  agitation requiring Precedex, sepsis s/p ABX, hypotension requiring bolus, dysphagia s/p PEG 2/14 and trach on 2/13, insomnia s/p Seroquel, pain s/p oxycodone and fentanyl. Patient now with Right Hemiparesis, dysphagia s/p PEG, Aphasia, Cognitive deficits, gait Instability, ADL impairments and Functional impairments.    Psychiatry called to see patient due to agitation , yesterday, received Olanzapine IM due to profane language, attempting to get out of bed, combativeness.   Today still has poor frustration tolerance, but is able to be redirected.     Med changes reviewed ,and discussed with attending of record. 
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 He is  s/p Left decompressive hemicraniectomy and right EVD placement 1/28/23. Hospital course further c/b respiratory failure s/p intubation and extubation,  agitation requiring Precedex, sepsis s/p ABX, hypotension requiring bolus, dysphagia s/p PEG 2/14 and trach on 2/13, insomnia s/p Seroquel, pain s/p oxycodone and fentanyl. Patient now with Right Hemiparesis, dysphagia s/p PEG, Aphasia, Cognitive deficits, gait Instability, ADL impairments and Functional impairments.    Psychiatry called to see patient due to agitation, medications adjusted , Memantine started, mood state appears improved, pt tolerating medications.

## 2023-03-28 NOTE — BH CONSULTATION LIAISON PROGRESS NOTE - NSBHCONSULTRECOMMENDOTHER_PSY_A_CORE FT
Continue Depakote, Trazodone and Lexapro.   However, these three meds can lower sodium , monitor BMP while on this combination. 
continue Memantine, Olanzapine PRN , Depakote, Gabapentin, and Aricept as per treatment team.     If rash persists consider stopping VPA, now that he is responding to Memantine.   Psychiatry will follow as needed.   Continue to have case coordinator work with staff at [facility] regarding outstanding legal issues.

## 2023-03-29 PROCEDURE — 99232 SBSQ HOSP IP/OBS MODERATE 35: CPT

## 2023-03-29 RX ORDER — MEMANTINE HYDROCHLORIDE 10 MG/1
5 TABLET ORAL
Refills: 0 | Status: DISCONTINUED | OUTPATIENT
Start: 2023-03-29 | End: 2023-04-06

## 2023-03-29 RX ORDER — HYDROCORTISONE 1 %
1 OINTMENT (GRAM) TOPICAL
Refills: 0 | Status: DISCONTINUED | OUTPATIENT
Start: 2023-03-29 | End: 2023-03-31

## 2023-03-29 RX ADMIN — Medication 1 APPLICATION(S): at 17:13

## 2023-03-29 RX ADMIN — Medication 1 APPLICATION(S): at 12:40

## 2023-03-29 RX ADMIN — Medication 1 APPLICATION(S): at 17:19

## 2023-03-29 RX ADMIN — Medication 1 APPLICATION(S): at 05:35

## 2023-03-29 RX ADMIN — ESCITALOPRAM OXALATE 10 MILLIGRAM(S): 10 TABLET, FILM COATED ORAL at 08:29

## 2023-03-29 RX ADMIN — Medication 1 APPLICATION(S): at 05:36

## 2023-03-29 RX ADMIN — PANTOPRAZOLE SODIUM 40 MILLIGRAM(S): 20 TABLET, DELAYED RELEASE ORAL at 12:38

## 2023-03-29 RX ADMIN — MEMANTINE HYDROCHLORIDE 5 MILLIGRAM(S): 10 TABLET ORAL at 17:09

## 2023-03-29 RX ADMIN — ENOXAPARIN SODIUM 90 MILLIGRAM(S): 100 INJECTION SUBCUTANEOUS at 09:07

## 2023-03-29 RX ADMIN — DIVALPROEX SODIUM 250 MILLIGRAM(S): 500 TABLET, DELAYED RELEASE ORAL at 05:36

## 2023-03-29 RX ADMIN — DIVALPROEX SODIUM 250 MILLIGRAM(S): 500 TABLET, DELAYED RELEASE ORAL at 17:09

## 2023-03-29 RX ADMIN — POLYETHYLENE GLYCOL 3350 17 GRAM(S): 17 POWDER, FOR SOLUTION ORAL at 12:38

## 2023-03-29 RX ADMIN — Medication 1 APPLICATION(S): at 17:15

## 2023-03-29 RX ADMIN — Medication 1 APPLICATION(S): at 17:20

## 2023-03-29 RX ADMIN — DONEPEZIL HYDROCHLORIDE 5 MILLIGRAM(S): 10 TABLET, FILM COATED ORAL at 12:38

## 2023-03-29 RX ADMIN — NYSTATIN CREAM 1 APPLICATION(S): 100000 CREAM TOPICAL at 17:14

## 2023-03-29 RX ADMIN — MIDODRINE HYDROCHLORIDE 10 MILLIGRAM(S): 2.5 TABLET ORAL at 08:30

## 2023-03-29 RX ADMIN — ENOXAPARIN SODIUM 90 MILLIGRAM(S): 100 INJECTION SUBCUTANEOUS at 21:36

## 2023-03-29 RX ADMIN — GABAPENTIN 600 MILLIGRAM(S): 400 CAPSULE ORAL at 21:34

## 2023-03-29 RX ADMIN — Medication 100 MILLIGRAM(S): at 21:35

## 2023-03-29 RX ADMIN — NYSTATIN CREAM 1 APPLICATION(S): 100000 CREAM TOPICAL at 05:35

## 2023-03-29 RX ADMIN — Medication 6 MILLIGRAM(S): at 21:34

## 2023-03-29 RX ADMIN — ATORVASTATIN CALCIUM 80 MILLIGRAM(S): 80 TABLET, FILM COATED ORAL at 21:35

## 2023-03-29 NOTE — PROGRESS NOTE ADULT - SUBJECTIVE AND OBJECTIVE BOX
SUBJECTIVE/ROS- Patient seen and evaluate while at bedside - transferring with nursing from WC to bed. Mood is good, easily agitated.  Not consistent with yes/no - looks comfortable, denies CP, SOB, HA, abd pain. LBM today.  Intermittent right sided (UE and LE) pain to touch.    VITALS  43y  Vital Signs Last 24 Hrs  T(C): 36.5 (03-29-23 @ 08:39), Max: 36.5 (03-29-23 @ 08:39)  T(F): 97.7 (03-29-23 @ 08:39), Max: 97.7 (03-29-23 @ 08:39)  HR: 92 (03-29-23 @ 08:39) (89 - 92)  BP: 115/77 (03-29-23 @ 08:39) (92/54 - 115/77)  RR: 16 (03-29-23 @ 08:39) (15 - 16)  SpO2: 98% (03-29-23 @ 08:39) (98% - 98%)    PHYSICAL EXAM:   Constitutional - restless, easily agitated  HEENT - EOMI, left hemicraniectomy (sutures removed)- helmet in place while out of bed  Neck - s/p trach decannulation (guaze over stoma)  Chest - resp nonlabored  Cardio -warm, well perfused, no cyanosis or pedal edema  Abdomen -  Soft, NTND, +PEG, Bone flap marsupialized in epigastric region.    Extremities - No peripheral edema, No calf tenderness   Neurologic Exam:                    Cognitive -             Orientation: Awake, Alert; aphasia and cognitive deficits - inconsistent yes/no            Communication-  command following improving but inconsistent, Non-fluent, able to repeat- but impaired.            Attention:  Impaired;        Cranial Nerves - Limited due to  aphasia; No facial asymmetry, Tongue midline, EOMI, Pupils dilated but reactive, +dysphagia     Motor -   wearing R, palm protector                    LEFT    UE - 5/5                    RIGHT UE - ShAB 0/5, EF 0/5, EE 0/5, WE 0/5,  0/5--                     LEFT    LE - 5/5                    RIGHT LE - HE 2/5, Hip adductor 1-2/5, KE 2/5, DF 0/5, PF 0/5     Tone: MAS 2/4 finger flexors, wrist right, elbow extensors     Sensory - decrease sensation to LT-- RUE  Psychiatric - Restless, easily agitated and frustrated    RECENT LABS:                        13.9   7.68  )-----------( 287      ( 27 Mar 2023 06:10 )             43.2     03-27    139  |  103  |  4<L>  ----------------------------<  158<H>  3.9   |  29  |  0.63    Ca    9.3      27 Mar 2023 06:10    TPro  7.3  /  Alb  3.3  /  TBili  0.4  /  DBili  x   /  AST  8<L>  /  ALT  23  /  AlkPhos  118  03-27    LIVER FUNCTIONS - ( 27 Mar 2023 06:10 )  Alb: 3.3 g/dL / Pro: 7.3 g/dL / ALK PHOS: 118 U/L / ALT: 23 U/L / AST: 8 U/L / GGT: x             MEDICATIONS  (STANDING):  AQUAPHOR (petrolatum Ointment) 1 Application(s) Topical two times a day  AQUAPHOR (petrolatum Ointment) 1 Application(s) Topical two times a day  atorvastatin 80 milliGRAM(s) Oral at bedtime  bacitracin   Ointment 1 Application(s) Topical two times a day  bacitracin   Ointment 1 Application(s) Topical daily  divalproex Sprinkle 250 milliGRAM(s) Oral two times a day  donepezil 5 milliGRAM(s) Oral daily  enoxaparin Injectable 90 milliGRAM(s) SubCutaneous <User Schedule>  escitalopram 10 milliGRAM(s) Oral <User Schedule>  gabapentin 600 milliGRAM(s) Oral at bedtime  hydrocortisone 1% Cream 1 Application(s) Topical two times a day  melatonin 6 milliGRAM(s) Oral at bedtime  midodrine. 10 milliGRAM(s) Oral <User Schedule>  nystatin Powder 1 Application(s) Topical two times a day  pantoprazole   Suspension 40 milliGRAM(s) Oral daily  polyethylene glycol 3350 17 Gram(s) Oral daily  senna Syrup 5 milliLiter(s) Oral at bedtime  traZODone 100 milliGRAM(s) Oral at bedtime    MEDICATIONS  (PRN):  acetaminophen    Suspension .. 650 milliGRAM(s) Oral every 6 hours PRN Temp greater or equal to 38C (100.4F), Mild Pain (1 - 3)  OLANZapine Injectable 2.5 milliGRAM(s) IntraMuscular daily PRN severe agitation

## 2023-03-29 NOTE — PROGRESS NOTE ADULT - ASSESSMENT
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 He is  s/p Left decompressive hemicraniectomy and right EVD placement 1/28/23. Hospital course further c/b respiratory failure s/p intubation and extubation,  agitation requiring precedex, sepsis s/p ABX, hypotension requiring bolus, dysphagia s/p PEG 2/14 and trach on 2/13, insomnia s/p seroquel, pain s/p oxycodone and fentanyl. Patient now with Right Hemiparesis, dysphagia s/p PEG, Aphasia, Cognitive deficits, gait Instability, ADL impairments and Functional impairments.    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    - ST: to diagnose and treat deficits in swallowing, cognition and communication.  - Helmet when OOB  - follow up CT head 3/19 reviewed-  Postoperative changes compatible with a left temporal frontal parietal craniectomy is again seen. left frontoparietal hematoma with some improvement.  improved vasogenic edema is seen. Mass effect on the left lateral ventricle and Left-to-right shift (3.1 mm)has now resolved.   - OT– right resting hand splint  - Called Dr Cueva's office--patient has a follow up office visit on 3/28 at 2pm-- for cranioplasty*****     Sleep/Restlessness  --c/w Trazodone  100mg qhs 3/8-- for with sleep and restlessness.   Pt's mother reports he was on 50-100mg qhs and 50mg daytime home meds.   --cont. melatonin  --monitor  --Attending discussed with nursing that patient needs supervision for safety monitoring as pt.  is impulsive, restless and high fall risk     # Agitation   - R/o infection - CXR unremarkable.  UA negative, Labs WNL.  -Psychiatry consult, Dr. Acharya following  --cont. with  depakote 250mg bid  3/24  - depakote level =24 and ammonia level=21 3/27--nontoxic  -- c/w gabapentin to 600 mg qhs  - Zyprexa 2.5 mg IM daily PRN severe agitation.   - Continue to monitor    Depression/anxiety  --Pt was on Paxil 30mg at home as per mother-- Paxil 10mg started 3/10 but d/c in favor of lexapro  -- cont. Lexapro 10mg  --has been more effective for pt's anxiety/depression  --Continue to monitor  - c/w gabapentin to 600 mg qhs for anxiety    Aphasia  -- c/w Donepezil 5 mg.    -trial of mematine 5mg BID-- start 3/29--Monitor    Bilateral lower extremity DVTs–  – Lower extremity Doppler from 3/1–right femoral vein DVT and bilateral lower extremity calf DVTs  – spoke with patient's neurosurgery PA–who was in contact with Dr. Cueva patient's neurosurgeon–gave clearance for patient to start therapeutic Lovenox.  --Vascular Surgery consult -- reaached out to Dr. Hodges 3/7 - nothing further to do if patient is on therapeutic lovenox.   -- C/w therapeutic lovenox for 3 months.    Orthostatic hypotension–  -cont. midodrine 10mg     #Acute Respiratory Failure  - Intubation and extubation  - s/p Trach 2/13  - Decannulated 3/5, Pulm monitoring, tolerating well    #GI PPX  - Nexium 40mg daily    #DM II  - A1c 6.0  -management as per hospitalist--off insulin     #Pain management  - Tylenol PRN  - Neurontin cw 600 hs --   - Continue to monitor     #Bowel Regimen  - Senna, miralax PRN    #Bladder management  -voiding well with low PVRs,   -- off doxazosin 3/2    #Dysphagia    - s/p PEG 2/14  - Puree with thin liquids-- 1:1 assist with meals  - SLP: evaluation and treatment  - Diet: tolerating bolus TF     #Skin:  - Skin on admission: IAD to bilateral groin and sacrum; RUQ incision with sutures with palpable skull flap; Left hemicraniectomy with sutures MILLER; psoriasis BL shin  - Pressure injury/Skin: Turn Q2hrs while in bed, OOB to Chair, PT/OT     #Precaution  - Fall, Aspiration, seizure    #home meds  - per list provided by mother, [place]: Rousuvastatin 40mg daily and Paroxetine 30mg daily  -  cw paroxetine  - cw high intensity atorvastatin to home med dose    – IDT 3/23:  Social work:  patient on discharge can live with his mother who has a first-floor set up.  Nursing–incontinent of bowel and bladder, total assist with toileting  Speech: Puree/thin diet,  severe language deficits–poor command following, poor orientation, Some spontaneous naming, & gestures, Speech jargon, Aphasia, severe cognitive deficits  OT's: Supervision eating; Mod-Max A UBD, Max A LBD, Total assist with toileting and transfers, restless  PT: standing with Mod A x 2 for 5min--inconsistent; Improved trunk control; Tot A transfers, limited by behavior   goals-- mod assist with transfers, ambulation 10 feet max assist with hemiwalker, wheelchair mobility 50 feet min assist.  ELOS: 4/6 ZOHAIB  Goals:  1.  Improved secretion management and tolerate PMV  2.  transfers with 1 person assist  3.  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 He is  s/p Left decompressive hemicraniectomy and right EVD placement 1/28/23. Hospital course further c/b respiratory failure s/p intubation and extubation,  agitation requiring precedex, sepsis s/p ABX, hypotension requiring bolus, dysphagia s/p PEG 2/14 and trach on 2/13, insomnia s/p seroquel, pain s/p oxycodone and fentanyl. Patient now with Right Hemiparesis, dysphagia s/p PEG, Aphasia, Cognitive deficits, gait Instability, ADL impairments and Functional impairments.    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    - ST: to diagnose and treat deficits in swallowing, cognition and communication.  - Helmet when OOB  - follow up CT head 3/19 reviewed-  Postoperative changes compatible with a left temporal frontal parietal craniectomy is again seen. left frontoparietal hematoma with some improvement.  improved vasogenic edema is seen. Mass effect on the left lateral ventricle and Left-to-right shift (3.1 mm)has now resolved.   - OT– right resting hand splint  - Called Dr Cueva's office--patient has a follow up office visit on 3/28 at 2pm-- for cranioplasty*****     Sleep/Restlessness  --c/w Trazodone  100mg qhs 3/8-- for with sleep and restlessness.   Pt's mother reports he was on 50-100mg qhs and 50mg daytime home meds.   --cont. melatonin  --monitor  --Attending discussed with nursing that patient needs supervision for safety monitoring as pt.  is impulsive, restless and high fall risk     # Agitation   - R/o infection - CXR unremarkable.  UA negative, Labs WNL.  -Psychiatry consult, Dr. Acharya following  --cont. with  depakote 250mg bid  3/24  - depakote level =24 and ammonia level=21 3/27--nontoxic  -- c/w gabapentin to 600 mg qhs  - Zyprexa 2.5 mg IM daily PRN severe agitation.   - Continue to monitor    Depression/anxiety  --Pt was on Paxil 30mg at home as per mother-- Paxil 10mg started 3/10 but d/c in favor of lexapro  -- cont. Lexapro 10mg  --has been more effective for pt's anxiety/depression  --Continue to monitor  - c/w gabapentin to 600 mg qhs for anxiety    Aphasia  -- c/w Donepezil 5 mg.    -trial of mematine 5mg BID-- start 3/29--Monitor    Bilateral lower extremity DVTs–  – Lower extremity Doppler from 3/1–right femoral vein DVT and bilateral lower extremity calf DVTs  – spoke with patient's neurosurgery PA–who was in contact with Dr. Cueva patient's neurosurgeon–gave clearance for patient to start therapeutic Lovenox.  --Vascular Surgery consult -- reaached out to Dr. Hodges 3/7 - nothing further to do if patient is on therapeutic lovenox.   -- C/w therapeutic lovenox for 3 months.    Orthostatic hypotension–  -cont. midodrine 10mg     #Acute Respiratory Failure  - Intubation and extubation  - s/p Trach 2/13  - Decannulated 3/5, Pulm monitoring, tolerating well    #GI PPX  - Nexium 40mg daily    #DM II  - A1c 6.0  -management as per hospitalist--off insulin     #Pain management  - Tylenol PRN  - Neurontin cw 600 hs --   - Continue to monitor     #Bowel Regimen  - Senna, miralax PRN    #Bladder management  -voiding well with low PVRs,   -- off doxazosin 3/2    #Dysphagia    - s/p PEG 2/14  - Puree with thin liquids-- 1:1 assist with meals  - SLP: evaluation and treatment  - Diet: tolerating bolus TF     #Skin:  - Skin on admission: IAD to bilateral groin and sacrum; RUQ incision with sutures with palpable skull flap; Left hemicraniectomy with sutures MILLER; psoriasis BL shin  - Pressure injury/Skin: Turn Q2hrs while in bed, OOB to Chair, PT/OT     #Precaution  - Fall, Aspiration, seizure    #home meds  - per list provided by mother, [place]: Rousuvastatin 40mg daily and Paroxetine 30mg daily  -  cw paroxetine  - cw high intensity atorvastatin to home med dose    – IDT 3/23:  Social work:  patient on discharge can live with his mother who has a first-floor set up.  Nursing–incontinent of bowel and bladder, total assist with toileting  Speech: Puree/thin diet,  severe language deficits–poor command following, poor orientation, Some spontaneous naming, & gestures, Speech jargon, Aphasia, severe cognitive deficits  OT's: Supervision eating; Mod-Max A UBD, Max A LBD, Total assist with toileting and transfers, restless  PT: standing with Mod A x 2 for 5min--inconsistent; Improved trunk control; Tot A transfers, limited by behavior   goals-- mod assist with transfers, ambulation 10 feet max assist with hemiwalker, wheelchair mobility 50 feet min assist.  ELOS: 4/6 ZOHAIB  Goals:  1.  Improved secretion management and tolerate PMV  2.  transfers with 1 person assist  3.  Communicate basic wants and needs with yes no answers    **Spoke with Sil,  of neurosurgeon Dr. Cueva, about cranioplasty. They are working on authorization. Possible the appointment would be as early as next week, in which case Sil/office will reach out to unit or Dr. Mata's cell phone to request pre-surgical testing materials. However, if authorization does not happen within that time frame, the cranioplasty will take place at the end of April as Dr. Cueva is on vacation for the second and third week of April.

## 2023-03-30 LAB
ALBUMIN SERPL ELPH-MCNC: 3.3 G/DL — SIGNIFICANT CHANGE UP (ref 3.3–5)
ALP SERPL-CCNC: 101 U/L — SIGNIFICANT CHANGE UP (ref 40–120)
ALT FLD-CCNC: 23 U/L — SIGNIFICANT CHANGE UP (ref 10–45)
ANION GAP SERPL CALC-SCNC: 8 MMOL/L — SIGNIFICANT CHANGE UP (ref 5–17)
AST SERPL-CCNC: 10 U/L — SIGNIFICANT CHANGE UP (ref 10–40)
BILIRUB SERPL-MCNC: 0.3 MG/DL — SIGNIFICANT CHANGE UP (ref 0.2–1.2)
BUN SERPL-MCNC: 5 MG/DL — LOW (ref 7–23)
CALCIUM SERPL-MCNC: 9.2 MG/DL — SIGNIFICANT CHANGE UP (ref 8.4–10.5)
CHLORIDE SERPL-SCNC: 104 MMOL/L — SIGNIFICANT CHANGE UP (ref 96–108)
CO2 SERPL-SCNC: 29 MMOL/L — SIGNIFICANT CHANGE UP (ref 22–31)
CREAT SERPL-MCNC: 0.67 MG/DL — SIGNIFICANT CHANGE UP (ref 0.5–1.3)
EGFR: 119 ML/MIN/1.73M2 — SIGNIFICANT CHANGE UP
GLUCOSE SERPL-MCNC: 160 MG/DL — HIGH (ref 70–99)
HCT VFR BLD CALC: 42.4 % — SIGNIFICANT CHANGE UP (ref 39–50)
HGB BLD-MCNC: 13.4 G/DL — SIGNIFICANT CHANGE UP (ref 13–17)
MCHC RBC-ENTMCNC: 29.5 PG — SIGNIFICANT CHANGE UP (ref 27–34)
MCHC RBC-ENTMCNC: 31.6 GM/DL — LOW (ref 32–36)
MCV RBC AUTO: 93.2 FL — SIGNIFICANT CHANGE UP (ref 80–100)
NRBC # BLD: 0 /100 WBCS — SIGNIFICANT CHANGE UP (ref 0–0)
PLATELET # BLD AUTO: 279 K/UL — SIGNIFICANT CHANGE UP (ref 150–400)
POTASSIUM SERPL-MCNC: 4.3 MMOL/L — SIGNIFICANT CHANGE UP (ref 3.5–5.3)
POTASSIUM SERPL-SCNC: 4.3 MMOL/L — SIGNIFICANT CHANGE UP (ref 3.5–5.3)
PROT SERPL-MCNC: 7 G/DL — SIGNIFICANT CHANGE UP (ref 6–8.3)
RBC # BLD: 4.55 M/UL — SIGNIFICANT CHANGE UP (ref 4.2–5.8)
RBC # FLD: 13.2 % — SIGNIFICANT CHANGE UP (ref 10.3–14.5)
SODIUM SERPL-SCNC: 141 MMOL/L — SIGNIFICANT CHANGE UP (ref 135–145)
WBC # BLD: 7.32 K/UL — SIGNIFICANT CHANGE UP (ref 3.8–10.5)
WBC # FLD AUTO: 7.32 K/UL — SIGNIFICANT CHANGE UP (ref 3.8–10.5)

## 2023-03-30 PROCEDURE — 99232 SBSQ HOSP IP/OBS MODERATE 35: CPT

## 2023-03-30 RX ADMIN — PANTOPRAZOLE SODIUM 40 MILLIGRAM(S): 20 TABLET, DELAYED RELEASE ORAL at 12:04

## 2023-03-30 RX ADMIN — ENOXAPARIN SODIUM 90 MILLIGRAM(S): 100 INJECTION SUBCUTANEOUS at 08:58

## 2023-03-30 RX ADMIN — ATORVASTATIN CALCIUM 80 MILLIGRAM(S): 80 TABLET, FILM COATED ORAL at 21:01

## 2023-03-30 RX ADMIN — Medication 1 APPLICATION(S): at 17:32

## 2023-03-30 RX ADMIN — DIVALPROEX SODIUM 250 MILLIGRAM(S): 500 TABLET, DELAYED RELEASE ORAL at 17:43

## 2023-03-30 RX ADMIN — ENOXAPARIN SODIUM 90 MILLIGRAM(S): 100 INJECTION SUBCUTANEOUS at 21:01

## 2023-03-30 RX ADMIN — MIDODRINE HYDROCHLORIDE 10 MILLIGRAM(S): 2.5 TABLET ORAL at 08:59

## 2023-03-30 RX ADMIN — GABAPENTIN 600 MILLIGRAM(S): 400 CAPSULE ORAL at 21:01

## 2023-03-30 RX ADMIN — Medication 6 MILLIGRAM(S): at 21:01

## 2023-03-30 RX ADMIN — Medication 1 APPLICATION(S): at 05:46

## 2023-03-30 RX ADMIN — MEMANTINE HYDROCHLORIDE 5 MILLIGRAM(S): 10 TABLET ORAL at 17:43

## 2023-03-30 RX ADMIN — DONEPEZIL HYDROCHLORIDE 5 MILLIGRAM(S): 10 TABLET, FILM COATED ORAL at 12:05

## 2023-03-30 RX ADMIN — MEMANTINE HYDROCHLORIDE 5 MILLIGRAM(S): 10 TABLET ORAL at 05:53

## 2023-03-30 RX ADMIN — Medication 100 MILLIGRAM(S): at 21:02

## 2023-03-30 RX ADMIN — DIVALPROEX SODIUM 250 MILLIGRAM(S): 500 TABLET, DELAYED RELEASE ORAL at 05:53

## 2023-03-30 RX ADMIN — Medication 1 APPLICATION(S): at 17:43

## 2023-03-30 RX ADMIN — Medication 1 APPLICATION(S): at 12:04

## 2023-03-30 RX ADMIN — NYSTATIN CREAM 1 APPLICATION(S): 100000 CREAM TOPICAL at 17:43

## 2023-03-30 RX ADMIN — ESCITALOPRAM OXALATE 10 MILLIGRAM(S): 10 TABLET, FILM COATED ORAL at 08:59

## 2023-03-30 RX ADMIN — NYSTATIN CREAM 1 APPLICATION(S): 100000 CREAM TOPICAL at 05:46

## 2023-03-30 NOTE — PROGRESS NOTE ADULT - SUBJECTIVE AND OBJECTIVE BOX
HPI:  This is 42 year old male with PMH of prior substance use, HTN, anxiety who presented to [facility] on 1/23. On 1/23 morning, he told his family that he was not feeling well and took 2 tabs of Vyvanse (usually takes as needed). Shortly after, he was at a group therapy meeting over the phone when he complained to his father he was having a severe headache. He then became more lethargic, and developed nausea and vomited. He was brought to ED by his father. On arrival, noted to be lethargic, vomiting, with garbled speech. 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 Post CT, more lethargic with dilated right pupil and bilateral reactive but sluggish pupils. He was given mannitol and intubated for airway protection and was taken to neuro ICU.    On 1/25, HCT with enlarging R lateral ventricle and slightly worsening MLS concerning for trapped ventricle. 1/27 acute drop in SpO2 on 1/27, lavaged and suctioned out thick clot. Placed back on ACVC and FiO2 weaned from 70% to 40% overnight. On 1/28/23, he had episode of emesis, & fever of 100.3. He was  given tylenol with improvement in temperature but still little to no movement on the L side (previously would localize arm to suction and spontaneously bend L leg). Pt. Had left decompressive hemicraniectomy and right EVD  placed.    After the procedure, patient opened eyes for the first time and regained LUE/LLE movement. As he became febrile again this was less prominent. Ceftriaxone was switched to Zosyn. 1/29 HCT mild increase extraaxial fluid collection. on 1/30, repeat  HCT unchanged, his MTL was discontinued. Zosyn and vancomycin switched to cefepime. On 1/31,  HCT shoeds worsening edema +/- extraaxial fluid collection, elevated ICPs, and mannitol was restarted.      MTL was weaned,  EVD adjusted and eventually removed on 2/10.  Hospital course further c/b agitation requiring precedex, hypotension requiring bolus, dysphagia s/p PEG 2/14  and trach on 2/13, insomnia s/p seroquel, pain s/p oxycodone and fentanyl. He underwent wound revision with NSGY and plastics on 2/17. Keppra discontinued due to signs of agitation. Tolerated trach collar.     Patient was evaluated by PM&R and therapy for functional deficits, gait/ADL impairments and acute rehabilitation was recommended. Patient was medically optimized for discharge to [facility] IRU on 2/28/23.     (28 Feb 2023 13:10)          Subjective: Pt. calm this AM,  Greeted examiners with "how are you?"  Still with impaired command following.  Limited by aphasia,  Slept during the night.   Wearing right resting wrist/hand orthosis.  PT reports pt. ambulated 10ft x 4 with Mod A and WCf.        VITALS  Vital Signs Last 24 Hrs  T(C): 36.7 (30 Mar 2023 08:09), Max: 36.7 (29 Mar 2023 19:58)  T(F): 98.1 (30 Mar 2023 08:09), Max: 98.1 (29 Mar 2023 19:58)  HR: 91 (30 Mar 2023 08:09) (85 - 91)  BP: 123/83 (30 Mar 2023 08:09) (109/64 - 123/83)  BP(mean): --  RR: 16 (30 Mar 2023 08:09) (15 - 16)  SpO2: 97% (30 Mar 2023 08:09) (97% - 97%)    Parameters below as of 30 Mar 2023 08:09  Patient On (Oxygen Delivery Method): room air        REVIEW OF SYMPTOMS  Neurological deficits      PHYSICAL EXAM  Constitutional - NAD  HEENT - EOMI, left hemicraniectomy concavity- helmet in place while out of bed  Neck - s/p trach decannulation (guaze over stoma)  Chest - resp nonlabored  Cardio -warm, well perfused, no cyanosis or pedal edema  Abdomen -  Soft, NTND, +PEG, Bone flap marsupialized in epigastric region.    Extremities - No peripheral edema, No calf tenderness   Neurologic Exam:                    Cognitive -             Orientation: Awake, Alert; aphasia and cognitive deficits - inconsistent yes/no            Communication-  command following improving but inconsistent, Non-fluent, able to repeat- but impaired.            Attention:  Impaired;        Cranial Nerves - Limited due to  aphasia; No facial asymmetry, Tongue midline, EOMI, Pupils dilated but reactive, +dysphagia     Motor -                     LEFT    UE - 5/5                    RIGHT UE - ShAB 0/5, EF 0/5, EE 0/5, WE 0/5,  0/5--                     LEFT    LE - 5/5                    RIGHT LE - HE 2/5, Hip adductor 1-2/5, KE 2/5, DF 0/5, PF 0/5     Tone: MAS 2/4 finger flexors, wrist right, 1+/4 elbow extensors; 3/4 right PFs-- inversion      Sensory - decrease sensation to LT-- RUE  Psychiatric - Restless and frustrated at times.       RECENT LABS                        13.4   7.32  )-----------( 279      ( 30 Mar 2023 06:10 )             42.4     03-30    141  |  104  |  5<L>  ----------------------------<  160<H>  4.3   |  29  |  0.67    Ca    9.2      30 Mar 2023 06:10    TPro  7.0  /  Alb  3.3  /  TBili  0.3  /  DBili  x   /  AST  10  /  ALT  23  /  AlkPhos  101  03-30            RADIOLOGY/OTHER RESULTS      MEDICATIONS  (STANDING):  AQUAPHOR (petrolatum Ointment) 1 Application(s) Topical two times a day  AQUAPHOR (petrolatum Ointment) 1 Application(s) Topical two times a day  atorvastatin 80 milliGRAM(s) Oral at bedtime  bacitracin   Ointment 1 Application(s) Topical two times a day  bacitracin   Ointment 1 Application(s) Topical daily  divalproex Sprinkle 250 milliGRAM(s) Oral two times a day  donepezil 5 milliGRAM(s) Oral daily  enoxaparin Injectable 90 milliGRAM(s) SubCutaneous <User Schedule>  escitalopram 10 milliGRAM(s) Oral <User Schedule>  gabapentin 600 milliGRAM(s) Oral at bedtime  hydrocortisone 1% Cream 1 Application(s) Topical two times a day  melatonin 6 milliGRAM(s) Oral at bedtime  memantine 5 milliGRAM(s) Oral two times a day  midodrine. 10 milliGRAM(s) Oral <User Schedule>  nystatin Powder 1 Application(s) Topical two times a day  pantoprazole   Suspension 40 milliGRAM(s) Oral daily  polyethylene glycol 3350 17 Gram(s) Oral daily  senna Syrup 5 milliLiter(s) Oral at bedtime  traZODone 100 milliGRAM(s) Oral at bedtime    MEDICATIONS  (PRN):  acetaminophen    Suspension .. 650 milliGRAM(s) Oral every 6 hours PRN Temp greater or equal to 38C (100.4F), Mild Pain (1 - 3)  OLANZapine Injectable 2.5 milliGRAM(s) IntraMuscular daily PRN severe agitation

## 2023-03-30 NOTE — PROGRESS NOTE ADULT - ASSESSMENT
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 He is s/p left decompressive hemicraniectomy and right EVD placement 1/28/23. Hospital course complicated by respiratory failure s/p intubation and extubation, agitation, sepsis, dysphagia s/p PEG 2/14 and trach on 2/13. Patient now with admitted to [facility] acute inpatient rehab.    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 with mass effect and 8mm midline shift  -Continue comprehensive rehab program  -s/p Decompressive hemicraniectomy and EVD placement 1/28  -Continue pain control  -Continue Namenda, Donepezil  -Continue gabapentin     #Depression with anxiety  -Continue Depakote, Lexapro  -Continue Trazodone qHS for insomnia     #Bilateral lower extremity DVTs  -Lower extremity Doppler from 3/1–right femoral vein DVT and bilateral lower extremity calf DVTs  -Continue Lovenox 90mg BID x 3 months  -Neurosurgery and vascular inputs appreciated    #Hypotension  -Improved on Midodrine  -Continue Midodrine 10mg daily  -Monitor vitals    #T2DM, HbA1c 6.0  -diet controlled  -monitor  -blood glucose goal 100-180 in hospital setting    #HLD  -Continue Lipitor     #Prophylactic Measure  -DVT ppx: Lovenox 90mg BID  -GI ppx: Protonix  -Bowel regimen

## 2023-03-30 NOTE — PROGRESS NOTE ADULT - SUBJECTIVE AND OBJECTIVE BOX
Patient is a 43y old  Male who presents with a chief complaint of left IPH/ICH (29 Mar 2023 13:22)      Patient seen and examined at bedside. No overnight events.    REVIEW OF SYSTEMS:  CONSTITUTIONAL: No fever or chills  CARDIOVASCULAR: No chest pain, palpitations    ALLERGIES:  No Known Allergies    MEDICATIONS  (STANDING):  AQUAPHOR (petrolatum Ointment) 1 Application(s) Topical two times a day  AQUAPHOR (petrolatum Ointment) 1 Application(s) Topical two times a day  atorvastatin 80 milliGRAM(s) Oral at bedtime  bacitracin   Ointment 1 Application(s) Topical two times a day  bacitracin   Ointment 1 Application(s) Topical daily  divalproex Sprinkle 250 milliGRAM(s) Oral two times a day  donepezil 5 milliGRAM(s) Oral daily  enoxaparin Injectable 90 milliGRAM(s) SubCutaneous <User Schedule>  escitalopram 10 milliGRAM(s) Oral <User Schedule>  gabapentin 600 milliGRAM(s) Oral at bedtime  hydrocortisone 1% Cream 1 Application(s) Topical two times a day  melatonin 6 milliGRAM(s) Oral at bedtime  memantine 5 milliGRAM(s) Oral two times a day  midodrine. 10 milliGRAM(s) Oral <User Schedule>  nystatin Powder 1 Application(s) Topical two times a day  pantoprazole   Suspension 40 milliGRAM(s) Oral daily  polyethylene glycol 3350 17 Gram(s) Oral daily  senna Syrup 5 milliLiter(s) Oral at bedtime  traZODone 100 milliGRAM(s) Oral at bedtime    MEDICATIONS  (PRN):  acetaminophen    Suspension .. 650 milliGRAM(s) Oral every 6 hours PRN Temp greater or equal to 38C (100.4F), Mild Pain (1 - 3)  OLANZapine Injectable 2.5 milliGRAM(s) IntraMuscular daily PRN severe agitation    Vital Signs Last 24 Hrs  T(F): 98.1 (30 Mar 2023 08:09), Max: 98.1 (29 Mar 2023 19:58)  HR: 91 (30 Mar 2023 08:09) (85 - 91)  BP: 123/83 (30 Mar 2023 08:09) (109/64 - 123/83)  RR: 16 (30 Mar 2023 08:09) (15 - 16)  SpO2: 97% (30 Mar 2023 08:09) (97% - 97%)  I&O's Summary    30 Mar 2023 07:01  -  30 Mar 2023 10:29  --------------------------------------------------------  IN: 480 mL / OUT: 0 mL / NET: 480 mL      BMI (kg/m2): 24.2 (03-25-23 @ 12:24)    PHYSICAL EXAM:  GENERAL: NAD  HEENT:  left hemicraniectomy, anicteric, moist mucous membranes, EOMI, PERRL, no lid-lag, conjunctiva and sclera clear  CHEST/LUNG:  CTA b/l, no rales, wheezes, or rhonchi,  normal respiratory effort, no intercostal retractions  HEART:  RRR, S1, S2, no murmurs; no pitting edema  ABDOMEN:  BS+, soft, nontender, nondistended, peg in place  MSK/EXTREMITIES: palpable peripheral pulses, no clubbing or cyanosis  NERVOUS SYSTEM: Alert, cognitive deficits, aphasia, follows very simple commands, right sided hemiparesis   PSYCH: easily frustrated affect, Alert & Awake; poor judgement    LABS: Personally reviewed                        13.4   7.32  )-----------( 279      ( 30 Mar 2023 06:10 )             42.4       03-30    141  |  104  |  5   ----------------------------<  160  4.3   |  29  |  0.67    Ca    9.2      30 Mar 2023 06:10    TPro  7.0  /  Alb  3.3  /  TBili  0.3  /  DBili  x   /  AST  10  /  ALT  23  /  AlkPhos  101  03-30            COVID-19 PCR: NotDetec (02-28-23 @ 16:00)      RADIOLOGY & ADDITIONAL TESTS: Personally reviewed    Medical management discussed with Dr. Talavera (physiatry) continue midodrine for hypotension monitor vitals

## 2023-03-30 NOTE — PROGRESS NOTE ADULT - ASSESSMENT
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 He is  s/p Left decompressive hemicraniectomy and right EVD placement 1/28/23. Hospital course further c/b respiratory failure s/p intubation and extubation,  agitation requiring precedex, sepsis s/p ABX, hypotension requiring bolus, dysphagia s/p PEG 2/14 and trach on 2/13, insomnia s/p seroquel, pain s/p oxycodone and fentanyl. Patient now with Right Hemiparesis, dysphagia s/p PEG, Aphasia, Cognitive deficits, gait Instability, ADL impairments and Functional impairments.    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    - ST: to diagnose and treat deficits in swallowing, cognition and communication.  - Helmet when OOB  - follow up CT head 3/19 reviewed-  Postoperative changes compatible with a left temporal frontal parietal craniectomy is again seen. left frontoparietal hematoma with some improvement.  improved vasogenic edema is seen. Mass effect on the left lateral ventricle and Left-to-right shift (3.1 mm)has now resolved.   - OT– right resting hand splint  - Called Dr Cueva's office--patient had a follow up office visit on 3/28 at 2pm--Pt. may have cranioplasty next week if NSG can get authorization (will reach out to us), otherwise pt. d/c plan to [facility] on 4/6/23    Sleep/Restlessness  --c/w Trazodone  100mg qhs 3/8-- for with sleep and restlessness.   Pt's mother reports he was on 50-100mg qhs and 50mg daytime home meds.   --cont. melatonin  --monitor  -- patient needs supervision for safety monitoring as pt.  is impulsive, restless and high fall risk     # Agitation   --Psychiatry consult, Dr. Acharya following  --cont. with  depakote 250mg bid  3/24  - depakote level =24 and ammonia level=21 3/27--nontoxic  -- c/w gabapentin to 600 mg qhs  - Zyprexa 2.5 mg IM daily PRN severe agitation.   - Continue to monitor    Depression/anxiety  -- cont. Lexapro 10mg  --has been more effective for pt's anxiety/depression  --Continue to monitor  - c/w gabapentin to 600 mg qhs for anxiety    Aphasia  -- c/w Donepezil 5 mg.    -cont. mematine 5mg BID-- start 3/29--Monitor    Bilateral lower extremity DVTs–  – Lower extremity Doppler from 3/1–right femoral vein DVT and bilateral lower extremity calf DVTs  – spoke with patient's neurosurgery PA–who was in contact with Dr. Cueva patient's neurosurgeon–gave clearance for patient to start therapeutic Lovenox.  --Vascular Surgery consult -- reaached out to Dr. Hodges 3/7 - nothing further to do if patient is on therapeutic lovenox.   -- C/w therapeutic lovenox for 3 months.    Orthostatic hypotension–  -cont. midodrine 10mg     #Acute Respiratory Failure  - Intubation and extubation  - s/p Trach 2/13  - Decannulated 3/5, Pulm monitoring, tolerating well    #GI PPX  - Nexium 40mg daily    #DM II  - A1c 6.0  -management as per hospitalist--off insulin     #Pain management  - Tylenol PRN  - Neurontin cw 600 hs --   - Continue to monitor     #Bowel Regimen  - Senna, miralax PRN    #Bladder management  -voiding well with low PVRs,   -- off doxazosin 3/2    #Dysphagia    - s/p PEG 2/14  - Puree with thin liquids-- 1:1 assist with meals  - SLP: evaluation and treatment  - Diet: tolerating bolus TF     #Skin:  - Skin on admission: IAD to bilateral groin and sacrum; RUQ incision with sutures with palpable skull flap; Left hemicraniectomy with sutures MILLER; psoriasis BL shin  - Pressure injury/Skin: Turn Q2hrs while in bed, OOB to Chair, PT/OT     #Precaution  - Fall, Aspiration, seizure    #home meds  - per list provided by mother, [place]: Rousuvastatin 40mg daily and Paroxetine 30mg daily  -  cw paroxetine  - cw high intensity atorvastatin to home med dose    – IDT 3/23:  Social work:  patient on discharge can live with his mother who has a first-floor set up.  Nursing–incontinent of bowel and bladder, total assist with toileting  Speech: Puree/thin diet,  severe language deficits–poor command following, poor orientation, Some spontaneous naming, & gestures, Speech jargon, Aphasia, severe cognitive deficits  OT's: Supervision eating; Mod-Max A UBD, Max A LBD, Total assist with toileting and transfers, restless  PT: standing with Mod A x 2 for 5min--inconsistent; Improved trunk control; Tot A transfers, limited by behavior   goals-- mod assist with transfers, ambulation 10 feet max assist with hemiwalker, wheelchair mobility 50 feet min assist.  ELOS: 4/6 [facility]  Goals:  1.  Improved secretion management and tolerate PMV  2.  transfers with 1 person assist  3.  Communicate basic wants and needs with yes no answers    **Dr. Lerma Spoke with Sil,  of neurosurgeon Dr. Cueva, about cranioplasty 3/29/23. They are working on authorization. Possible the appointment would be as early as next week, in which case Sil/office will reach out to unit or Dr. Mata's cell phone to request pre-surgical testing materials. However, if authorization does not happen within that time frame, the cranioplasty will take place at the end of April as Dr. Cueva is on vacation for the second and third week of April.    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 He is  s/p Left decompressive hemicraniectomy and right EVD placement 1/28/23. Hospital course further c/b respiratory failure s/p intubation and extubation,  agitation requiring precedex, sepsis s/p ABX, hypotension requiring bolus, dysphagia s/p PEG 2/14 and trach on 2/13, insomnia s/p seroquel, pain s/p oxycodone and fentanyl. Patient now with Right Hemiparesis, dysphagia s/p PEG, Aphasia, Cognitive deficits, gait Instability, ADL impairments and Functional impairments.    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    - ST: to diagnose and treat deficits in swallowing, cognition and communication.  - Helmet when OOB  - follow up CT head 3/19 reviewed-  Postoperative changes compatible with a left temporal frontal parietal craniectomy is again seen. left frontoparietal hematoma with some improvement.  improved vasogenic edema is seen. Mass effect on the left lateral ventricle and Left-to-right shift (3.1 mm)has now resolved.   - OT– right resting hand splint  - Called Dr Cueva's office--patient had a follow up office visit on 3/28 at 2pm--Pt. may have cranioplasty next week if NSG can get authorization (will reach out to us), otherwise pt. d/c plan to [facility] on 4/6/23    Sleep/Restlessness  --c/w Trazodone  100mg qhs 3/8-- for with sleep and restlessness.   Pt's mother reports he was on 50-100mg qhs and 50mg daytime home meds.   --cont. melatonin  --monitor  -- patient needs supervision for safety monitoring as pt.  is impulsive, restless and high fall risk     # Agitation   --Psychiatry consult, Dr. Acharya following  --cont. with  depakote 250mg bid  3/24  - depakote level =24 and ammonia level=21 3/27--nontoxic  -- c/w gabapentin to 600 mg qhs  - Zyprexa 2.5 mg IM daily PRN severe agitation.   - Continue to monitor    Depression/anxiety  -- cont. Lexapro 10mg  --has been more effective for pt's anxiety/depression  --Continue to monitor  - c/w gabapentin to 600 mg qhs for anxiety    Aphasia  -- c/w Donepezil 5 mg.    -cont. mematine 5mg BID-- start 3/29--Monitor    Bilateral lower extremity DVTs–  – Lower extremity Doppler from 3/1–right femoral vein DVT and bilateral lower extremity calf DVTs  – spoke with patient's neurosurgery PA–who was in contact with Dr. Cueva patient's neurosurgeon–gave clearance for patient to start therapeutic Lovenox.  --Vascular Surgery consult -- reaached out to Dr. Hodges 3/7 - nothing further to do if patient is on therapeutic lovenox.   -- C/w therapeutic lovenox for 3 months.    Orthostatic hypotension–  -cont. midodrine 10mg     #Acute Respiratory Failure  - Intubation and extubation  - s/p Trach 2/13  - Decannulated 3/5, Pulm monitoring, tolerating well    #GI PPX  - Nexium 40mg daily    #DM II  - A1c 6.0  -management as per hospitalist--off insulin     #Pain management  - Tylenol PRN  - Neurontin cw 600 hs --   - Continue to monitor     #Bowel Regimen  - Senna, miralax PRN    #Bladder management  -voiding well with low PVRs,   -- off doxazosin 3/2    #Dysphagia    - s/p PEG 2/14  - Diet upgraded to Soft bite sized thin liquids  3/30--d/w Speech therapy-- 1:1 assist with meals  - SLP: evaluation and treatment  - Diet: tolerating bolus TF     #Skin:  - Skin on admission: IAD to bilateral groin and sacrum; RUQ incision with sutures with palpable skull flap; Left hemicraniectomy with sutures MILLER; psoriasis BL shin  - Pressure injury/Skin: Turn Q2hrs while in bed, OOB to Chair, PT/OT     #Precaution  - Fall, Aspiration, seizure    #home meds  - per list provided by mother, [place]: Rousuvastatin 40mg daily and Paroxetine 30mg daily  -  cw paroxetine  - cw high intensity atorvastatin to home med dose    – IDT 3/23:  Social work:  patient on discharge can live with his mother who has a first-floor set up.  Nursing–incontinent of bowel and bladder, total assist with toileting  Speech: Puree/thin diet,  severe language deficits–poor command following, poor orientation, Some spontaneous naming, & gestures, Speech jargon, Aphasia, severe cognitive deficits  OT's: Supervision eating; Mod-Max A UBD, Max A LBD, Total assist with toileting and transfers, restless  PT: standing with Mod A x 2 for 5min--inconsistent; Improved trunk control; Tot A transfers, limited by behavior   goals-- mod assist with transfers, ambulation 10 feet max assist with hemiwalker, wheelchair mobility 50 feet min assist.  ELOS: 4/6 [facility]  Goals:  1.  Improved secretion management and tolerate PMV  2.  transfers with 1 person assist  3.  Communicate basic wants and needs with yes no answers    **Dr. Lerma Spoke with Sil,  of neurosurgeon Dr. Cueva, about cranioplasty 3/29/23. They are working on authorization. Possible the appointment would be as early as next week, in which case Sil/office will reach out to unit or Dr. Mata's cell phone to request pre-surgical testing materials. However, if authorization does not happen within that time frame, the cranioplasty will take place at the end of April as Dr. Cueva is on vacation for the second and third week of April.    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 He is  s/p Left decompressive hemicraniectomy and right EVD placement 1/28/23. Hospital course further c/b respiratory failure s/p intubation and extubation,  agitation requiring precedex, sepsis s/p ABX, hypotension requiring bolus, dysphagia s/p PEG 2/14 and trach on 2/13, insomnia s/p seroquel, pain s/p oxycodone and fentanyl. Patient now with Right Hemiparesis, dysphagia s/p PEG, Aphasia, Cognitive deficits, gait Instability, ADL impairments and Functional impairments.    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    - ST: to diagnose and treat deficits in swallowing, cognition and communication.  - Helmet when OOB  - follow up CT head 3/19 reviewed-  Postoperative changes compatible with a left temporal frontal parietal craniectomy is again seen. left frontoparietal hematoma with some improvement.  improved vasogenic edema is seen. Mass effect on the left lateral ventricle and Left-to-right shift (3.1 mm)has now resolved.   - OT– right resting hand splint  - Called Dr Cueva's office--patient had a follow up office visit on 3/28 at 2pm--Pt. may have cranioplasty next week if NSG can get authorization (will reach out to us), otherwise pt. d/c plan to [facility] on 4/6/23    Sleep/Restlessness  --c/w Trazodone  100mg qhs 3/8-- for with sleep and restlessness.   Pt's mother reports he was on 50-100mg qhs and 50mg daytime home meds.   --cont. melatonin  --monitor  -- patient needs supervision for safety monitoring as pt.  is impulsive, restless and high fall risk     # Agitation   --Psychiatry consult, Dr. Acharya following  --cont. with  depakote 250mg bid  3/24  - depakote level =24 and ammonia level=21 3/27--nontoxic  -- c/w gabapentin to 600 mg qhs  - Zyprexa 2.5 mg IM daily PRN severe agitation.   - Continue to monitor    Depression/anxiety  -- cont. Lexapro 10mg  --has been more effective for pt's anxiety/depression  --Continue to monitor  - c/w gabapentin to 600 mg qhs for anxiety    Aphasia  -- c/w Donepezil 5 mg.    -cont. mematine 5mg BID-- start 3/29--Monitor    Bilateral lower extremity DVTs–  – Lower extremity Doppler from 3/1–right femoral vein DVT and bilateral lower extremity calf DVTs  – spoke with patient's neurosurgery PA–who was in contact with Dr. Cueva patient's neurosurgeon–gave clearance for patient to start therapeutic Lovenox.  --Vascular Surgery consult -- reaached out to Dr. Hodges 3/7 - nothing further to do if patient is on therapeutic lovenox.   -- C/w therapeutic lovenox for 3 months.    Orthostatic hypotension–  -cont. midodrine 10mg     #Acute Respiratory Failure  - Intubation and extubation  - s/p Trach 2/13  - Decannulated 3/5, Pulm monitoring, tolerating well    #GI PPX  - Nexium 40mg daily    #DM II  - A1c 6.0  -management as per hospitalist--off insulin     #Pain management  - Tylenol PRN  - Neurontin cw 600 hs --   - Continue to monitor     #Bowel Regimen  - Senna, miralax PRN    #Bladder management  -voiding well with low PVRs,   -- off doxazosin 3/2    #Dysphagia    - s/p PEG 2/14  - Diet upgraded to Soft bite sized thin liquids  3/30--d/w Speech therapy-- 1:1 assist with meals  - SLP: evaluation and treatment  - Diet: tolerating bolus TF     #Skin:  - Skin on admission: IAD to bilateral groin and sacrum; RUQ incision with sutures with palpable skull flap; Left hemicraniectomy with sutures MILLER; psoriasis BL shin  - Pressure injury/Skin: Turn Q2hrs while in bed, OOB to Chair, PT/OT     #Precaution  - Fall, Aspiration, seizure    #home meds  - per list provided by mother, [place]: Rousuvastatin 40mg daily and Paroxetine 30mg daily  -  cw paroxetine  - cw high intensity atorvastatin to home med dose    – IDT 3/28:  Social work:  patient on discharge can live with his mother who has a first-floor set up.  Nursing–incontinent of bowel and bladder, total assist with toileting  Speech: Diet upgraded to soft bite-size;  severe language deficits–poor command following, poor orientation, improve repetition says family members names, some increased spontaneous content of speech, Speech jargon, Aphasia, severe cognitive deficits  OT: Gradual progress–supervision eating/grooming; mod assist–upper body dressing; max assist–lower body dressing; total assist–toileting, no longer using [place] transfers.  Improved sitting balance.  PT: Transfers–stand pivot–mod assist x1 (fluctuates); bed mobility–mod assist supine to sit; sit to stand at rail–mod assist; ambulates 10 feet x 2 mod assist and wheelchair follow at Left HR–initiating hip flexion to advance right lower extremity.   goals-- mod assist with transfers, ambulation 10 feet max assist with hemiwalker, wheelchair mobility 50 feet min assist.  ELOS: 4/6 [facility]  Goals:  1.  Improved secretion management and tolerate PMV  2.  transfers with 1 person assist  3.  Communicate basic wants and needs with yes no answers    **Dr. Lerma Spoke with Sil,  of neurosurgeon Dr. Cueva, about cranioplasty 3/29/23. They are working on authorization. Possible the appointment would be as early as next week, in which case Sil/office will reach out to unit or Dr. Mata's cell phone to request pre-surgical testing materials. However, if authorization does not happen within that time frame, the cranioplasty will take place at the end of April as Dr. Cueva is on vacation for the second and third week of April.

## 2023-03-31 PROCEDURE — 99232 SBSQ HOSP IP/OBS MODERATE 35: CPT

## 2023-03-31 RX ORDER — HYDROCORTISONE 1 %
1 OINTMENT (GRAM) TOPICAL
Refills: 0 | Status: DISCONTINUED | OUTPATIENT
Start: 2023-03-31 | End: 2023-04-06

## 2023-03-31 RX ADMIN — Medication 1 APPLICATION(S): at 21:47

## 2023-03-31 RX ADMIN — Medication 1 APPLICATION(S): at 05:52

## 2023-03-31 RX ADMIN — MEMANTINE HYDROCHLORIDE 5 MILLIGRAM(S): 10 TABLET ORAL at 05:51

## 2023-03-31 RX ADMIN — ENOXAPARIN SODIUM 90 MILLIGRAM(S): 100 INJECTION SUBCUTANEOUS at 21:36

## 2023-03-31 RX ADMIN — NYSTATIN CREAM 1 APPLICATION(S): 100000 CREAM TOPICAL at 05:52

## 2023-03-31 RX ADMIN — Medication 1 APPLICATION(S): at 17:52

## 2023-03-31 RX ADMIN — Medication 6 MILLIGRAM(S): at 21:36

## 2023-03-31 RX ADMIN — Medication 1 APPLICATION(S): at 05:50

## 2023-03-31 RX ADMIN — PANTOPRAZOLE SODIUM 40 MILLIGRAM(S): 20 TABLET, DELAYED RELEASE ORAL at 11:50

## 2023-03-31 RX ADMIN — DONEPEZIL HYDROCHLORIDE 5 MILLIGRAM(S): 10 TABLET, FILM COATED ORAL at 11:50

## 2023-03-31 RX ADMIN — ATORVASTATIN CALCIUM 80 MILLIGRAM(S): 80 TABLET, FILM COATED ORAL at 21:35

## 2023-03-31 RX ADMIN — ESCITALOPRAM OXALATE 10 MILLIGRAM(S): 10 TABLET, FILM COATED ORAL at 08:19

## 2023-03-31 RX ADMIN — Medication 100 MILLIGRAM(S): at 21:35

## 2023-03-31 RX ADMIN — DIVALPROEX SODIUM 250 MILLIGRAM(S): 500 TABLET, DELAYED RELEASE ORAL at 05:51

## 2023-03-31 RX ADMIN — Medication 1 APPLICATION(S): at 17:51

## 2023-03-31 RX ADMIN — GABAPENTIN 600 MILLIGRAM(S): 400 CAPSULE ORAL at 21:35

## 2023-03-31 RX ADMIN — DIVALPROEX SODIUM 250 MILLIGRAM(S): 500 TABLET, DELAYED RELEASE ORAL at 17:52

## 2023-03-31 RX ADMIN — Medication 1 APPLICATION(S): at 11:50

## 2023-03-31 RX ADMIN — NYSTATIN CREAM 1 APPLICATION(S): 100000 CREAM TOPICAL at 17:51

## 2023-03-31 RX ADMIN — MEMANTINE HYDROCHLORIDE 5 MILLIGRAM(S): 10 TABLET ORAL at 17:52

## 2023-03-31 RX ADMIN — ENOXAPARIN SODIUM 90 MILLIGRAM(S): 100 INJECTION SUBCUTANEOUS at 08:19

## 2023-03-31 RX ADMIN — MIDODRINE HYDROCHLORIDE 10 MILLIGRAM(S): 2.5 TABLET ORAL at 08:19

## 2023-03-31 NOTE — PROGRESS NOTE ADULT - SUBJECTIVE AND OBJECTIVE BOX
Patient is a 43y old  Male who presents with a chief complaint of left IPH/ICH (30 Mar 2023 10:53)    HPI:  This is 42 year old male with PMH of prior substance use, HTN, anxiety who presented to [facility] on 1/23. On 1/23 morning, he told his family that he was not feeling well and took 2 tabs of Vyvanse (usually takes as needed). Shortly after, he was at a group therapy meeting over the phone when he complained to his father he was having a severe headache. He then became more lethargic, and developed nausea and vomited. He was brought to ED by his father. On arrival, noted to be lethargic, vomiting, with garbled speech. 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 Post CT, more lethargic with dilated right pupil and bilateral reactive but sluggish pupils. He was given mannitol and intubated for airway protection and was taken to neuro ICU.    On 1/25, HCT with enlarging R lateral ventricle and slightly worsening MLS concerning for trapped ventricle. 1/27 acute drop in SpO2 on 1/27, lavaged and suctioned out thick clot. Placed back on ACVC and FiO2 weaned from 70% to 40% overnight. On 1/28/23, he had episode of emesis, & fever of 100.3. He was  given tylenol with improvement in temperature but still little to no movement on the L side (previously would localize arm to suction and spontaneously bend L leg). Pt. Had left decompressive hemicraniectomy and right EVD  placed.    After the procedure, patient opened eyes for the first time and regained LUE/LLE movement. As he became febrile again this was less prominent. Ceftriaxone was switched to Zosyn. 1/29 HCT mild increase extraaxial fluid collection. on 1/30, repeat  HCT unchanged, his MTL was discontinued. Zosyn and vancomycin switched to cefepime. On 1/31,  HCT shoeds worsening edema +/- extraaxial fluid collection, elevated ICPs, and mannitol was restarted.      MTL was weaned,  EVD adjusted and eventually removed on 2/10.  Hospital course further c/b agitation requiring precedex, hypotension requiring bolus, dysphagia s/p PEG 2/14  and trach on 2/13, insomnia s/p seroquel, pain s/p oxycodone and fentanyl. He underwent wound revision with NSGY and plastics on 2/17. Keppra discontinued due to signs of agitation. Tolerated trach collar.     Patient was evaluated by PM&R and therapy for functional deficits, gait/ADL impairments and acute rehabilitation was recommended. Patient was medically optimized for discharge to [facility] IRU on 2/28/23.    Subjective/ROS:   Patient seen and evaluated while resting in bed. Calm at the moment but mood fluctuates. Easily distracted, but able to follow conversation, +aphasic; unable to follow commands.  Better with some yes/no questions.  Denies CP, SOB, nausea, or abd pain.  He's able to recall speech therapist - when he saw him he grunted in annoyance.      Vital Signs Last 24 Hrs  T(C): 36.3 (03-31-23 @ 08:14), Max: 36.6 (03-30-23 @ 20:40)  T(F): 97.4 (03-31-23 @ 08:14), Max: 97.8 (03-30-23 @ 20:40)  HR: 88 (03-31-23 @ 08:14) (82 - 88)  BP: 134/92 (03-31-23 @ 08:14) (106/66 - 134/92)  RR: 16 (03-31-23 @ 08:14) (16 - 16)  SpO2: 96% (03-31-23 @ 08:14) (96% - 98%)    PHYSICAL EXAM  Constitutional - NAD  HEENT - EOMI, left hemicraniectomy concavity  Neck - s/p trach decannulation (guaze over stoma)  Chest - resp nonlabored  Cardio -warm, well perfused, no cyanosis or pedal edema  Abdomen -  Soft, NTND, +PEG, Bone flap marsupialized in epigastric region.    Extremities - No peripheral edema, No calf tenderness   Neurologic Exam:                    Cognitive -             Orientation: Awake, Alert; aphasia and cognitive deficits - inconsistent yes/no            Communication-  command following improving but inconsistent, Non-fluent, able to repeat- but impaired.            Attention:  Impaired;        Cranial Nerves - Limited due to  aphasia; No facial asymmetry, Tongue midline, EOMI, Pupils dilated but reactive, +dysphagia     Motor -    LEFT    UE - 5/5                    RIGHT UE - ShAB 0/5, EF 0/5, EE 0/5, WE 0/5,  0/5                    LEFT    LE - 5/5                    RIGHT LE - HE 2/5, Hip adductor 1-2/5, KE 2/5, DF 0/5, PF 0/5     Tone: MAS 2/4 finger flexors, wrist right, 1+/4 elbow extensors; 3/4 right PFs-- inversion      Sensory - decrease sensation to LT-- RUE  Psychiatric - Restless and frustrated at times.   Skin - diffuse small red papular rash to face, dry plaque lesion to left shin, right lateral shin and right knee    RECENT LABS                        13.4   7.32  )-----------( 279      ( 30 Mar 2023 06:10 )             42.4     03-30    141  |  104  |  5<L>  ----------------------------<  160<H>  4.3   |  29  |  0.67    Ca    9.2      30 Mar 2023 06:10    TPro  7.0  /  Alb  3.3  /  TBili  0.3  /  DBili  x   /  AST  10  /  ALT  23  /  AlkPhos  101  03-30    MEDICATIONS  (STANDING):  AQUAPHOR (petrolatum Ointment) 1 Application(s) Topical two times a day  AQUAPHOR (petrolatum Ointment) 1 Application(s) Topical two times a day  atorvastatin 80 milliGRAM(s) Oral at bedtime  bacitracin   Ointment 1 Application(s) Topical two times a day  bacitracin   Ointment 1 Application(s) Topical daily  divalproex Sprinkle 250 milliGRAM(s) Oral two times a day  donepezil 5 milliGRAM(s) Oral daily  enoxaparin Injectable 90 milliGRAM(s) SubCutaneous <User Schedule>  escitalopram 10 milliGRAM(s) Oral <User Schedule>  gabapentin 600 milliGRAM(s) Oral at bedtime  hydrocortisone 1% Cream 1 Application(s) Topical two times a day  melatonin 6 milliGRAM(s) Oral at bedtime  memantine 5 milliGRAM(s) Oral two times a day  midodrine. 10 milliGRAM(s) Oral <User Schedule>  nystatin Powder 1 Application(s) Topical two times a day  pantoprazole   Suspension 40 milliGRAM(s) Oral daily  polyethylene glycol 3350 17 Gram(s) Oral daily  senna Syrup 5 milliLiter(s) Oral at bedtime  traZODone 100 milliGRAM(s) Oral at bedtime    MEDICATIONS  (PRN):  acetaminophen    Suspension .. 650 milliGRAM(s) Oral every 6 hours PRN Temp greater or equal to 38C (100.4F), Mild Pain (1 - 3)  OLANZapine Injectable 2.5 milliGRAM(s) IntraMuscular daily PRN severe agitation

## 2023-03-31 NOTE — PROGRESS NOTE ADULT - ASSESSMENT
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 He is  s/p Left decompressive hemicraniectomy and right EVD placement 1/28/23. Hospital course further c/b respiratory failure s/p intubation and extubation,  agitation requiring precedex, sepsis s/p ABX, hypotension requiring bolus, dysphagia s/p PEG 2/14 and trach on 2/13, insomnia s/p seroquel, pain s/p oxycodone and fentanyl. Patient now with Right Hemiparesis, dysphagia s/p PEG, Aphasia, Cognitive deficits, gait Instability, ADL impairments and Functional impairments.    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    - ST: to diagnose and treat deficits in swallowing, cognition and communication.  - Helmet when OOB  - follow up CT head 3/19 reviewed-  Postoperative changes compatible with a left temporal frontal parietal craniectomy is again seen. left frontoparietal hematoma with some improvement.  improved vasogenic edema is seen. Mass effect on the left lateral ventricle and Left-to-right shift (3.1 mm)has now resolved.   - OT– right resting hand splint  - Called Dr Cueva's office--patient had a follow up office visit on 3/28 at 2pm--Pt. may have cranioplasty next week if NSG can get authorization (will reach out to us), otherwise pt. d/c plan to [facility] on 4/6/23    Sleep/Restlessness  --c/w Trazodone  100mg qhs 3/8-- for with sleep and restlessness.   Pt's mother reports he was on 50-100mg qhs and 50mg daytime home meds.   --cont. melatonin  --monitor  -- patient needs supervision for safety monitoring as pt.  is impulsive, restless and high fall risk     # Agitation   --Psychiatry consult, Dr. Acharya following  --cont. with  depakote 250mg bid  3/24  - depakote level =24 and ammonia level=21 3/27--nontoxic  -- c/w gabapentin to 600 mg qhs  - Zyprexa 2.5 mg IM daily PRN severe agitation.   - Continue to monitor    Depression/anxiety  -- cont. Lexapro 10mg  --has been more effective for pt's anxiety/depression  --Continue to monitor  - c/w gabapentin to 600 mg qhs for anxiety    Aphasia  -- c/w Donepezil 5 mg.   -- mematine 5mg BID-- start 3/29--Monitor    Bilateral lower extremity DVTs–  – Lower extremity Doppler from 3/1–right femoral vein DVT and bilateral lower extremity calf DVTs  – spoke with patient's neurosurgery PA–who was in contact with Dr. Cueva patient's neurosurgeon–gave clearance for patient to start therapeutic Lovenox.  --Vascular Surgery consult -- reaached out to Dr. Hodges 3/7 - nothing further to do if patient is on therapeutic lovenox.   -- C/w therapeutic lovenox for 3 months.    Orthostatic hypotension–  -cont. midodrine 10mg     #Acute Respiratory Failure  - Intubation and extubation  - s/p Trach 2/13  - Decannulated 3/5, Pulm monitoring, tolerating well    #GI PPX  - Nexium 40mg daily    #DM II  - A1c 6.0  -management as per hospitalist--off insulin     #Pain management  - Tylenol PRN  - Gabapentin 600 hs    - Continue to monitor     #Bowel Regimen  - Senna, miralax     #Bladder management  -voiding well with low PVRs,   -- off doxazosin 3/2    #Dysphagia    - s/p PEG 2/14  - Diet upgraded to Soft bite sized thin liquids  3/30--d/w Speech therapy-- 1:1 assist with meals  - SLP: evaluation and treatment  - Diet: tolerating bolus TF     #Skin:  - Skin on admission: IAD to bilateral groin and sacrum; RUQ incision with sutures with palpable skull flap; Left hemicraniectomy with sutures MILLER; psoriasis BL shin  - Pressure injury/Skin: Turn Q2hrs while in bed, OOB to Chair, PT/OT     #Precaution  - Fall, Aspiration, seizure    #home meds  - per list provided by Laura yang: Rousuvastatin 40mg daily and Paroxetine 30mg daily  -  cw paroxetine  - cw high intensity atorvastatin to home med dose    – IDT 3/28:  Social work:  patient on discharge can live with his mother who has a first-floor set up.  Nursing–incontinent of bowel and bladder, total assist with toileting  Speech: Diet upgraded to soft bite-size;  severe language deficits–poor command following, poor orientation, improve repetition says family members names, some increased spontaneous content of speech, Speech jargon, Aphasia, severe cognitive deficits  OT: Gradual progress–supervision eating/grooming; mod assist–upper body dressing; max assist–lower body dressing; total assist–toileting, no longer using [place] transfers.  Improved sitting balance.  PT: Transfers–stand pivot–mod assist x1 (fluctuates); bed mobility–mod assist supine to sit; sit to stand at rail–mod assist; ambulates 10 feet x 2 mod assist and wheelchair follow at Left HR–initiating hip flexion to advance right lower extremity.   goals-- mod assist with transfers, ambulation 10 feet max assist with hemiwalker, wheelchair mobility 50 feet min assist.  ELOS: 4/6 [facility]  Goals:  1.  Improved secretion management and tolerate PMV  2.  transfers with 1 person assist  3.  Communicate basic wants and needs with yes no answers    **Dr. Lerma Spoke with Sil,  of neurosurgeon Dr. Cueva, about cranioplasty 3/29/23. They are working on authorization. Possible the appointment would be as early as next week, in which case Sil/office will reach out to unit or Dr. Mata's cell phone to request pre-surgical testing materials. However, if authorization does not happen within that time frame, the cranioplasty will take place at the end of April as Dr. Cueva is on vacation for the second and third week of April.

## 2023-04-01 PROCEDURE — 99232 SBSQ HOSP IP/OBS MODERATE 35: CPT

## 2023-04-01 RX ADMIN — ESCITALOPRAM OXALATE 10 MILLIGRAM(S): 10 TABLET, FILM COATED ORAL at 08:21

## 2023-04-01 RX ADMIN — ATORVASTATIN CALCIUM 80 MILLIGRAM(S): 80 TABLET, FILM COATED ORAL at 21:09

## 2023-04-01 RX ADMIN — ENOXAPARIN SODIUM 90 MILLIGRAM(S): 100 INJECTION SUBCUTANEOUS at 21:09

## 2023-04-01 RX ADMIN — NYSTATIN CREAM 1 APPLICATION(S): 100000 CREAM TOPICAL at 17:21

## 2023-04-01 RX ADMIN — DIVALPROEX SODIUM 250 MILLIGRAM(S): 500 TABLET, DELAYED RELEASE ORAL at 17:18

## 2023-04-01 RX ADMIN — MEMANTINE HYDROCHLORIDE 5 MILLIGRAM(S): 10 TABLET ORAL at 17:19

## 2023-04-01 RX ADMIN — Medication 1 APPLICATION(S): at 05:21

## 2023-04-01 RX ADMIN — Medication 1 APPLICATION(S): at 17:21

## 2023-04-01 RX ADMIN — Medication 1 APPLICATION(S): at 11:51

## 2023-04-01 RX ADMIN — NYSTATIN CREAM 1 APPLICATION(S): 100000 CREAM TOPICAL at 05:20

## 2023-04-01 RX ADMIN — Medication 6 MILLIGRAM(S): at 21:10

## 2023-04-01 RX ADMIN — MIDODRINE HYDROCHLORIDE 10 MILLIGRAM(S): 2.5 TABLET ORAL at 08:21

## 2023-04-01 RX ADMIN — Medication 100 MILLIGRAM(S): at 21:09

## 2023-04-01 RX ADMIN — DONEPEZIL HYDROCHLORIDE 5 MILLIGRAM(S): 10 TABLET, FILM COATED ORAL at 11:50

## 2023-04-01 RX ADMIN — Medication 1 APPLICATION(S): at 05:20

## 2023-04-01 RX ADMIN — Medication 1 APPLICATION(S): at 17:22

## 2023-04-01 RX ADMIN — SENNA PLUS 5 MILLILITER(S): 8.6 TABLET ORAL at 21:09

## 2023-04-01 RX ADMIN — GABAPENTIN 600 MILLIGRAM(S): 400 CAPSULE ORAL at 21:09

## 2023-04-01 RX ADMIN — PANTOPRAZOLE SODIUM 40 MILLIGRAM(S): 20 TABLET, DELAYED RELEASE ORAL at 11:51

## 2023-04-01 RX ADMIN — Medication 1 APPLICATION(S): at 05:23

## 2023-04-01 RX ADMIN — MEMANTINE HYDROCHLORIDE 5 MILLIGRAM(S): 10 TABLET ORAL at 05:20

## 2023-04-01 RX ADMIN — POLYETHYLENE GLYCOL 3350 17 GRAM(S): 17 POWDER, FOR SOLUTION ORAL at 11:51

## 2023-04-01 RX ADMIN — DIVALPROEX SODIUM 250 MILLIGRAM(S): 500 TABLET, DELAYED RELEASE ORAL at 05:20

## 2023-04-01 RX ADMIN — ENOXAPARIN SODIUM 90 MILLIGRAM(S): 100 INJECTION SUBCUTANEOUS at 08:21

## 2023-04-01 NOTE — PROGRESS NOTE ADULT - SUBJECTIVE AND OBJECTIVE BOX
Patient is a 43y old  Male who presents with a chief complaint of left IPH/ICH (01 Apr 2023 09:42)      HPI:  This is 42 year old male with PMH of prior substance use, HTN, anxiety who presented to [facility] on 1/23. On 1/23 morning, he told his family that he was not feeling well and took 2 tabs of Vyvanse (usually takes as needed). Shortly after, he was at a group therapy meeting over the phone when he complained to his father he was having a severe headache. He then became more lethargic, and developed nausea and vomited. He was brought to ED by his father. On arrival, noted to be lethargic, vomiting, with garbled speech. 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 Post CT, more lethargic with dilated right pupil and bilateral reactive but sluggish pupils. He was given mannitol and intubated for airway protection and was taken to neuro ICU.    On 1/25, HCT with enlarging R lateral ventricle and slightly worsening MLS concerning for trapped ventricle. 1/27 acute drop in SpO2 on 1/27, lavaged and suctioned out thick clot. Placed back on ACVC and FiO2 weaned from 70% to 40% overnight. On 1/28/23, he had episode of emesis, & fever of 100.3. He was  given tylenol with improvement in temperature but still little to no movement on the L side (previously would localize arm to suction and spontaneously bend L leg). Pt. Had left decompressive hemicraniectomy and right EVD  placed.    After the procedure, patient opened eyes for the first time and regained LUE/LLE movement. As he became febrile again this was less prominent. Ceftriaxone was switched to Zosyn. 1/29 HCT mild increase extraaxial fluid collection. on 1/30, repeat  HCT unchanged, his MTL was discontinued. Zosyn and vancomycin switched to cefepime. On 1/31,  HCT shoeds worsening edema +/- extraaxial fluid collection, elevated ICPs, and mannitol was restarted.      MTL was weaned,  EVD adjusted and eventually removed on 2/10.  Hospital course further c/b agitation requiring precedex, hypotension requiring bolus, dysphagia s/p PEG 2/14  and trach on 2/13, insomnia s/p seroquel, pain s/p oxycodone and fentanyl. He underwent wound revision with NSGY and plastics on 2/17. Keppra discontinued due to signs of agitation. Tolerated trach collar.     Patient was evaluated by PM&R and therapy for functional deficits, gait/ADL impairments and acute rehabilitation was recommended. Patient was medically optimized for discharge to [facility] IRU on 2/28/23.     (28 Feb 2023 13:10)      PAST MEDICAL & SURGICAL HISTORY:  Substance abuse      Hypertension      Depression      Anxiety      No significant past surgical history          MEDICATIONS  (STANDING):  AQUAPHOR (petrolatum Ointment) 1 Application(s) Topical two times a day  AQUAPHOR (petrolatum Ointment) 1 Application(s) Topical two times a day  atorvastatin 80 milliGRAM(s) Oral at bedtime  bacitracin   Ointment 1 Application(s) Topical two times a day  bacitracin   Ointment 1 Application(s) Topical daily  divalproex Sprinkle 250 milliGRAM(s) Oral two times a day  donepezil 5 milliGRAM(s) Oral daily  enoxaparin Injectable 90 milliGRAM(s) SubCutaneous <User Schedule>  escitalopram 10 milliGRAM(s) Oral <User Schedule>  gabapentin 600 milliGRAM(s) Oral at bedtime  hydrocortisone 1% Cream 1 Application(s) Topical two times a day  melatonin 6 milliGRAM(s) Oral at bedtime  memantine 5 milliGRAM(s) Oral two times a day  midodrine. 10 milliGRAM(s) Oral <User Schedule>  nystatin Powder 1 Application(s) Topical two times a day  pantoprazole   Suspension 40 milliGRAM(s) Oral daily  polyethylene glycol 3350 17 Gram(s) Oral daily  senna Syrup 5 milliLiter(s) Oral at bedtime  traZODone 100 milliGRAM(s) Oral at bedtime    MEDICATIONS  (PRN):  acetaminophen    Suspension .. 650 milliGRAM(s) Oral every 6 hours PRN Temp greater or equal to 38C (100.4F), Mild Pain (1 - 3)  OLANZapine Injectable 2.5 milliGRAM(s) IntraMuscular daily PRN severe agitation      Allergies    No Known Allergies    Intolerances          VITALS  43y  Vital Signs Last 24 Hrs  T(C): 36.3 (01 Apr 2023 08:16), Max: 36.8 (31 Mar 2023 19:42)  T(F): 97.3 (01 Apr 2023 08:16), Max: 98.3 (31 Mar 2023 19:42)  HR: 84 (01 Apr 2023 08:16) (84 - 84)  BP: 111/78 (01 Apr 2023 08:16) (108/66 - 111/78)  BP(mean): --  RR: 16 (01 Apr 2023 08:16) (16 - 16)  SpO2: 96% (01 Apr 2023 08:16) (96% - 96%)    Parameters below as of 01 Apr 2023 08:16  Patient On (Oxygen Delivery Method): room air      Daily     Daily         RECENT LABS:                      CAPILLARY BLOOD GLUCOSE

## 2023-04-01 NOTE — PROGRESS NOTE ADULT - ASSESSMENT
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 He is s/p left decompressive hemicraniectomy and right EVD placement 1/28/23. Hospital course complicated by respiratory failure s/p intubation and extubation, agitation, sepsis, dysphagia s/p PEG 2/14 and trach on 2/13. Patient now with admitted to [facility] acute inpatient rehab.    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 with mass effect and 8mm midline shift  -s/p Decompressive hemicraniectomy and EVD placement 1/28  -Continue Namenda, Donepezil  -Continue gabapentin   -Continue comprehensive rehab program - PT/OT/SLP per rehab team  -Pain management, bowel regimen per rehab     #Depression with anxiety  -Continue Depakote, Lexapro  -Continue Trazodone qHS for insomnia     #Bilateral lower extremity DVTs  -Lower extremity Doppler from 3/1–right femoral vein DVT and bilateral lower extremity calf DVTs  -Continue Lovenox 90mg BID x 3 months  -Neurosurgery and vascular inputs appreciated    #Hypotension, improved on Midodrine  -Continue Midodrine  -Monitor vitals    #T2DM, HbA1c 6.0  -diet controlled  -monitor  -blood glucose goal 100-180 in hospital setting    #HLD  -Continue Lipitor     DVT ppx: Lovenox  HI ppx: Protonix

## 2023-04-01 NOTE — PROGRESS NOTE ADULT - SUBJECTIVE AND OBJECTIVE BOX
Patient is a 43y old  Male who presents with a chief complaint of left IPH/ICH (31 Mar 2023 13:02)       Patient seen and examined at bedside. feeling well, occasional headache, self resolve. no additional complaints.     ALLERGIES:  No Known Allergies    MEDICATIONS  (STANDING):  AQUAPHOR (petrolatum Ointment) 1 Application(s) Topical two times a day  AQUAPHOR (petrolatum Ointment) 1 Application(s) Topical two times a day  atorvastatin 80 milliGRAM(s) Oral at bedtime  bacitracin   Ointment 1 Application(s) Topical two times a day  bacitracin   Ointment 1 Application(s) Topical daily  divalproex Sprinkle 250 milliGRAM(s) Oral two times a day  donepezil 5 milliGRAM(s) Oral daily  enoxaparin Injectable 90 milliGRAM(s) SubCutaneous <User Schedule>  escitalopram 10 milliGRAM(s) Oral <User Schedule>  gabapentin 600 milliGRAM(s) Oral at bedtime  hydrocortisone 1% Cream 1 Application(s) Topical two times a day  melatonin 6 milliGRAM(s) Oral at bedtime  memantine 5 milliGRAM(s) Oral two times a day  midodrine. 10 milliGRAM(s) Oral <User Schedule>  nystatin Powder 1 Application(s) Topical two times a day  pantoprazole   Suspension 40 milliGRAM(s) Oral daily  polyethylene glycol 3350 17 Gram(s) Oral daily  senna Syrup 5 milliLiter(s) Oral at bedtime  traZODone 100 milliGRAM(s) Oral at bedtime    MEDICATIONS  (PRN):  acetaminophen    Suspension .. 650 milliGRAM(s) Oral every 6 hours PRN Temp greater or equal to 38C (100.4F), Mild Pain (1 - 3)  OLANZapine Injectable 2.5 milliGRAM(s) IntraMuscular daily PRN severe agitation    Vital Signs Last 24 Hrs  T(F): 97.3 (01 Apr 2023 08:16), Max: 98.3 (31 Mar 2023 19:42)  HR: 84 (01 Apr 2023 08:16) (84 - 84)  BP: 111/78 (01 Apr 2023 08:16) (108/66 - 111/78)  RR: 16 (01 Apr 2023 08:16) (16 - 16)  SpO2: 96% (01 Apr 2023 08:16) (96% - 96%)  I&O's Summary        PHYSICAL EXAM:  General: NAD, +left hemicraniectomy  ENT: MMM, no scleral icterus  Neck: Supple, No JVD  Lungs: Clear to auscultation bilaterally, no wheezes, rales, rhonchi  Cardio: RRR, S1/S2  Abdomen: Soft, Nontender, Nondistended; Bowel sounds present, +PEG  Extremities: No calf tenderness, No pitting edema, +right hemiparesis    LABS:                        13.4   7.32  )-----------( 279      ( 30 Mar 2023 06:10 )             42.4       03-30    141  |  104  |  5   ----------------------------<  160  4.3   |  29  |  0.67    Ca    9.2      30 Mar 2023 06:10    TPro  7.0  /  Alb  3.3  /  TBili  0.3  /  DBili  x   /  AST  10  /  ALT  23  /  AlkPhos  101  03-30                                          RADIOLOGY & ADDITIONAL TESTS: reviewed    Care Discussed with Consultants/Other Providers: yes, rehab

## 2023-04-01 NOTE — PROGRESS NOTE ADULT - ASSESSMENT
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 s/p Left decompressive hemicraniectomy and right EVD placement, respiratory failure, sepsis +PEG +TRACH    # Large left frontal and basal ganglia IPH with mass effect and 8mm midline shift  - s/p Decompressive hemicraniectomy and EVD placement 1/28  -  underwent wound revision with NSGY and plastics on 2/17  -  Donepezil 5 mg.   - mematine 5mg BID  - Cont Comprehensive Rehab Program: PT/OT/ST, 3hours daily and 5 days weekly  - Helmet when OOB  - OT: right resting hand splint  - cranioplasty next week if NSG can get authorization (will reach out to us), otherwise pt. d/c plan to [facility] on 4/6/23    # Sleep/Restlessness  - Trazodone  100mg qhs 3/8  - melatonin  - depakote 250mg bid  3/24  - gabapentin to 600 mg qhs  - Zyprexa 2.5 mg IM daily PRN severe agitation.     # Depression/anxiety  - cont. Lexapro 10mg   - gabapentin to 600 mg qhs for anxiety    # Bilateral lower extremity DVTs–  - Lower extremity Doppler from 3/1–right femoral vein DVT and bilateral lower extremity calf DVTs  - lovenox for 3 months. Cleared by NSGY, vascular    # Orthostatic hypotension  -cont. midodrine 10mg   -  (108/66 - 111/78) 4/1    # facial rash  - ? seborrheic dermatitis  - hydrocortisone 1% cream to face  - avoid non hypoallergenic soaps/cleansers    # Acute Respiratory Failure  - Decannulated 3/5, Pulm monitoring, tolerating well    # DM II  - A1c 6.0    # Pain management  - Tylenol PRN  - Gabapentin 600 hs      #Bowel Regimen  - Senna, miralax   - Nexium 40mg daily    # Dysphagia    - s/p PEG 2/14  - upgraded to Soft bite sized thin liquids  1:1 assist with meals    # Skin:  - Skin on admission: IAD to bilateral groin and sacrum; RUQ incision with sutures with palpable skull flap; Left hemicraniectomy with sutures MILLER; psoriasis BL shin  - Pressure injury/Skin: Turn Q2hrs while in bed, OOB to Chair, PT/OT     #Precaution  - Fall, Aspiration, seizure    #home meds  - per list provided by mother, [place]: Rousuvastatin 40mg daily and Paroxetine 30mg daily  - paroxetine  - cw high intensity atorvastatin to home med dose

## 2023-04-02 PROCEDURE — 99232 SBSQ HOSP IP/OBS MODERATE 35: CPT

## 2023-04-02 RX ADMIN — DIVALPROEX SODIUM 250 MILLIGRAM(S): 500 TABLET, DELAYED RELEASE ORAL at 17:41

## 2023-04-02 RX ADMIN — ENOXAPARIN SODIUM 90 MILLIGRAM(S): 100 INJECTION SUBCUTANEOUS at 21:12

## 2023-04-02 RX ADMIN — Medication 1 APPLICATION(S): at 05:13

## 2023-04-02 RX ADMIN — Medication 1 APPLICATION(S): at 17:42

## 2023-04-02 RX ADMIN — Medication 1 APPLICATION(S): at 12:12

## 2023-04-02 RX ADMIN — Medication 100 MILLIGRAM(S): at 21:09

## 2023-04-02 RX ADMIN — DONEPEZIL HYDROCHLORIDE 5 MILLIGRAM(S): 10 TABLET, FILM COATED ORAL at 12:09

## 2023-04-02 RX ADMIN — Medication 6 MILLIGRAM(S): at 21:10

## 2023-04-02 RX ADMIN — Medication 1 APPLICATION(S): at 17:43

## 2023-04-02 RX ADMIN — MEMANTINE HYDROCHLORIDE 5 MILLIGRAM(S): 10 TABLET ORAL at 05:15

## 2023-04-02 RX ADMIN — PANTOPRAZOLE SODIUM 40 MILLIGRAM(S): 20 TABLET, DELAYED RELEASE ORAL at 12:09

## 2023-04-02 RX ADMIN — Medication 1 APPLICATION(S): at 05:14

## 2023-04-02 RX ADMIN — ATORVASTATIN CALCIUM 80 MILLIGRAM(S): 80 TABLET, FILM COATED ORAL at 21:10

## 2023-04-02 RX ADMIN — DIVALPROEX SODIUM 250 MILLIGRAM(S): 500 TABLET, DELAYED RELEASE ORAL at 05:15

## 2023-04-02 RX ADMIN — ESCITALOPRAM OXALATE 10 MILLIGRAM(S): 10 TABLET, FILM COATED ORAL at 07:53

## 2023-04-02 RX ADMIN — ENOXAPARIN SODIUM 90 MILLIGRAM(S): 100 INJECTION SUBCUTANEOUS at 08:05

## 2023-04-02 RX ADMIN — MEMANTINE HYDROCHLORIDE 5 MILLIGRAM(S): 10 TABLET ORAL at 17:42

## 2023-04-02 RX ADMIN — GABAPENTIN 600 MILLIGRAM(S): 400 CAPSULE ORAL at 21:10

## 2023-04-02 RX ADMIN — NYSTATIN CREAM 1 APPLICATION(S): 100000 CREAM TOPICAL at 05:13

## 2023-04-02 RX ADMIN — NYSTATIN CREAM 1 APPLICATION(S): 100000 CREAM TOPICAL at 17:42

## 2023-04-02 RX ADMIN — MIDODRINE HYDROCHLORIDE 10 MILLIGRAM(S): 2.5 TABLET ORAL at 07:53

## 2023-04-02 NOTE — PROGRESS NOTE ADULT - ASSESSMENT
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 He is s/p left decompressive hemicraniectomy and right EVD placement 1/28/23. Hospital course complicated by respiratory failure s/p intubation and extubation, agitation, sepsis, dysphagia s/p PEG 2/14 and trach on 2/13. Patient now with admitted to [facility] acute inpatient rehab.    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 with mass effect and 8mm midline shift  -s/p Decompressive hemicraniectomy and EVD placement 1/28  -Continue Namenda, Donepezil  -Continue gabapentin   -Continue comprehensive rehab program - PT/OT/SLP per rehab team  -Pain management, bowel regimen per rehab     #Depression with anxiety  -Continue Depakote, Lexapro  -Continue Trazodone qHS for insomnia     #Bilateral lower extremity DVTs  -Lower extremity Doppler from 3/1–right femoral vein DVT and bilateral lower extremity calf DVTs  -Continue Lovenox 90mg BID x 3 months  -Neurosurgery and vascular inputs appreciated    #Hypotension, improved on Midodrine  -Continue Midodrine  -Monitor vitals    #T2DM, HbA1c 6.0  -diet controlled  -monitor  -blood glucose goal 100-180 in hospital setting    #HLD  -Continue Lipitor     DVT ppx: Lovenox  HI ppx: Protonix

## 2023-04-02 NOTE — PROGRESS NOTE ADULT - COMMENTS
Patient eyes open, restless. +left mitten in place, still able to grab onto rail and pull to side. Can be redirected, but needs frequent attention. He denies H/A
Patient mildly restless and agitated this morning, can be redirected briefly (wanted water/something to drink) Facial rash appears improved, less erythematous and confluent. No new areas of rash noted    denies pain, gestures frustration to right side (weakness)
Patient getting cleaned, continueds to have facial rash, increased on left compared to right. No fever, denies food or drug allergies, non pruritic. not noted anywhere else on extremities or trunk

## 2023-04-02 NOTE — PROGRESS NOTE ADULT - SUBJECTIVE AND OBJECTIVE BOX
Patient is a 43y old  Male who presents with a chief complaint of left IPH/ICH (31 Mar 2023 13:02)       Patient seen and examined at bedside. feeling well, in good spirits today. no acute medical complaints.     ALLERGIES:  No Known Allergies    MEDICATIONS  (STANDING):  AQUAPHOR (petrolatum Ointment) 1 Application(s) Topical two times a day  AQUAPHOR (petrolatum Ointment) 1 Application(s) Topical two times a day  atorvastatin 80 milliGRAM(s) Oral at bedtime  bacitracin   Ointment 1 Application(s) Topical two times a day  bacitracin   Ointment 1 Application(s) Topical daily  divalproex Sprinkle 250 milliGRAM(s) Oral two times a day  donepezil 5 milliGRAM(s) Oral daily  enoxaparin Injectable 90 milliGRAM(s) SubCutaneous <User Schedule>  escitalopram 10 milliGRAM(s) Oral <User Schedule>  gabapentin 600 milliGRAM(s) Oral at bedtime  hydrocortisone 1% Cream 1 Application(s) Topical two times a day  melatonin 6 milliGRAM(s) Oral at bedtime  memantine 5 milliGRAM(s) Oral two times a day  midodrine. 10 milliGRAM(s) Oral <User Schedule>  nystatin Powder 1 Application(s) Topical two times a day  pantoprazole   Suspension 40 milliGRAM(s) Oral daily  polyethylene glycol 3350 17 Gram(s) Oral daily  senna Syrup 5 milliLiter(s) Oral at bedtime  traZODone 100 milliGRAM(s) Oral at bedtime    MEDICATIONS  (PRN):  acetaminophen    Suspension .. 650 milliGRAM(s) Oral every 6 hours PRN Temp greater or equal to 38C (100.4F), Mild Pain (1 - 3)  OLANZapine Injectable 2.5 milliGRAM(s) IntraMuscular daily PRN severe agitation    Vital Signs Last 24 Hrs  T(C): 36.9 (01 Apr 2023 19:32), Max: 36.9 (01 Apr 2023 19:32)  T(F): 98.5 (01 Apr 2023 19:32), Max: 98.5 (01 Apr 2023 19:32)  HR: 81 (02 Apr 2023 07:55) (81 - 83)  BP: 123/78 (02 Apr 2023 07:55) (103/68 - 123/78)  BP(mean): --  RR: 16 (02 Apr 2023 07:55) (15 - 16)  SpO2: 100% (02 Apr 2023 07:55) (95% - 100%)    Parameters below as of 02 Apr 2023 07:55  Patient On (Oxygen Delivery Method): room air        PHYSICAL EXAM:  General: NAD, +left hemicraniectomy  ENT: MMM, no scleral icterus  Neck: Supple, No JVD  Lungs: Clear to auscultation bilaterally, no wheezes, rales, rhonchi  Cardio: RRR, S1/S2  Abdomen: Soft, Nontender, Nondistended; Bowel sounds present, +PEG  Extremities: No calf tenderness, No pitting edema, +right hemiparesis    LABS:                        13.4   7.32  )-----------( 279      ( 30 Mar 2023 06:10 )             42.4       03-30    141  |  104  |  5   ----------------------------<  160  4.3   |  29  |  0.67    Ca    9.2      30 Mar 2023 06:10    TPro  7.0  /  Alb  3.3  /  TBili  0.3  /  DBili  x   /  AST  10  /  ALT  23  /  AlkPhos  101  03-30                                          RADIOLOGY & ADDITIONAL TESTS: reviewed    Care Discussed with Consultants/Other Providers: yes, rehab

## 2023-04-02 NOTE — PROGRESS NOTE ADULT - SKIN COMMENTS
erythematous rash, papular, much less confluent and erythematous, bilateral face left > right, non pruritic, sl neck. no extremity infolvement
+maculo papular erythematous rash non pruritic face

## 2023-04-02 NOTE — PROGRESS NOTE ADULT - MOTOR
left quad, HF 5-/5 ankle PF 5/5  right LE 0/5 AROM  calves soft not TP
no calf swelling or peadl edema  no rash on UE or LE
bilateral calves soft no TTP  no erythema or warmth  left UE and LE at least 3-4/5  no AROM RUE or RLE

## 2023-04-02 NOTE — PROGRESS NOTE ADULT - ASSESSMENT
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 s/p Left decompressive hemicraniectomy and right EVD placement, respiratory failure, sepsis +PEG +TRACH    # Large left frontal and basal ganglia IPH with mass effect and 8mm midline shift  - s/p Decompressive hemicraniectomy and EVD placement 1/28  - s/p wound revision with NSGY and plastics on 2/17  -  Donepezil 5 mg, mematine 5mg BID  - Cont Comprehensive Rehab Program: PT/OT/ST, 3hours daily and 5 days weekly  - Helmet when OOB  - right resting hand splint  - cranioplasty next week if NSG can get authorization (will reach out to us), otherwise pt. d/c plan to [facility] on 4/6/23    # Sleep/Restlessness  - Trazodone  100mg qhs 3/8  - melatonin  - depakote 250mg bid   - gabapentin 600 mg qhs  - Zyprexa 2.5 mg IM daily PRN severe agitation.     # Depression/anxiety  - cont. Lexapro 10mg   - gabapentin to 600 mg qhs for anxiety    # Bilateral lower extremity DVT  - Lower extremity Doppler 3/1: right femoral vein DVT and bilateral lower extremity calf DVTs  - lovenox for 3 months. Cleared by NSGY, vascular    # Orthostatic hypotension  - midodrine 10mg   -  (103/68 - 123/78) 4/2    # facial rash ? seborrheic dermatitis  - hydrocortisone 1% cream to face  - avoid non hypoallergenic soaps/cleansers  - imrpoved 4/2    # Acute Respiratory Failure  - Decannulated 3/5, Pulm monitoring, tolerating well    # DM II  - A1c 6.0    # Pain management  - Tylenol PRN  - Gabapentin 600 hs      #Bowel Regimen  - Senna, miralax   - Nexium 40mg daily    # Dysphagia    - s/p PEG 2/14  - upgraded to Soft bite sized thin liquids  1:1 assist with meals    # Skin:  - Skin on admission: IAD to bilateral groin and sacrum; RUQ incision with sutures with palpable skull flap; Left hemicraniectomy with sutures MILLER; psoriasis BL shin  - Pressure injury/Skin: Turn Q2hrs while in bed, OOB to Chair, PT/OT     #Precaution  - Fall, Aspiration, seizure    \

## 2023-04-02 NOTE — PROGRESS NOTE ADULT - SUBJECTIVE AND OBJECTIVE BOX
Patient is a 43y old  Male who presents with a chief complaint of left IPH/ICH (02 Apr 2023 09:31)      HPI:  This is 42 year old male with PMH of prior substance use, HTN, anxiety who presented to [facility] on 1/23. On 1/23 morning, he told his family that he was not feeling well and took 2 tabs of Vyvanse (usually takes as needed). Shortly after, he was at a group therapy meeting over the phone when he complained to his father he was having a severe headache. He then became more lethargic, and developed nausea and vomited. He was brought to ED by his father. On arrival, noted to be lethargic, vomiting, with garbled speech. 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 Post CT, more lethargic with dilated right pupil and bilateral reactive but sluggish pupils. He was given mannitol and intubated for airway protection and was taken to neuro ICU.    On 1/25, HCT with enlarging R lateral ventricle and slightly worsening MLS concerning for trapped ventricle. 1/27 acute drop in SpO2 on 1/27, lavaged and suctioned out thick clot. Placed back on ACVC and FiO2 weaned from 70% to 40% overnight. On 1/28/23, he had episode of emesis, & fever of 100.3. He was  given tylenol with improvement in temperature but still little to no movement on the L side (previously would localize arm to suction and spontaneously bend L leg). Pt. Had left decompressive hemicraniectomy and right EVD  placed.    After the procedure, patient opened eyes for the first time and regained LUE/LLE movement. As he became febrile again this was less prominent. Ceftriaxone was switched to Zosyn. 1/29 HCT mild increase extraaxial fluid collection. on 1/30, repeat  HCT unchanged, his MTL was discontinued. Zosyn and vancomycin switched to cefepime. On 1/31,  HCT shoeds worsening edema +/- extraaxial fluid collection, elevated ICPs, and mannitol was restarted.      MTL was weaned,  EVD adjusted and eventually removed on 2/10.  Hospital course further c/b agitation requiring precedex, hypotension requiring bolus, dysphagia s/p PEG 2/14  and trach on 2/13, insomnia s/p seroquel, pain s/p oxycodone and fentanyl. He underwent wound revision with NSGY and plastics on 2/17. Keppra discontinued due to signs of agitation. Tolerated trach collar.     Patient was evaluated by PM&R and therapy for functional deficits, gait/ADL impairments and acute rehabilitation was recommended. Patient was medically optimized for discharge to [facility] IRU on 2/28/23.     (28 Feb 2023 13:10)      PAST MEDICAL & SURGICAL HISTORY:  Substance abuse      Hypertension      Depression      Anxiety      No significant past surgical history          MEDICATIONS  (STANDING):  AQUAPHOR (petrolatum Ointment) 1 Application(s) Topical two times a day  AQUAPHOR (petrolatum Ointment) 1 Application(s) Topical two times a day  atorvastatin 80 milliGRAM(s) Oral at bedtime  bacitracin   Ointment 1 Application(s) Topical two times a day  bacitracin   Ointment 1 Application(s) Topical daily  divalproex Sprinkle 250 milliGRAM(s) Oral two times a day  donepezil 5 milliGRAM(s) Oral daily  enoxaparin Injectable 90 milliGRAM(s) SubCutaneous <User Schedule>  escitalopram 10 milliGRAM(s) Oral <User Schedule>  gabapentin 600 milliGRAM(s) Oral at bedtime  hydrocortisone 1% Cream 1 Application(s) Topical two times a day  melatonin 6 milliGRAM(s) Oral at bedtime  memantine 5 milliGRAM(s) Oral two times a day  midodrine. 10 milliGRAM(s) Oral <User Schedule>  nystatin Powder 1 Application(s) Topical two times a day  pantoprazole   Suspension 40 milliGRAM(s) Oral daily  polyethylene glycol 3350 17 Gram(s) Oral daily  senna Syrup 5 milliLiter(s) Oral at bedtime  traZODone 100 milliGRAM(s) Oral at bedtime    MEDICATIONS  (PRN):  acetaminophen    Suspension .. 650 milliGRAM(s) Oral every 6 hours PRN Temp greater or equal to 38C (100.4F), Mild Pain (1 - 3)  OLANZapine Injectable 2.5 milliGRAM(s) IntraMuscular daily PRN severe agitation      Allergies    No Known Allergies    Intolerances          VITALS  43y  Vital Signs Last 24 Hrs  T(C): 36.9 (01 Apr 2023 19:32), Max: 36.9 (01 Apr 2023 19:32)  T(F): 98.5 (01 Apr 2023 19:32), Max: 98.5 (01 Apr 2023 19:32)  HR: 81 (02 Apr 2023 07:55) (81 - 83)  BP: 123/78 (02 Apr 2023 07:55) (103/68 - 123/78)  BP(mean): --  RR: 16 (02 Apr 2023 07:55) (15 - 16)  SpO2: 100% (02 Apr 2023 07:55) (95% - 100%)    Parameters below as of 02 Apr 2023 07:55  Patient On (Oxygen Delivery Method): room air      Daily     Daily         RECENT LABS:                      CAPILLARY BLOOD GLUCOSE

## 2023-04-02 NOTE — PROGRESS NOTE ADULT - CONSTITUTIONAL COMMENTS
alert, eyes spontaneously open. Verbalizing, dysarthric, aphasic
alert, improved simple attention, less restless. Breathing comfortably
eyes open spontaneously. no signficiant cough or secretions noted +TRACH collar in place. Incision healing well +sutures, no oozing  +subcutnaous trace swelling no TTP

## 2023-04-03 ENCOUNTER — TRANSCRIPTION ENCOUNTER (OUTPATIENT)
Age: 43
End: 2023-04-03

## 2023-04-03 LAB
ALBUMIN SERPL ELPH-MCNC: 3.5 G/DL — SIGNIFICANT CHANGE UP (ref 3.3–5)
ALP SERPL-CCNC: 98 U/L — SIGNIFICANT CHANGE UP (ref 40–120)
ALT FLD-CCNC: 24 U/L — SIGNIFICANT CHANGE UP (ref 10–45)
ANION GAP SERPL CALC-SCNC: 7 MMOL/L — SIGNIFICANT CHANGE UP (ref 5–17)
AST SERPL-CCNC: 8 U/L — LOW (ref 10–40)
BILIRUB SERPL-MCNC: 0.3 MG/DL — SIGNIFICANT CHANGE UP (ref 0.2–1.2)
BUN SERPL-MCNC: 6 MG/DL — LOW (ref 7–23)
CALCIUM SERPL-MCNC: 9.4 MG/DL — SIGNIFICANT CHANGE UP (ref 8.4–10.5)
CHLORIDE SERPL-SCNC: 104 MMOL/L — SIGNIFICANT CHANGE UP (ref 96–108)
CO2 SERPL-SCNC: 32 MMOL/L — HIGH (ref 22–31)
CREAT SERPL-MCNC: 0.55 MG/DL — SIGNIFICANT CHANGE UP (ref 0.5–1.3)
EGFR: 126 ML/MIN/1.73M2 — SIGNIFICANT CHANGE UP
GLUCOSE SERPL-MCNC: 119 MG/DL — HIGH (ref 70–99)
HCT VFR BLD CALC: 42.1 % — SIGNIFICANT CHANGE UP (ref 39–50)
HGB BLD-MCNC: 13.6 G/DL — SIGNIFICANT CHANGE UP (ref 13–17)
MCHC RBC-ENTMCNC: 29.5 PG — SIGNIFICANT CHANGE UP (ref 27–34)
MCHC RBC-ENTMCNC: 32.3 GM/DL — SIGNIFICANT CHANGE UP (ref 32–36)
MCV RBC AUTO: 91.3 FL — SIGNIFICANT CHANGE UP (ref 80–100)
MRSA PCR RESULT.: DETECTED
NRBC # BLD: 0 /100 WBCS — SIGNIFICANT CHANGE UP (ref 0–0)
PLATELET # BLD AUTO: 295 K/UL — SIGNIFICANT CHANGE UP (ref 150–400)
POTASSIUM SERPL-MCNC: 4.2 MMOL/L — SIGNIFICANT CHANGE UP (ref 3.5–5.3)
POTASSIUM SERPL-SCNC: 4.2 MMOL/L — SIGNIFICANT CHANGE UP (ref 3.5–5.3)
PROT SERPL-MCNC: 7.1 G/DL — SIGNIFICANT CHANGE UP (ref 6–8.3)
RBC # BLD: 4.61 M/UL — SIGNIFICANT CHANGE UP (ref 4.2–5.8)
RBC # FLD: 13.1 % — SIGNIFICANT CHANGE UP (ref 10.3–14.5)
S AUREUS DNA NOSE QL NAA+PROBE: DETECTED
SARS-COV-2 RNA SPEC QL NAA+PROBE: SIGNIFICANT CHANGE UP
SODIUM SERPL-SCNC: 143 MMOL/L — SIGNIFICANT CHANGE UP (ref 135–145)
WBC # BLD: 8.2 K/UL — SIGNIFICANT CHANGE UP (ref 3.8–10.5)
WBC # FLD AUTO: 8.2 K/UL — SIGNIFICANT CHANGE UP (ref 3.8–10.5)

## 2023-04-03 PROCEDURE — 99232 SBSQ HOSP IP/OBS MODERATE 35: CPT

## 2023-04-03 RX ORDER — SENNA PLUS 8.6 MG/1
5 TABLET ORAL
Qty: 0 | Refills: 0 | DISCHARGE
Start: 2023-04-03

## 2023-04-03 RX ORDER — VENLAFAXINE HCL 75 MG
0 CAPSULE, EXT RELEASE 24 HR ORAL
Qty: 0 | Refills: 0 | DISCHARGE

## 2023-04-03 RX ORDER — BUPRENORPHINE AND NALOXONE 2; .5 MG/1; MG/1
2 TABLET SUBLINGUAL
Qty: 0 | Refills: 0 | DISCHARGE

## 2023-04-03 RX ORDER — ESCITALOPRAM OXALATE 10 MG/1
1 TABLET, FILM COATED ORAL
Qty: 0 | Refills: 0 | DISCHARGE
Start: 2023-04-03

## 2023-04-03 RX ORDER — CLONAZEPAM 1 MG
1 TABLET ORAL
Qty: 0 | Refills: 0 | DISCHARGE

## 2023-04-03 RX ORDER — DIVALPROEX SODIUM 500 MG/1
2 TABLET, DELAYED RELEASE ORAL
Qty: 0 | Refills: 0 | DISCHARGE
Start: 2023-04-03

## 2023-04-03 RX ORDER — ALPRAZOLAM 0.25 MG
1 TABLET ORAL
Qty: 0 | Refills: 0 | DISCHARGE

## 2023-04-03 RX ORDER — MIDODRINE HYDROCHLORIDE 2.5 MG/1
1 TABLET ORAL
Qty: 0 | Refills: 0 | DISCHARGE
Start: 2023-04-03

## 2023-04-03 RX ORDER — OLANZAPINE 15 MG/1
2.5 TABLET, FILM COATED ORAL
Qty: 0 | Refills: 0 | DISCHARGE
Start: 2023-04-03

## 2023-04-03 RX ORDER — PETROLATUM,WHITE
1 JELLY (GRAM) TOPICAL
Qty: 0 | Refills: 0 | DISCHARGE
Start: 2023-04-03

## 2023-04-03 RX ORDER — ATENOLOL 25 MG/1
0 TABLET ORAL
Qty: 0 | Refills: 0 | DISCHARGE

## 2023-04-03 RX ORDER — ATORVASTATIN CALCIUM 80 MG/1
1 TABLET, FILM COATED ORAL
Qty: 0 | Refills: 0 | DISCHARGE
Start: 2023-04-03

## 2023-04-03 RX ORDER — SIMVASTATIN 20 MG/1
0 TABLET, FILM COATED ORAL
Qty: 0 | Refills: 0 | DISCHARGE

## 2023-04-03 RX ORDER — TRAZODONE HCL 50 MG
1 TABLET ORAL
Qty: 0 | Refills: 0 | DISCHARGE
Start: 2023-04-03

## 2023-04-03 RX ORDER — NYSTATIN CREAM 100000 [USP'U]/G
1 CREAM TOPICAL
Qty: 0 | Refills: 0 | DISCHARGE
Start: 2023-04-03

## 2023-04-03 RX ORDER — HYDROCORTISONE 1 %
0 OINTMENT (GRAM) TOPICAL
Qty: 0 | Refills: 0 | DISCHARGE
Start: 2023-04-03

## 2023-04-03 RX ORDER — DONEPEZIL HYDROCHLORIDE 10 MG/1
1 TABLET, FILM COATED ORAL
Qty: 0 | Refills: 0 | DISCHARGE
Start: 2023-04-03

## 2023-04-03 RX ORDER — POLYETHYLENE GLYCOL 3350 17 G/17G
17 POWDER, FOR SOLUTION ORAL
Qty: 0 | Refills: 0 | DISCHARGE
Start: 2023-04-03

## 2023-04-03 RX ORDER — LANOLIN ALCOHOL/MO/W.PET/CERES
2 CREAM (GRAM) TOPICAL
Qty: 0 | Refills: 0 | DISCHARGE
Start: 2023-04-03

## 2023-04-03 RX ORDER — GABAPENTIN 400 MG/1
2 CAPSULE ORAL
Qty: 0 | Refills: 0 | DISCHARGE
Start: 2023-04-03

## 2023-04-03 RX ORDER — BUPROPION HYDROCHLORIDE 150 MG/1
150 TABLET, EXTENDED RELEASE ORAL
Qty: 0 | Refills: 0 | DISCHARGE

## 2023-04-03 RX ORDER — MEMANTINE HYDROCHLORIDE 10 MG/1
1 TABLET ORAL
Qty: 0 | Refills: 0 | DISCHARGE
Start: 2023-04-03

## 2023-04-03 RX ADMIN — ENOXAPARIN SODIUM 90 MILLIGRAM(S): 100 INJECTION SUBCUTANEOUS at 08:38

## 2023-04-03 RX ADMIN — Medication 1 APPLICATION(S): at 17:27

## 2023-04-03 RX ADMIN — Medication 100 MILLIGRAM(S): at 21:51

## 2023-04-03 RX ADMIN — MEMANTINE HYDROCHLORIDE 5 MILLIGRAM(S): 10 TABLET ORAL at 05:13

## 2023-04-03 RX ADMIN — DIVALPROEX SODIUM 250 MILLIGRAM(S): 500 TABLET, DELAYED RELEASE ORAL at 17:30

## 2023-04-03 RX ADMIN — ATORVASTATIN CALCIUM 80 MILLIGRAM(S): 80 TABLET, FILM COATED ORAL at 21:51

## 2023-04-03 RX ADMIN — ESCITALOPRAM OXALATE 10 MILLIGRAM(S): 10 TABLET, FILM COATED ORAL at 07:30

## 2023-04-03 RX ADMIN — Medication 1 APPLICATION(S): at 05:15

## 2023-04-03 RX ADMIN — Medication 1 APPLICATION(S): at 17:28

## 2023-04-03 RX ADMIN — Medication 1 APPLICATION(S): at 17:29

## 2023-04-03 RX ADMIN — PANTOPRAZOLE SODIUM 40 MILLIGRAM(S): 20 TABLET, DELAYED RELEASE ORAL at 12:00

## 2023-04-03 RX ADMIN — Medication 1 APPLICATION(S): at 05:14

## 2023-04-03 RX ADMIN — NYSTATIN CREAM 1 APPLICATION(S): 100000 CREAM TOPICAL at 17:27

## 2023-04-03 RX ADMIN — GABAPENTIN 600 MILLIGRAM(S): 400 CAPSULE ORAL at 21:52

## 2023-04-03 RX ADMIN — MIDODRINE HYDROCHLORIDE 10 MILLIGRAM(S): 2.5 TABLET ORAL at 07:30

## 2023-04-03 RX ADMIN — MEMANTINE HYDROCHLORIDE 5 MILLIGRAM(S): 10 TABLET ORAL at 17:30

## 2023-04-03 RX ADMIN — Medication 6 MILLIGRAM(S): at 21:52

## 2023-04-03 RX ADMIN — DONEPEZIL HYDROCHLORIDE 5 MILLIGRAM(S): 10 TABLET, FILM COATED ORAL at 12:00

## 2023-04-03 RX ADMIN — NYSTATIN CREAM 1 APPLICATION(S): 100000 CREAM TOPICAL at 06:09

## 2023-04-03 RX ADMIN — POLYETHYLENE GLYCOL 3350 17 GRAM(S): 17 POWDER, FOR SOLUTION ORAL at 11:59

## 2023-04-03 RX ADMIN — DIVALPROEX SODIUM 250 MILLIGRAM(S): 500 TABLET, DELAYED RELEASE ORAL at 05:13

## 2023-04-03 RX ADMIN — Medication 1 APPLICATION(S): at 05:16

## 2023-04-03 NOTE — DISCHARGE NOTE PROVIDER - NSDCMRMEDTOKEN_GEN_ALL_CORE_FT
Aquaphor Body Spray topical ointment: 1 Apply topically to affected area 2 times a day  atorvastatin 80 mg oral tablet: 1 tab(s) orally once a day (at bedtime)  divalproex sodium 125 mg oral delayed release capsule: 2 cap(s) orally 2 times a day  donepezil 5 mg oral tablet: 1 tab(s) orally once a day  escitalopram 10 mg oral tablet: 1 tab(s) orally  gabapentin 300 mg oral capsule: 2 cap(s) orally once a day (at bedtime)  hydrocortisone 1% topical cream: 1 Apply topically to affected area 2 times a day  melatonin 3 mg oral tablet: 2 tab(s) orally once a day (at bedtime)  memantine 5 mg oral tablet: 1 tab(s) orally 2 times a day  midodrine 10 mg oral tablet: 1 tab(s) orally  nystatin 100,000 units/g topical powder: 1 Apply topically to affected area 2 times a day  OLANZapine 10 mg intramuscular injection: 2.5 milligram(s) intramuscular once a day As needed severe agitation  polyethylene glycol 3350 oral powder for reconstitution: 17 gram(s) orally once a day  Right custom molded AFO.: DX: Left Intercerebral hemorrhage, right spastic hemiparesis.  senna (sennosides) 8.8 mg/5 mL oral syrup: 5 milliliter(s) orally once a day (at bedtime)  traZODone 100 mg oral tablet: 1 tab(s) orally once a day (at bedtime)   Aquaphor Body Spray topical ointment: 1 Apply topically to affected area 2 times a day  atorvastatin 80 mg oral tablet: 1 tab(s) orally once a day (at bedtime)  chlorhexidine 2% topical pad: 1 Apply topically to affected area once a day  divalproex sodium 125 mg oral delayed release capsule: 2 cap(s) orally 2 times a day  donepezil 5 mg oral tablet: 1 tab(s) orally once a day  escitalopram 10 mg oral tablet: 1 tab(s) orally  gabapentin 300 mg oral capsule: 2 cap(s) orally once a day (at bedtime)  hydrocortisone 1% topical cream: 1 Apply topically to affected area 2 times a day  melatonin 3 mg oral tablet: 2 tab(s) orally once a day (at bedtime)  memantine 5 mg oral tablet: 1 tab(s) orally 2 times a day  midodrine 10 mg oral tablet: 1 tab(s) orally  nystatin 100,000 units/g topical powder: 1 Apply topically to affected area 2 times a day  OLANZapine 10 mg intramuscular injection: 2.5 milligram(s) intramuscular once a day As needed severe agitation  polyethylene glycol 3350 oral powder for reconstitution: 17 gram(s) orally once a day  Right custom molded AFO.: DX: Left Intercerebral hemorrhage, right spastic hemiparesis.  senna (sennosides) 8.8 mg/5 mL oral syrup: 5 milliliter(s) orally once a day (at bedtime)  traZODone 100 mg oral tablet: 1 tab(s) orally once a day (at bedtime)   Aquaphor Body Spray topical ointment: 1 Apply topically to affected area 2 times a day  atorvastatin 80 mg oral tablet: 1 tab(s) orally once a day (at bedtime)  [place] AT Kit 2% topical kit: Apply topically to affected area 2 times a day Apply to both nares twice daily  chlorhexidine 2% topical pad: 1 Apply topically to affected area once a day  divalproex sodium 125 mg oral delayed release capsule: 2 cap(s) orally 2 times a day  donepezil 5 mg oral tablet: 1 tab(s) orally once a day  escitalopram 10 mg oral tablet: 1 tab(s) orally once a day daily at 8am  gabapentin 300 mg oral capsule: 2 cap(s) orally once a day (at bedtime)  hydrocortisone 1% topical cream: Apply topically to affected area 2 times a day 1 Apply topically to affected area 2 times a day  melatonin 3 mg oral tablet: 2 tab(s) orally once a day (at bedtime)  memantine 5 mg oral tablet: 1 tab(s) orally 2 times a day  midodrine 10 mg oral tablet: 1 tab(s) orally once a day 8am daily  nystatin 100,000 units/g topical powder: 1 Apply topically to affected area 2 times a day  OLANZapine 10 mg intramuscular injection: 2.5 milligram(s) intramuscular once a day As needed severe agitation  pantoprazole 40 mg oral granule, delayed release: 40 milligram(s) orally once a day before breakfast  polyethylene glycol 3350 oral powder for reconstitution: 17 gram(s) orally once a day  Right custom molded AFO.: DX: Left Intercerebral hemorrhage, right spastic hemiparesis.  senna (sennosides) 8.8 mg/5 mL oral syrup: 5 milliliter(s) orally once a day (at bedtime)  traZODone 100 mg oral tablet: 1 tab(s) orally once a day (at bedtime)   Aquaphor Body Spray topical ointment: 1 Apply topically to affected area 2 times a day  atorvastatin 80 mg oral tablet: 1 tab(s) orally once a day (at bedtime)  chlorhexidine 2% topical pad: 1 Apply topically to affected area once a day  divalproex sodium 125 mg oral delayed release capsule: 2 cap(s) orally 2 times a day  donepezil 5 mg oral tablet: 1 tab(s) orally once a day  escitalopram 10 mg oral tablet: 1 tab(s) orally once a day daily at 8am  gabapentin 300 mg oral capsule: 2 cap(s) orally once a day (at bedtime)  hydrocortisone 1% topical cream: Apply topically to affected area 2 times a day 1 Apply topically to affected area 2 times a day  melatonin 3 mg oral tablet: 2 tab(s) orally once a day (at bedtime)  memantine 5 mg oral tablet: 1 tab(s) orally 2 times a day  midodrine 10 mg oral tablet: 1 tab(s) orally once a day 8am daily  mupirocin 2% topical cream: Apply topically to affected area 2 times a day apply to both nares twice a day  nystatin 100,000 units/g topical powder: 1 Apply topically to affected area 2 times a day  OLANZapine 10 mg intramuscular injection: 2.5 milligram(s) intramuscular once a day As needed severe agitation  pantoprazole 40 mg oral granule, delayed release: 40 milligram(s) orally once a day before breakfast  polyethylene glycol 3350 oral powder for reconstitution: 17 gram(s) orally once a day  Right custom molded AFO.: DX: Left Intercerebral hemorrhage, right spastic hemiparesis.  senna (sennosides) 8.8 mg/5 mL oral syrup: 5 milliliter(s) orally once a day (at bedtime)  traZODone 100 mg oral tablet: 1 tab(s) orally once a day (at bedtime)

## 2023-04-03 NOTE — PROGRESS NOTE ADULT - ASSESSMENT
44 y/o M with PMH HTN, anxiety who presented to [facility] 1/23 with 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 He is s/p left decompressive hemicraniectomy and right EVD placement 1/28/23. Hospital course complicated by respiratory failure s/p intubation and extubation, agitation, sepsis, dysphagia s/p PEG 2/14 and trach on 2/13. Patient now with admitted to [facility] acute inpatient rehab.    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 with mass effect and 8mm midline shift  -s/p Decompressive hemicraniectomy and EVD placement 1/28  -Continue Namenda, Donepezil  -Continue gabapentin  -Continue comprehensive rehab program - PT/OT/SLP per rehab team  -Pain management, bowel regimen per rehab     #Depression with anxiety  -Continue Depakote, Lexapro  -Continue Trazodone qHS for insomnia     #Bilateral lower extremity DVTs  -Lower extremity Doppler from 3/1–right femoral vein DVT and bilateral lower extremity calf DVTs  -Continue Lovenox 90mg BID x 3 months  -Neurosurgery and vascular inputs appreciated    #Hypotension, improved on Midodrine  -Continue Midodrine  -Monitor vitals    #T2DM, HbA1c 6.0  -diet controlled  -monitor  -blood glucose goal 100-180 in hospital setting    #HLD  -Continue Lipitor     DVT ppx: Lovenox  GI ppx: Protonix

## 2023-04-03 NOTE — DISCHARGE NOTE PROVIDER - CARE PROVIDERS DIRECT ADDRESSES
,DirectAddress_Unknown,DirectAddress_Unknown ,DirectAddress_Unknown,DirectAddress_Unknown,[email or web address]

## 2023-04-03 NOTE — PROGRESS NOTE ADULT - SUBJECTIVE AND OBJECTIVE BOX
HPI:  This is 42 year old male with PMH of prior substance use, HTN, anxiety who presented to [facility] on 1/23. On 1/23 morning, he told his family that he was not feeling well and took 2 tabs of Vyvanse (usually takes as needed). Shortly after, he was at a group therapy meeting over the phone when he complained to his father he was having a severe headache. He then became more lethargic, and developed nausea and vomited. He was brought to ED by his father. On arrival, noted to be lethargic, vomiting, with garbled speech. 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 Post CT, more lethargic with dilated right pupil and bilateral reactive but sluggish pupils. He was given mannitol and intubated for airway protection and was taken to neuro ICU.    On 1/25, HCT with enlarging R lateral ventricle and slightly worsening MLS concerning for trapped ventricle. 1/27 acute drop in SpO2 on 1/27, lavaged and suctioned out thick clot. Placed back on ACVC and FiO2 weaned from 70% to 40% overnight. On 1/28/23, he had episode of emesis, & fever of 100.3. He was  given tylenol with improvement in temperature but still little to no movement on the L side (previously would localize arm to suction and spontaneously bend L leg). Pt. Had left decompressive hemicraniectomy and right EVD  placed.    After the procedure, patient opened eyes for the first time and regained LUE/LLE movement. As he became febrile again this was less prominent. Ceftriaxone was switched to Zosyn. 1/29 HCT mild increase extraaxial fluid collection. on 1/30, repeat  HCT unchanged, his MTL was discontinued. Zosyn and vancomycin switched to cefepime. On 1/31,  HCT shoeds worsening edema +/- extraaxial fluid collection, elevated ICPs, and mannitol was restarted.      MTL was weaned,  EVD adjusted and eventually removed on 2/10.  Hospital course further c/b agitation requiring precedex, hypotension requiring bolus, dysphagia s/p PEG 2/14  and trach on 2/13, insomnia s/p seroquel, pain s/p oxycodone and fentanyl. He underwent wound revision with NSGY and plastics on 2/17. Keppra discontinued due to signs of agitation. Tolerated trach collar.     Patient was evaluated by PM&R and therapy for functional deficits, gait/ADL impairments and acute rehabilitation was recommended. Patient was medically optimized for discharge to [facility] IRU on 2/28/23.      Subjective: Patient seen and examined in physical therapy on stationary bike. Greets with, " Jennifer Talavera, how are you?"  Still impaired with command following and less than 100  % Y/N reliability. Surgery cranioplasty scheduled 4/6 w Dr. Cueva. Patient aware.     Vital Signs Last 24 Hrs  T(C): 36.5 (03 Apr 2023 07:21), Max: 36.7 (02 Apr 2023 20:06)  T(F): 97.7 (03 Apr 2023 07:21), Max: 98 (02 Apr 2023 20:06)  HR: 86 (03 Apr 2023 07:21) (85 - 86)  BP: 113/72 (03 Apr 2023 07:21) (113/72 - 114/69)  RR: 16 (03 Apr 2023 07:21) (16 - 16)  SpO2: 96% (03 Apr 2023 07:21) (96% - 96%)    Parameters below as of 03 Apr 2023 07:21  Patient On (Oxygen Delivery Method): room air    REVIEW OF SYMPTOMS  Neurological deficits      PHYSICAL EXAM  Constitutional - NAD  HEENT - EOMI, left hemicraniectomy concavity- helmet in place while out of bed  Neck - s/p trach decannulation (guaze over stoma)  Chest - resp nonlabored  Cardio -warm, well perfused, no cyanosis or pedal edema  Abdomen -  Soft, NTND, +PEG, Bone flap marsupialized in epigastric region.    Extremities - No peripheral edema, No calf tenderness   Neurologic Exam:                    Cognitive -             Orientation: Awake, Alert; aphasia and cognitive deficits - inconsistent yes/no            Communication-  command following improving but inconsistent, Non-fluent, able to repeat- but impaired.            Attention:  Impaired;        Cranial Nerves - Limited due to  aphasia; No facial asymmetry, Tongue midline, EOMI, Pupils dilated but reactive, +dysphagia     Motor -                     LEFT    UE - 5/5                    RIGHT UE - ShAB 0/5, EF 0/5, EE 0/5, WE 0/5,  0/5--                     LEFT    LE - 5/5                    RIGHT LE - HE 2/5, Hip adductor 1-2/5, KE 2/5, DF 0/5, PF 0/5     Tone: MAS 2/4 finger flexors, wrist right, 1+/4 elbow extensors; 3/4 right PFs-- inversion      Sensory - decrease sensation to LT-- RUE  Psychiatric - Restless and frustrated at times.                13.6   8.20  )-----------( 295      ( 03 Apr 2023 07:20 )             42.1     04-03    143  |  104  |  6<L>  ----------------------------<  119<H>  4.2   |  32<H>  |  0.55    Ca    9.4      03 Apr 2023 07:20    TPro  7.1  /  Alb  3.5  /  TBili  0.3  /  DBili  x   /  AST  8<L>  /  ALT  24  /  AlkPhos  98  04-03    CAPILLARY BLOOD GLUCOSE    MEDICATIONS  (STANDING):  AQUAPHOR (petrolatum Ointment) 1 Application(s) Topical two times a day  AQUAPHOR (petrolatum Ointment) 1 Application(s) Topical two times a day  atorvastatin 80 milliGRAM(s) Oral at bedtime  bacitracin   Ointment 1 Application(s) Topical two times a day  bacitracin   Ointment 1 Application(s) Topical daily  divalproex Sprinkle 250 milliGRAM(s) Oral two times a day  donepezil 5 milliGRAM(s) Oral daily  enoxaparin Injectable 90 milliGRAM(s) SubCutaneous <User Schedule>  escitalopram 10 milliGRAM(s) Oral <User Schedule>  gabapentin 600 milliGRAM(s) Oral at bedtime  hydrocortisone 1% Cream 1 Application(s) Topical two times a day  melatonin 6 milliGRAM(s) Oral at bedtime  memantine 5 milliGRAM(s) Oral two times a day  midodrine. 10 milliGRAM(s) Oral <User Schedule>  nystatin Powder 1 Application(s) Topical two times a day  pantoprazole   Suspension 40 milliGRAM(s) Oral daily  polyethylene glycol 3350 17 Gram(s) Oral daily  senna Syrup 5 milliLiter(s) Oral at bedtime  traZODone 100 milliGRAM(s) Oral at bedtime    MEDICATIONS  (PRN):  acetaminophen    Suspension .. 650 milliGRAM(s) Oral every 6 hours PRN Temp greater or equal to 38C (100.4F), Mild Pain (1 - 3)  OLANZapine Injectable 2.5 milliGRAM(s) IntraMuscular daily PRN severe agitation           HPI:  This is 42 year old male with PMH of prior substance use, HTN, anxiety who presented to [facility] on 1/23. On 1/23 morning, he told his family that he was not feeling well and took 2 tabs of Vyvanse (usually takes as needed). Shortly after, he was at a group therapy meeting over the phone when he complained to his father he was having a severe headache. He then became more lethargic, and developed nausea and vomited. He was brought to ED by his father. On arrival, noted to be lethargic, vomiting, with garbled speech. 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 Post CT, more lethargic with dilated right pupil and bilateral reactive but sluggish pupils. He was given mannitol and intubated for airway protection and was taken to neuro ICU.    On 1/25, HCT with enlarging R lateral ventricle and slightly worsening MLS concerning for trapped ventricle. 1/27 acute drop in SpO2 on 1/27, lavaged and suctioned out thick clot. Placed back on ACVC and FiO2 weaned from 70% to 40% overnight. On 1/28/23, he had episode of emesis, & fever of 100.3. He was  given tylenol with improvement in temperature but still little to no movement on the L side (previously would localize arm to suction and spontaneously bend L leg). Pt. Had left decompressive hemicraniectomy and right EVD  placed.    After the procedure, patient opened eyes for the first time and regained LUE/LLE movement. As he became febrile again this was less prominent. Ceftriaxone was switched to Zosyn. 1/29 HCT mild increase extraaxial fluid collection. on 1/30, repeat  HCT unchanged, his MTL was discontinued. Zosyn and vancomycin switched to cefepime. On 1/31,  HCT shoeds worsening edema +/- extraaxial fluid collection, elevated ICPs, and mannitol was restarted.      MTL was weaned,  EVD adjusted and eventually removed on 2/10.  Hospital course further c/b agitation requiring precedex, hypotension requiring bolus, dysphagia s/p PEG 2/14  and trach on 2/13, insomnia s/p seroquel, pain s/p oxycodone and fentanyl. He underwent wound revision with NSGY and plastics on 2/17. Keppra discontinued due to signs of agitation. Tolerated trach collar.     Patient was evaluated by PM&R and therapy for functional deficits, gait/ADL impairments and acute rehabilitation was recommended. Patient was medically optimized for discharge to [facility] IRU on 2/28/23.      Subjective: Patient seen and examined in physical therapy on stationary bike. Greets with, " Jennifer Talavera"  Still impaired with command following and less than 100  % Y/N reliability. Surgery cranioplasty scheduled 4/6 w Dr. Cueva. Patient aware.     Vital Signs Last 24 Hrs  T(C): 36.5 (03 Apr 2023 07:21), Max: 36.7 (02 Apr 2023 20:06)  T(F): 97.7 (03 Apr 2023 07:21), Max: 98 (02 Apr 2023 20:06)  HR: 86 (03 Apr 2023 07:21) (85 - 86)  BP: 113/72 (03 Apr 2023 07:21) (113/72 - 114/69)  RR: 16 (03 Apr 2023 07:21) (16 - 16)  SpO2: 96% (03 Apr 2023 07:21) (96% - 96%)    Parameters below as of 03 Apr 2023 07:21  Patient On (Oxygen Delivery Method): room air    REVIEW OF SYMPTOMS  Neurological deficits      PHYSICAL EXAM  Constitutional - NAD  HEENT - EOMI, left hemicraniectomy concavity- helmet in place while out of bed  Neck - s/p trach decannulation (guaze over stoma)  Chest - resp nonlabored  Cardio -warm, well perfused, no cyanosis or pedal edema  Abdomen -  Soft, NTND, +PEG, Bone flap marsupialized in epigastric region.    Extremities - No peripheral edema, No calf tenderness   Neurologic Exam:                    Cognitive -             Orientation: Awake, Alert; aphasia and cognitive deficits - inconsistent yes/no            Communication-  command following improving but inconsistent, Non-fluent, able to repeat- but impaired. Expression improving           Attention:  Impaired;        Cranial Nerves - Limited due to  aphasia; No facial asymmetry, Tongue midline, EOMI, Pupils dilated but reactive, +dysphagia     Motor -                     LEFT    UE - 5/5                    RIGHT UE - ShAB 0/5, EF 0/5, EE 0/5, WE 0/5,  0/5--                     LEFT    LE - 5/5                    RIGHT LE - HE 2/5, Hip adductor 1-2/5, KE 2/5, DF 0/5, PF 0/5     Tone: MAS 2/4 finger flexors, wrist right, 1+/4 elbow extensors; 3/4 right PFs-- inversion      Sensory - decrease sensation to LT-- RUE  Psychiatric - Restless and frustrated at times.                13.6   8.20  )-----------( 295      ( 03 Apr 2023 07:20 )             42.1     04-03    143  |  104  |  6<L>  ----------------------------<  119<H>  4.2   |  32<H>  |  0.55    Ca    9.4      03 Apr 2023 07:20    TPro  7.1  /  Alb  3.5  /  TBili  0.3  /  DBili  x   /  AST  8<L>  /  ALT  24  /  AlkPhos  98  04-03    CAPILLARY BLOOD GLUCOSE    MEDICATIONS  (STANDING):  AQUAPHOR (petrolatum Ointment) 1 Application(s) Topical two times a day  AQUAPHOR (petrolatum Ointment) 1 Application(s) Topical two times a day  atorvastatin 80 milliGRAM(s) Oral at bedtime  bacitracin   Ointment 1 Application(s) Topical two times a day  bacitracin   Ointment 1 Application(s) Topical daily  divalproex Sprinkle 250 milliGRAM(s) Oral two times a day  donepezil 5 milliGRAM(s) Oral daily  enoxaparin Injectable 90 milliGRAM(s) SubCutaneous <User Schedule>  escitalopram 10 milliGRAM(s) Oral <User Schedule>  gabapentin 600 milliGRAM(s) Oral at bedtime  hydrocortisone 1% Cream 1 Application(s) Topical two times a day  melatonin 6 milliGRAM(s) Oral at bedtime  memantine 5 milliGRAM(s) Oral two times a day  midodrine. 10 milliGRAM(s) Oral <User Schedule>  nystatin Powder 1 Application(s) Topical two times a day  pantoprazole   Suspension 40 milliGRAM(s) Oral daily  polyethylene glycol 3350 17 Gram(s) Oral daily  senna Syrup 5 milliLiter(s) Oral at bedtime  traZODone 100 milliGRAM(s) Oral at bedtime    MEDICATIONS  (PRN):  acetaminophen    Suspension .. 650 milliGRAM(s) Oral every 6 hours PRN Temp greater or equal to 38C (100.4F), Mild Pain (1 - 3)  OLANZapine Injectable 2.5 milliGRAM(s) IntraMuscular daily PRN severe agitation

## 2023-04-03 NOTE — PROGRESS NOTE ADULT - SUBJECTIVE AND OBJECTIVE BOX
Patient is a 43y old  Male who presents with a chief complaint of left IPH/ICH (02 Apr 2023 09:37)      Patient seen and examined at bedside. denies acute medical complaints.       ALLERGIES:  No Known Allergies    MEDICATIONS  (STANDING):  AQUAPHOR (petrolatum Ointment) 1 Application(s) Topical two times a day  AQUAPHOR (petrolatum Ointment) 1 Application(s) Topical two times a day  atorvastatin 80 milliGRAM(s) Oral at bedtime  bacitracin   Ointment 1 Application(s) Topical two times a day  bacitracin   Ointment 1 Application(s) Topical daily  divalproex Sprinkle 250 milliGRAM(s) Oral two times a day  donepezil 5 milliGRAM(s) Oral daily  enoxaparin Injectable 90 milliGRAM(s) SubCutaneous <User Schedule>  escitalopram 10 milliGRAM(s) Oral <User Schedule>  gabapentin 600 milliGRAM(s) Oral at bedtime  hydrocortisone 1% Cream 1 Application(s) Topical two times a day  melatonin 6 milliGRAM(s) Oral at bedtime  memantine 5 milliGRAM(s) Oral two times a day  midodrine. 10 milliGRAM(s) Oral <User Schedule>  nystatin Powder 1 Application(s) Topical two times a day  pantoprazole   Suspension 40 milliGRAM(s) Oral daily  polyethylene glycol 3350 17 Gram(s) Oral daily  senna Syrup 5 milliLiter(s) Oral at bedtime  traZODone 100 milliGRAM(s) Oral at bedtime    MEDICATIONS  (PRN):  acetaminophen    Suspension .. 650 milliGRAM(s) Oral every 6 hours PRN Temp greater or equal to 38C (100.4F), Mild Pain (1 - 3)  OLANZapine Injectable 2.5 milliGRAM(s) IntraMuscular daily PRN severe agitation    Vital Signs Last 24 Hrs  T(F): 97.7 (03 Apr 2023 07:21), Max: 98 (02 Apr 2023 20:06)  HR: 86 (03 Apr 2023 07:21) (85 - 86)  BP: 113/72 (03 Apr 2023 07:21) (113/72 - 114/69)  RR: 16 (03 Apr 2023 07:21) (16 - 16)  SpO2: 96% (03 Apr 2023 07:21) (96% - 96%)  I&O's Summary      PHYSICAL EXAM:  General: NAD, +left hemicraniectomy  ENT: MMM, no scleral icterus  Neck: Supple, No JVD  Lungs: Clear to auscultation bilaterally, no wheezes, rales, rhonchi  Cardio: RRR, S1/S2  Abdomen: Soft, Nontender, Nondistended; Bowel sounds present, +PEG  Extremities: No calf tenderness, No pitting edema, +right hemiparesis    LABS:                        13.6   8.20  )-----------( 295      ( 03 Apr 2023 07:20 )             42.1       04-03    143  |  104  |  6   ----------------------------<  119  4.2   |  32  |  0.55    Ca    9.4      03 Apr 2023 07:20    TPro  7.1  /  Alb  3.5  /  TBili  0.3  /  DBili  x   /  AST  8   /  ALT  24  /  AlkPhos  98  04-03                                          RADIOLOGY & ADDITIONAL TESTS: reviewed    Care Discussed with Consultants/Other Providers: yes, rehab

## 2023-04-03 NOTE — PROGRESS NOTE ADULT - ASSESSMENT
ASSESSMENT/PLAN  This is 42 year old male with PMH of prior substance use, HTN, anxiety who presented to [facility] on 1/23 with  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 He is  s/p Left decompressive hemicraniectomy and right EVD placement 1/28/23. Hospital course further c/b respiratory failure s/p intubation and extubation,  agitation requiring precedex, sepsis s/p ABX, hypotension requiring bolus, dysphagia s/p PEG 2/14 and trach on 2/13, insomnia s/p seroquel, pain s/p oxycodone and fentanyl. Patient now with Right Hemiparesis, dysphagia s/p PEG, Aphasia, Cognitive deficits, gait Instability, ADL impairments and Functional impairments.    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    - ST: to diagnose and treat deficits in swallowing, cognition and communication.  - Helmet when OOB  - follow up CT head 3/19 reviewed-  Postoperative changes compatible with a left temporal frontal parietal craniectomy is again seen. left frontoparietal hematoma with some improvement.  improved vasogenic edema is seen. Mass effect on the left lateral ventricle and Left-to-right shift (3.1 mm)has now resolved.   - OT– right resting hand splint  - Cranioplsty w Dr. Cueva 4/6/23 2:15pm    Sleep/Restlessness  --c/w Trazodone  100mg qhs 3/8-- for with sleep and restlessness.   Pt's mother reports he was on 50-100mg qhs and 50mg daytime home meds.   --cont. melatonin  --monitor  -- patient needs supervision for safety monitoring as pt. is impulsive, restless and high fall risk     # Agitation   --Psychiatry consult, Dr. Acharya following  --cont. with  depakote 250mg bid  3/24  - depakote level =24 and ammonia level=21 3/27--nontoxic  -- c/w gabapentin to 600 mg qhs  - Zyprexa 2.5 mg IM daily PRN severe agitation.   - Continue to monitor    Depression/anxiety  -- cont. Lexapro 10mg  --has been more effective for pt's anxiety/depression  --Continue to monitor  - c/w gabapentin to 600 mg qhs for anxiety    Aphasia  -- c/w Donepezil 5 mg.    -cont. mematine 5mg BID-- start 3/29--Monitor    Bilateral lower extremity DVTs–  – Lower extremity Doppler from 3/1–right femoral vein DVT and bilateral lower extremity calf DVTs  – spoke with patient's neurosurgery PA–who was in contact with Dr. Cueva patient's neurosurgeon–gave clearance for patient to start therapeutic Lovenox.  --Vascular Surgery consult -- reaached out to Dr. Hodges 3/7 - nothing further to do if patient is on therapeutic lovenox.   -- C/w therapeutic lovenox for 3 months.    Orthostatic hypotension–  -cont. midodrine 10mg     #Acute Respiratory Failure  - Intubation and extubation  - s/p Trach 2/13  - Decannulated 3/5, Pulm monitoring, tolerating well    #GI PPX  - Nexium 40mg daily    #DM II  - A1c 6.0  -management as per hospitalist--off insulin     #Pain management  - Tylenol PRN  - Neurontin cw 600 hs --   - Continue to monitor     #Bowel Regimen  - Senna, miralax PRN    #Bladder management  -voiding well with low PVRs,   -- off doxazosin 3/2    #Dysphagia    - s/p PEG 2/14  - Diet upgraded Soft bite sized thin liquids  3/30--d/w Speech therapy-- 1:1 assist with meals  - SLP: evaluation and treatment  - Diet: tolerating bolus TF     #Skin:  - Skin on admission: IAD to bilateral groin and sacrum; RUQ incision with sutures with palpable skull flap; Left hemicraniectomy with sutures MILLER; psoriasis BL shin  - Pressure injury/Skin: Turn Q2hrs while in bed, OOB to Chair, PT/OT     #Precaution  - Fall, Aspiration, seizure    #home meds  - per list provided by Laura yang: Rousuvastatin 40mg daily and Paroxetine 30mg daily  -  cw paroxetine  - cw high intensity atorvastatin to home med dose    – IDT 3/28:  Social work:  patient on discharge can live with his mother who has a first-floor set up.  Nursing–incontinent of bowel and bladder, total assist with toileting  Speech: Diet upgraded to soft bite-size;  severe language deficits–poor command following, poor orientation, improve repetition says family members names, some increased spontaneous content of speech, Speech jargon, Aphasia, severe cognitive deficits  OT: Gradual progress–supervision eating/grooming; mod assist–upper body dressing; max assist–lower body dressing; total assist–toileting, no longer using [place] transfers.  Improved sitting balance.  PT: Transfers–stand pivot–mod assist x1 (fluctuates); bed mobility–mod assist supine to sit; sit to stand at rail–mod assist; ambulates 10 feet x 2 mod assist and wheelchair follow at Left HR–initiating hip flexion to advance right lower extremity.   goals-- mod assist with transfers, ambulation 10 feet max assist with hemiwalker, wheelchair mobility 50 feet min assist.  ELOS: 4/6 ZOHAIB  Goals:  1.  Improved secretion management and tolerate PMV  2.  transfers with 1 person assist  3.  Communicate basic wants and needs with yes no answers    **Dr. Lerma Spoke with Jeniffer from Dr. Cueva's office who confirmed 4/6/23 cranioplasty and requested MRSA and PCR COVID swab + medical clearance forms which were faxed over and received on 4/3/23. D/W social work to scheduled transportation. Patient aware. Will d/w family.     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 He is  s/p Left decompressive hemicraniectomy and right EVD placement 1/28/23. Hospital course further c/b respiratory failure s/p intubation and extubation,  agitation requiring precedex, sepsis s/p ABX, hypotension requiring bolus, dysphagia s/p PEG 2/14 and trach on 2/13, insomnia s/p seroquel, pain s/p oxycodone and fentanyl. Patient now with Right Hemiparesis, dysphagia s/p PEG, Aphasia, Cognitive deficits, gait Instability, ADL impairments and Functional impairments.    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    - ST: to diagnose and treat deficits in swallowing, cognition and communication.  - Helmet when OOB  - follow up CT head 3/19 reviewed-  Postoperative changes compatible with a left temporal frontal parietal craniectomy is again seen. left frontoparietal hematoma with some improvement.  improved vasogenic edema is seen. Mass effect on the left lateral ventricle and Left-to-right shift (3.1 mm)has now resolved.   - OT– right resting hand splint  - Cranioplsty w Dr. Cueva 4/6/23 2:15pm    Sleep/Restlessness  --c/w Trazodone  100mg qhs 3/8-- for with sleep and restlessness.   Pt's mother reports he was on 50-100mg qhs and 50mg daytime home meds.   --cont. melatonin  --monitor  -- patient needs supervision for safety monitoring as pt. is impulsive, restless and high fall risk     # Agitation--improved   --Psychiatry consult, Dr. Acharya following  --cont. with  depakote 250mg bid  3/24  - depakote level =24 and ammonia level=21 3/27--nontoxic  -- c/w gabapentin to 600 mg qhs  - Zyprexa 2.5 mg IM daily PRN severe agitation. --has not needed  - Continue to monitor    Depression/anxiety  -- cont. Lexapro 10mg  --has been more effective for pt's anxiety/depression  --Continue to monitor  - c/w gabapentin to 600 mg qhs for anxiety    Aphasia  -- c/w Donepezil 5 mg.    -cont. mematine 5mg BID-- start 3/29--Monitor    Bilateral lower extremity DVTs–  – Lower extremity Doppler from 3/1–right femoral vein DVT and bilateral lower extremity calf DVTs  – spoke with patient's neurosurgery PA–who was in contact with Dr. Cueva patient's neurosurgeon–gave clearance for patient to start therapeutic Lovenox.  --Vascular Surgery consult -- reaached out to Dr. Hodges 3/7 - nothing further to do if patient is on therapeutic lovenox.   -- C/w therapeutic lovenox for 3 months.    Orthostatic hypotension–  -cont. midodrine 10mg     #Acute Respiratory Failure  - Intubation and extubation  - s/p Trach 2/13  - Decannulated 3/5, Pulm monitoring, tolerating well    #GI PPX  - Nexium 40mg daily    #DM II  - A1c 6.0  -management as per hospitalist--off insulin     #Pain management  - Tylenol PRN  - Neurontin cw 600 hs --   - Continue to monitor     #Bowel Regimen  - Senna, miralax PRN    #Bladder management  -voiding well with low PVRs,   -- off doxazosin 3/2    #Dysphagia    - s/p PEG 2/14  - Diet upgraded Soft bite sized thin liquids  3/30--d/w Speech therapy-- 1:1 assist with meals  - SLP: evaluation and treatment  - Diet: tolerating bolus TF     #Skin:  - Skin on admission: IAD to bilateral groin and sacrum; RUQ incision with sutures with palpable skull flap; Left hemicraniectomy with sutures MILLER; psoriasis BL shin  - Pressure injury/Skin: Turn Q2hrs while in bed, OOB to Chair, PT/OT     #Precaution  - Fall, Aspiration, seizure    #home meds  - per list provided by mother, [place]: Rousuvastatin 40mg daily and Paroxetine 30mg daily  -  cw paroxetine  - cw high intensity atorvastatin to home med dose    – IDT 3/28:  Social work:  patient on discharge can live with his mother who has a first-floor set up.  Nursing–incontinent of bowel and bladder, total assist with toileting  Speech: Diet upgraded to soft bite-size;  severe language deficits–poor command following, poor orientation, improve repetition says family members names, some increased spontaneous content of speech, Speech jargon, Aphasia, severe cognitive deficits  OT: Gradual progress–supervision eating/grooming; mod assist–upper body dressing; max assist–lower body dressing; total assist–toileting, no longer using Constantine transfers.  Improved sitting balance.  PT: Transfers–stand pivot–mod assist x1 (fluctuates); bed mobility–mod assist supine to sit; sit to stand at rail–mod assist; ambulates 10 feet x 2 mod assist and wheelchair follow at Left HR–initiating hip flexion to advance right lower extremity.   goals-- mod assist with transfers, ambulation 10 feet max assist with hemiwalker, wheelchair mobility 50 feet min assist.  ELOS: 4/6 or 4/5 to [place] for Cranioplasty  Goals:  1.  Improved secretion management and tolerate PMV  2.  transfers with 1 person assist  3.  Communicate basic wants and needs with yes no answers    **Dr. Lerma Spoke with Jeniffer from Dr. Cueva's office who confirmed 4/6/23 cranioplasty and requested MRSA and PCR COVID swab + medical clearance forms which were faxed over and received on 4/3/23. D/W social work to scheduled transportation. Patient aware. Will d/w family.

## 2023-04-03 NOTE — DISCHARGE NOTE PROVIDER - NSDCFUADDAPPT_GEN_ALL_CORE_FT
Monday April 10  Em Barrera MD  neurology  [address] [facility] [address]  [phone]    Tues April 11  Demetri HARMON [facility]  [address] [place] [address]  [phone]

## 2023-04-03 NOTE — DISCHARGE NOTE PROVIDER - NSDCCPCAREPLAN_GEN_ALL_CORE_FT
PRINCIPAL DISCHARGE DIAGNOSIS  Diagnosis: Intraparenchymal hemorrhage of brain  Assessment and Plan of Treatment: Left frontal and basal ganglia hemorrhage of the brain resulting in functional and cognitive deficits. You have made significant gains while in rehabilitation. Continue to wear resting hand splint and leg brace.  Orthotic measurements were made. To be delivered at future date.      SECONDARY DISCHARGE DIAGNOSES  Diagnosis: Insomnia  Assessment and Plan of Treatment: Continue with Trazodone 100mg nightly for sleeplessness and restlessness.  You may continue taking melatonin (which is over the counter) as directed on the label if you find this helpful for sleep.    Diagnosis: Agitation  Assessment and Plan of Treatment: Continue to take Depakene/Depakote twice a day. Continnue with gabapentin 600mg at night. Follow-up with the physicians listed in this document for continued management.    Diagnosis: Mood disorder  Assessment and Plan of Treatment: For depression/anxiety continue to take Lexapro 10mg daily which has shown itself to be more beneficial for you currently.  Continue to take Gabapentin as mentioned above. Follow-up with the providers listed in this discharge document for continued management.    Diagnosis: Aphasia  Assessment and Plan of Treatment: Continue to take memantin 5mg twice a day and donepazil 5mg daily to support language recovery in rehabilitation. Follow-up with Physical Medicine and Rehabilitation for continued management.    Diagnosis: H/O orthostatic hypotension  Assessment and Plan of Treatment: Continue to take midodrine to help with blood pressure regulation. Over time you may no longer need the same dosage or the medication - follow-up with Physical Medicine and Rehabilitation for continued assessment and management of this medication.     PRINCIPAL DISCHARGE DIAGNOSIS  Diagnosis: Intraparenchymal hemorrhage of brain  Assessment and Plan of Treatment: Left frontal and basal ganglia hemorrhage of the brain resulting in functional and cognitive deficits. You have made significant gains while in rehabilitation. Continue to wear resting hand splint and leg brace.  Orthotic measurements were made. To be delivered at future date.      SECONDARY DISCHARGE DIAGNOSES  Diagnosis: Insomnia  Assessment and Plan of Treatment: Continue with Trazodone 100mg nightly for sleeplessness and restlessness.  You may continue taking melatonin (which is over the counter) as directed on the label if you find this helpful for sleep.    Diagnosis: Agitation  Assessment and Plan of Treatment: Continue to take Depakene/Depakote twice a day. Continnue with gabapentin 600mg at night. Follow-up with the physicians listed in this document for continued management.    Diagnosis: Mood disorder  Assessment and Plan of Treatment: For depression/anxiety continue to take Lexapro 10mg daily which has shown itself to be more beneficial for you currently.  Continue to take Gabapentin as mentioned above. Follow-up with the providers listed in this discharge document for continued management.    Diagnosis: Aphasia  Assessment and Plan of Treatment: Continue to take memantin 5mg twice a day and donepazil 5mg daily to support language recovery in rehabilitation. Follow-up with Physical Medicine and Rehabilitation for continued management.    Diagnosis: H/O orthostatic hypotension  Assessment and Plan of Treatment: Continue to take midodrine to help with blood pressure regulation. Over time you may no longer need the same dosage or the medication - follow-up with Physical Medicine and Rehabilitation for continued assessment and management of this medication.    Diagnosis: PEG (percutaneous endoscopic gastrostomy) status  Assessment and Plan of Treatment: No longer necessary as tolerating soft and bite sized diet. Continue to take Protonix. Follow-up with the physicians listed in this document after your cranioplasty for assessment and planning of PEG removal.

## 2023-04-03 NOTE — DISCHARGE NOTE PROVIDER - CARE PROVIDER_API CALL
Ramiro Cueva)  Neurosurgery  [address] [facility], [address]  [place] [address]  Phone: [phone]  Fax: [phone]  Follow Up Time: 1 month    Demetri Crystal  [address] [address]     Bring copy of med records, labs, imaging.  Phone: [phone]  Fax: (   )    -  Follow Up Time: 2 weeks   Ramiro Cueav)  Neurosurgery  [address] [facility], [address]  [place] [address]  Phone: [phone]  Fax: [phone]  Follow Up Time: 1 month    Demetri Crystal  [address] [facility]  [address] [place] [id number]     Bring copy of med records, labs, imaging.  Phone: [phone]  Fax: (   )    -  Follow Up Time: 2 weeks    Eulalia Talavera (DO)  Brain Injury Medicine; PhysicalRehab Medicine  [address] [place] [address]  Phone: [phone]  Fax: [phone]  Follow Up Time:

## 2023-04-03 NOTE — DISCHARGE NOTE PROVIDER - PROVIDER TOKENS
PROVIDER:[TOKEN:[[id number]],FOLLOWUP:[1 month]],FREE:[LAST:[Maryam Lund],FIRST:[Demetri],PHONE:[phone]],FAX:[(   )    -],ADDRESS:[[address] [place] [id number]     Bring copy of med records, labs, imaging.],FOLLOWUP:[2 weeks]] PROVIDER:[TOKEN:[[id number]],FOLLOWUP:[1 month]],FREE:[LAST:[Maryam Lund],FIRST:[Demetri],PHONE:[phone]],FAX:[(   )    -],ADDRESS:[[address] [place] [id number]     Bring copy of med records, labs, imaging.],FOLLOWUP:[2 weeks]],PROVIDER:[TOKEN:[[id number]]]

## 2023-04-03 NOTE — CHART NOTE - NSCHARTNOTEFT_GEN_A_CORE
Nutrition Follow Up Note  Hospital Course   (Per Electronic Medical Record)    Source:  Patient [X]  Nursing Staff [X]   Medical Record [X]      Diet: Diet, Soft and Bite Sized (03-30-23 @ 12:10) [Active]    Patient w/ aphasia, can answer y/n questions at this time. W/ 1:1 assistance at meals. W/ adequate appetite/intake consuming % of meals. Recommend continue double protein + patient amenable to adding Ensure Plus High Protein Daily 8 oz (Provides 350 kcal, 20 grams of protein) at this time to augment PO intakes for increased nutritional needs. No issues w/ chewing and swallowing current diet texture. Denies N/V/C/D, last BM 4/2 Per nursing flowsheets.        Enteral/Parenteral Nutrition: Not Applicable    Current Weight: 193.7lb on 3/25  Recommend obtaining current weights    Pertinent Medications: MEDICATIONS  (STANDING):  AQUAPHOR (petrolatum Ointment) 1 Application(s) Topical two times a day  AQUAPHOR (petrolatum Ointment) 1 Application(s) Topical two times a day  atorvastatin 80 milliGRAM(s) Oral at bedtime  bacitracin   Ointment 1 Application(s) Topical two times a day  bacitracin   Ointment 1 Application(s) Topical daily  divalproex Sprinkle 250 milliGRAM(s) Oral two times a day  donepezil 5 milliGRAM(s) Oral daily  enoxaparin Injectable 90 milliGRAM(s) SubCutaneous <User Schedule>  escitalopram 10 milliGRAM(s) Oral <User Schedule>  gabapentin 600 milliGRAM(s) Oral at bedtime  hydrocortisone 1% Cream 1 Application(s) Topical two times a day  melatonin 6 milliGRAM(s) Oral at bedtime  memantine 5 milliGRAM(s) Oral two times a day  midodrine. 10 milliGRAM(s) Oral <User Schedule>  nystatin Powder 1 Application(s) Topical two times a day  pantoprazole   Suspension 40 milliGRAM(s) Oral daily  polyethylene glycol 3350 17 Gram(s) Oral daily  senna Syrup 5 milliLiter(s) Oral at bedtime  traZODone 100 milliGRAM(s) Oral at bedtime    MEDICATIONS  (PRN):  acetaminophen    Suspension .. 650 milliGRAM(s) Oral every 6 hours PRN Temp greater or equal to 38C (100.4F), Mild Pain (1 - 3)  OLANZapine Injectable 2.5 milliGRAM(s) IntraMuscular daily PRN severe agitation      Pertinent Labs:  04-03 Na143 mmol/L Glu 119 mg/dL<H> K+ 4.2 mmol/L Cr  0.55 mg/dL BUN 6 mg/dL<L> 04-03 Alb 3.5 g/dL    Skin: Surgical Incision, Site left head, upper abdominal    Edema: No edema noted per nursing flowsheet    Last Bowel Movement: on 4/2 Per nursing flowsheets     Estimated Needs:   [X] No Change Since Previous Assessment    Previous Nutrition Diagnosis:   X] Chewing Difficulties    [X] Swallowing Difficulties    related to dysphagia as evidenced by modified IDDSI pureed diet texture     Nutrition Diagnosis is [X] Ongoing - addressed w/ Soft and Bite sized diet.       New Nutrition Diagnosis: [X] Not Applicable    Interventions:   1. Recommend continue double protein + patient amenable to adding Ensure Plus High Protein Daily 8 oz (Provides 350 kcal, 20 grams of protein) at this time to augment PO intakes for increased nutritional needs  2. Recommend Continue Nutrition Plan of Care.    Monitoring & Evaluation:   [X] Weights   [X] PO Intake   [X] Skin Integrity   [X] Follow Up (Per Protocol)  [X] Tolerance to Diet Prescription   [X] Other: Labs     Registered Dietitian/Nutritionist Remains Available.  Zahra Rain RD, MS, CDN    Phone# [phone]

## 2023-04-03 NOTE — DISCHARGE NOTE PROVIDER - HOSPITAL COURSE
HPI:  This is 42 year old male with PMH of prior substance use, HTN, anxiety who presented to [facility] on 1/23. On 1/23 morning, he told his family that he was not feeling well and took 2 tabs of Vyvanse (usually takes as needed). Shortly after, he was at a group therapy meeting over the phone when he complained to his father he was having a severe headache. He then became more lethargic, and developed nausea and vomited. He was brought to ED by his father. On arrival, noted to be lethargic, vomiting, with garbled speech. 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 Post CT, more lethargic with dilated right pupil and bilateral reactive but sluggish pupils. He was given mannitol and intubated for airway protection and was taken to neuro ICU.    On 1/25, HCT with enlarging R lateral ventricle and slightly worsening MLS concerning for trapped ventricle. 1/27 acute drop in SpO2 on 1/27, lavaged and suctioned out thick clot. Placed back on ACVC and FiO2 weaned from 70% to 40% overnight. On 1/28/23, he had episode of emesis, & fever of 100.3. He was  given tylenol with improvement in temperature but still little to no movement on the L side (previously would localize arm to suction and spontaneously bend L leg). Pt. Had left decompressive hemicraniectomy and right EVD  placed.    After the procedure, patient opened eyes for the first time and regained LUE/LLE movement. As he became febrile again this was less prominent. Ceftriaxone was switched to Zosyn. 1/29 HCT mild increase extraaxial fluid collection. on 1/30, repeat  HCT unchanged, his MTL was discontinued. Zosyn and vancomycin switched to cefepime. On 1/31,  HCT shoeds worsening edema +/- extraaxial fluid collection, elevated ICPs, and mannitol was restarted.      MTL was weaned,  EVD adjusted and eventually removed on 2/10.  Hospital course further c/b agitation requiring precedex, hypotension requiring bolus, dysphagia s/p PEG 2/14  and trach on 2/13, insomnia s/p seroquel, pain s/p oxycodone and fentanyl. He underwent wound revision with NSGY and plastics on 2/17. Keppra discontinued due to signs of agitation. Tolerated trach collar.     Patient was evaluated by PM&R and therapy for functional deficits, gait/ADL impairments and acute rehabilitation was recommended. Patient was medically optimized for discharge to [facility] IRU on 2/28/23.    Rehab course significant for ___.    Functional Status:      All other medical co-morbidities were stable. Patient tolerated course of inpatient PT/OT/SLP rehab with significant improvements and met rehab goals prior to discharge. Discharge instructions were discussed with patient and family. All questions answered and all concerns addressed. Patient was medically cleared for discharge to ___.     Outpatient Follow-ups:   HPI:  This is 42 year old male with PMH of prior substance use, HTN, anxiety who presented to [facility] on 1/23. On 1/23 morning, he told his family that he was not feeling well and took 2 tabs of Vyvanse (usually takes as needed). Shortly after, he was at a group therapy meeting over the phone when he complained to his father he was having a severe headache. He then became more lethargic, and developed nausea and vomited. He was brought to ED by his father. On arrival, noted to be lethargic, vomiting, with garbled speech. 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 Post CT, more lethargic with dilated right pupil and bilateral reactive but sluggish pupils. He was given mannitol and intubated for airway protection and was taken to neuro ICU.    On 1/25, HCT with enlarging R lateral ventricle and slightly worsening MLS concerning for trapped ventricle. 1/27 acute drop in SpO2 on 1/27, lavaged and suctioned out thick clot. Placed back on ACVC and FiO2 weaned from 70% to 40% overnight. On 1/28/23, he had episode of emesis, & fever of 100.3. He was  given tylenol with improvement in temperature but still little to no movement on the L side (previously would localize arm to suction and spontaneously bend L leg). Pt. Had left decompressive hemicraniectomy and right EVD  placed.    After the procedure, patient opened eyes for the first time and regained LUE/LLE movement. As he became febrile again this was less prominent. Ceftriaxone was switched to Zosyn. 1/29 HCT mild increase extraaxial fluid collection. on 1/30, repeat  HCT unchanged, his MTL was discontinued. Zosyn and vancomycin switched to cefepime. On 1/31,  HCT shoeds worsening edema +/- extraaxial fluid collection, elevated ICPs, and mannitol was restarted.     MTL was weaned,  EVD adjusted and eventually removed on 2/10.  Hospital course further c/b agitation requiring precedex, hypotension requiring bolus, dysphagia s/p PEG 2/14  and trach on 2/13, insomnia s/p seroquel, pain s/p oxycodone and fentanyl. He underwent wound revision with NSGY and plastics on 2/17. Keppra discontinued due to signs of agitation. Tolerated trach collar.     Patient was evaluated by PM&R and therapy for functional deficits, gait/ADL impairments and acute rehabilitation was recommended. Patient was medically optimized for discharge to [facility] IRU on 2/28/23.    Rehab course significant for ___.    All other medical co-morbidities were stable. Patient tolerated course of inpatient PT/OT/SLP rehab with significant improvements and met rehab goals prior to discharge. Discharge instructions were discussed with patient and family. All questions answered and all concerns addressed. Patient was medically cleared for discharge to ___.       Outpatient follow-ups:    Functional Status:  Monday April 10  Em Barrera MD  neurology  [address] [facility] [address]  [phone]    Tues April 11  Demetri Lund   [facility]  [address] [place] [id number] [phone]    Dr Ramiro Cueva neurosurgery    Dr. Demetri Lund         HPI:  This is 42 year old male with PMH of prior substance use, HTN, anxiety who presented to [facility] on 1/23. On 1/23 morning, he told his family that he was not feeling well and took 2 tabs of Vyvanse (usually takes as needed). Shortly after, he was at a group therapy meeting over the phone when he complained to his father he was having a severe headache. He then became more lethargic, and developed nausea and vomited. He was brought to ED by his father. On arrival, noted to be lethargic, vomiting, with garbled speech. 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 Post CT, more lethargic with dilated right pupil and bilateral reactive but sluggish pupils. He was given mannitol and intubated for airway protection and was taken to neuro ICU.    On 1/25, HCT with enlarging R lateral ventricle and slightly worsening MLS concerning for trapped ventricle. 1/27 acute drop in SpO2 on 1/27, lavaged and suctioned out thick clot. Placed back on ACVC and FiO2 weaned from 70% to 40% overnight. On 1/28/23, he had episode of emesis, & fever of 100.3. He was  given tylenol with improvement in temperature but still little to no movement on the L side (previously would localize arm to suction and spontaneously bend L leg). Pt. Had left decompressive hemicraniectomy and right EVD  placed.    After the procedure, patient opened eyes for the first time and regained LUE/LLE movement. As he became febrile again this was less prominent. Ceftriaxone was switched to Zosyn. 1/29 HCT mild increase extraaxial fluid collection. on 1/30, repeat  HCT unchanged, his MTL was discontinued. Zosyn and vancomycin switched to cefepime. On 1/31,  HCT shoeds worsening edema +/- extraaxial fluid collection, elevated ICPs, and mannitol was restarted.     MTL was weaned,  EVD adjusted and eventually removed on 2/10.  Hospital course further c/b agitation requiring precedex, hypotension requiring bolus, dysphagia s/p PEG 2/14  and trach on 2/13, insomnia s/p seroquel, pain s/p oxycodone and fentanyl. He underwent wound revision with NSGY and plastics on 2/17. Keppra discontinued due to signs of agitation. Tolerated trach collar.     Patient was evaluated by PM&R and therapy for functional deficits, gait/ADL impairments and acute rehabilitation was recommended. Patient was medically optimized for discharge to [facility] IRU on 2/28/23.    Rehab course significant for decannulation 3/5/23, Orthostatic hypotension controlled with midodrine. Patient with notable restlessness as awareness improved, which ultimately escalated to a code gray, patient medications adjusted by psychiatry; neuropsychologist following - no further incidents. Patient has been cooperative and calm but remains under safety monitoring.   Presurgical testing revealed MRSA colonization treated with bactroban in nares starting 4/5 and chlorhexidine cloths. COVID negative on discharge screening.     All other medical co-morbidities were stable. Patient tolerated course of inpatient PT/OT/SLP rehab with significant improvements and met rehab goals prior to discharge. Discharge instructions were discussed with patient and family. All questions answered and all concerns addressed. Patient was medically cleared for discharge to neurosurgery for cranioplasty.     Outpatient follow-ups:    Functional Status:  Monday April 10  Em Barrera MD  neurology  [address] [facility] [address]  [phone]    Tues April 11  Demetri Lund   [facility]  [address] [place] [id number] [phone]    Dr Ramiro Cueva neurosurgery    Dr. Demetri Lund         HPI:  This is 42 year old male with PMH of prior substance use, HTN, anxiety who presented to [facility] on 1/23. On 1/23 morning, he told his family that he was not feeling well and took 2 tabs of Vyvanse (usually takes as needed). Shortly after, he was at a group therapy meeting over the phone when he complained to his father he was having a severe headache. He then became more lethargic, and developed nausea and vomited. He was brought to ED by his father. On arrival, noted to be lethargic, vomiting, with garbled speech. 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 Post CT, more lethargic with dilated right pupil and bilateral reactive but sluggish pupils. He was given mannitol and intubated for airway protection and was taken to neuro ICU.    On 1/25, HCT with enlarging R lateral ventricle and slightly worsening MLS concerning for trapped ventricle. 1/27 acute drop in SpO2 on 1/27, lavaged and suctioned out thick clot. Placed back on ACVC and FiO2 weaned from 70% to 40% overnight. On 1/28/23, he had episode of emesis, & fever of 100.3. He was  given tylenol with improvement in temperature but still little to no movement on the L side (previously would localize arm to suction and spontaneously bend L leg). Pt. Had left decompressive hemicraniectomy and right EVD  placed.    After the procedure, patient opened eyes for the first time and regained LUE/LLE movement. As he became febrile again this was less prominent. Ceftriaxone was switched to Zosyn. 1/29 HCT mild increase extraaxial fluid collection. on 1/30, repeat  HCT unchanged, his MTL was discontinued. Zosyn and vancomycin switched to cefepime. On 1/31,  HCT shoeds worsening edema +/- extraaxial fluid collection, elevated ICPs, and mannitol was restarted.     MTL was weaned,  EVD adjusted and eventually removed on 2/10.  Hospital course further c/b agitation requiring precedex, hypotension requiring bolus, dysphagia s/p PEG 2/14  and trach on 2/13, insomnia s/p seroquel, pain s/p oxycodone and fentanyl. He underwent wound revision with NSGY and plastics on 2/17. Keppra discontinued due to signs of agitation. Tolerated trach collar.     Patient was evaluated by PM&R and therapy for functional deficits, gait/ADL impairments and acute rehabilitation was recommended. Patient was medically optimized for discharge to [facility] IRU on 2/28/23.    Rehab course significant for self decannulation 3/5/23, tolerated well, and orthostatic hypotension controlled with midodrine. Patient with notable restlessness as cognitive awareness improved, which ultimately escalated to a code gray, patient medications adjusted by psychiatry; neuropsychologist following - no further incidents. Patient has been cooperative and calm but remains under safety monitoring.   Presurgical testing revealed MRSA colonization treated with bactroban in nares starting 4/5 and chlorhexidine cloths. COVID negative on discharge screening.     All other medical co-morbidities were stable. Patient tolerated course of inpatient PT/OT/SLP rehab with significant improvements and met rehab goals prior to discharge. Discharge instructions were discussed with patient and family. All questions answered and all concerns addressed. Patient was medically cleared for discharge to neurosurgery for cranioplasty.     Outpatient follow-ups:    Functional Status:  Monday April 10  Em Barrera MD  neurology  [address] [facility] [address]  [phone]    Tues April 11  Demetri Lund   [facility]  [address] [place] [id number] [phone]    Dr Ramiro Cueva neurosurgery    Dr. Demetri Lund         HPI:  This is 42 year old male with PMH of prior substance use, HTN, anxiety who presented to [facility] on 1/23. On 1/23 morning, he told his family that he was not feeling well and took 2 tabs of Vyvanse (usually takes as needed). Shortly after, he was at a group therapy meeting over the phone when he complained to his father he was having a severe headache. He then became more lethargic, and developed nausea and vomited. He was brought to ED by his father. On arrival, noted to be lethargic, vomiting, with garbled speech. 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 Post CT, more lethargic with dilated right pupil and bilateral reactive but sluggish pupils. He was given mannitol and intubated for airway protection and was taken to neuro ICU.    On 1/25, HCT with enlarging R lateral ventricle and slightly worsening MLS concerning for trapped ventricle. 1/27 acute drop in SpO2 on 1/27, lavaged and suctioned out thick clot. Placed back on ACVC and FiO2 weaned from 70% to 40% overnight. On 1/28/23, he had episode of emesis, & fever of 100.3. He was  given tylenol with improvement in temperature but still little to no movement on the L side (previously would localize arm to suction and spontaneously bend L leg). Pt. Had left decompressive hemicraniectomy and right EVD  placed.    After the procedure, patient opened eyes for the first time and regained LUE/LLE movement. As he became febrile again this was less prominent. Ceftriaxone was switched to Zosyn. 1/29 HCT mild increase extraaxial fluid collection. on 1/30, repeat  HCT unchanged, his MTL was discontinued. Zosyn and vancomycin switched to cefepime. On 1/31,  HCT shows worsening edema +/- extraaxial fluid collection, elevated ICPs, and mannitol was restarted.     MTL was weaned,  EVD adjusted and eventually removed on 2/10.  Hospital course further c/b agitation requiring precedex, hypotension requiring bolus, dysphagia s/p PEG 2/14  and trach on 2/13, insomnia s/p seroquel, pain s/p oxycodone and fentanyl. He underwent wound revision with NSGY and plastics on 2/17. Keppra discontinued due to signs of agitation. Tolerated trach collar.     Patient was evaluated by PM&R and therapy for functional deficits, gait/ADL impairments and acute rehabilitation was recommended. Patient was medically optimized for discharge to [facility] IRU on 2/28/23.    Rehab course significant for self decannulation 3/5/23, tolerated well, and orthostatic hypotension controlled with midodrine. Patient with notable restlessness as cognitive awareness improved, which ultimately escalated to a code gray, patient medications adjusted by psychiatry; neuropsychologist following - no further incidents. Patient has been cooperative and calm but remains under safety monitoring.   Presurgical testing revealed MRSA colonization treated with bactroban in nares starting 4/5 and chlorhexidine cloths. COVID negative on discharge screening.     All other medical co-morbidities were stable. Patient tolerated course of inpatient PT/OT/SLP rehab with significant improvements and met rehab goals prior to discharge. Discharge instructions were discussed with patient and family. All questions answered and all concerns addressed. Patient was medically cleared for discharge to neurosurgery for cranioplasty.     Outpatient follow-ups:    Functional Status:  Monday April 10  Em Barrera MD  neurology  [address] [facility] [address]  [phone]    Tues April 11  Demetri Lund   [facility]  [address] [place] [id number] [phone]    Dr Ramiro Cueva neurosurgery    Dr. Demetri Lund

## 2023-04-03 NOTE — PROGRESS NOTE ADULT - ATTENDING COMMENTS
Pt. seen with resident.  Agree with documentation above as per resident with amendments made as appropriate. Patient medically stable. Making progress towards rehab goals.     left ICH  Pt's expression in speech and function in PT improved-- Mod A for transfer-- d/w PT.   Coordinating with NSG for discharge 4/6 or 4/5 to [place] for Cranioplasty

## 2023-04-03 NOTE — CHART NOTE - NSCHARTNOTESELECT_GEN_ALL_CORE
Nutrition Services
IPOC/Event Note
Neuropsychology
Nutrition Services

## 2023-04-04 PROCEDURE — 99232 SBSQ HOSP IP/OBS MODERATE 35: CPT

## 2023-04-04 RX ORDER — MUPIROCIN 20 MG/G
1 OINTMENT TOPICAL
Refills: 0 | Status: DISCONTINUED | OUTPATIENT
Start: 2023-04-04 | End: 2023-04-06

## 2023-04-04 RX ADMIN — Medication 1 APPLICATION(S): at 17:29

## 2023-04-04 RX ADMIN — ESCITALOPRAM OXALATE 10 MILLIGRAM(S): 10 TABLET, FILM COATED ORAL at 08:03

## 2023-04-04 RX ADMIN — Medication 1 APPLICATION(S): at 06:05

## 2023-04-04 RX ADMIN — GABAPENTIN 600 MILLIGRAM(S): 400 CAPSULE ORAL at 21:37

## 2023-04-04 RX ADMIN — PANTOPRAZOLE SODIUM 40 MILLIGRAM(S): 20 TABLET, DELAYED RELEASE ORAL at 12:50

## 2023-04-04 RX ADMIN — DONEPEZIL HYDROCHLORIDE 5 MILLIGRAM(S): 10 TABLET, FILM COATED ORAL at 12:50

## 2023-04-04 RX ADMIN — ATORVASTATIN CALCIUM 80 MILLIGRAM(S): 80 TABLET, FILM COATED ORAL at 21:37

## 2023-04-04 RX ADMIN — Medication 100 MILLIGRAM(S): at 21:37

## 2023-04-04 RX ADMIN — DIVALPROEX SODIUM 250 MILLIGRAM(S): 500 TABLET, DELAYED RELEASE ORAL at 06:06

## 2023-04-04 RX ADMIN — MUPIROCIN 1 APPLICATION(S): 20 OINTMENT TOPICAL at 18:28

## 2023-04-04 RX ADMIN — NYSTATIN CREAM 1 APPLICATION(S): 100000 CREAM TOPICAL at 17:28

## 2023-04-04 RX ADMIN — Medication 6 MILLIGRAM(S): at 21:37

## 2023-04-04 RX ADMIN — NYSTATIN CREAM 1 APPLICATION(S): 100000 CREAM TOPICAL at 06:06

## 2023-04-04 RX ADMIN — POLYETHYLENE GLYCOL 3350 17 GRAM(S): 17 POWDER, FOR SOLUTION ORAL at 12:50

## 2023-04-04 RX ADMIN — Medication 1 APPLICATION(S): at 16:30

## 2023-04-04 RX ADMIN — MEMANTINE HYDROCHLORIDE 5 MILLIGRAM(S): 10 TABLET ORAL at 17:30

## 2023-04-04 RX ADMIN — MIDODRINE HYDROCHLORIDE 10 MILLIGRAM(S): 2.5 TABLET ORAL at 08:04

## 2023-04-04 RX ADMIN — Medication 1 APPLICATION(S): at 06:04

## 2023-04-04 RX ADMIN — MEMANTINE HYDROCHLORIDE 5 MILLIGRAM(S): 10 TABLET ORAL at 06:06

## 2023-04-04 RX ADMIN — DIVALPROEX SODIUM 250 MILLIGRAM(S): 500 TABLET, DELAYED RELEASE ORAL at 17:30

## 2023-04-04 NOTE — PROGRESS NOTE ADULT - SUBJECTIVE AND OBJECTIVE BOX
HPI:  This is 42 year old male with PMH of prior substance use, HTN, anxiety who presented to [facility] on 1/23. On 1/23 morning, he told his family that he was not feeling well and took 2 tabs of Vyvanse (usually takes as needed). Shortly after, he was at a group therapy meeting over the phone when he complained to his father he was having a severe headache. He then became more lethargic, and developed nausea and vomited. He was brought to ED by his father. On arrival, noted to be lethargic, vomiting, with garbled speech. 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 Post CT, more lethargic with dilated right pupil and bilateral reactive but sluggish pupils. He was given mannitol and intubated for airway protection and was taken to neuro ICU.    On 1/25, HCT with enlarging R lateral ventricle and slightly worsening MLS concerning for trapped ventricle. 1/27 acute drop in SpO2 on 1/27, lavaged and suctioned out thick clot. Placed back on ACVC and FiO2 weaned from 70% to 40% overnight. On 1/28/23, he had episode of emesis, & fever of 100.3. He was  given tylenol with improvement in temperature but still little to no movement on the L side (previously would localize arm to suction and spontaneously bend L leg). Pt. Had left decompressive hemicraniectomy and right EVD  placed.    After the procedure, patient opened eyes for the first time and regained LUE/LLE movement. As he became febrile again this was less prominent. Ceftriaxone was switched to Zosyn. 1/29 HCT mild increase extraaxial fluid collection. on 1/30, repeat  HCT unchanged, his MTL was discontinued. Zosyn and vancomycin switched to cefepime. On 1/31,  HCT shoeds worsening edema +/- extraaxial fluid collection, elevated ICPs, and mannitol was restarted.      MTL was weaned,  EVD adjusted and eventually removed on 2/10.  Hospital course further c/b agitation requiring precedex, hypotension requiring bolus, dysphagia s/p PEG 2/14  and trach on 2/13, insomnia s/p seroquel, pain s/p oxycodone and fentanyl. He underwent wound revision with NSGY and plastics on 2/17. Keppra discontinued due to signs of agitation. Tolerated trach collar.     Patient was evaluated by PM&R and therapy for functional deficits, gait/ADL impairments and acute rehabilitation was recommended. Patient was medically optimized for discharge to [facility] IRU on 2/28/23.      Subjective: Patient seen and examined in bed this morning. Alert and cooperative. Endorses understanding about surgery. Improved word formation. Reports discomfort RUE RLE.     Vital Signs Last 24 Hrs  T(C): 36.4 (04 Apr 2023 08:05), Max: 36.9 (03 Apr 2023 20:23)  T(F): 97.5 (04 Apr 2023 08:05), Max: 98.4 (03 Apr 2023 20:23)  HR: 89 (04 Apr 2023 08:05) (85 - 89)  BP: 120/78 (04 Apr 2023 08:05) (105/68 - 120/78)  RR: 16 (04 Apr 2023 08:05) (15 - 16)  SpO2: 96% (04 Apr 2023 08:05) (96% - 96%)    Parameters below as of 04 Apr 2023 08:05  Patient On (Oxygen Delivery Method): room air    REVIEW OF SYMPTOMS  Neurological deficits    PHYSICAL EXAM  Constitutional - NAD  HEENT - EOMI, left hemicraniectomy concavity- helmet in place while out of bed  Neck - s/p trach decannulation (guaze over stoma)  Chest - resp nonlabored  Cardio -warm, well perfused, no cyanosis or pedal edema  Abdomen -  Soft, NTND, +PEG, Bone flap marsupialized in epigastric region.    Extremities - No peripheral edema, No calf tenderness   Neurologic Exam:                    Cognitive -             Orientation: Awake, Alert; aphasia and cognitive deficits - inconsistent yes/no            Communication-  command following improving but inconsistent, Non-fluent, able to repeat- but impaired. Expression improving           Attention:  Impaired;        Cranial Nerves - Limited due to  aphasia; No facial asymmetry, Tongue midline, EOMI, Pupils dilated but reactive, +dysphagia     Motor -                     LEFT    UE - 5/5                    RIGHT UE - ShAB 0/5, EF 0/5, EE 0/5, WE 0/5,  0/5--                     LEFT    LE - 5/5                    RIGHT LE - HE 2/5, Hip adductor 1-2/5, KE 2/5, DF 0/5, PF 0/5     Tone: MAS 2/4 finger flexors, wrist right, 1+/4 elbow extensors; 3/4 right PFs-- inversion      Sensory - decrease sensation to LT-- RUE  Psychiatric - Restless and frustrated at times.                13.6   8.20  )-----------( 295      ( 03 Apr 2023 07:20 )             42.1     04-03    143  |  104  |  6<L>  ----------------------------<  119<H>  4.2   |  32<H>  |  0.55    Ca    9.4      03 Apr 2023 07:20    TPro  7.1  /  Alb  3.5  /  TBili  0.3  /  DBili  x   /  AST  8<L>  /  ALT  24  /  AlkPhos  98  04-03    MEDICATIONS  (STANDING):  AQUAPHOR (petrolatum Ointment) 1 Application(s) Topical two times a day  AQUAPHOR (petrolatum Ointment) 1 Application(s) Topical two times a day  atorvastatin 80 milliGRAM(s) Oral at bedtime  bacitracin   Ointment 1 Application(s) Topical daily  bacitracin   Ointment 1 Application(s) Topical two times a day  divalproex Sprinkle 250 milliGRAM(s) Oral two times a day  donepezil 5 milliGRAM(s) Oral daily  escitalopram 10 milliGRAM(s) Oral <User Schedule>  gabapentin 600 milliGRAM(s) Oral at bedtime  hydrocortisone 1% Cream 1 Application(s) Topical two times a day  melatonin 6 milliGRAM(s) Oral at bedtime  memantine 5 milliGRAM(s) Oral two times a day  midodrine. 10 milliGRAM(s) Oral <User Schedule>  mupirocin 2% Nasal 1 Application(s) Both Nostrils two times a day  nystatin Powder 1 Application(s) Topical two times a day  pantoprazole   Suspension 40 milliGRAM(s) Oral daily  polyethylene glycol 3350 17 Gram(s) Oral daily  senna Syrup 5 milliLiter(s) Oral at bedtime  traZODone 100 milliGRAM(s) Oral at bedtime    MEDICATIONS  (PRN):  acetaminophen    Suspension .. 650 milliGRAM(s) Oral every 6 hours PRN Temp greater or equal to 38C (100.4F), Mild Pain (1 - 3)  OLANZapine Injectable 2.5 milliGRAM(s) IntraMuscular daily PRN severe agitation   HPI:  This is 42 year old male with PMH of prior substance use, HTN, anxiety who presented to [facility] on 1/23. On 1/23 morning, he told his family that he was not feeling well and took 2 tabs of Vyvanse (usually takes as needed). Shortly after, he was at a group therapy meeting over the phone when he complained to his father he was having a severe headache. He then became more lethargic, and developed nausea and vomited. He was brought to ED by his father. On arrival, noted to be lethargic, vomiting, with garbled speech. 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 Post CT, more lethargic with dilated right pupil and bilateral reactive but sluggish pupils. He was given mannitol and intubated for airway protection and was taken to neuro ICU.    On 1/25, HCT with enlarging R lateral ventricle and slightly worsening MLS concerning for trapped ventricle. 1/27 acute drop in SpO2 on 1/27, lavaged and suctioned out thick clot. Placed back on ACVC and FiO2 weaned from 70% to 40% overnight. On 1/28/23, he had episode of emesis, & fever of 100.3. He was  given tylenol with improvement in temperature but still little to no movement on the L side (previously would localize arm to suction and spontaneously bend L leg). Pt. Had left decompressive hemicraniectomy and right EVD  placed.    After the procedure, patient opened eyes for the first time and regained LUE/LLE movement. As he became febrile again this was less prominent. Ceftriaxone was switched to Zosyn. 1/29 HCT mild increase extraaxial fluid collection. on 1/30, repeat  HCT unchanged, his MTL was discontinued. Zosyn and vancomycin switched to cefepime. On 1/31,  HCT shoeds worsening edema +/- extraaxial fluid collection, elevated ICPs, and mannitol was restarted.      MTL was weaned,  EVD adjusted and eventually removed on 2/10.  Hospital course further c/b agitation requiring precedex, hypotension requiring bolus, dysphagia s/p PEG 2/14  and trach on 2/13, insomnia s/p seroquel, pain s/p oxycodone and fentanyl. He underwent wound revision with NSGY and plastics on 2/17. Keppra discontinued due to signs of agitation. Tolerated trach collar.     Patient was evaluated by PM&R and therapy for functional deficits, gait/ADL impairments and acute rehabilitation was recommended. Patient was medically optimized for discharge to [facility] IRU on 2/28/23.      Subjective: Patient seen and examined in bed this morning. Alert and cooperative. Endorses understanding about surgery. Improved word formation. Reports discomfort RUE RLE. Greeting examiner appropriately, can say his name on command.     Vital Signs Last 24 Hrs  T(C): 36.4 (04 Apr 2023 08:05), Max: 36.9 (03 Apr 2023 20:23)  T(F): 97.5 (04 Apr 2023 08:05), Max: 98.4 (03 Apr 2023 20:23)  HR: 89 (04 Apr 2023 08:05) (85 - 89)  BP: 120/78 (04 Apr 2023 08:05) (105/68 - 120/78)  RR: 16 (04 Apr 2023 08:05) (15 - 16)  SpO2: 96% (04 Apr 2023 08:05) (96% - 96%)    Parameters below as of 04 Apr 2023 08:05  Patient On (Oxygen Delivery Method): room air    REVIEW OF SYMPTOMS  Neurological deficits    PHYSICAL EXAM  Constitutional - NAD  HEENT - EOMI, left hemicraniectomy concavity- helmet in place while out of bed  Neck - s/p trach decannulation (guaze over stoma)  Chest - resp nonlabored  Cardio -warm, well perfused, no cyanosis or pedal edema  Abdomen -  Soft, NTND, +PEG, Bone flap marsupialized in epigastric region.    Extremities - No peripheral edema, No calf tenderness   Neurologic Exam:                    Cognitive -             Orientation: Awake, Alert; aphasia and cognitive deficits - inconsistent yes/no            Communication-  command following improving but inconsistent, Non-fluent, able to repeat- but impaired. Expression improving           Attention:  Impaired;        Cranial Nerves - Limited due to  aphasia; No facial asymmetry, Tongue midline, EOMI, Pupils dilated but reactive, +dysphagia     Motor -                     LEFT    UE - 5/5                    RIGHT UE - ShAB 0/5, EF 0/5, EE 0/5, WE 0/5,  0/5--                     LEFT    LE - 5/5                    RIGHT LE - HE 2/5, Hip adductor 1-2/5, KE 2/5, DF 0/5, PF 0/5     Tone: MAS 2/4 finger flexors, wrist right, 1+/4 elbow extensors; 3/4 right PFs-- inversion      Sensory - decrease sensation to LT-- RUE  Psychiatric - Restless and frustrated at times.                13.6   8.20  )-----------( 295      ( 03 Apr 2023 07:20 )             42.1     04-03    143  |  104  |  6<L>  ----------------------------<  119<H>  4.2   |  32<H>  |  0.55    Ca    9.4      03 Apr 2023 07:20    TPro  7.1  /  Alb  3.5  /  TBili  0.3  /  DBili  x   /  AST  8<L>  /  ALT  24  /  AlkPhos  98  04-03    MEDICATIONS  (STANDING):  AQUAPHOR (petrolatum Ointment) 1 Application(s) Topical two times a day  AQUAPHOR (petrolatum Ointment) 1 Application(s) Topical two times a day  atorvastatin 80 milliGRAM(s) Oral at bedtime  bacitracin   Ointment 1 Application(s) Topical daily  bacitracin   Ointment 1 Application(s) Topical two times a day  divalproex Sprinkle 250 milliGRAM(s) Oral two times a day  donepezil 5 milliGRAM(s) Oral daily  escitalopram 10 milliGRAM(s) Oral <User Schedule>  gabapentin 600 milliGRAM(s) Oral at bedtime  hydrocortisone 1% Cream 1 Application(s) Topical two times a day  melatonin 6 milliGRAM(s) Oral at bedtime  memantine 5 milliGRAM(s) Oral two times a day  midodrine. 10 milliGRAM(s) Oral <User Schedule>  mupirocin 2% Nasal 1 Application(s) Both Nostrils two times a day  nystatin Powder 1 Application(s) Topical two times a day  pantoprazole   Suspension 40 milliGRAM(s) Oral daily  polyethylene glycol 3350 17 Gram(s) Oral daily  senna Syrup 5 milliLiter(s) Oral at bedtime  traZODone 100 milliGRAM(s) Oral at bedtime    MEDICATIONS  (PRN):  acetaminophen    Suspension .. 650 milliGRAM(s) Oral every 6 hours PRN Temp greater or equal to 38C (100.4F), Mild Pain (1 - 3)  OLANZapine Injectable 2.5 milliGRAM(s) IntraMuscular daily PRN severe agitation

## 2023-04-04 NOTE — PROGRESS NOTE ADULT - ASSESSMENT
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 He is  s/p Left decompressive hemicraniectomy and right EVD placement 1/28/23. Hospital course further c/b respiratory failure s/p intubation and extubation,  agitation requiring precedex, sepsis s/p ABX, hypotension requiring bolus, dysphagia s/p PEG 2/14 and trach on 2/13, insomnia s/p seroquel, pain s/p oxycodone and fentanyl. Patient now with Right Hemiparesis, dysphagia s/p PEG, Aphasia, Cognitive deficits, gait Instability, ADL impairments and Functional impairments.    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    - ST: to diagnose and treat deficits in swallowing, cognition and communication.  - Helmet when OOB  - follow up CT head 3/19 reviewed-  Postoperative changes compatible with a left temporal frontal parietal craniectomy is again seen. left frontoparietal hematoma with some improvement.  improved vasogenic edema is seen. Mass effect on the left lateral ventricle and Left-to-right shift (3.1 mm)has now resolved.   - OT– right resting hand splint  - Cranioplsty w Dr. Cueva 4/6/23 2:15pm, confirmed with transfer center    Sleep/Restlessness  --c/w Trazodone  100mg qhs 3/8-- for with sleep and restlessness.   Pt's mother reports he was on 50-100mg qhs and 50mg daytime home meds.   --cont. melatonin  --monitor  -- patient needs supervision for safety monitoring as pt. is impulsive, restless and high fall risk     # Agitation--improved   --Psychiatry consult, Dr. Acharya following  --cont. with  depakote 250mg bid  3/24  - depakote level =24 and ammonia level=21 3/27--nontoxic  -- c/w gabapentin to 600 mg qhs  - Zyprexa 2.5 mg IM daily PRN severe agitation. --has not needed  - Continue to monitor    Depression/anxiety  -- cont. Lexapro 10mg  --has been more effective for pt's anxiety/depression  --Continue to monitor  - c/w gabapentin to 600 mg qhs for anxiety    Aphasia  -- c/w Donepezil 5 mg.    -cont. mematine 5mg BID-- start 3/29--Monitor    Bilateral lower extremity DVTs–  – Lower extremity Doppler from 3/1–right femoral vein DVT and bilateral lower extremity calf DVTs  – spoke with patient's neurosurgery PA–who was in contact with Dr. Cueva patient's neurosurgeon–gave clearance for patient to start therapeutic Lovenox.  --Vascular Surgery consult -- reached out to Dr. Hodges 3/7 - nothing further to do if patient is on therapeutic lovenox.   -- C/w therapeutic lovenox for 3 months. HELD FOR SURGERY    Orthostatic hypotension–  -cont. midodrine 10mg     #Acute Respiratory Failure  - Intubation and extubation  - s/p Trach 2/13  - Decannulated 3/5, Pulm monitoring, tolerating well    #GI PPX  - Nexium 40mg daily    #DM II  - A1c 6.0  -management as per hospitalist--off insulin     #Pain management  - Tylenol PRN  - Neurontin cw 600 hs --   - Continue to monitor     #Bowel Regimen  - Senna, miralax PRN    #Bladder management  -voiding well with low PVRs,   -- off doxazosin 3/2    #Dysphagia    - s/p PEG 2/14  - Diet upgraded Soft bite sized thin liquids  3/30--d/w Speech therapy-- 1:1 assist with meals  - SLP: evaluation and treatment  - Diet: tolerating bolus TF     #Skin:  - Skin on admission: IAD to bilateral groin and sacrum; RUQ incision with sutures with palpable skull flap; Left hemicraniectomy with sutures MILLER; psoriasis BL shin  - Pressure injury/Skin: Turn Q2hrs while in bed, OOB to Chair, PT/OT     #Precaution  - Fall, Aspiration, seizure    #home meds  - per list provided by Laura yang: Rousuvastatin 40mg daily and Paroxetine 30mg daily  -  cw paroxetine  - cw high intensity atorvastatin to home med dose    – IDT 3/28:  Social work:  patient on discharge can live with his mother who has a first-floor set up.  Nursing–incontinent of bowel and bladder, total assist with toileting  Speech: Diet upgraded to soft bite-size;  severe language deficits–poor command following, poor orientation, improve repetition says family members names, some increased spontaneous content of speech, Speech jargon, Aphasia, severe cognitive deficits  OT: Gradual progress–supervision eating/grooming; mod assist–upper body dressing; max assist–lower body dressing; total assist–toileting, no longer using [place] transfers.  Improved sitting balance.  PT: Transfers–stand pivot–mod assist x1 (fluctuates); bed mobility–mod assist supine to sit; sit to stand at rail–mod assist; ambulates 10 feet x 2 mod assist and wheelchair follow at Left HR–initiating hip flexion to advance right lower extremity.   goals-- mod assist with transfers, ambulation 10 feet max assist with hemiwalker, wheelchair mobility 50 feet min assist.  ELOS: 4/6 or 4/5 to [place] for Cranioplasty  Goals:  1.  Improved secretion management and tolerate PMV  2.  transfers with 1 person assist  3.  Communicate basic wants and needs with yes no answers    **Dr. Lerma Spoke with Jeniffer from Dr. Cueva's office who confirmed 4/6/23 cranioplasty and requested MRSA and PCR COVID swab + medical clearance forms which were faxed over and received on 4/3/23. D/W social work to scheduled transportation. Patient aware. BOTH ASPIRIN AND LOVENOX HELD FOR SURGERY starting PM 4/3.

## 2023-04-04 NOTE — PROGRESS NOTE ADULT - ATTENDING COMMENTS
Pt. seen with resident.  Agree with documentation above as per resident with amendments made as appropriate. Patient medically stable. Making progress towards rehab goals.      left ICH  Making progress in therapy  Speech improving  Coordinating with surgery office for pts cranioplasty-- spoke with [facility] and [facility], and Dr. Cueva.

## 2023-04-05 ENCOUNTER — TRANSCRIPTION ENCOUNTER (OUTPATIENT)
Age: 43
End: 2023-04-05

## 2023-04-05 PROCEDURE — 99232 SBSQ HOSP IP/OBS MODERATE 35: CPT

## 2023-04-05 PROCEDURE — 99233 SBSQ HOSP IP/OBS HIGH 50: CPT

## 2023-04-05 PROCEDURE — 90832 PSYTX W PT 30 MINUTES: CPT

## 2023-04-05 RX ORDER — CHLORHEXIDINE GLUCONATE 213 G/1000ML
1 SOLUTION TOPICAL DAILY
Refills: 0 | Status: DISCONTINUED | OUTPATIENT
Start: 2023-04-05 | End: 2023-04-06

## 2023-04-05 RX ORDER — MUPIROCIN 20 MG/G
1 OINTMENT TOPICAL
Qty: 1 | Refills: 0
Start: 2023-04-05

## 2023-04-05 RX ORDER — CHLORHEXIDINE GLUCONATE 213 G/1000ML
1 SOLUTION TOPICAL
Qty: 0 | Refills: 0 | DISCHARGE
Start: 2023-04-05

## 2023-04-05 RX ORDER — PANTOPRAZOLE SODIUM 20 MG/1
40 TABLET, DELAYED RELEASE ORAL
Qty: 0 | Refills: 0 | DISCHARGE
Start: 2023-04-05

## 2023-04-05 RX ADMIN — MUPIROCIN 1 APPLICATION(S): 20 OINTMENT TOPICAL at 17:39

## 2023-04-05 RX ADMIN — MIDODRINE HYDROCHLORIDE 10 MILLIGRAM(S): 2.5 TABLET ORAL at 07:30

## 2023-04-05 RX ADMIN — Medication 1 APPLICATION(S): at 05:28

## 2023-04-05 RX ADMIN — NYSTATIN CREAM 1 APPLICATION(S): 100000 CREAM TOPICAL at 05:28

## 2023-04-05 RX ADMIN — Medication 1 APPLICATION(S): at 17:41

## 2023-04-05 RX ADMIN — GABAPENTIN 600 MILLIGRAM(S): 400 CAPSULE ORAL at 21:29

## 2023-04-05 RX ADMIN — DIVALPROEX SODIUM 250 MILLIGRAM(S): 500 TABLET, DELAYED RELEASE ORAL at 17:38

## 2023-04-05 RX ADMIN — DONEPEZIL HYDROCHLORIDE 5 MILLIGRAM(S): 10 TABLET, FILM COATED ORAL at 12:18

## 2023-04-05 RX ADMIN — NYSTATIN CREAM 1 APPLICATION(S): 100000 CREAM TOPICAL at 17:42

## 2023-04-05 RX ADMIN — MUPIROCIN 1 APPLICATION(S): 20 OINTMENT TOPICAL at 05:29

## 2023-04-05 RX ADMIN — PANTOPRAZOLE SODIUM 40 MILLIGRAM(S): 20 TABLET, DELAYED RELEASE ORAL at 12:18

## 2023-04-05 RX ADMIN — Medication 1 APPLICATION(S): at 12:21

## 2023-04-05 RX ADMIN — ATORVASTATIN CALCIUM 80 MILLIGRAM(S): 80 TABLET, FILM COATED ORAL at 21:25

## 2023-04-05 RX ADMIN — CHLORHEXIDINE GLUCONATE 1 APPLICATION(S): 213 SOLUTION TOPICAL at 17:40

## 2023-04-05 RX ADMIN — Medication 650 MILLIGRAM(S): at 23:43

## 2023-04-05 RX ADMIN — POLYETHYLENE GLYCOL 3350 17 GRAM(S): 17 POWDER, FOR SOLUTION ORAL at 12:18

## 2023-04-05 RX ADMIN — Medication 1 APPLICATION(S): at 05:29

## 2023-04-05 RX ADMIN — Medication 1 APPLICATION(S): at 17:42

## 2023-04-05 RX ADMIN — SENNA PLUS 5 MILLILITER(S): 8.6 TABLET ORAL at 21:26

## 2023-04-05 RX ADMIN — Medication 100 MILLIGRAM(S): at 21:25

## 2023-04-05 RX ADMIN — Medication 6 MILLIGRAM(S): at 21:29

## 2023-04-05 RX ADMIN — ESCITALOPRAM OXALATE 10 MILLIGRAM(S): 10 TABLET, FILM COATED ORAL at 07:30

## 2023-04-05 RX ADMIN — MEMANTINE HYDROCHLORIDE 5 MILLIGRAM(S): 10 TABLET ORAL at 05:29

## 2023-04-05 RX ADMIN — DIVALPROEX SODIUM 250 MILLIGRAM(S): 500 TABLET, DELAYED RELEASE ORAL at 05:29

## 2023-04-05 RX ADMIN — MEMANTINE HYDROCHLORIDE 5 MILLIGRAM(S): 10 TABLET ORAL at 17:38

## 2023-04-05 NOTE — DISCHARGE NOTE NURSING/CASE MANAGEMENT/SOCIAL WORK - PATIENT PORTAL LINK FT
You can access the FollowMyHealth Patient Portal offered by [facility] by registering at the following website: [email or web address]. By joining [facility]’s FollowMyHealth portal, you will also be able to view your health information using other applications (apps) compatible with our system.

## 2023-04-05 NOTE — PROGRESS NOTE ADULT - ASSESSMENT
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 He is s/p left decompressive hemicraniectomy and right EVD placement 1/28/23. Hospital course complicated by respiratory failure s/p intubation and extubation, agitation, sepsis, dysphagia s/p PEG 2/14 and trach on 2/13. Patient now with admitted to [facility] acute inpatient rehab.    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 with mass effect and 8mm midline shift  -s/p Decompressive hemicraniectomy and EVD placement 1/28  -Continue Namenda, Donepezil  -Continue gabapentin  -Continue comprehensive rehab program - PT/OT/SLP per rehab team  -Pain management, bowel regimen per rehab   -Patient is medically optimized for cranioplasty 4/6. Continue to hold lovenox/ASA.  -Continue bactroban, chlorhexidine for MRSA colonization     #Depression with anxiety  -Continue Depakote, Lexapro  -Continue Trazodone qHS for insomnia     #Bilateral lower extremity DVTs  -Lower extremity Doppler from 3/1–right femoral vein DVT and bilateral lower extremity calf DVTs  -Continue Lovenox 90mg BID x 3 months - held in anticipation for OR 4/6 cranioplasty  -Neurosurgery and vascular inputs appreciated    #Hypotension, improved on Midodrine  -Continue Midodrine  -Monitor vitals    #T2DM, HbA1c 6.0  -diet controlled  -monitor  -blood glucose goal 100-180 in hospital setting    #HLD  -Continue Lipitor     DVT ppx: Lovenox  GI ppx: Protonix

## 2023-04-05 NOTE — PROGRESS NOTE ADULT - ASSESSMENT
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 He is  s/p Left decompressive hemicraniectomy and right EVD placement 1/28/23. Hospital course further c/b respiratory failure s/p intubation and extubation,  agitation requiring precedex, sepsis s/p ABX, hypotension requiring bolus, dysphagia s/p PEG 2/14 and trach on 2/13, insomnia s/p seroquel, pain s/p oxycodone and fentanyl. Patient now with Right Hemiparesis, dysphagia s/p PEG, Aphasia, Cognitive deficits, gait Instability, ADL impairments and Functional impairments.    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    - ST: to diagnose and treat deficits in swallowing, cognition and communication.  - Helmet when OOB  - follow up CT head 3/19 reviewed-  Postoperative changes compatible with a left temporal frontal parietal craniectomy is again seen. left frontoparietal hematoma with some improvement.  improved vasogenic edema is seen. Mass effect on the left lateral ventricle and Left-to-right shift (3.1 mm)has now resolved.   - OT– right resting hand splint  - Cranioplasty w Dr. Cueva 4/6/23 2:15pm, confirmed with transfer center-- scheduled for 10:30am tomorrow    right -- Pt. requires a right custom AFO for support and stability during ambulation to improve safety and independence with ambulation and decrease fall risk. 	Patient is unable to use a prefabricated brace as use of the orthosis will be for longer than six months and because the foot must be controlled in more than one plane         Sleep/Restlessness  --c/w Trazodone  100mg qhs 3/8-- for with sleep and restlessness.   Pt's mother reports he was on 50-100mg qhs and 50mg daytime home meds.   --cont. melatonin  --monitor  -- patient needs supervision for safety monitoring as pt. is impulsive, restless and high fall risk     # Agitation--improved   --Psychiatry consult, Dr. Acharya following  --cont. with  depakote 250mg bid  3/24  - depakote level =24 and ammonia level=21 3/27--nontoxic  -- c/w gabapentin to 600 mg qhs  - Zyprexa 2.5 mg IM daily PRN severe agitation. --has not needed  - Continue to monitor    Depression/anxiety  -- cont. Lexapro 10mg    - c/w gabapentin to 600 mg qhs for anxiety    Aphasia  -- c/w Donepezil 5 mg.    -cont. mematine 5mg BID-- tolerating well  speech improving    MRSA colonization--  -- Bactroban in nares BID x 10days  --chlorhexidine cloths  --Mother called concerned -- provided reassurance this is not infectious and only colonization and being adequately treated.     facial rash-- ?seborrheic dermatitis  -mother concerned this is worsening  --d/w hospitalist to follow up on pt. and recs for treatment.     Bilateral lower extremity DVTs–  – Lower extremity Doppler from 3/1–right femoral vein DVT and bilateral lower extremity calf DVTs  – spoke with patient's neurosurgery PA–who was in contact with Dr. Cueva patient's neurosurgeon–gave clearance for patient to start therapeutic Lovenox.  --Vascular Surgery consult -- reached out to Dr. Hodges 3/7 - nothing further to do if patient is on therapeutic lovenox.   -- C/w therapeutic lovenox for 3 months. HELD FOR SURGERY    Orthostatic hypotension–  -cont. midodrine 10mg     #Acute Respiratory Failure  - Intubation and extubation  - s/p Trach 2/13  - Decannulated 3/5, Pulm monitoring, tolerating well    #GI PPX  - Nexium 40mg daily    #DM II  - A1c 6.0  -management as per hospitalist--off insulin     #Pain management  - Tylenol PRN  - Neurontin cw 600 hs --   - Continue to monitor     #Bowel Regimen  - Senna, miralax PRN    #Bladder management  -voiding well with low PVRs,   -- off doxazosin 3/2    #Dysphagia    - s/p PEG 2/14  - Diet -- Soft bite sized thin liquids  3/30--d/w Speech therapy-- 1:1 assist with meals  - SLP: evaluation and treatment  - Diet: tolerating bolus TF     #Skin:  - Skin on admission: IAD to bilateral groin and sacrum; RUQ incision with sutures with palpable skull flap; Left hemicraniectomy with sutures MILLER; psoriasis BL shin  - Pressure injury/Skin: Turn Q2hrs while in bed, OOB to Chair, PT/OT     #Precaution  - Fall, Aspiration, seizure    #home meds  - per list provided by Laura yang: Rousuvastatin 40mg daily and Paroxetine 30mg daily  -  cw paroxetine  - cw high intensity atorvastatin to home med dose    – IDT 3/28:  Social work:  patient on discharge can live with his mother who has a first-floor set up.  Nursing–incontinent of bowel and bladder, total assist with toileting  Speech: Diet upgraded to soft bite-size;  severe language deficits–poor command following, poor orientation, improve repetition says family members names, some increased spontaneous content of speech, Speech jargon, Aphasia, severe cognitive deficits  OT: Gradual progress–supervision eating/grooming; mod assist–upper body dressing; max assist–lower body dressing; total assist–toileting, no longer using [place] transfers.  Improved sitting balance.  PT: Transfers–stand pivot–mod assist x1 (fluctuates); bed mobility–mod assist supine to sit; sit to stand at rail–mod assist; ambulates 10 feet x 2 mod assist and wheelchair follow at Left HR–initiating hip flexion to advance right lower extremity.   goals-- mod assist with transfers, ambulation 10 feet max assist with hemiwalker, wheelchair mobility 50 feet min assist.  ELOS: 4/6  to [place] for Cranioplasty  Goals:  1.  Improved secretion management and tolerate PMV  2.  transfers with 1 person assist  3.  Communicate basic wants and needs with yes no answers     BOTH ASPIRIN AND LOVENOX HELD FOR SURGERY starting PM 4/3.       ** Spoke with pt's  _mother, Georgia___, to provide update on pt's status,  medical update, medications, MRSA colonization, progress in therapy,  and discharge to [place] for cranioplasty.  Discussed AR vs ZOHAIB post-cranioplasty.  All questions answered.  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 He is  s/p Left decompressive hemicraniectomy and right EVD placement 1/28/23. Hospital course further c/b respiratory failure s/p intubation and extubation,  agitation requiring precedex, sepsis s/p ABX, hypotension requiring bolus, dysphagia s/p PEG 2/14 and trach on 2/13, insomnia s/p seroquel, pain s/p oxycodone and fentanyl. Patient now with Right Hemiparesis, dysphagia s/p PEG, Aphasia, Cognitive deficits, gait Instability, ADL impairments and Functional impairments.    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    - ST: to diagnose and treat deficits in swallowing, cognition and communication.  - Helmet when OOB  - follow up CT head 3/19 reviewed-  Postoperative changes compatible with a left temporal frontal parietal craniectomy is again seen. left frontoparietal hematoma with some improvement.  improved vasogenic edema is seen. Mass effect on the left lateral ventricle and Left-to-right shift (3.1 mm)has now resolved.   - OT– right resting hand splint  - Cranioplasty w Dr. Cueva 4/6/23 2:15pm, confirmed with transfer center-- scheduled for 10:30am tomorrow    right -- Pt. requires a right custom AFO for support and stability during ambulation to improve safety and independence with ambulation and decrease fall risk. 	Patient is unable to use a prefabricated brace as use of the orthosis will be for longer than six months and because the foot must be controlled in more than one plane         Sleep/Restlessness  --c/w Trazodone  100mg qhs 3/8-- for with sleep and restlessness.   Pt's mother reports he was on 50-100mg qhs and 50mg daytime home meds.   --cont. melatonin  --monitor  -- patient needs supervision for safety monitoring as pt. is impulsive, restless and high fall risk     # Agitation--improved   --Psychiatry consult, Dr. Acharya following  --cont. with  depakote 250mg bid  3/24  - depakote level =24 and ammonia level=21 3/27--nontoxic  -- c/w gabapentin to 600 mg qhs  - Zyprexa 2.5 mg IM daily PRN severe agitation. --has not needed  - Continue to monitor    Depression/anxiety  -- cont. Lexapro 10mg    - c/w gabapentin to 600 mg qhs for anxiety    Aphasia  -- c/w Donepezil 5 mg.    -cont. mematine 5mg BID-- tolerating well  speech improving    MRSA colonization--  -- Bactroban in nares BID x 5 days  --chlorhexidine cloths  --Mother called concerned -- provided reassurance this is not infectious and only colonization and being adequately treated.     facial rash-- ?seborrheic dermatitis  -mother concerned this is worsening  --d/w hospitalist to follow up on pt. and recs for treatment.     Bilateral lower extremity DVTs–  – Lower extremity Doppler from 3/1–right femoral vein DVT and bilateral lower extremity calf DVTs  – spoke with patient's neurosurgery PA–who was in contact with Dr. Cueva patient's neurosurgeon–gave clearance for patient to start therapeutic Lovenox.  --Vascular Surgery consult -- reached out to Dr. Hodges 3/7 - nothing further to do if patient is on therapeutic lovenox.   -- C/w therapeutic lovenox for 3 months. HELD FOR SURGERY    Orthostatic hypotension–  -cont. midodrine 10mg     #Acute Respiratory Failure  - Intubation and extubation  - s/p Trach 2/13  - Decannulated 3/5, Pulm monitoring, tolerating well    #GI PPX  - Nexium 40mg daily    #DM II  - A1c 6.0  -management as per hospitalist--off insulin     #Pain management  - Tylenol PRN  - Neurontin cw 600 hs --   - Continue to monitor     #Bowel Regimen  - Senna, miralax PRN    #Bladder management  -voiding well with low PVRs,   -- off doxazosin 3/2    #Dysphagia    - s/p PEG 2/14  - Diet -- Soft bite sized thin liquids  3/30--d/w Speech therapy-- 1:1 assist with meals  - SLP: evaluation and treatment  - Diet: tolerating bolus TF     #Skin:  - Skin on admission: IAD to bilateral groin and sacrum; RUQ incision with sutures with palpable skull flap; Left hemicraniectomy with sutures MILLER; psoriasis BL shin  - Pressure injury/Skin: Turn Q2hrs while in bed, OOB to Chair, PT/OT     #Precaution  - Fall, Aspiration, seizure    #home meds  - per list provided by Laura yang: Rousuvastatin 40mg daily and Paroxetine 30mg daily  -  cw paroxetine  - cw high intensity atorvastatin to home med dose    – IDT 3/28:  Social work:  patient on discharge can live with his mother who has a first-floor set up.  Nursing–incontinent of bowel and bladder, total assist with toileting  Speech: Diet upgraded to soft bite-size;  severe language deficits–poor command following, poor orientation, improve repetition says family members names, some increased spontaneous content of speech, Speech jargon, Aphasia, severe cognitive deficits  OT: Gradual progress–supervision eating/grooming; mod assist–upper body dressing; max assist–lower body dressing; total assist–toileting, no longer using [place] transfers.  Improved sitting balance.  PT: Transfers–stand pivot–mod assist x1 (fluctuates); bed mobility–mod assist supine to sit; sit to stand at rail–mod assist; ambulates 10 feet x 2 mod assist and wheelchair follow at Left HR–initiating hip flexion to advance right lower extremity.   goals-- mod assist with transfers, ambulation 10 feet max assist with hemiwalker, wheelchair mobility 50 feet min assist.  ELOS: 4/6  to [place] for Cranioplasty  Goals:  1.  Improved secretion management and tolerate PMV  2.  transfers with 1 person assist  3.  Communicate basic wants and needs with yes no answers     BOTH ASPIRIN AND LOVENOX HELD FOR SURGERY starting PM 4/3.       ** Spoke with pt's  _mother, Georgia___, to provide update on pt's status,  medical update, medications, MRSA colonization, progress in therapy,  and discharge to [place] for cranioplasty.  Discussed AR vs ZOHAIB post-cranioplasty.  All questions answered.

## 2023-04-05 NOTE — PROGRESS NOTE ADULT - ASSESSMENT
Pt alert, attentive, expressive language difficulties, thought processes goal-directed, no abnormal thought contents noted, depressed affect but responsive to humor, okay mood, denied AH/VH, denied SI/HI/I/P, calm behavior, improved coping and communication. Plan: Neuropsychologist remains available.

## 2023-04-05 NOTE — PROGRESS NOTE ADULT - SUBJECTIVE AND OBJECTIVE BOX
Pt was seen for 20 min for supportive tx. Pt c/o anxiety. Pt reported doing well since last seen. He continues to participate in all his txs with improved participation and cooperation with his therapists. Pt remains expressively aphasic but his comprehension has improved substantially. He greeted this clinician earlier in the hallways, and demonstrated broader affect that in previous meetings during the meeting. Pt expressed concern about his recovery, and was given reassurance that he has been improving in regards to mobility and language. He is actually able to communicate more content now, using short phrases, and much more accurate yes/no answers. He asked about his arm and after checking his movement on the right shoulder he was also reassured that continued improvement was very likely. Pt also expressed concern about the procedure he will have tomorrow, the cranioplasty, and explained to Pt that this was important for his safety and recovery, and that in spite of the temporary discomfort he will likely have it is necessary; Pt agreed with it. Pt hinted at not feeling happy with going home after his cranioplasty, but is aware that there aren't many options at this time. Explained to Pt the need for patience and persistence were very necessary on his part as healing and rehabilitation take time. Discussed briefly issues of safety to prevent falls, and to adhere to tx recommendations given at d/c. Support and encouragement were provided.

## 2023-04-05 NOTE — PROGRESS NOTE ADULT - SUBJECTIVE AND OBJECTIVE BOX
HPI:  This is 42 year old male with PMH of prior substance use, HTN, anxiety who presented to [facility] on 1/23. On 1/23 morning, he told his family that he was not feeling well and took 2 tabs of Vyvanse (usually takes as needed). Shortly after, he was at a group therapy meeting over the phone when he complained to his father he was having a severe headache. He then became more lethargic, and developed nausea and vomited. He was brought to ED by his father. On arrival, noted to be lethargic, vomiting, with garbled speech. 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 Post CT, more lethargic with dilated right pupil and bilateral reactive but sluggish pupils. He was given mannitol and intubated for airway protection and was taken to neuro ICU.    On 1/25, HCT with enlarging R lateral ventricle and slightly worsening MLS concerning for trapped ventricle. 1/27 acute drop in SpO2 on 1/27, lavaged and suctioned out thick clot. Placed back on ACVC and FiO2 weaned from 70% to 40% overnight. On 1/28/23, he had episode of emesis, & fever of 100.3. He was  given tylenol with improvement in temperature but still little to no movement on the L side (previously would localize arm to suction and spontaneously bend L leg). Pt. Had left decompressive hemicraniectomy and right EVD  placed.    After the procedure, patient opened eyes for the first time and regained LUE/LLE movement. As he became febrile again this was less prominent. Ceftriaxone was switched to Zosyn. 1/29 HCT mild increase extraaxial fluid collection. on 1/30, repeat  HCT unchanged, his MTL was discontinued. Zosyn and vancomycin switched to cefepime. On 1/31,  HCT shoeds worsening edema +/- extraaxial fluid collection, elevated ICPs, and mannitol was restarted.      MTL was weaned,  EVD adjusted and eventually removed on 2/10.  Hospital course further c/b agitation requiring precedex, hypotension requiring bolus, dysphagia s/p PEG 2/14  and trach on 2/13, insomnia s/p seroquel, pain s/p oxycodone and fentanyl. He underwent wound revision with NSGY and plastics on 2/17. Keppra discontinued due to signs of agitation. Tolerated trach collar.     Patient was evaluated by PM&R and therapy for functional deficits, gait/ADL impairments and acute rehabilitation was recommended. Patient was medically optimized for discharge to [facility] IRU on 2/28/23.        Subjective:  Pt. slept during the night as per nursing.  Calm, cooperative. Aphasia improving.  Pt. observed ambulating in PT  with lite gait. No agitation. presurgical testing faxed to Dr. Rodrigues office this AM.        VITALS  Vital Signs Last 24 Hrs  T(C): 36.6 (05 Apr 2023 08:48), Max: 37.2 (04 Apr 2023 20:11)  T(F): 97.8 (05 Apr 2023 08:48), Max: 98.9 (04 Apr 2023 20:11)  HR: 82 (05 Apr 2023 08:48) (82 - 85)  BP: 114/74 (05 Apr 2023 08:48) (104/66 - 114/74)  BP(mean): --  RR: 16 (05 Apr 2023 08:48) (15 - 16)  SpO2: 96% (05 Apr 2023 08:48) (96% - 96%)    Parameters below as of 05 Apr 2023 08:48  Patient On (Oxygen Delivery Method): room air        REVIEW OF SYMPTOMS  Neurological deficits      PHYSICAL EXAM  Constitutional - NAD  HEENT - EOMI, left hemicraniectomy concavity- helmet in place while out of bed  Neck - s/p trach decannulation (guaze over stoma)  Chest - resp nonlabored  Cardio -warm, well perfused, no cyanosis or pedal edema  Abdomen -  Soft, NTND, +PEG, Bone flap marsupialized in epigastric region.    Extremities - No peripheral edema, No calf tenderness   Neurologic Exam:                    Cognitive -             Orientation: Awake, Alert; aphasia and cognitive deficits -            Communication-  command following improved--able to say name to command and follow most simple commands, Non-fluent, able to repeat- but impaired. Expression improving--recognizes and greets examiner.            Attention:  Impaired;        Cranial Nerves - Limited due to  aphasia; No facial asymmetry, Tongue midline, EOMI, Pupils dilated but reactive, +dysphagia     Motor -                     LEFT    UE - 5/5                    RIGHT UE - ShAB 0/5, EF 0/5, EE 0/5, WE 0/5,  0/5--                     LEFT    LE - 5/5                    RIGHT LE - Hip 2-3/5, Hip adductor 2/5, KE 2/5, DF 0/5, PF 0/5     Tone: MAS 2/4 finger flexors, wrist right, 1+/4 elbow extensors; 3/4 right PFs-- inversion      Sensory - decrease sensation to LT-- RUE  Psychiatric - calm, frustrated at times    RECENT LABS                  RADIOLOGY/OTHER RESULTS      MEDICATIONS  (STANDING):  AQUAPHOR (petrolatum Ointment) 1 Application(s) Topical two times a day  AQUAPHOR (petrolatum Ointment) 1 Application(s) Topical two times a day  atorvastatin 80 milliGRAM(s) Oral at bedtime  bacitracin   Ointment 1 Application(s) Topical two times a day  bacitracin   Ointment 1 Application(s) Topical daily  divalproex Sprinkle 250 milliGRAM(s) Oral two times a day  donepezil 5 milliGRAM(s) Oral daily  escitalopram 10 milliGRAM(s) Oral <User Schedule>  gabapentin 600 milliGRAM(s) Oral at bedtime  hydrocortisone 1% Cream 1 Application(s) Topical two times a day  melatonin 6 milliGRAM(s) Oral at bedtime  memantine 5 milliGRAM(s) Oral two times a day  midodrine. 10 milliGRAM(s) Oral <User Schedule>  mupirocin 2% Nasal 1 Application(s) Both Nostrils two times a day  nystatin Powder 1 Application(s) Topical two times a day  pantoprazole   Suspension 40 milliGRAM(s) Oral daily  polyethylene glycol 3350 17 Gram(s) Oral daily  senna Syrup 5 milliLiter(s) Oral at bedtime  traZODone 100 milliGRAM(s) Oral at bedtime    MEDICATIONS  (PRN):  acetaminophen    Suspension .. 650 milliGRAM(s) Oral every 6 hours PRN Temp greater or equal to 38C (100.4F), Mild Pain (1 - 3)  OLANZapine Injectable 2.5 milliGRAM(s) IntraMuscular daily PRN severe agitation

## 2023-04-05 NOTE — DISCHARGE NOTE NURSING/CASE MANAGEMENT/SOCIAL WORK - NSDCPEFALRISK_GEN_ALL_CORE
For information on Fall & Injury Prevention, visit: [email or web address] OR  [email or web address] OR  https://www.cdc.gov/steadi/patient.html

## 2023-04-05 NOTE — DISCHARGE NOTE NURSING/CASE MANAGEMENT/SOCIAL WORK - NSDCFUADDAPPT_GEN_ALL_CORE_FT
Monday April 10  Em Barrera MD  neurology  [address] [facility] [address]  [phone]    Tues April 11  Demetri HARMON [facility]  [address] [place] [address]  [phone]

## 2023-04-05 NOTE — PROGRESS NOTE ADULT - TIME-BASED BILLING (NON-CRITICAL CARE)
----- Message from Keyanna Flores MD sent at 5/21/2021  6:12 PM CDT -----  Please let pt know that her lumbar X-ray and chest X-ray did come back normal without any major concerning findings. Chest X-ray did show a little scoliosis of the lower thoracic spine, which may be a reason she has been having more chronic back pain; this kind of thing often responds very well to physical therapy, so if she is every interested in pursuing this I'm happy to refer her, she just needs to let me know. Thanks!  
Called patient and reviewed results, she is still concerned about her cough. Patient is not interested in a physical therapy referral. She is again asking about a chest CT. Please advise. She is also concerned about her throat, she does have an appointment with GI on 7/9/2021.  
I do not feel it's appropriate right now to order a CT but if she would like to further discuss with Pulmonary medicine I can refer her and they can decide what might be best. Referral placed in system, thanks!  
I see ok well then we'll hold off on any higher level imaging at this time as her back seems better. I do think GI is the way to go for now with the sensation she is having in her throat as they will also be able to assess her throat (not just her stomach) when they consider an EGD; however, if she'd strongly prefer to try ENT then I can send her there first but I have a feeling she will ultimately end up needing to see GI. Up to her, thanks!  
Left message for patient to call back.   
Left message for patient to return call.    
Message left asking the patient to call me back so we can discuss Dr Flores's message below.  
Patient returning call. Patient is at work and states it is difficult to answer phone. Patient requests a detailed voice message with results.   
Patient states there was some misunderstanding. She said the pain in her back has gone away and she feels that if it comes back, then, she would let Dr know and then they could order CT.  She wants to make sure that she would be seeing ENT and not GI because she feels this is in her throat not her chest. She does not want to go to pulmonary medicine.  
Spoke with patient. She said that she has talked about his many times. She kept getting phone calls about the same thing. She is seeing GI in July. She does not want another call until then. She stated that communication is horrible that we do not have it documented correctly that this was talked about already. She also asked why we never leave a message about what we are calling about. I updated her chart so that we are able to leave detailed messages. She was appreciative of the updates in her chart.   
Time-based billing (NON-critical care)

## 2023-04-05 NOTE — PROGRESS NOTE ADULT - SUBJECTIVE AND OBJECTIVE BOX
Patient is a 43y old  Male who presents with a chief complaint of left IPH/ICH (05 Apr 2023 12:31)      Patient seen and examined at bedside. NAD.     ALLERGIES:  No Known Allergies    MEDICATIONS  (STANDING):  AQUAPHOR (petrolatum Ointment) 1 Application(s) Topical two times a day  AQUAPHOR (petrolatum Ointment) 1 Application(s) Topical two times a day  atorvastatin 80 milliGRAM(s) Oral at bedtime  bacitracin   Ointment 1 Application(s) Topical two times a day  bacitracin   Ointment 1 Application(s) Topical daily  chlorhexidine 2% Cloths 1 Application(s) Topical daily  divalproex Sprinkle 250 milliGRAM(s) Oral two times a day  donepezil 5 milliGRAM(s) Oral daily  escitalopram 10 milliGRAM(s) Oral <User Schedule>  gabapentin 600 milliGRAM(s) Oral at bedtime  hydrocortisone 1% Cream 1 Application(s) Topical two times a day  melatonin 6 milliGRAM(s) Oral at bedtime  memantine 5 milliGRAM(s) Oral two times a day  midodrine. 10 milliGRAM(s) Oral <User Schedule>  mupirocin 2% Nasal 1 Application(s) Both Nostrils two times a day  nystatin Powder 1 Application(s) Topical two times a day  pantoprazole   Suspension 40 milliGRAM(s) Oral daily  polyethylene glycol 3350 17 Gram(s) Oral daily  senna Syrup 5 milliLiter(s) Oral at bedtime  traZODone 100 milliGRAM(s) Oral at bedtime    MEDICATIONS  (PRN):  acetaminophen    Suspension .. 650 milliGRAM(s) Oral every 6 hours PRN Temp greater or equal to 38C (100.4F), Mild Pain (1 - 3)  OLANZapine Injectable 2.5 milliGRAM(s) IntraMuscular daily PRN severe agitation    Vital Signs Last 24 Hrs  T(F): 97.8 (05 Apr 2023 08:48), Max: 98.9 (04 Apr 2023 20:11)  HR: 82 (05 Apr 2023 08:48) (82 - 85)  BP: 114/74 (05 Apr 2023 08:48) (104/66 - 114/74)  RR: 16 (05 Apr 2023 08:48) (15 - 16)  SpO2: 96% (05 Apr 2023 08:48) (96% - 96%)  I&O's Summary    04 Apr 2023 07:01  -  05 Apr 2023 07:00  --------------------------------------------------------  IN: 0 mL / OUT: 701 mL / NET: -701 mL    PHYSICAL EXAM:  General: NAD, +left hemicraniectomy  ENT: MMM, no scleral icterus  Neck: Supple, No JVD  Lungs: Clear to auscultation bilaterally, no wheezes, rales, rhonchi  Cardio: RRR, S1/S2  Abdomen: Soft, Nontender, Nondistended; Bowel sounds present, +PEG  Extremities: No calf tenderness, No pitting edema, +right hemiparesis    LABS:                        13.6   8.20  )-----------( 295      ( 03 Apr 2023 07:20 )             42.1       04-03    143  |  104  |  6   ----------------------------<  119  4.2   |  32  |  0.55    Ca    9.4      03 Apr 2023 07:20    TPro  7.1  /  Alb  3.5  /  TBili  0.3  /  DBili  x   /  AST  8   /  ALT  24  /  AlkPhos  98  04-03                                      COVID-19 PCR: NotDetec (04-03-23 @ 10:40)      RADIOLOGY & ADDITIONAL TESTS: reviewed     Care Discussed with Consultants/Other Providers: yes, rehab

## 2023-04-05 NOTE — PROGRESS NOTE ADULT - TIME BILLING
Review and preparation to see patient, examination and assessment of patient, obtaining nursing subjective report as pt. confused,  Discussion of management with following consultants: Hospitalist, Neurosurgery ,  Discussion of rehabilitation update  with SW, , OT, PT .    Discussion with family, ..mother Georgia.., to update on patient status, rehab plan of care, progress in therapy,  & , discharge planning. Documentation time.
Review and preparation to see patient, examination and assessment of patient, obtaining nursing subjective report as pt. confused,  Discussion of management with following consultants: Hospitalist, Neuropsychologist, Psychiatry.     Discussion of  pt's behavior and rehabilitation plan of care with therapy  and nursing.   follow up on patient throughout the day to address behavior concerns.  documentation time.
Review and preparation to see patient, examination and assessment of patient, obtaining nursing & PT subjective report as pt. confused,  Discussion of management with --: Hospitalist, nursing , Neurosurgery and Vascular Surgery.   Discussion of rehabilitation plan of care during team meeting with SW, Speech, OT, PT and nursing.  Review of imaging.--CT head and LE doppler.  Medically complexity of patient.  Documentation time.
Review and preparation to see patient, examination and assessment of patient, obtaining nursing subjective report as pt. confused,  Discussion of management with following consultants: Hospitalist, .Psychiatry ,  Discussion of rehabilitation plan of care during team meeting with SW, Speech, OT, PT and nursing.  follow up on patient during the day. Documentation time.
Review and preparation to see patient, examination and assessment of patient, obtaining nursing subjective report as pt. confused,  Discussion of managment with following consultants: Hospitalist, pulmonology, neuropsychology ,    Discussion with family, ., to update on patient status, rehab plan of care, progress in therapy, general prognosis, ELOS, discharge planning. documentation time.
Review and preparation to see patient, examination and assessment of patient, obtaining nursing subjective report as pt. confused,  Discussion of management with following consultants: Hospitalist, &  ,  Discussion of function in therapy with  PT , review of therapy notes.  Review of imaging--CT head.  Discussion with family, ...parents., to update on patient status, rehab plan of care, progress in therapy, general prognosis, ELOS, discharge planning.  Documentation time.

## 2023-04-06 VITALS
OXYGEN SATURATION: 97 % | DIASTOLIC BLOOD PRESSURE: 72 MMHG | HEART RATE: 91 BPM | TEMPERATURE: 98 F | SYSTOLIC BLOOD PRESSURE: 119 MMHG | RESPIRATION RATE: 16 BRPM

## 2023-04-06 LAB
ALBUMIN SERPL ELPH-MCNC: 3.3 G/DL — SIGNIFICANT CHANGE UP (ref 3.3–5)
ALP SERPL-CCNC: 94 U/L — SIGNIFICANT CHANGE UP (ref 40–120)
ALT FLD-CCNC: 23 U/L — SIGNIFICANT CHANGE UP (ref 10–45)
ANION GAP SERPL CALC-SCNC: 13 MMOL/L — SIGNIFICANT CHANGE UP (ref 5–17)
AST SERPL-CCNC: 13 U/L — SIGNIFICANT CHANGE UP (ref 10–40)
BILIRUB SERPL-MCNC: 0.3 MG/DL — SIGNIFICANT CHANGE UP (ref 0.2–1.2)
BUN SERPL-MCNC: 9 MG/DL — SIGNIFICANT CHANGE UP (ref 7–23)
CALCIUM SERPL-MCNC: 9.2 MG/DL — SIGNIFICANT CHANGE UP (ref 8.4–10.5)
CHLORIDE SERPL-SCNC: 102 MMOL/L — SIGNIFICANT CHANGE UP (ref 96–108)
CO2 SERPL-SCNC: 26 MMOL/L — SIGNIFICANT CHANGE UP (ref 22–31)
CREAT SERPL-MCNC: 0.64 MG/DL — SIGNIFICANT CHANGE UP (ref 0.5–1.3)
EGFR: 120 ML/MIN/1.73M2 — SIGNIFICANT CHANGE UP
GLUCOSE SERPL-MCNC: 128 MG/DL — HIGH (ref 70–99)
HCT VFR BLD CALC: 41.5 % — SIGNIFICANT CHANGE UP (ref 39–50)
HGB BLD-MCNC: 13.4 G/DL — SIGNIFICANT CHANGE UP (ref 13–17)
MCHC RBC-ENTMCNC: 29.9 PG — SIGNIFICANT CHANGE UP (ref 27–34)
MCHC RBC-ENTMCNC: 32.3 GM/DL — SIGNIFICANT CHANGE UP (ref 32–36)
MCV RBC AUTO: 92.6 FL — SIGNIFICANT CHANGE UP (ref 80–100)
NRBC # BLD: 0 /100 WBCS — SIGNIFICANT CHANGE UP (ref 0–0)
PLATELET # BLD AUTO: 279 K/UL — SIGNIFICANT CHANGE UP (ref 150–400)
POTASSIUM SERPL-MCNC: 4.2 MMOL/L — SIGNIFICANT CHANGE UP (ref 3.5–5.3)
POTASSIUM SERPL-SCNC: 4.2 MMOL/L — SIGNIFICANT CHANGE UP (ref 3.5–5.3)
PROT SERPL-MCNC: 7 G/DL — SIGNIFICANT CHANGE UP (ref 6–8.3)
RBC # BLD: 4.48 M/UL — SIGNIFICANT CHANGE UP (ref 4.2–5.8)
RBC # FLD: 13.2 % — SIGNIFICANT CHANGE UP (ref 10.3–14.5)
SODIUM SERPL-SCNC: 141 MMOL/L — SIGNIFICANT CHANGE UP (ref 135–145)
WBC # BLD: 6.88 K/UL — SIGNIFICANT CHANGE UP (ref 3.8–10.5)
WBC # FLD AUTO: 6.88 K/UL — SIGNIFICANT CHANGE UP (ref 3.8–10.5)

## 2023-04-06 PROCEDURE — 36415 COLL VENOUS BLD VENIPUNCTURE: CPT

## 2023-04-06 PROCEDURE — 80053 COMPREHEN METABOLIC PANEL: CPT

## 2023-04-06 PROCEDURE — 93970 EXTREMITY STUDY: CPT

## 2023-04-06 PROCEDURE — 92610 EVALUATE SWALLOWING FUNCTION: CPT

## 2023-04-06 PROCEDURE — 83001 ASSAY OF GONADOTROPIN (FSH): CPT

## 2023-04-06 PROCEDURE — 81001 URINALYSIS AUTO W/SCOPE: CPT

## 2023-04-06 PROCEDURE — 83002 ASSAY OF GONADOTROPIN (LH): CPT

## 2023-04-06 PROCEDURE — 84402 ASSAY OF FREE TESTOSTERONE: CPT

## 2023-04-06 PROCEDURE — 85027 COMPLETE CBC AUTOMATED: CPT

## 2023-04-06 PROCEDURE — 82140 ASSAY OF AMMONIA: CPT

## 2023-04-06 PROCEDURE — 97535 SELF CARE MNGMENT TRAINING: CPT

## 2023-04-06 PROCEDURE — 71045 X-RAY EXAM CHEST 1 VIEW: CPT

## 2023-04-06 PROCEDURE — 85025 COMPLETE CBC W/AUTO DIFF WBC: CPT

## 2023-04-06 PROCEDURE — 99238 HOSP IP/OBS DSCHRG MGMT 30/<: CPT

## 2023-04-06 PROCEDURE — 82962 GLUCOSE BLOOD TEST: CPT

## 2023-04-06 PROCEDURE — 84439 ASSAY OF FREE THYROXINE: CPT

## 2023-04-06 PROCEDURE — 83036 HEMOGLOBIN GLYCOSYLATED A1C: CPT

## 2023-04-06 PROCEDURE — 97112 NEUROMUSCULAR REEDUCATION: CPT

## 2023-04-06 PROCEDURE — 84443 ASSAY THYROID STIM HORMONE: CPT

## 2023-04-06 PROCEDURE — 87635 SARS-COV-2 COVID-19 AMP PRB: CPT

## 2023-04-06 PROCEDURE — 87640 STAPH A DNA AMP PROBE: CPT

## 2023-04-06 PROCEDURE — 97110 THERAPEUTIC EXERCISES: CPT

## 2023-04-06 PROCEDURE — 70450 CT HEAD/BRAIN W/O DYE: CPT

## 2023-04-06 PROCEDURE — 82533 TOTAL CORTISOL: CPT

## 2023-04-06 PROCEDURE — 97163 PT EVAL HIGH COMPLEX 45 MIN: CPT

## 2023-04-06 PROCEDURE — 97167 OT EVAL HIGH COMPLEX 60 MIN: CPT

## 2023-04-06 PROCEDURE — 92523 SPEECH SOUND LANG COMPREHEN: CPT

## 2023-04-06 PROCEDURE — 92507 TX SP LANG VOICE COMM INDIV: CPT

## 2023-04-06 PROCEDURE — 92611 MOTION FLUOROSCOPY/SWALLOW: CPT

## 2023-04-06 PROCEDURE — 97116 GAIT TRAINING THERAPY: CPT

## 2023-04-06 PROCEDURE — 80164 ASSAY DIPROPYLACETIC ACD TOT: CPT

## 2023-04-06 PROCEDURE — 84305 ASSAY OF SOMATOMEDIN: CPT

## 2023-04-06 PROCEDURE — 97530 THERAPEUTIC ACTIVITIES: CPT

## 2023-04-06 PROCEDURE — 87641 MR-STAPH DNA AMP PROBE: CPT

## 2023-04-06 PROCEDURE — 74230 X-RAY XM SWLNG FUNCJ C+: CPT

## 2023-04-06 PROCEDURE — 92526 ORAL FUNCTION THERAPY: CPT

## 2023-04-06 PROCEDURE — 93005 ELECTROCARDIOGRAM TRACING: CPT

## 2023-04-06 RX ORDER — OLANZAPINE 15 MG/1
5 TABLET, FILM COATED ORAL ONCE
Refills: 0 | Status: COMPLETED | OUTPATIENT
Start: 2023-04-06 | End: 2023-04-06

## 2023-04-06 RX ADMIN — Medication 1 APPLICATION(S): at 05:14

## 2023-04-06 RX ADMIN — MIDODRINE HYDROCHLORIDE 10 MILLIGRAM(S): 2.5 TABLET ORAL at 07:43

## 2023-04-06 RX ADMIN — OLANZAPINE 5 MILLIGRAM(S): 15 TABLET, FILM COATED ORAL at 08:58

## 2023-04-06 RX ADMIN — Medication 1 APPLICATION(S): at 05:12

## 2023-04-06 RX ADMIN — ESCITALOPRAM OXALATE 10 MILLIGRAM(S): 10 TABLET, FILM COATED ORAL at 07:43

## 2023-04-06 RX ADMIN — Medication 650 MILLIGRAM(S): at 00:43

## 2023-04-06 RX ADMIN — DIVALPROEX SODIUM 250 MILLIGRAM(S): 500 TABLET, DELAYED RELEASE ORAL at 05:10

## 2023-04-06 RX ADMIN — MEMANTINE HYDROCHLORIDE 5 MILLIGRAM(S): 10 TABLET ORAL at 05:11

## 2023-04-06 RX ADMIN — MUPIROCIN 1 APPLICATION(S): 20 OINTMENT TOPICAL at 05:11

## 2023-04-06 NOTE — PROGRESS NOTE ADULT - ATTENDING SUPERVISION STATEMENT
Fellow
Fellow
Resident
Fellow
Resident
Resident
Fellow
Resident
Fellow
Resident

## 2023-04-06 NOTE — PROGRESS NOTE ADULT - NUTRITIONAL ASSESSMENT
This patient has been assessed with a concern for Malnutrition and has been determined to have a diagnosis/diagnoses of Moderate protein-calorie malnutrition.    This patient is being managed with:   Diet Minced and Moist-  Entered: Mar 27 2023 11:25AM  
This patient has been assessed with a concern for Malnutrition and has been determined to have a diagnosis/diagnoses of Moderate protein-calorie malnutrition.    This patient is being managed with:   Diet NPO with Tube Feed-  Tube Feeding Modality: Gastrostomy  TwoCal HN  Total Volume for 24 Hours (mL): 1200  Bolus  Total Volume of Bolus (mL):  300  Total # of Feeds: 4  Tube Feed Frequency: Every 6 hours   Tube Feed Start Time: 20:00  Bolus Feed Rate (mL per Hour): 300   Bolus Feed Duration (in Hours): 1  Bolus Feed Instructions:  Please provide tube feed at 8am 12pm 4pm and 8pm  Free Water Flush  Free Water Flush Instructions:  Please provide 300ml water at least 1 hour apart from feeds  Entered: Mar  1 2023  2:52PM    
This patient has been assessed with a concern for Malnutrition and has been determined to have a diagnosis/diagnoses of Moderate protein-calorie malnutrition.    This patient is being managed with:   Diet Pureed-  Entered: Mar  8 2023  3:00PM    
This patient has been assessed with a concern for Malnutrition and has been determined to have a diagnosis/diagnoses of Moderate protein-calorie malnutrition.    This patient is being managed with:   Diet NPO with Tube Feed-  Tube Feeding Modality: Gastrostomy  TwoCal HN  Total Volume for 24 Hours (mL): 1200  Bolus  Total Volume of Bolus (mL):  300  Total # of Feeds: 4  Tube Feed Frequency: Every 6 hours   Tube Feed Start Time: 20:00  Bolus Feed Rate (mL per Hour): 300   Bolus Feed Duration (in Hours): 1  Bolus Feed Instructions:  Please provide tube feed at 8am 12pm 4pm and 8pm  Free Water Flush  Free Water Flush Instructions:  Please provide 300ml water at least 1 hour apart from feeds  Entered: Mar  1 2023  2:52PM    
This patient has been assessed with a concern for Malnutrition and has been determined to have a diagnosis/diagnoses of Moderate protein-calorie malnutrition.    This patient is being managed with:   Diet NPO with Tube Feed-  Tube Feeding Modality: Gastrostomy  TwoCal HN  Total Volume for 24 Hours (mL): 1200  Bolus  Total Volume of Bolus (mL):  300  Total # of Feeds: 4  Tube Feed Frequency: Every 6 hours   Tube Feed Start Time: 20:00  Bolus Feed Rate (mL per Hour): 300   Bolus Feed Duration (in Hours): 1  Bolus Feed Instructions:  Please provide tube feed at 8am 12pm 4pm and 8pm  Free Water Flush  Free Water Flush Instructions:  Please provide 300ml water at least 1 hour apart from feeds  Entered: Mar  1 2023  2:52PM    
This patient has been assessed with a concern for Malnutrition and has been determined to have a diagnosis/diagnoses of Moderate protein-calorie malnutrition.    This patient is being managed with:   Diet Pureed-  Entered: Mar  8 2023  3:00PM  
This patient has been assessed with a concern for Malnutrition and has been determined to have a diagnosis/diagnoses of Moderate protein-calorie malnutrition.    This patient is being managed with:   Diet Soft and Bite Sized-  Entered: Mar 30 2023 12:09PM  
This patient has been assessed with a concern for Malnutrition and has been determined to have a diagnosis/diagnoses of Moderate protein-calorie malnutrition.    This patient is being managed with:   Diet Minced and Moist-  Entered: Mar 27 2023 11:25AM    This patient has been assessed with a concern for Malnutrition and has been determined to have a diagnosis/diagnoses of Moderate protein-calorie malnutrition.    This patient is being managed with:   Diet Minced and Moist-  Entered: Mar 27 2023 11:25AM  
This patient has been assessed with a concern for Malnutrition and has been determined to have a diagnosis/diagnoses of Moderate protein-calorie malnutrition.    This patient is being managed with:   Diet NPO with Tube Feed-  Tube Feeding Modality: Gastrostomy  TwoCal HN  Total Volume for 24 Hours (mL): 1200  Bolus  Total Volume of Bolus (mL):  300  Total # of Feeds: 4  Tube Feed Frequency: Every 6 hours   Tube Feed Start Time: 20:00  Bolus Feed Rate (mL per Hour): 300   Bolus Feed Duration (in Hours): 1  Bolus Feed Instructions:  Please provide tube feed at 8am 12pm 4pm and 8pm  Free Water Flush  Free Water Flush Instructions:  Please provide 300ml water at least 1 hour apart from feeds  Entered: Mar  1 2023  2:52PM    
This patient has been assessed with a concern for Malnutrition and has been determined to have a diagnosis/diagnoses of Moderate protein-calorie malnutrition.    This patient is being managed with:   Diet NPO with Tube Feed-  Tube Feeding Modality: Gastrostomy  TwoCal HN  Total Volume for 24 Hours (mL): 1200  Bolus  Total Volume of Bolus (mL):  300  Total # of Feeds: 4  Tube Feed Frequency: Every 6 hours   Tube Feed Start Time: 20:00  Bolus Feed Rate (mL per Hour): 300   Bolus Feed Duration (in Hours): 1  Bolus Feed Instructions:  Please provide tube feed at 8am 12pm 4pm and 8pm  Free Water Flush  Free Water Flush Instructions:  Please provide 300ml water at least 1 hour apart from feeds  Entered: Mar  1 2023  2:52PM    
This patient has been assessed with a concern for Malnutrition and has been determined to have a diagnosis/diagnoses of Moderate protein-calorie malnutrition.    This patient is being managed with:   Diet Pureed-  Entered: Mar  8 2023  3:00PM    
This patient has been assessed with a concern for Malnutrition and has been determined to have a diagnosis/diagnoses of Moderate protein-calorie malnutrition.    This patient is being managed with:   Diet Soft and Bite Sized-  Supplement Feeding Modality:  Oral  Ensure Plus High Protein Cans or Servings Per Day:  1       Frequency:  Daily  Entered: Apr  3 2023  9:51AM  
This patient has been assessed with a concern for Malnutrition and has been determined to have a diagnosis/diagnoses of Moderate protein-calorie malnutrition.    This patient is being managed with:   Diet NPO with Tube Feed-  Tube Feeding Modality: Gastrostomy  TwoCal HN  Total Volume for 24 Hours (mL): 1200  Bolus  Total Volume of Bolus (mL):  300  Total # of Feeds: 4  Tube Feed Frequency: Every 6 hours   Tube Feed Start Time: 20:00  Bolus Feed Rate (mL per Hour): 300   Bolus Feed Duration (in Hours): 1  Bolus Feed Instructions:  Please provide tube feed at 8am 12pm 4pm and 8pm  Free Water Flush  Free Water Flush Instructions:  Please provide 300ml water at least 1 hour apart from feeds  Entered: Mar  1 2023  2:52PM    
This patient has been assessed with a concern for Malnutrition and has been determined to have a diagnosis/diagnoses of Moderate protein-calorie malnutrition.    This patient is being managed with:   Diet NPO with Tube Feed-  Tube Feeding Modality: Gastrostomy  TwoCal HN  Total Volume for 24 Hours (mL): 1200  Bolus  Total Volume of Bolus (mL):  300  Total # of Feeds: 4  Tube Feed Frequency: Every 6 hours   Tube Feed Start Time: 20:00  Bolus Feed Rate (mL per Hour): 300   Bolus Feed Duration (in Hours): 1  Bolus Feed Instructions:  Please provide tube feed at 8am 12pm 4pm and 8pm  Free Water Flush  Free Water Flush Instructions:  Please provide 300ml water at least 1 hour apart from feeds  Entered: Mar  1 2023  2:52PM    
This patient has been assessed with a concern for Malnutrition and has been determined to have a diagnosis/diagnoses of Moderate protein-calorie malnutrition.    This patient is being managed with:   Diet Pureed-  Entered: Mar  8 2023  3:00PM    
Diet, Pureed (03-08-23 @ 15:01) [Active]
Diet, Soft and Bite Sized:   Supplement Feeding Modality:  Oral  Ensure Plus High Protein Cans or Servings Per Day:  1       Frequency:  Daily (04-03-23 @ 09:51) [Pending Verification By Attending]  Diet, Soft and Bite Sized (03-30-23 @ 12:10) [Active]
This patient has been assessed with a concern for Malnutrition and has been determined to have a diagnosis/diagnoses of Moderate protein-calorie malnutrition.    This patient is being managed with:   Diet Minced and Moist-  Entered: Mar 27 2023 11:25AM  
This patient has been assessed with a concern for Malnutrition and has been determined to have a diagnosis/diagnoses of Moderate protein-calorie malnutrition.    This patient is being managed with:   Diet NPO after Midnight-     NPO Start Date: 05-Apr-2023   NPO Start Time: 23:59  Except Medications  Entered: Apr 5 2023 10:21PM    Diet Soft and Bite Sized-  Supplement Feeding Modality:  Oral  Ensure Plus High Protein Cans or Servings Per Day:  1       Frequency:  Daily  Entered: Apr  3 2023  9:51AM  
This patient has been assessed with a concern for Malnutrition and has been determined to have a diagnosis/diagnoses of Moderate protein-calorie malnutrition.    This patient is being managed with:   Diet Pureed-  Entered: Mar  8 2023  3:00PM    
This patient has been assessed with a concern for Malnutrition and has been determined to have a diagnosis/diagnoses of Moderate protein-calorie malnutrition.    This patient is being managed with:   Diet Soft and Bite Sized-  Entered: Mar 30 2023 12:09PM  
This patient has been assessed with a concern for Malnutrition and has been determined to have a diagnosis/diagnoses of Moderate protein-calorie malnutrition.    This patient is being managed with:   Diet Soft and Bite Sized-  Supplement Feeding Modality:  Oral  Ensure Plus High Protein Cans or Servings Per Day:  1       Frequency:  Daily  Entered: Apr  3 2023  9:51AM  
This patient has been assessed with a concern for Malnutrition and has been determined to have a diagnosis/diagnoses of Moderate protein-calorie malnutrition.    This patient is being managed with:   Diet Soft and Bite Sized-  Entered: Mar 30 2023 12:09PM

## 2023-04-06 NOTE — PROGRESS NOTE ADULT - ATTENDING COMMENTS
Pt. seen with resident.  Agree with documentation above as per resident with amendments made as appropriate. Patient medically stable.     Left ICH  stable for discharge today to [place] for Cranioplasty.

## 2023-04-06 NOTE — PROGRESS NOTE ADULT - SUBJECTIVE AND OBJECTIVE BOX
HPI:  This is 42 year old male with PMH of prior substance use, HTN, anxiety who presented to [facility] on 1/23. On 1/23 morning, he told his family that he was not feeling well and took 2 tabs of Vyvanse (usually takes as needed). Shortly after, he was at a group therapy meeting over the phone when he complained to his father he was having a severe headache. He then became more lethargic, and developed nausea and vomited. He was brought to ED by his father. On arrival, noted to be lethargic, vomiting, with garbled speech. 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 Post CT, more lethargic with dilated right pupil and bilateral reactive but sluggish pupils. He was given mannitol and intubated for airway protection and was taken to neuro ICU.    On 1/25, HCT with enlarging R lateral ventricle and slightly worsening MLS concerning for trapped ventricle. 1/27 acute drop in SpO2 on 1/27, lavaged and suctioned out thick clot. Placed back on ACVC and FiO2 weaned from 70% to 40% overnight. On 1/28/23, he had episode of emesis, & fever of 100.3. He was  given tylenol with improvement in temperature but still little to no movement on the L side (previously would localize arm to suction and spontaneously bend L leg). Pt. Had left decompressive hemicraniectomy and right EVD  placed.    After the procedure, patient opened eyes for the first time and regained LUE/LLE movement. As he became febrile again this was less prominent. Ceftriaxone was switched to Zosyn. 1/29 HCT mild increase extraaxial fluid collection. on 1/30, repeat  HCT unchanged, his MTL was discontinued. Zosyn and vancomycin switched to cefepime. On 1/31,  HCT shoeds worsening edema +/- extraaxial fluid collection, elevated ICPs, and mannitol was restarted.      MTL was weaned,  EVD adjusted and eventually removed on 2/10.  Hospital course further c/b agitation requiring precedex, hypotension requiring bolus, dysphagia s/p PEG 2/14  and trach on 2/13, insomnia s/p seroquel, pain s/p oxycodone and fentanyl. He underwent wound revision with NSGY and plastics on 2/17. Keppra discontinued due to signs of agitation. Tolerated trach collar.     Patient was evaluated by PM&R and therapy for functional deficits, gait/ADL impairments and acute rehabilitation was recommended. Patient was medically optimized for discharge to [facility] IRU on 2/28/23.        Subjective:  Code Gray called before rounds this morning. No acuter overnight events. Patient is hungry this morning but NPO for surgery this afternoon - pick-up scheduled 10:30am.     Vital Signs Last 24 Hrs  T(C): 36.4 (05 Apr 2023 19:58), Max: 36.6 (05 Apr 2023 08:48)  T(F): 97.6 (05 Apr 2023 19:58), Max: 97.8 (05 Apr 2023 08:48)  HR: 88 (05 Apr 2023 19:58) (82 - 88)  BP: 117/84 (05 Apr 2023 19:58) (114/74 - 117/84)  RR: 16 (05 Apr 2023 19:58) (16 - 16)  SpO2: 98% (05 Apr 2023 19:58) (96% - 98%)    Parameters below as of 05 Apr 2023 19:58  Patient On (Oxygen Delivery Method): room air    PHYSICAL EXAM  Constitutional - NAD  HEENT - EOMI, left hemicraniectomy concavity- helmet in place while out of bed  Neck - s/p trach decannulation (guaze over stoma)  Chest - resp nonlabored  Cardio -warm, well perfused, no cyanosis or pedal edema  Abdomen -  Soft, NTND, +PEG, Bone flap marsupialized in epigastric region.    Extremities - No peripheral edema, No calf tenderness   Neurologic Exam:                    Cognitive -             Orientation: Awake, Alert; aphasia and cognitive deficits -            Communication-  command following improved--able to say name to command and follow most simple commands, Non-fluent, able to repeat- but impaired. Expression improving--recognizes and greets examiner.            Attention:  Impaired;        Cranial Nerves - Limited due to  aphasia; No facial asymmetry, Tongue midline, EOMI, Pupils dilated but reactive, +dysphagia     Motor -                     LEFT    UE - 5/5                    RIGHT UE - ShAB 0/5, EF 0/5, EE 0/5, WE 0/5,  0/5--                     LEFT    LE - 5/5                    RIGHT LE - Hip 2-3/5, Hip adductor 2/5, KE 2/5, DF 0/5, PF 0/5     Tone: MAS 2/4 finger flexors, wrist right, 1+/4 elbow extensors; 3/4 right PFs-- inversion      Sensory - decrease sensation to LT-- RUE  Psychiatric - calm, frustrated at times    MEDICATIONS  (STANDING):  AQUAPHOR (petrolatum Ointment) 1 Application(s) Topical two times a day  AQUAPHOR (petrolatum Ointment) 1 Application(s) Topical two times a day  atorvastatin 80 milliGRAM(s) Oral at bedtime  bacitracin   Ointment 1 Application(s) Topical two times a day  bacitracin   Ointment 1 Application(s) Topical daily  chlorhexidine 2% Cloths 1 Application(s) Topical daily  divalproex Sprinkle 250 milliGRAM(s) Oral two times a day  donepezil 5 milliGRAM(s) Oral daily  escitalopram 10 milliGRAM(s) Oral <User Schedule>  gabapentin 600 milliGRAM(s) Oral at bedtime  hydrocortisone 1% Cream 1 Application(s) Topical two times a day  melatonin 6 milliGRAM(s) Oral at bedtime  memantine 5 milliGRAM(s) Oral two times a day  midodrine. 10 milliGRAM(s) Oral <User Schedule>  mupirocin 2% Nasal 1 Application(s) Both Nostrils two times a day  nystatin Powder 1 Application(s) Topical two times a day  pantoprazole   Suspension 40 milliGRAM(s) Oral daily  polyethylene glycol 3350 17 Gram(s) Oral daily  senna Syrup 5 milliLiter(s) Oral at bedtime  traZODone 100 milliGRAM(s) Oral at bedtime    MEDICATIONS  (PRN):  acetaminophen    Suspension .. 650 milliGRAM(s) Oral every 6 hours PRN Temp greater or equal to 38C (100.4F), Mild Pain (1 - 3)  OLANZapine 5 milliGRAM(s) Oral once PRN agitation, unable to be redirected  OLANZapine Injectable 2.5 milliGRAM(s) IntraMuscular daily PRN severe agitation           HPI:  This is 42 year old male with PMH of prior substance use, HTN, anxiety who presented to [facility] on 1/23. On 1/23 morning, he told his family that he was not feeling well and took 2 tabs of Vyvanse (usually takes as needed). Shortly after, he was at a group therapy meeting over the phone when he complained to his father he was having a severe headache. He then became more lethargic, and developed nausea and vomited. He was brought to ED by his father. On arrival, noted to be lethargic, vomiting, with garbled speech. 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 Post CT, more lethargic with dilated right pupil and bilateral reactive but sluggish pupils. He was given mannitol and intubated for airway protection and was taken to neuro ICU.    On 1/25, HCT with enlarging R lateral ventricle and slightly worsening MLS concerning for trapped ventricle. 1/27 acute drop in SpO2 on 1/27, lavaged and suctioned out thick clot. Placed back on ACVC and FiO2 weaned from 70% to 40% overnight. On 1/28/23, he had episode of emesis, & fever of 100.3. He was  given tylenol with improvement in temperature but still little to no movement on the L side (previously would localize arm to suction and spontaneously bend L leg). Pt. Had left decompressive hemicraniectomy and right EVD  placed.    After the procedure, patient opened eyes for the first time and regained LUE/LLE movement. As he became febrile again this was less prominent. Ceftriaxone was switched to Zosyn. 1/29 HCT mild increase extraaxial fluid collection. on 1/30, repeat  HCT unchanged, his MTL was discontinued. Zosyn and vancomycin switched to cefepime. On 1/31,  HCT shoeds worsening edema +/- extraaxial fluid collection, elevated ICPs, and mannitol was restarted.      MTL was weaned,  EVD adjusted and eventually removed on 2/10.  Hospital course further c/b agitation requiring precedex, hypotension requiring bolus, dysphagia s/p PEG 2/14  and trach on 2/13, insomnia s/p seroquel, pain s/p oxycodone and fentanyl. He underwent wound revision with NSGY and plastics on 2/17. Keppra discontinued due to signs of agitation. Tolerated trach collar.     Patient was evaluated by PM&R and therapy for functional deficits, gait/ADL impairments and acute rehabilitation was recommended. Patient was medically optimized for discharge to [facility] IRU on 2/28/23.      Subjective:  Code Gray called before rounds this morning for removing helmet and throwing at staff. No acute overnight events prior. Patient is hungry this morning but NPO for surgery this afternoon - pick-up scheduled 10:30am.  Zyprexa given.     Vital Signs Last 24 Hrs  T(C): 36.4 (05 Apr 2023 19:58), Max: 36.4 (05 Apr 2023 19:58)  T(F): 97.6 (05 Apr 2023 19:58), Max: 97.6 (05 Apr 2023 19:58)  HR: 88 (05 Apr 2023 19:58) (88 - 88)  BP: 117/84 (05 Apr 2023 19:58) (117/84 - 117/84)  RR: 16 (05 Apr 2023 19:58) (16 - 16)  SpO2: 98% (05 Apr 2023 19:58) (98% - 98%)    Parameters below as of 05 Apr 2023 19:58  Patient On (Oxygen Delivery Method): room air      PHYSICAL EXAM  Constitutional - NAD  HEENT - EOMI, left hemicraniectomy concavity- helmet in place while out of bed  Neck - s/p trach decannulation (guaze over stoma)  Chest - resp nonlabored  Cardio -warm, well perfused, no cyanosis or pedal edema  Abdomen -  Soft, NTND, +PEG, Bone flap marsupialized in epigastric region.    Extremities - No peripheral edema, No calf tenderness   Neurologic Exam:                    Cognitive -             Orientation: Awake, Alert; aphasia and cognitive deficits -            Communication-  command following improved--able to say name to command and follow most simple commands, Non-fluent, able to repeat- but impaired. Expression improving--recognizes and greets examiner.            Attention:  Impaired;        Cranial Nerves - Limited due to  aphasia; No facial asymmetry, Tongue midline, EOMI, Pupils dilated but reactive, +dysphagia     Motor -                     LEFT    UE - 5/5                    RIGHT UE - ShAB 0/5, EF 0/5, EE 0/5, WE 0/5,  0/5--                     LEFT    LE - 5/5                    RIGHT LE - Hip 2-3/5, Hip adductor 2/5, KE 2/5, DF 0/5, PF 0/5     Tone: MAS 2/4 finger flexors, wrist right, 1+/4 elbow extensors; 3/4 right PFs-- inversion      Sensory - decrease sensation to LT-- RUE  Psychiatric - calm, frustrated at times    MEDICATIONS  (STANDING):  AQUAPHOR (petrolatum Ointment) 1 Application(s) Topical two times a day  AQUAPHOR (petrolatum Ointment) 1 Application(s) Topical two times a day  atorvastatin 80 milliGRAM(s) Oral at bedtime  bacitracin   Ointment 1 Application(s) Topical two times a day  bacitracin   Ointment 1 Application(s) Topical daily  chlorhexidine 2% Cloths 1 Application(s) Topical daily  divalproex Sprinkle 250 milliGRAM(s) Oral two times a day  donepezil 5 milliGRAM(s) Oral daily  escitalopram 10 milliGRAM(s) Oral <User Schedule>  gabapentin 600 milliGRAM(s) Oral at bedtime  hydrocortisone 1% Cream 1 Application(s) Topical two times a day  melatonin 6 milliGRAM(s) Oral at bedtime  memantine 5 milliGRAM(s) Oral two times a day  midodrine. 10 milliGRAM(s) Oral <User Schedule>  mupirocin 2% Nasal 1 Application(s) Both Nostrils two times a day  nystatin Powder 1 Application(s) Topical two times a day  pantoprazole   Suspension 40 milliGRAM(s) Oral daily  polyethylene glycol 3350 17 Gram(s) Oral daily  senna Syrup 5 milliLiter(s) Oral at bedtime  traZODone 100 milliGRAM(s) Oral at bedtime    MEDICATIONS  (PRN):  acetaminophen    Suspension .. 650 milliGRAM(s) Oral every 6 hours PRN Temp greater or equal to 38C (100.4F), Mild Pain (1 - 3)  OLANZapine Injectable 2.5 milliGRAM(s) IntraMuscular daily PRN severe agitation             HPI:  This is 42 year old male with PMH of prior substance use, HTN, anxiety who presented to [facility] on 1/23. On 1/23 morning, he told his family that he was not feeling well and took 2 tabs of Vyvanse (usually takes as needed). Shortly after, he was at a group therapy meeting over the phone when he complained to his father he was having a severe headache. He then became more lethargic, and developed nausea and vomited. He was brought to ED by his father. On arrival, noted to be lethargic, vomiting, with garbled speech. 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 Post CT, more lethargic with dilated right pupil and bilateral reactive but sluggish pupils. He was given mannitol and intubated for airway protection and was taken to neuro ICU.    On 1/25, HCT with enlarging R lateral ventricle and slightly worsening MLS concerning for trapped ventricle. 1/27 acute drop in SpO2 on 1/27, lavaged and suctioned out thick clot. Placed back on ACVC and FiO2 weaned from 70% to 40% overnight. On 1/28/23, he had episode of emesis, & fever of 100.3. He was  given tylenol with improvement in temperature but still little to no movement on the L side (previously would localize arm to suction and spontaneously bend L leg). Pt. Had left decompressive hemicraniectomy and right EVD  placed.    After the procedure, patient opened eyes for the first time and regained LUE/LLE movement. As he became febrile again this was less prominent. Ceftriaxone was switched to Zosyn. 1/29 HCT mild increase extraaxial fluid collection. on 1/30, repeat  HCT unchanged, his MTL was discontinued. Zosyn and vancomycin switched to cefepime. On 1/31,  HCT shoeds worsening edema +/- extraaxial fluid collection, elevated ICPs, and mannitol was restarted.      MTL was weaned,  EVD adjusted and eventually removed on 2/10.  Hospital course further c/b agitation requiring precedex, hypotension requiring bolus, dysphagia s/p PEG 2/14  and trach on 2/13, insomnia s/p seroquel, pain s/p oxycodone and fentanyl. He underwent wound revision with NSGY and plastics on 2/17. Keppra discontinued due to signs of agitation. Tolerated trach collar.     Patient was evaluated by PM&R and therapy for functional deficits, gait/ADL impairments and acute rehabilitation was recommended. Patient was medically optimized for discharge to [facility] IRU on 2/28/23.      Subjective:  Code Gray called before rounds this morning for removing helmet and throwing at staff. No acute overnight events prior. Patient is hungry this morning but NPO for surgery this afternoon - pick-up scheduled 10:30am.  IM Zyprexa given.     Vital Signs Last 24 Hrs  T(C): 36.4 (05 Apr 2023 19:58), Max: 36.4 (05 Apr 2023 19:58)  T(F): 97.6 (05 Apr 2023 19:58), Max: 97.6 (05 Apr 2023 19:58)  HR: 88 (05 Apr 2023 19:58) (88 - 88)  BP: 117/84 (05 Apr 2023 19:58) (117/84 - 117/84)  RR: 16 (05 Apr 2023 19:58) (16 - 16)  SpO2: 98% (05 Apr 2023 19:58) (98% - 98%)    Parameters below as of 05 Apr 2023 19:58  Patient On (Oxygen Delivery Method): room air      PHYSICAL EXAM  Constitutional - NAD  HEENT - EOMI, left hemicraniectomy concavity- helmet in place while out of bed  Neck - s/p trach decannulation (guaze over stoma)  Chest - resp nonlabored  Cardio -warm, well perfused, no cyanosis or pedal edema  Abdomen -  Soft, NTND, +PEG, Bone flap marsupialized in epigastric region.    Extremities - No peripheral edema, No calf tenderness   Neurologic Exam:                    Cognitive -             Orientation: Awake, Alert; aphasia and cognitive deficits -            Communication-  command following improved--able to say name to command and follow most simple commands, Non-fluent, able to repeat- but impaired. Expression improving--recognizes and greets examiner.            Attention:  Impaired;        Cranial Nerves - Limited due to  aphasia; No facial asymmetry, Tongue midline, EOMI, Pupils dilated but reactive, +dysphagia     Motor -                     LEFT    UE - 5/5                    RIGHT UE - ShAB 0/5, EF 0/5, EE 0/5, WE 0/5,  0/5--                     LEFT    LE - 5/5                    RIGHT LE - Hip 2-3/5, Hip adductor 2/5, KE 2/5, DF 0/5, PF 0/5     Tone: MAS 2/4 finger flexors, wrist right, 1+/4 elbow extensors; 3/4 right PFs-- inversion      Sensory - decrease sensation to LT-- RUE  Psychiatric - calm, frustrated at times    MEDICATIONS  (STANDING):  AQUAPHOR (petrolatum Ointment) 1 Application(s) Topical two times a day  AQUAPHOR (petrolatum Ointment) 1 Application(s) Topical two times a day  atorvastatin 80 milliGRAM(s) Oral at bedtime  bacitracin   Ointment 1 Application(s) Topical two times a day  bacitracin   Ointment 1 Application(s) Topical daily  chlorhexidine 2% Cloths 1 Application(s) Topical daily  divalproex Sprinkle 250 milliGRAM(s) Oral two times a day  donepezil 5 milliGRAM(s) Oral daily  escitalopram 10 milliGRAM(s) Oral <User Schedule>  gabapentin 600 milliGRAM(s) Oral at bedtime  hydrocortisone 1% Cream 1 Application(s) Topical two times a day  melatonin 6 milliGRAM(s) Oral at bedtime  memantine 5 milliGRAM(s) Oral two times a day  midodrine. 10 milliGRAM(s) Oral <User Schedule>  mupirocin 2% Nasal 1 Application(s) Both Nostrils two times a day  nystatin Powder 1 Application(s) Topical two times a day  pantoprazole   Suspension 40 milliGRAM(s) Oral daily  polyethylene glycol 3350 17 Gram(s) Oral daily  senna Syrup 5 milliLiter(s) Oral at bedtime  traZODone 100 milliGRAM(s) Oral at bedtime    MEDICATIONS  (PRN):  acetaminophen    Suspension .. 650 milliGRAM(s) Oral every 6 hours PRN Temp greater or equal to 38C (100.4F), Mild Pain (1 - 3)  OLANZapine Injectable 2.5 milliGRAM(s) IntraMuscular daily PRN severe agitation             HPI:  This is 42 year old male with PMH of prior substance use, HTN, anxiety who presented to [facility] on 1/23. On 1/23 morning, he told his family that he was not feeling well and took 2 tabs of Vyvanse (usually takes as needed). Shortly after, he was at a group therapy meeting over the phone when he complained to his father he was having a severe headache. He then became more lethargic, and developed nausea and vomited. He was brought to ED by his father. On arrival, noted to be lethargic, vomiting, with garbled speech. 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 Post CT, more lethargic with dilated right pupil and bilateral reactive but sluggish pupils. He was given mannitol and intubated for airway protection and was taken to neuro ICU.    On 1/25, HCT with enlarging R lateral ventricle and slightly worsening MLS concerning for trapped ventricle. 1/27 acute drop in SpO2 on 1/27, lavaged and suctioned out thick clot. Placed back on ACVC and FiO2 weaned from 70% to 40% overnight. On 1/28/23, he had episode of emesis, & fever of 100.3. He was  given tylenol with improvement in temperature but still little to no movement on the L side (previously would localize arm to suction and spontaneously bend L leg). Pt. Had left decompressive hemicraniectomy and right EVD  placed.    After the procedure, patient opened eyes for the first time and regained LUE/LLE movement. As he became febrile again this was less prominent. Ceftriaxone was switched to Zosyn. 1/29 HCT mild increase extraaxial fluid collection. on 1/30, repeat  HCT unchanged, his MTL was discontinued. Zosyn and vancomycin switched to cefepime. On 1/31,  HCT shoeds worsening edema +/- extraaxial fluid collection, elevated ICPs, and mannitol was restarted.      MTL was weaned,  EVD adjusted and eventually removed on 2/10.  Hospital course further c/b agitation requiring precedex, hypotension requiring bolus, dysphagia s/p PEG 2/14  and trach on 2/13, insomnia s/p seroquel, pain s/p oxycodone and fentanyl. He underwent wound revision with NSGY and plastics on 2/17. Keppra discontinued due to signs of agitation. Tolerated trach collar.     Patient was evaluated by PM&R and therapy for functional deficits, gait/ADL impairments and acute rehabilitation was recommended. Patient was medically optimized for discharge to [facility] IRU on 2/28/23.      Subjective:  Code Gray called before rounds this morning for removing helmet and throwing at staff. No acute overnight events prior. Patient is hungry this morning but NPO for surgery this afternoon - pick-up scheduled 10:30am.  IM Zyprexa given. Pt. calmed down afterward.     Vital Signs Last 24 Hrs  T(C): 36.4 (05 Apr 2023 19:58), Max: 36.4 (05 Apr 2023 19:58)  T(F): 97.6 (05 Apr 2023 19:58), Max: 97.6 (05 Apr 2023 19:58)  HR: 88 (05 Apr 2023 19:58) (88 - 88)  BP: 117/84 (05 Apr 2023 19:58) (117/84 - 117/84)  RR: 16 (05 Apr 2023 19:58) (16 - 16)  SpO2: 98% (05 Apr 2023 19:58) (98% - 98%)    Parameters below as of 05 Apr 2023 19:58  Patient On (Oxygen Delivery Method): room air      PHYSICAL EXAM  Constitutional - NAD  HEENT - EOMI, left hemicraniectomy concavity- helmet in place while out of bed  Neck - trach  stoma site healed  Chest - resp nonlabored  Cardio -warm, well perfused, no cyanosis or pedal edema  Abdomen -  Soft, NTND, +PEG, Bone flap marsupialized in epigastric region.    Extremities - No peripheral edema, No calf tenderness   Neurologic Exam:                    Cognitive -             Orientation: Awake, Alert; aphasia and cognitive deficits -            Communication-  command following improved--able to say name to command and follow most simple commands, Non-fluent, able to repeat- but impaired. Expression improving--recognizes and greets examiner.            Attention:  Impaired;        Cranial Nerves - Limited due to  aphasia; No facial asymmetry, Tongue midline, EOMI, Pupils dilated but reactive, +dysphagia     Motor -                     LEFT    UE - 5/5                    RIGHT UE - ShAB 0/5, EF 0/5, EE 0/5, WE 0/5,  0/5--                     LEFT    LE - 5/5                    RIGHT LE - Hip 2-3/5, Hip adductor 2/5, KE 2/5, DF 0/5, PF 0/5     Tone: MAS 2/4 finger flexors, wrist right, 1+/4 elbow extensors; 3/4 right PFs-- inversion      Sensory - decrease sensation to LT-- RUE  Psychiatric - calm, frustrated at times    MEDICATIONS  (STANDING):  AQUAPHOR (petrolatum Ointment) 1 Application(s) Topical two times a day  AQUAPHOR (petrolatum Ointment) 1 Application(s) Topical two times a day  atorvastatin 80 milliGRAM(s) Oral at bedtime  bacitracin   Ointment 1 Application(s) Topical two times a day  bacitracin   Ointment 1 Application(s) Topical daily  chlorhexidine 2% Cloths 1 Application(s) Topical daily  divalproex Sprinkle 250 milliGRAM(s) Oral two times a day  donepezil 5 milliGRAM(s) Oral daily  escitalopram 10 milliGRAM(s) Oral <User Schedule>  gabapentin 600 milliGRAM(s) Oral at bedtime  hydrocortisone 1% Cream 1 Application(s) Topical two times a day  melatonin 6 milliGRAM(s) Oral at bedtime  memantine 5 milliGRAM(s) Oral two times a day  midodrine. 10 milliGRAM(s) Oral <User Schedule>  mupirocin 2% Nasal 1 Application(s) Both Nostrils two times a day  nystatin Powder 1 Application(s) Topical two times a day  pantoprazole   Suspension 40 milliGRAM(s) Oral daily  polyethylene glycol 3350 17 Gram(s) Oral daily  senna Syrup 5 milliLiter(s) Oral at bedtime  traZODone 100 milliGRAM(s) Oral at bedtime    MEDICATIONS  (PRN):  acetaminophen    Suspension .. 650 milliGRAM(s) Oral every 6 hours PRN Temp greater or equal to 38C (100.4F), Mild Pain (1 - 3)  OLANZapine Injectable 2.5 milliGRAM(s) IntraMuscular daily PRN severe agitation

## 2023-04-06 NOTE — PROGRESS NOTE ADULT - PROVIDER SPECIALTY LIST ADULT
Brain Injury Medicine
Hospitalist
Hospitalist
Physiatry
Physiatry
Pulmonology
Brain Injury Medicine
Hospitalist
Physiatry
Physiatry
Pulmonology
Rehab Medicine
Brain Injury Medicine
Brain Injury Medicine
Hospitalist
Neuropsychology
Neuropsychology
Pulmonology
Rehab Medicine
Brain Injury Medicine
Hospitalist
Pulmonology
Rehab Medicine
Hospitalist
Physiatry
Physiatry
Rehab Medicine
Physiatry
Rehab Medicine
Physiatry

## 2023-04-06 NOTE — PROGRESS NOTE ADULT - REASON FOR ADMISSION
left IPH/ICH

## 2023-04-06 NOTE — PROGRESS NOTE ADULT - ASSESSMENT
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 He is  s/p Left decompressive hemicraniectomy and right EVD placement 1/28/23. Hospital course further c/b respiratory failure s/p intubation and extubation,  agitation requiring precedex, sepsis s/p ABX, hypotension requiring bolus, dysphagia s/p PEG 2/14 and trach on 2/13, insomnia s/p seroquel, pain s/p oxycodone and fentanyl. Patient now with Right Hemiparesis, dysphagia s/p PEG, Aphasia, Cognitive deficits, gait Instability, ADL impairments and Functional impairments.    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    - ST: to diagnose and treat deficits in swallowing, cognition and communication.  - Helmet when OOB  - follow up CT head 3/19 reviewed-  Postoperative changes compatible with a left temporal frontal parietal craniectomy is again seen. left frontoparietal hematoma with some improvement.  improved vasogenic edema is seen. Mass effect on the left lateral ventricle and Left-to-right shift (3.1 mm)has now resolved.   - OT– right resting hand splint  - Cranioplasty w Dr. Cueva 4/6/23 2:15pm, confirmed with transfer center-- scheduled for 10:30am tomorrow    right -- Pt. requires a right custom AFO for support and stability during ambulation to improve safety and independence with ambulation and decrease fall risk. 	Patient is unable to use a prefabricated brace as use of the orthosis will be for longer than six months and because the foot must be controlled in more than one plane         Sleep/Restlessness  --c/w Trazodone  100mg qhs 3/8-- for with sleep and restlessness.   Pt's mother reports he was on 50-100mg qhs and 50mg daytime home meds.   --cont. melatonin  --monitor  -- patient needs supervision for safety monitoring as pt. is impulsive, restless and high fall risk     # Agitation--improved   --Psychiatry consult, Dr. Acharya following  --cont. with  depakote 250mg bid  3/24  - depakote level =24 and ammonia level=21 3/27--nontoxic  -- c/w gabapentin to 600 mg qhs  - Zyprexa 2.5 mg IM daily PRN severe agitation. --has not needed  - Continue to monitor    Depression/anxiety  -- cont. Lexapro 10mg    - c/w gabapentin to 600 mg qhs for anxiety    Aphasia  -- c/w Donepezil 5 mg.    -cont. mematine 5mg BID-- tolerating well  speech improving    MRSA colonization--  -- Bactroban in nares BID x 5 days  --chlorhexidine cloths  --Mother called concerned -- provided reassurance this is not infectious and only colonization and being adequately treated.     facial rash-- ?seborrheic dermatitis  -mother concerned this is worsening  --d/w hospitalist to follow up on pt. and recs for treatment.     Bilateral lower extremity DVTs–  – Lower extremity Doppler from 3/1–right femoral vein DVT and bilateral lower extremity calf DVTs  – spoke with patient's neurosurgery PA–who was in contact with Dr. Cueva patient's neurosurgeon–gave clearance for patient to start therapeutic Lovenox.  --Vascular Surgery consult -- reached out to Dr. Hodges 3/7 - nothing further to do if patient is on therapeutic lovenox.   -- C/w therapeutic lovenox for 3 months. HELD FOR SURGERY    Orthostatic hypotension–  -cont. midodrine 10mg     #Acute Respiratory Failure  - Intubation and extubation  - s/p Trach 2/13  - Decannulated 3/5, Pulm monitoring, tolerating well    #GI PPX  - Nexium 40mg daily    #DM II  - A1c 6.0  -management as per hospitalist--off insulin     #Pain management  - Tylenol PRN  - Neurontin cw 600 hs --   - Continue to monitor     #Bowel Regimen  - Senna, miralax PRN    #Bladder management  -voiding well with low PVRs,   -- off doxazosin 3/2    #Dysphagia    - s/p PEG 2/14  - Diet -- Soft bite sized thin liquids  3/30--d/w Speech therapy-- 1:1 assist with meals  - SLP: evaluation and treatment  - Diet: tolerating bolus TF     #Skin:  - Skin on admission: IAD to bilateral groin and sacrum; RUQ incision with sutures with palpable skull flap; Left hemicraniectomy with sutures MILLER; psoriasis BL shin  - Pressure injury/Skin: Turn Q2hrs while in bed, OOB to Chair, PT/OT     #Precaution  - Fall, Aspiration, seizure    #home meds  - per list provided by Laura yang: Rousuvastatin 40mg daily and Paroxetine 30mg daily  -  cw paroxetine  - cw high intensity atorvastatin to home med dose    – IDT 3/28:  Social work:  patient on discharge can live with his mother who has a first-floor set up.  Nursing–incontinent of bowel and bladder, total assist with toileting  Speech: Diet upgraded to soft bite-size;  severe language deficits–poor command following, poor orientation, improve repetition says family members names, some increased spontaneous content of speech, Speech jargon, Aphasia, severe cognitive deficits  OT: Gradual progress–supervision eating/grooming; mod assist–upper body dressing; max assist–lower body dressing; total assist–toileting, no longer using [place] transfers.  Improved sitting balance.  PT: Transfers–stand pivot–mod assist x1 (fluctuates); bed mobility–mod assist supine to sit; sit to stand at rail–mod assist; ambulates 10 feet x 2 mod assist and wheelchair follow at Left HR–initiating hip flexion to advance right lower extremity.   goals-- mod assist with transfers, ambulation 10 feet max assist with hemiwalker, wheelchair mobility 50 feet min assist.  ELOS: 4/6  to [place] for Cranioplasty  Goals:  1.  Improved secretion management and tolerate PMV  2.  transfers with 1 person assist  3.  Communicate basic wants and needs with yes no answers     BOTH ASPIRIN AND LOVENOX HELD FOR SURGERY starting PM 4/3.       ** Spoke with pt's  _mother, Georgia___, to provide update on pt's status,  medical update, medications, MRSA colonization, progress in therapy,  and discharge to [place] for cranioplasty.  Discussed AR vs ZOHAIB post-cranioplasty.  All questions answered.  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 He is  s/p Left decompressive hemicraniectomy and right EVD placement 1/28/23. Hospital course further c/b respiratory failure s/p intubation and extubation,  agitation requiring precedex, sepsis s/p ABX, hypotension requiring bolus, dysphagia s/p PEG 2/14 and trach on 2/13, insomnia s/p seroquel, pain s/p oxycodone and fentanyl. Patient now with Right Hemiparesis, dysphagia s/p PEG, Aphasia, Cognitive deficits, gait Instability, ADL impairments and Functional impairments.    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    - ST: to diagnose and treat deficits in swallowing, cognition and communication.  - Helmet when OOB  - follow up CT head 3/19 reviewed-  Postoperative changes compatible with a left temporal frontal parietal craniectomy is again seen. left frontoparietal hematoma with some improvement.  improved vasogenic edema is seen. Mass effect on the left lateral ventricle and Left-to-right shift (3.1 mm)has now resolved.   - OT– right resting hand splint  - Cranioplasty w Dr. Cueva 4/6/23 2:15pm, confirmed with transfer center-- scheduled for 10:30am.     right HP-- Pt. requires a right custom AFO for support and stability during ambulation to improve safety and independence with ambulation and decrease fall risk. 	  Patient is unable to use a prefabricated brace as use of the orthosis will be for longer than six months and because the foot must be controlled in more than one plane       Sleep/Restlessness  --c/w Trazodone  100mg qhs 3/8-- for with sleep and restlessness.   Pt's mother reports he was on 50-100mg qhs and 50mg daytime home meds.   --cont. melatonin  --monitor  -- patient needs supervision for safety monitoring as pt. is impulsive, restless and high fall risk     # Agitation- provoked by hunger this morning, NPO for surgery  -- 1x 5mg IM Zyprexa  --Psychiatry consult, Dr. Acharya following  --cont. with  depakote 250mg bid  3/24  - depakote level =24 and ammonia level=21 3/27--nontoxic  -- c/w gabapentin to 600 mg qhs  - Zyprexa 2.5 mg IM daily PRN severe agitation.   - Continue to monitor    Depression/anxiety  -- cont. Lexapro 10mg    - c/w gabapentin to 600 mg qhs for anxiety    Aphasia  -- c/w Donepezil 5 mg.    -cont. mematine 5mg BID-- tolerating well, speech improving    MRSA colonization--  -- Bactroban in nares BID x 5 days  -- chlorhexidine cloths  -- Mother called concerned -- provided reassurance 4/5 this is not infectious and only colonization and being adequately treated.     facial rash-- ?seborrheic dermatitis  -mother concerned this is worsening  --d/w hospitalist to follow up on pt. and recs for treatment.     Bilateral lower extremity DVTs–  – Lower extremity Doppler from 3/1–right femoral vein DVT and bilateral lower extremity calf DVTs  – spoke with patient's neurosurgery PA–who was in contact with Dr. Cueva patient's neurosurgeon–gave clearance for patient to start therapeutic Lovenox.  --Vascular Surgery consult -- reached out to Dr. Hodges 3/7 - nothing further to do if patient is on therapeutic lovenox.   -- C/w therapeutic lovenox for 3 months. HELD FOR SURGERY    Orthostatic hypotension–  -cont. midodrine 10mg     #Acute Respiratory Failure  - Intubation and extubation  - s/p Trach 2/13  - Decannulated 3/5, Pulm monitoring, tolerating well    #GI PPX  - Nexium 40mg daily    #DM II  - A1c 6.0  -management as per hospitalist--off insulin     #Pain management  - Tylenol PRN  - Neurontin cw 600 hs --   - Continue to monitor     #Bowel Regimen  - Senna, miralax PRN    #Bladder management  -voiding well with low PVRs,   -- off doxazosin 3/2    #Dysphagia    - s/p PEG 2/14  - Diet -- Soft bite sized thin liquids  3/30--d/w Speech therapy-- 1:1 assist with meals  - SLP: evaluation and treatment  - Diet: tolerating bolus TF     #Skin:  - Skin on admission: IAD to bilateral groin and sacrum; RUQ incision with sutures with palpable skull flap; Left hemicraniectomy with sutures MILLER; psoriasis BL shin  - Pressure injury/Skin: Turn Q2hrs while in bed, OOB to Chair, PT/OT     #Precaution  - Fall, Aspiration, seizure    #home meds  - per list provided by Laura yang: Rousuvastatin 40mg daily and Paroxetine 30mg daily  -  cw paroxetine  - cw high intensity atorvastatin to home med dose    – IDT 3/28:  Social work:  patient on discharge can live with his mother who has a first-floor set up.  Nursing–incontinent of bowel and bladder, total assist with toileting  Speech: Diet upgraded to soft bite-size;  severe language deficits–poor command following, poor orientation, improve repetition says family members names, some increased spontaneous content of speech, Speech jargon, Aphasia, severe cognitive deficits  OT: Gradual progress–supervision eating/grooming; mod assist–upper body dressing; max assist–lower body dressing; total assist–toileting, no longer using [place] transfers.  Improved sitting balance.  PT: Transfers–stand pivot–mod assist x1 (fluctuates); bed mobility–mod assist supine to sit; sit to stand at rail–mod assist; ambulates 10 feet x 2 mod assist and wheelchair follow at Left HR–initiating hip flexion to advance right lower extremity.   goals-- mod assist with transfers, ambulation 10 feet max assist with hemiwalker, wheelchair mobility 50 feet min assist.  ELOS: 4/6  to [place] for Cranioplasty  Goals:  1.  Improved secretion management and tolerate PMV  2.  transfers with 1 person assist  3.  Communicate basic wants and needs with yes no answers     BOTH ASPIRIN AND LOVENOX HELD FOR SURGERY starting PM 4/3.       ** Spoke with pt's  _Laura yang___, to provide update on pt's status,  medical update, medications, MRSA colonization, progress in therapy,  and discharge to [place] for cranioplasty.  Discussed AR vs ZOHAIB post-cranioplasty.  All questions answered.  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 He is  s/p Left decompressive hemicraniectomy and right EVD placement 1/28/23. Hospital course further c/b respiratory failure s/p intubation and extubation,  agitation requiring precedex, sepsis s/p ABX, hypotension requiring bolus, dysphagia s/p PEG 2/14 and trach on 2/13, insomnia s/p seroquel, pain s/p oxycodone and fentanyl. Patient now with Right Hemiparesis, dysphagia s/p PEG, Aphasia, Cognitive deficits, gait Instability, ADL impairments and Functional impairments.    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    - ST: to diagnose and treat deficits in swallowing, cognition and communication.  - Helmet when OOB  - follow up CT head 3/19 reviewed-  Postoperative changes compatible with a left temporal frontal parietal craniectomy is again seen. left frontoparietal hematoma with some improvement.  improved vasogenic edema is seen. Mass effect on the left lateral ventricle and Left-to-right shift (3.1 mm)has now resolved.   - OT– right resting hand splint  - Cranioplasty w Dr. Cueva 4/6/23 2:15pm, confirmed with transfer center-- scheduled for 10:30am.     right HP-- Pt. requires a right custom AFO for support and stability during ambulation to improve safety and independence with ambulation and decrease fall risk. 	  Patient is unable to use a prefabricated brace as use of the orthosis will be for longer than six months and because the foot must be controlled in more than one plane       Sleep/Restlessness  --c/w Trazodone  100mg qhs 3/8-- for with sleep and restlessness.   Pt's mother reports he was on 50-100mg qhs and 50mg daytime home meds.   --cont. melatonin  --monitor  -- patient needs supervision for safety monitoring as pt. is impulsive, restless and high fall risk     # Agitation- provoked by hunger this morning, NPO for surgery  -- 1x 5mg IM Zyprexa  --Psychiatry consult, Dr. Acharya following  --cont. with  depakote 250mg bid  3/24  - depakote level =24 and ammonia level=21 3/27--nontoxic  -- c/w gabapentin  600 mg qhs  - Zyprexa 2.5 mg IM daily PRN severe agitation.   - Continue to monitor    Depression/anxiety  -- cont. Lexapro 10mg    - c/w gabapentin to 600 mg qhs for anxiety    Aphasia  -- c/w Donepezil 5 mg.    -cont. mematine 5mg BID-- tolerating well, speech improving    MRSA colonization--  -- Bactroban in nares BID x 5 days  -- chlorhexidine cloths  -- Mother called concerned -- provided reassurance 4/5 this is not infectious and only colonization and being adequately treated.         Bilateral lower extremity DVTs–  – Lower extremity Doppler from 3/1–right femoral vein DVT and bilateral lower extremity calf DVTs  – spoke with patient's neurosurgery PA–who was in contact with Dr. Cueva patient's neurosurgeon–gave clearance for patient to start therapeutic Lovenox.  --Vascular Surgery consult -- reached out to Dr. Hodges 3/7 - nothing further to do if patient is on therapeutic lovenox.   -- C/w therapeutic lovenox for 3 months. HELD FOR SURGERY    Orthostatic hypotension–  -cont. midodrine 10mg     #Acute Respiratory Failure  - Intubation and extubation  - s/p Trach 2/13  - Decannulated 3/5, Pulm monitoring, tolerating well    #GI PPX  - Nexium 40mg daily    #DM II  - A1c 6.0  -management as per hospitalist--off insulin     #Pain management  - Tylenol PRN  - Neurontin cw 600 hs --       #Bowel Regimen  - Senna, miralax PRN    #Bladder management  -voiding well with low PVRs,   -- off doxazosin 3/2    #Dysphagia  --NPO today for surgery  - s/p PEG 2/14  - Diet -- Soft bite sized thin liquids  3/30--d/w Speech therapy-- 1:1 assist with meals  - SLP: evaluation and treatment  - Diet: tolerating bolus TF     #Skin:  - Skin on admission: IAD to bilateral groin and sacrum; RUQ incision with sutures with palpable skull flap; Left hemicraniectomy with sutures MILLER; psoriasis BL shin  - Pressure injury/Skin: Turn Q2hrs while in bed, OOB to Chair, PT/OT     #Precaution  - Fall, Aspiration, seizure    #home meds  - per list provided by Laura yang: Rousuvastatin 40mg daily and Paroxetine 30mg daily  -  cw paroxetine  - cw high intensity atorvastatin to home med dose    – IDT 3/28:  Social work:  patient on discharge can live with his mother who has a first-floor set up.  Nursing–incontinent of bowel and bladder, total assist with toileting  Speech: Diet upgraded to soft bite-size;  severe language deficits–poor command following, poor orientation, improve repetition says family members names, some increased spontaneous content of speech, Speech jargon, Aphasia, severe cognitive deficits  OT: Gradual progress–supervision eating/grooming; mod assist–upper body dressing; max assist–lower body dressing; total assist–toileting, no longer using [place] transfers.  Improved sitting balance.  PT: Transfers–stand pivot–mod assist x1 (fluctuates); bed mobility–mod assist supine to sit; sit to stand at rail–mod assist; ambulates 10 feet x 2 mod assist and wheelchair follow at Left HR–initiating hip flexion to advance right lower extremity.   goals-- mod assist with transfers, ambulation 10 feet max assist with hemiwalker, wheelchair mobility 50 feet min assist.  ELOS: 4/6  to [place] for Cranioplasty  Goals:  1.  Improved secretion management and tolerate PMV  2.  transfers with 1 person assist  3.  Communicate basic wants and needs with yes no answers     BOTH ASPIRIN AND LOVENOX HELD FOR SURGERY starting PM 4/3.       ** Spoke with pt's  _mother, Georgia___, to provide update on pt's status,  medical update, medications, MRSA colonization, progress in therapy,  and discharge to [place] for cranioplasty.  Discussed AR vs ZOHAIB post-cranioplasty.  All questions answered.

## 2023-04-20 ENCOUNTER — EMERGENCY (EMERGENCY)
Facility: HOSPITAL | Age: 43
LOS: 1 days | Discharge: HOME | End: 2023-04-20
Attending: INTERNAL MEDICINE | Admitting: INTERNAL MEDICINE
Payer: MEDICAID

## 2023-04-20 VITALS
WEIGHT: 190.04 LBS | SYSTOLIC BLOOD PRESSURE: 135 MMHG | RESPIRATION RATE: 18 BRPM | OXYGEN SATURATION: 97 % | HEIGHT: 75 IN | HEART RATE: 133 BPM | DIASTOLIC BLOOD PRESSURE: 84 MMHG | TEMPERATURE: 98 F

## 2023-04-20 VITALS
SYSTOLIC BLOOD PRESSURE: 123 MMHG | HEART RATE: 104 BPM | DIASTOLIC BLOOD PRESSURE: 80 MMHG | TEMPERATURE: 98 F | OXYGEN SATURATION: 98 % | RESPIRATION RATE: 19 BRPM

## 2023-04-20 PROCEDURE — 99285 EMERGENCY DEPT VISIT HI MDM: CPT

## 2023-04-20 PROCEDURE — 99284 EMERGENCY DEPT VISIT MOD MDM: CPT

## 2023-04-20 PROCEDURE — 93010 ELECTROCARDIOGRAM REPORT: CPT

## 2023-04-20 PROCEDURE — 93005 ELECTROCARDIOGRAM TRACING: CPT

## 2023-04-20 PROCEDURE — 70450 CT HEAD/BRAIN W/O DYE: CPT | Mod: 26,MA

## 2023-04-20 PROCEDURE — 70450 CT HEAD/BRAIN W/O DYE: CPT | Mod: MA

## 2023-04-20 RX ORDER — DIAZEPAM 5 MG
5 TABLET ORAL ONCE
Refills: 0 | Status: DISCONTINUED | OUTPATIENT
Start: 2023-04-20 | End: 2023-04-20

## 2023-04-20 RX ORDER — OLANZAPINE 15 MG/1
10 TABLET, FILM COATED ORAL ONCE
Refills: 0 | Status: COMPLETED | OUTPATIENT
Start: 2023-04-20 | End: 2023-04-20

## 2023-04-20 RX ADMIN — Medication 2 MILLIGRAM(S): at 19:20

## 2023-04-20 RX ADMIN — OLANZAPINE 10 MILLIGRAM(S): 15 TABLET, FILM COATED ORAL at 20:49

## 2023-04-20 RX ADMIN — Medication 5 MILLIGRAM(S): at 17:32

## 2023-04-20 NOTE — ED PROVIDER NOTE - PATIENT PORTAL LINK FT
You can access the FollowMyHealth Patient Portal offered by [facility] by registering at the following website: [email or web address]. By joining [facility]’s FollowMyHealth portal, you will also be able to view your health information using other applications (apps) compatible with our system.

## 2023-04-20 NOTE — ED PROVIDER NOTE - NSFOLLOWUPINSTRUCTIONS_ED_ALL_ED_FT
Follow up with your PMD within 1-2 days.  Rest, increase your fluids, advance your activity as tolerated.   Take all of your other medications as previously prescribed.   Worsening, continued or ANY new concerning symptoms return to the emergency department.  Head injury precautions recommend to use the helmet On all the time

## 2023-04-20 NOTE — ED ADULT TRIAGE NOTE - CHIEF COMPLAINT QUOTE
Pt BIBA from the [facility] unwitnessed fall, was found on floor by staff without helmet on, h/o brain bleed with craniotomy in the past, pt with expressive aphasia

## 2023-04-20 NOTE — ED PROVIDER NOTE - PHYSICAL EXAMINATION
General:     NAD, well-nourished, well-appearing  Head:     NC/AT, EOMI, oral mucosa moist Positive scar on the left parietal area  Neck:     trachea midline  Lungs:     CTA b/l, no w/r/r  CVS:     S1S2, RRR, no m/g/r  Abd:     +BS, s/nt/nd, no organomegaly  Ext:    2+ radial and pedal pulses, no c/c/e  Neuro: AAOx3, no sensory/motor deficits

## 2023-04-20 NOTE — ED ADULT TRIAGE NOTE - HISTORY OF COVID-19 VACCINATION
[address] [facility] 
 
 
 [address] [facility] 100[address] [facility] 
[phone] Patient: Lauryn Peralta MRN: [id number] TQN:[birth year] Visit Information Date & Time Provider Department Dept. Phone Encounter #  
 9/7/2018  2:15 PM Melba Kelly MD [facility] [address] [id number] Upcoming Health Maintenance Date Due Influenza Age 5 to Adult 8/1/2018 DTaP/Tdap/Td series (2 - Td) 6/28/2026 Allergies as of 9/7/2018  Review Complete On: 9/7/2018 By: Kimberly Downey LPN Severity Noted Reaction Type Reactions Tiger Balm [Camphor-menthol]  05/31/2011    Contact Dermatitis Current Immunizations  Reviewed on 6/28/2017 Name Date Td, Adsorbed PF 8/21/2013  6:17 PM  
 Tdap 6/28/2016 Not reviewed this visit You Were Diagnosed With   
  
 Codes Comments Tinnitus of right ear    -  Primary ICD-10-CM: H93.11 ICD-9-CM: 388.30 Dysfunction of right eustachian tube     ICD-10-CM: H69.81 ICD-9-CM: 381.81 Vitals BP Pulse Temp Resp Height(growth percentile) Weight(growth percentile) 142/86 (BP 1 Location: Left arm, BP Patient Position: Sitting) 71 98.8 °F (37.1 °C) (Oral) 18 6' (1.829 m) 208 lb 4 oz (94.5 kg) SpO2 BMI Smoking Status 96% 28.24 kg/m2 Never Smoker BMI and BSA Data Body Mass Index Body Surface Area  
 28.24 kg/m 2 2.19 m 2 Preferred Pharmacy Pharmacy Name Phone [id number], [address] [place] [address] [phone] Your Updated Medication List  
  
   
This list is accurate as of 9/7/18  3:01 PM.  Always use your most recent med list.  
  
  
  
  
 CALCIUM 600 + D 600-125 mg-unit Tab Generic drug:  calcium-cholecalciferol (d3) Take  by mouth. fexofenadine-pseudoephedrine 180-240 mg per tablet Commonly known as:  ALLEGRA-D 24 Take 1 Tab by mouth daily. glucosamine-chondroitin 500-400 mg Cap Commonly known as:  20 [facility] Take 1 Cap by mouth daily. ibuprofen 200 mg tablet Commonly known as:  MOTRIN Take  by mouth. MAGNESIUM CITRATE PO Take  by mouth.  
  
 multivitamin tablet Commonly known as:  ONE A DAY Take 1 Tab by mouth daily. PROBIOTIC 4X 10-15 mg Tbec Generic drug:  B.infantis-B.ani-B.long-B.bifi Take  by mouth.  
  
 scopolamine 1 mg over 3 days Pt3d Commonly known as:  TRANSDERM-SCOP  
1 Patch by TransDERmal route every seventy-two (72) hours. tretinoin 0.05 % topical cream  
Commonly known as:  RETIN-A  
APPLY TO AFFECTED AREA NIGHTLY  
  
 VITAMIN B-12 1,000 mcg tablet Generic drug:  cyanocobalamin Take 1,000 mcg by mouth daily. VITAMIN D3 1,000 unit Cap Generic drug:  cholecalciferol Take  by mouth daily. zinc 50 mg Tab tablet Take  by mouth daily. Prescriptions Printed Refills  
 fexofenadine-pseudoephedrine (ALLEGRA-D 24) 180-240 mg per tablet 0 Sig: Take 1 Tab by mouth daily. Class: Print Route: Oral  
  
Patient Instructions Eustachian Tube Problems: Care Instructions Your Care Instructions The eustachian (say \"you-STAY-shee-un\") tubes run between the inside of the ears and the throat. They keep air pressure stable in the ears. If your eustachian tubes become blocked, the air pressure in your ears changes. The fluids from a cold can clog eustachian tubes, causing pain in the ears. A quick change in air pressure can cause eustachian tubes to close up. This might happen when an airplane changes altitude or when a  goes up or down underwater. Eustachian tube problems often clear up on their own or after antibiotic treatment. If your tubes continue to be blocked, you may need surgery. Follow-up care is a key part of your treatment and safety.  Be sure to make and go to all appointments, and call your doctor if you are having problems. It's also a good idea to know your test results and keep a list of the medicines you take. How can you care for yourself at home? · To ease ear pain, apply a warm washcloth or a heating pad set on low. There may be some drainage from the ear when the heat melts earwax. Put a cloth between the heat source and your skin. Do not use a heating pad with children. · If your doctor prescribed antibiotics, take them as directed. Do not stop taking them just because you feel better. You need to take the full course of antibiotics. · Your doctor may recommend over-the-counter medicine. Be safe with medicines. Oral or nasal decongestants may relieve ear pain. Avoid decongestants that are combined with antihistamines, which tend to cause more blockage. But if allergies seem to be the problem, your doctor may recommend a combination. Be careful with cough and cold medicines. Don't give them to children younger than 6, because they don't work for children that age and can even be harmful. For children 6 and older, always follow all the instructions carefully. Make sure you know how much medicine to give and how long to use it. And use the dosing device if one is included. When should you call for help? Call your doctor now or seek immediate medical care if: 
  · You develop sudden, complete hearing loss.  
  · You have severe pain or feel dizzy.  
  · You have new or increasing pus or blood draining from your ear.  
  · You have redness, swelling, or pain around or behind the ear.  
 Watch closely for changes in your health, and be sure to contact your doctor if: 
  · You do not get better after 2 weeks.  
  · You have any new symptoms, such as itching or a feeling of fullness in the ear. Where can you learn more? Go to http://regina-karan.info/. Enter Y822 in the search box to learn more about \"Eustachian Tube Problems: Care Instructions. \" Current as of: May 12, 2017 Content Version: 11.7 © 1369-2409 [employer], [employer]. Care instructions adapted under license by [employer] (which disclaims liability or warranty for this information). If you have questions about a medical condition or this instruction, always ask your healthcare professional. [address] any warranty or liability for your use of this information. Introducing [facility]! Sophie Robbins introduces LOOKSIMA patient portal. Now you can access parts of your medical record, email your doctor's office, and request medication refills online. 1. In your internet browser, go to [email or web address] 2. Click on the First Time User? Click Here link in the Sign In box. You will see the New Member Sign Up page. 3. Enter your LOOKSIMA Access Code exactly as it appears below. You will not need to use this code after youve completed the sign-up process. If you do not sign up before the expiration date, you must request a new code. · LOOKSIMA Access Code: [email or web address] Expires: 12/6/2018  3:00 PM 
 
4. Enter the last four digits of your Social Security Number (xxxx) and Date of Birth (mm/dd/yyyy) as indicated and click Submit. You will be taken to the next sign-up page. 5. Create a LOOKSIMA ID. This will be your LOOKSIMA login ID and cannot be changed, so think of one that is secure and easy to remember. 6. Create a LOOKSIMA password. You can change your password at any time. 7. Enter your Password Reset Question and Answer. This can be used at a later time if you forget your password. 8. Enter your e-mail address. You will receive e-mail notification when new information is available in [address] 9. Click Sign Up. You can now view and download portions of your medical record. 10. Click the Download Summary menu link to download a portable copy of your medical information. If you have questions, please visit the Frequently Asked Questions section of the [employer] website. Remember, Deeplink is NOT to be used for urgent needs. For medical emergencies, dial 911. Now available from your iPhone and Android! Please provide this summary of care documentation to your next provider. Your primary care clinician is listed as Rico York. If you have any questions after today's visit, please call [phone]. Yes

## 2023-04-20 NOTE — ED PROVIDER NOTE - CLINICAL SUMMARY MEDICAL DECISION MAKING FREE TEXT BOX
43-year-old male brought in from by EMS status post think it is head injury is a good question I do not really have a decision patient did not have any protection on head History of intracranial bleed with right-sided weakness patient also was seen to be very anxious and mentions.  Patient was noted to have some muscle fasciculations in the right leg  CT brain postoperative changes no new bleed patient was agitated so had to be sedated with Ativan 2 mg IM

## 2023-04-20 NOTE — ED ADULT NURSE NOTE - NSIMPLEMENTINTERV_GEN_ALL_ED
Implemented All Fall Risk Interventions:  [place] to call system. Call bell, personal items and telephone within reach. Instruct patient to call for assistance. Room bathroom lighting operational. Non-slip footwear when patient is off stretcher. Physically safe environment: no spills, clutter or unnecessary equipment. Stretcher in lowest position, wheels locked, appropriate side rails in place. Provide visual cue, wrist band, yellow gown, etc. Monitor gait and stability. Monitor for mental status changes and reorient to person, place, and time. Review medications for side effects contributing to fall risk. Reinforce activity limits and safety measures with patient and family.

## 2023-04-20 NOTE — ED ADULT NURSE NOTE - OBJECTIVE STATEMENT
Pt BIB EMS from [facility] s/p unwitnessed fall. Pt with h/o intracranial hemorrhage in January s/p craniotomy. Pt supposed to be wearing a helmet, however when pt was found on floor he did not have helmet on, per family. Pt with expressive aphasia. No vomiting in ED. Pt is awake and alert.

## 2023-04-20 NOTE — ED PROVIDER NOTE - OBJECTIVE STATEMENT
43-year-old male brought in from by EMS status post think it is head injury is a good question I do not really have a decision patient did not have any protection on head History of intracranial bleed with right-sided weakness patient also was seen to be very anxious and mentions.  Patient was noted to have some muscle fasciculations in the right leg

## 2023-07-06 ENCOUNTER — APPOINTMENT (OUTPATIENT)
Dept: PHYSICAL MEDICINE AND REHAB | Facility: CLINIC | Age: 43
End: 2023-07-06
Payer: MEDICAID

## 2023-07-06 VITALS
OXYGEN SATURATION: 96 % | DIASTOLIC BLOOD PRESSURE: 85 MMHG | SYSTOLIC BLOOD PRESSURE: 122 MMHG | TEMPERATURE: 94.8 F | HEART RATE: 103 BPM

## 2023-07-06 DIAGNOSIS — R47.01 APHASIA: ICD-10-CM

## 2023-07-06 PROCEDURE — 99214 OFFICE O/P EST MOD 30 MIN: CPT | Mod: GC

## 2023-07-10 RX ORDER — ONABOTULINUMTOXINA 100 [USP'U]/1
100 INJECTION, POWDER, LYOPHILIZED, FOR SOLUTION INTRADERMAL; INTRAMUSCULAR
Qty: 5 | Refills: 0 | Status: ACTIVE | OUTPATIENT
Start: 2023-07-10

## 2023-07-10 NOTE — REVIEW OF SYSTEMS
[Joint Stiffness] : joint stiffness [Muscle Weakness] : muscle weakness [Skin Rash] : skin rash [Difficulty Walking] : difficulty walking [Patient Intake Form Reviewed] : Patient intake form was reviewed [Fever] : no fever [Chills] : no chills [Chest Pain] : no chest pain [Shortness Of Breath] : no shortness of breath [Joint Pain] : no joint pain [de-identified] : chronic psoriasis overdue for Skyrizi

## 2023-07-10 NOTE — PHYSICAL EXAM
[FreeTextEntry1] : General: seated in no acute distress\par Eyes: anicteric\par CV: reg tachycardia\par Resp: normal effort on room air\par Skin: diffuse psoriatic plaques; warm and dry\par Psych: appropriate mood and affect\par \par MS: alert; oriented to self, place, situation; follows commands; expressive aphasia\par \par Tone: L side 0 on modified Kathy; RUE\par shoulder adductors 1,\par elbow flexors/extensors 1, \par supinators 1+/2, pronators 0,\par wrist flexors 2-2+, \par FDS 2+ in wrist flexion / 3+ in wrist extension, FDP 2 in wrist flexion / 3+ in wrist extension,\par MCP 2-2+,\par FPL 1 in wrist flexion / 2 in wrist extension, \par FPB 0; \par \par RLE plantar flexors 3, inverters 0\par \par Strength: L side 5/5 throughout; R shoulder abduction <3/5, elbow flexion <3/5, hip flexion <3/5, knee extension 4+/5, plantar/dorsiflexion/eversion/inversion <3/5\par \par Gait: consistent shallow R heel strike; R knee hyperextension in stance phase, improved w/ addition of heel lift

## 2023-07-10 NOTE — HISTORY OF PRESENT ILLNESS
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 \par \par Discharged home from [facility] 05/11. Exhausted home therapy visits provided by insurance. Now working and progressing w/ private home PT/OT daily and SLP 2-3x/wk. Interested in [facility] outpt program.\par \par Currently w/ minimal use of RUE d/t spasticity. Obtained SaeboStretch splint per OT recommendation, but currently unable to wear d/t excessive R hand tone. No significant pain endorsed.\par \par Able to walk ~20min w/ R custom AFO, NBQC, and supervision for distraction/impulsive behavior. Custom AFO revised once and now fitting well w/o complaint. No falls. Increased difficulty navigating turns and stairs.\par \par Independent for transfers, eating, grooming. ModA required for upper/body dress.

## 2023-07-10 NOTE — ASSESSMENT
[FreeTextEntry1] : 44yo M w/ h/o L frontal + BG IPH s/p cranioplasty, presenting for significant R sided spasticity preventing use of right resting hand splint. Will trial Botox to wrist and finger flexors to allow use of SaeboStretch splint. Noted significant R knee hyperextension during stance phase of gait, improved w/ addition of heel lift. Rx'ed 3/8" R heel lift and 1/2" L heel lift. Will also trial Botox to R gastroc to reduce equinovarus synergy to reduce GR and ease of donning AFO. Discussed that pt would likely benefit larger equipment availability at outpt therapy compared to home therapies. Rx provided to start outpt PT + OT + SLP at [facility]. Plan to f/u for Botox injections as below, pending insurance authorization. Will then reassess utility of SaeboStretch vs other splint.\par \par Proposed Botox regimen: \par \par Right FCR-------------------- 60 units \par Right FCU-------------------- 60 units \par Right FDS-------------------- 90 units \par Right FDP-------------------- 70 units  \par Right lumbricals------------- 40 units  \par Right medial gastroc-------- 70 units \par Right lateral gastroc-------- 70 units \par Right soleus----------------- 40 units \par \par Total ---------------------------500 units\par \par I spent a total of 45 minutes on the date of the encounter evaluating and treating the patient including a discussion of treatment options.

## 2023-08-17 ENCOUNTER — APPOINTMENT (OUTPATIENT)
Dept: PHYSICAL MEDICINE AND REHAB | Facility: CLINIC | Age: 43
End: 2023-08-17

## 2023-09-27 ENCOUNTER — APPOINTMENT (OUTPATIENT)
Dept: PHYSICAL MEDICINE AND REHAB | Facility: CLINIC | Age: 43
End: 2023-09-27
Payer: MEDICAID

## 2023-09-27 PROCEDURE — 95874 GUIDE NERV DESTR NEEDLE EMG: CPT

## 2023-09-27 PROCEDURE — 64643 CHEMODENERV 1 EXTREM 1-4 EA: CPT

## 2023-09-27 PROCEDURE — 64644 CHEMODENERV 1 EXTREM 5/> MUS: CPT

## 2023-12-13 ENCOUNTER — APPOINTMENT (OUTPATIENT)
Dept: PHYSICAL MEDICINE AND REHAB | Facility: CLINIC | Age: 43
End: 2023-12-13
Payer: MEDICAID

## 2023-12-13 PROCEDURE — 99214 OFFICE O/P EST MOD 30 MIN: CPT

## 2023-12-13 RX ORDER — ONABOTULINUMTOXINA 100 [USP'U]/1
100 INJECTION, POWDER, LYOPHILIZED, FOR SOLUTION INTRADERMAL; INTRAMUSCULAR
Qty: 4 | Refills: 0 | Status: ACTIVE | OUTPATIENT
Start: 2023-12-13

## 2023-12-13 NOTE — REVIEW OF SYSTEMS
[Joint Stiffness] : joint stiffness [Muscle Weakness] : muscle weakness [Difficulty Walking] : difficulty walking [Negative] : Respiratory [Fever] : no fever [Chills] : no chills [Joint Pain] : no joint pain [de-identified] : chronic psoriasis

## 2023-12-13 NOTE — ASSESSMENT
[FreeTextEntry1] : Patient is a 42yo M w/ h/o HTN, mood disorder, psoriasis, and 01/2023 L frontal + BG IPH with resultant right spastic hemiparesis and gait impairments noted above including genu recurvatum.  Prescription provided for a right heel lift- 1/2" and left outer sole lift- 1 inch.  Patient's narrow-base quad cane is missing rubber knobs and prescription provided for a new narrow-base quad cane.  Discussed tight heel cord with patient with good tone reduction in the available range of motion.  Prescription provided for a right DynoPro PRAFO to be worn only in bed at night to provide stretch to the Achilles tendon.  Patient's wife expressed interest in the ZEALER L300, prescription provided for a trial of the device.  Patient demonstrates good tone reduction at the right hand however patient uses the tone in the fingers to grasp objects.  Discussed option of lowering dose but they prefer to drop out the long finger flexors from the protocol and just continue with the wrist flexors for now.  Will add the tibialis posterior to reduce equina varus posturing.  New Botox injection protocol:  Right FCR------------------------------ 60 units Right FCU------------------------------ 60 units Right medial gastrocnemius-------- 75 units Right lateral gastrocnemius--------- 75 units Right soleus----------------------------- 50 units Right tibialis posterior----------------- 80 units  Total ------------------------------------- 400 units  I spent a total of 30 minutes on the date of the encounter evaluating and treating the patient including a discussion of treatment options.

## 2023-12-13 NOTE — HISTORY OF PRESENT ILLNESS
Ok to switch back but may need PA.    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  Patient white wife reports that the hand has become excessively flaccid.  Patient is tolerating his resting hand splint very well, no pain complaints, no skin breakdown.  Though patient does not have voluntary movement of the hand the patient was able to  objects with his tone so that he could use his other hand more effectively.  They feel no difference of the stiffness at the ankle.  No falls reported since last visit.  Functionally the patient is ambulating with a NBQC/right SAFO in the home independently, independent on stairs, independent transfers.

## 2023-12-13 NOTE — PHYSICAL EXAM
[FreeTextEntry1] : General: seated in no acute distress Eyes: anicteric Skin: diffuse psoriatic plaques; warm and dry  MS: alert; oriented to self, place, situation; follows commands; expressive aphasia  Motor: Left upper extremity/left lower extremity: Tone normal, active range of motion within functional limits with 5/5 motor power throughout.  Right upper extremity: Synergistic shoulder flexion/elbow flexion <3/5mp, no volitional movement at the wrist and hand. Tone: shoulder adductors 1, elbow flexors/extensors 1,  pronators MAS=1, wrist flexors 2-2+,  FDS MAS=0 in wrist flexion / 2 in wrist extension FDP MAS=0 in wrist flexion / 1+ in wrist extension, MCP MAS 0-1, FPL MAS=1 in wrist flexion / 2 in wrist extension,  FPB MAS=0;   RLE: Hip flexion 3 -/5 MP, knee extension 4+/5 MP, ankle dorsiflexion/plantarflexion/inversion 0/5 MP.   Tone: Ankle plantar flexors 0-1, inverters 0-1, equinovarus posturing noted. Passive ankle dorsiflexion to neutral with the knee flexed, shy of neutral with the knee extended.   Functional status: Sit to stand transfer independent.  Patient ambulated with a narrow-base quad cane and right SAFO with good heel strike and genu recurvatum noted.  Genu recurvatum was significantly reduced with 1/2 inch heel lift on the right.  Clearance of the right lower extremity was improved with a Darco shoe on the left.

## 2024-01-24 ENCOUNTER — APPOINTMENT (OUTPATIENT)
Dept: PHYSICAL MEDICINE AND REHAB | Facility: CLINIC | Age: 44
End: 2024-01-24
Payer: MEDICAID

## 2024-01-24 VITALS
SYSTOLIC BLOOD PRESSURE: 109 MMHG | RESPIRATION RATE: 14 BRPM | TEMPERATURE: 97.9 F | HEART RATE: 68 BPM | DIASTOLIC BLOOD PRESSURE: 68 MMHG | OXYGEN SATURATION: 98 %

## 2024-01-24 PROCEDURE — 95874 GUIDE NERV DESTR NEEDLE EMG: CPT

## 2024-01-24 PROCEDURE — 64642 CHEMODENERV 1 EXTREMITY 1-4: CPT

## 2024-01-24 PROCEDURE — 64643 CHEMODENERV 1 EXTREM 1-4 EA: CPT

## 2024-01-24 NOTE — PROCEDURE
[Consent] : consent was given by patient or guardian [Site Verification] : the injection site was verified [Post-Injection Instructions Provided] : post-injection instructions were provided [Total Units: ___] : [unfilled] units [Total Waste: ___] : with [unfilled] wasted [Total Vials: ___] : [unfilled] vials were used [de-identified] : Procedural note for Botox injection:  Under sterile conditions the following muscles were injected with Botox in a 2 mL dilution and with EMG guidance:  Right FCR-------------------------- 60 units (3 sites) Right FCU-------------------------- 60 units (3 sites) Right tibialis posterior------------ 80 units (3 sites) Right medial gastrocnemius--- 70 units (3 sites) Right lateral gastrocnemius---- 70 units (3 sites) Right soleus------------------------ 40 units (3 sites)  Total---------------------------------- 400 units

## 2024-01-24 NOTE — ASSESSMENT
[FreeTextEntry1] : 42yo M w/ h/o L frontal + BG IPH s/p cranioplasty with right spastic hemiparesis who underwent a Botox injections to the muscles listed above. Tolerated well.  Patient's mother mentions that OT would like to have the elbow assess for Botox next visit.

## 2024-01-26 ENCOUNTER — OUTPATIENT (OUTPATIENT)
Dept: OUTPATIENT SERVICES | Facility: HOSPITAL | Age: 44
LOS: 1 days | Discharge: STILL ADMITTED OR ADMINISTRATIVE | End: 2024-01-26
Payer: COMMERCIAL

## 2024-01-26 PROCEDURE — 99214 OFFICE O/P EST MOD 30 MIN: CPT

## 2024-02-18 DIAGNOSIS — F41.9 ANXIETY DISORDER, UNSPECIFIED: ICD-10-CM

## 2024-02-27 ENCOUNTER — APPOINTMENT (OUTPATIENT)
Dept: PHYSICAL MEDICINE AND REHAB | Facility: CLINIC | Age: 44
End: 2024-02-27
Payer: MEDICAID

## 2024-02-27 PROCEDURE — 99213 OFFICE O/P EST LOW 20 MIN: CPT

## 2024-02-27 RX ORDER — ONABOTULINUMTOXINA 100 [USP'U]/1
100 INJECTION, POWDER, LYOPHILIZED, FOR SOLUTION INTRADERMAL; INTRAMUSCULAR
Qty: 5 | Refills: 0 | Status: ACTIVE | OUTPATIENT
Start: 2024-02-27

## 2024-02-27 NOTE — PHYSICAL EXAM
[FreeTextEntry1] : General: Patient in no apparent distress.  Patient is awake, alert and follows two-step commands.  Cooperative. HEENT: Normocephalic, atraumatic.  MMM. Skin: diffuse psoriatic plaques; warm and dry  MS: alert; oriented to self, place, situation; follows commands; expressive aphasia  Motor: Left upper extremity/left lower extremity: Tone normal, active range of motion within functional limits with 5/5 motor power throughout.  Right upper extremity: Synergistic shoulder flexion/elbow flexion <3/5mp, no volitional movement at the wrist and hand. Tone: shoulder adductors 1, elbow flexors/extensors 1,  pronators MAS=1, wrist flexors 1+-2, passive wrist extension to 60 degrees.   FDS MAS=0 in wrist flexion / 2 in wrist extension FDP MAS=0 in wrist flexion / 1+ in wrist extension, MCP MAS 2, FPL MAS=1 in wrist flexion / 2 in wrist extension,  FPB MAS=0;   RLE: Hip flexion 3 -/5 MP, knee extension 4+/5 MP, ankle dorsiflexion/plantarflexion/inversion 0/5 MP.   Tone: Ankle plantar flexors MAS = 1, inverters MAS = 1, minimal equinovarus posturing noted. Passive ankle dorsiflexion to neutral with the knee flexed, shy of neutral with the knee extended.  Ankle clonus present.   Functional status: Sit to stand transfer independent.  Patient ambulated with a narrow-base quad cane and right SAFO with shallow heel strike and genu recurvatum noted.  Patient is not using his 1/2" heel lifts.  Patient noted with some equina varus within the brace.

## 2024-02-27 NOTE — REVIEW OF SYSTEMS
[Joint Stiffness] : joint stiffness [Muscle Weakness] : muscle weakness [Difficulty Walking] : difficulty walking [Negative] : Respiratory [Fever] : no fever [Chills] : no chills [Joint Pain] : no joint pain [de-identified] : chronic psoriasis

## 2024-02-27 NOTE — HISTORY OF PRESENT ILLNESS
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  The long finger flexors and lumbricals were dropped out this last Botox injection and patient and family feel that the increased tone in the fingers allows him to better hold onto objects with his hand.  No pain complaints, no hygiene issues.  Patient is tolerating his resting hand splint very well, no pain complaints, no skin breakdown though questionable compliance.  They feel no difference of the stiffness at the ankle.  No falls reported since last visit.  Functionally the patient is ambulating with a NBQC/right SAFO in the home independently, independent on stairs, independent transfers.  Patient and spouse report some turning of his foot within the brace and has twice developed some skin ulceration on the lateral aspect of his lower leg with the varus control portion of the braces.  Currently no skin breakdown.  Patient currently should be using half inch heel lifts bilaterally until an outer sole lift his left added to the left shoe of 1 inch.

## 2024-02-27 NOTE — ASSESSMENT
[FreeTextEntry1] : Patient is a 42yo M w/ h/o HTN, mood disorder, psoriasis, and 01/2023 L frontal + BG IPH with resultant right spastic hemiparesis and gait impairments noted above including genu recurvatum.  Encourage patient to be compliant with his bilateral 1/2 inch heel lifts until he obtains his left outer sole lift- 1 inch to help minimize genu recurvatum.  Encouraged patient and spouse to purchase long cotton socks so that the plastic of the brace is not directly against the skin.  Will increase the dosage to the tibialis posterior and gastrocsoleus muscles to further reduce equinovarus posturing and potentially impact genu recurvatum. New Botox injection protocol:  Right FCR------------------------------ 60 units Right FCU------------------------------ 60 units Right medial gastrocnemius-------- 100 units Right lateral gastrocnemius--------- 100 units Right soleus----------------------------- 60 units Right tibialis posterior----------------- 110 units  Total ------------------------------------- 490 units  I spent a total of 20 minutes on the date of the encounter evaluating and treating the patient including a discussion of treatment options.

## 2024-04-30 ENCOUNTER — APPOINTMENT (OUTPATIENT)
Dept: PHYSICAL MEDICINE AND REHAB | Facility: CLINIC | Age: 44
End: 2024-04-30

## 2024-05-14 NOTE — DIETITIAN INITIAL EVALUATION ADULT - PERTINENT MEDS FT
History and Physical      Name:  Kesha Lazo /Age/Sex: 1964  (60 y.o. female)   MRN & CSN:  [id number] & [id number] Admission Date/Time: 2024  2:43 PM   Location:   PCP: Iliana Costello PA       Hospital Day: 1    Assessment and Plan:   Kesha Lazo is a 60 y.o.  female with past medical history of hypertension, came to the emergency room complaining of right lower quadrant abdominal pain that started this afternoon.    CT abdomen/pelvis  Obstructing 5 mm calculus in the distal right ureter at the right ureterovesicular junction. Mild right hydronephrosis.    Assessment    Right lower quadrant likely secondary to obstructing ureteral colic  Nausea/vomiting likely secondary to above  Hypertension    Plan    Start IV fluids  Start Flomax  N.p.o. past midnight  Urology consult  Morphine/Toradol IV as needed for pain  IV Zofran/Compazine as needed for nausea/vomiting  Continue amlodipine    Patient's family is at bedside    Diet Regular   DVT Prophylaxis [x] Lovenox, []  Heparin, [] SCDs, [] Ambulation   GI Prophylaxis [] PPI,  [] H2 Blocker,  [] Carafate,  [] Diet/Tube Feeds   Code Status Full Code   Disposition Patient requires continued admission due to urology eval   MDM [] Low, [] Moderate,[x]  High  Patient's risk as above      History of Present Illness:     Chief Complaint: Renal colic    Kesha Lazo is a 60 y.o.  female with past medical history of hypertension, came to the emergency room complaining of right lower quadrant abdominal pain radiating to the groin that started this afternoon.  Denies any recent fever, chills, cough, shortness of breath, chest pain, diarrhea or constipation. Reports nausea/vomiting.  Denies any prior history of renal stones.      Objective:   No intake or output data in the 24 hours ending 24 1607   Vitals:   Vitals:    24 1543   BP: (!) 160/81   Pulse: 63   Resp: 16   Temp:    SpO2: 99%     Physical Exam:   General-moderate distress 
MEDICATIONS  (STANDING):  AQUAPHOR (petrolatum Ointment) 1 Application(s) Topical two times a day  dextrose 5%. 1000 milliLiter(s) (100 mL/Hr) IV Continuous <Continuous>  dextrose 5%. 1000 milliLiter(s) (50 mL/Hr) IV Continuous <Continuous>  dextrose 50% Injectable 25 Gram(s) IV Push once  dextrose 50% Injectable 12.5 Gram(s) IV Push once  dextrose 50% Injectable 25 Gram(s) IV Push once  doxazosin 2 milliGRAM(s) Oral at bedtime  gabapentin Solution 600 milliGRAM(s) Oral at bedtime  glucagon  Injectable 1 milliGRAM(s) IntraMuscular once  heparin   Injectable 5000 Unit(s) SubCutaneous every 8 hours  insulin glargine Injectable (LANTUS) 7 Unit(s) SubCutaneous at bedtime  insulin lispro (ADMELOG) corrective regimen sliding scale   SubCutaneous four times a day before meals  insulin lispro Injectable (ADMELOG) 7 Unit(s) SubCutaneous four times a day before meals  melatonin 6 milliGRAM(s) Oral at bedtime  nystatin Powder 1 Application(s) Topical two times a day  pantoprazole   Suspension 40 milliGRAM(s) Oral daily  polyethylene glycol 3350 17 Gram(s) Oral daily  QUEtiapine 50 milliGRAM(s) Oral at bedtime  senna Syrup 5 milliLiter(s) Oral at bedtime    MEDICATIONS  (PRN):  acetaminophen    Suspension .. 650 milliGRAM(s) Oral every 6 hours PRN Temp greater or equal to 38C (100.4F), Mild Pain (1 - 3)  dextrose Oral Gel 15 Gram(s) Oral once PRN Blood Glucose LESS THAN 70 milliGRAM(s)/deciliter  oxyCODONE    IR 5 milliGRAM(s) Oral every 6 hours PRN Severe Pain (7 - 10)

## 2024-05-15 ENCOUNTER — APPOINTMENT (OUTPATIENT)
Dept: PHYSICAL MEDICINE AND REHAB | Facility: CLINIC | Age: 44
End: 2024-05-15
Payer: MEDICAID

## 2024-05-15 PROCEDURE — 95874 GUIDE NERV DESTR NEEDLE EMG: CPT

## 2024-05-15 PROCEDURE — 64643 CHEMODENERV 1 EXTREM 1-4 EA: CPT

## 2024-05-15 PROCEDURE — 64642 CHEMODENERV 1 EXTREMITY 1-4: CPT

## 2024-05-15 RX ORDER — ONABOTULINUMTOXINA 100 [USP'U]/1
100 INJECTION, POWDER, LYOPHILIZED, FOR SOLUTION INTRADERMAL; INTRAMUSCULAR
Qty: 1 | Refills: 0 | Status: COMPLETED | OUTPATIENT
Start: 2024-05-15

## 2024-05-15 RX ADMIN — ONABOTULINUMTOXINA 0 UNIT: 100 INJECTION, POWDER, LYOPHILIZED, FOR SOLUTION INTRADERMAL; INTRAMUSCULAR at 00:00

## 2024-05-15 NOTE — PROCEDURE
[Consent] : consent was given by patient or guardian [Site Verification] : the injection site was verified [Post-Injection Instructions Provided] : post-injection instructions were provided [Total Units: ___] : [unfilled] units [Total Vials: ___] : [unfilled] vials were used [Total Waste: ___] : with [unfilled] wasted [de-identified] : Procedural note for Botox injection:  Under sterile conditions the following muscles were injected with Botox in a 2 mL dilution and with EMG guidance:  Right FCR-------------------------- 60 units (3 sites) Right FCU-------------------------- 60 units (3 sites) Right tibialis posterior------------ 110 units (4 sites) Right medial gastrocnemius--- 100 units (4 sites) Right lateral gastrocnemius---- 100 units (4 sites) Right soleus------------------------ 60 units (3 sites)  Total---------------------------------- 490 units

## 2024-05-15 NOTE — ASSESSMENT
[FreeTextEntry1] : 45yo M w/ h/o L frontal + BG IPH s/p cranioplasty with right spastic hemiparesis who underwent a Botox injections to the muscles listed above. Tolerated well.  Increased dosages were given to the tibialis posterior and gastrocnemius muscles.  Patient's mentions that OT would like to have the elbow assess for Botox next visit.

## 2024-07-02 ENCOUNTER — APPOINTMENT (OUTPATIENT)
Dept: PHYSICAL MEDICINE AND REHAB | Facility: CLINIC | Age: 44
End: 2024-07-02
Payer: MEDICAID

## 2024-07-02 DIAGNOSIS — G81.11 SPASTIC HEMIPLEGIA AFFECTING RIGHT DOMINANT SIDE: ICD-10-CM

## 2024-07-02 DIAGNOSIS — M21.861 OTHER SPECIFIED ACQUIRED DEFORMITIES OF RIGHT LOWER LEG: ICD-10-CM

## 2024-07-02 DIAGNOSIS — R26.1 PARALYTIC GAIT: ICD-10-CM

## 2024-07-02 PROCEDURE — 99213 OFFICE O/P EST LOW 20 MIN: CPT

## 2024-07-02 RX ORDER — ONABOTULINUMTOXINA 100 [USP'U]/1
100 INJECTION, POWDER, LYOPHILIZED, FOR SOLUTION INTRADERMAL; INTRAMUSCULAR
Qty: 6 | Refills: 0 | Status: ACTIVE | OUTPATIENT
Start: 2024-07-02

## 2024-08-20 ENCOUNTER — APPOINTMENT (OUTPATIENT)
Dept: PHYSICAL MEDICINE AND REHAB | Facility: CLINIC | Age: 44
End: 2024-08-20
Payer: MEDICAID

## 2024-08-20 DIAGNOSIS — G81.11 SPASTIC HEMIPLEGIA AFFECTING RIGHT DOMINANT SIDE: ICD-10-CM

## 2024-08-20 PROCEDURE — 95874 GUIDE NERV DESTR NEEDLE EMG: CPT

## 2024-08-20 PROCEDURE — 64643 CHEMODENERV 1 EXTREM 1-4 EA: CPT

## 2024-08-20 PROCEDURE — 64642 CHEMODENERV 1 EXTREMITY 1-4: CPT

## 2024-08-27 RX ORDER — ONABOTULINUMTOXINA 100 [USP'U]/1
100 INJECTION, POWDER, LYOPHILIZED, FOR SOLUTION INTRADERMAL; INTRAMUSCULAR
Qty: 1 | Refills: 0 | Status: COMPLETED | OUTPATIENT
Start: 2024-08-27

## 2024-08-27 RX ORDER — SODIUM CHLORIDE 9 MG/ML
0.9 INJECTION, SOLUTION INTRAMUSCULAR; INTRAVENOUS; SUBCUTANEOUS
Refills: 0 | Status: COMPLETED | OUTPATIENT
Start: 2024-08-27

## 2024-09-24 ENCOUNTER — APPOINTMENT (OUTPATIENT)
Dept: PHYSICAL MEDICINE AND REHAB | Facility: CLINIC | Age: 44
End: 2024-09-24
Payer: MEDICAID

## 2024-09-24 DIAGNOSIS — R26.1 PARALYTIC GAIT: ICD-10-CM

## 2024-09-24 DIAGNOSIS — G81.11 SPASTIC HEMIPLEGIA AFFECTING RIGHT DOMINANT SIDE: ICD-10-CM

## 2024-09-24 DIAGNOSIS — M21.861 OTHER SPECIFIED ACQUIRED DEFORMITIES OF RIGHT LOWER LEG: ICD-10-CM

## 2024-09-24 PROCEDURE — 99213 OFFICE O/P EST LOW 20 MIN: CPT

## 2024-09-24 NOTE — HISTORY OF PRESENT ILLNESS
[FreeTextEntry1] : "John" is a 45yo M w/ h/o HTN, mood disorder, psoriasis, and 01/2023 L frontal + BG IPH s/p hemicraniectomy + 05/2023 alloplastic cranioplasty w/ residual Right spastic hemiparesis, cognitive deficits, and expressive aphasia who underwent a Botox injection on August 20, 2024.  The long finger flexors were previously dropped out due to weakness and difficulty holding onto objects with his right hand.  Low-dose lumbricals were put back into the last Botox injection.  Patient reports decreased tone noted at the wrist and is tolerating his resting hand splint.  Patient reports occupational therapy would like me to evaluate his elbow tone for need for Botox.  No pain complaints, no hygiene issues.  Patient is tolerating his AFO, no skin issues.  Family reports decreased tone at the ankle, easier to don the brace, less foot inversion within the brace. No falls reported since last visit.  Functionally the patient is ambulating with a NBQC/right SAFO in the home independently, independent on stairs, independent transfers.   Patient currently should be using half inch heel lifts bilaterally until an outer sole lift his left added to the left shoe of 1 inch.

## 2024-09-24 NOTE — REVIEW OF SYSTEMS
[Joint Stiffness] : joint stiffness [Muscle Weakness] : muscle weakness [Difficulty Walking] : difficulty walking [Negative] : Respiratory [Fever] : no fever [Chills] : no chills [Joint Pain] : no joint pain [de-identified] : chronic psoriasis

## 2024-09-24 NOTE — ASSESSMENT
[FreeTextEntry1] : Patient is a 45yo M w/ h/o HTN, mood disorder, psoriasis, and 01/2023 L frontal + BG IPH with resultant right spastic hemiparesis and gait impairments noted above including genu recurvatum.  Encourage patient/family members to better quality heel lifts, website provided to purchase them.  To be compliant with his bilateral 1/2 inch heel lifts until he obtains his left outer sole lift- 1 inch to help minimize genu recurvatum.  Will continue the Botox injection protocol of August 20, 2024 (6 vials).  Clinically I do not see an indication for injecting his elbow flexors, my email was provided to the family to give to his occupational therapist and will confirm with them..    I spent a total of 20 minutes on the date of the encounter evaluating and treating the patient including a discussion of treatment options.

## 2024-09-24 NOTE — PHYSICAL EXAM
[FreeTextEntry1] : General: Patient in no apparent distress.  Patient is awake, alert and follows two-step commands.  Cooperative. HEENT: Normocephalic, atraumatic.  MMM. Skin: diffuse psoriatic plaques; warm and dry  MS: alert; oriented to self, place, situation; follows commands; expressive aphasia  Motor: Left upper extremity/left lower extremity: Tone normal, active range of motion within functional limits with 5/5 motor power throughout.  Right upper extremity: Synergistic shoulder flexion/elbow flexion <3/5mp, no volitional movement at the wrist and hand. Tone: shoulder adductors 1, elbow flexors/extensors 1+-2,  pronators MAS=1, full passive supination wrist flexors 1+, passive wrist extension to 70 degrees.  No significant tone at the end range of motion. FDS MAS=0-1 in wrist flexion / 3-3+ in wrist extension FDP MAS=0 in wrist flexion / 2+ in wrist extension, MCP MAS 2, FPL MAS=1 in wrist flexion / 2 in wrist extension,  FPB MAS=0;   RLE: Hip flexion 3-/5 MP, knee extension 4+/5 MP, ankle dorsiflexion/plantarflexion/inversion 0/5 MP.   Tone: Ankle plantar flexors MAS = 1, inverters MAS = 1, minimal equinovarus posturing noted. Passive ankle dorsiflexion to neutral with the knee flexed, shy of neutral with the knee extended.   Functional status: Sit to stand transfer independent.  Patient ambulated with a narrow-base quad cane and right SAFO with FF initial contact and genu recurvatum noted.  Patient is using poor quality 1/2" heel lifts.  Patient noted with mild equinovarus within the brace.

## 2024-12-16 NOTE — ED ADULT NURSE NOTE - NSSEPSISSUSPECTED_ED_A_ED
12/16/24                            Juventino Stafford   [facility] [id number]    To Whom It May Concern:    This is to certify Juventino Stafford was evaluated with Dennys Olvera MD on 12/16/24 and can return to school 12/17/24 and regular physical education on 12/24/24.                 Electronically signed by:  Dennys Olvera MD  [facility]  [address] [place] [id number]  Dept Phone: [phone]       
No